# Patient Record
Sex: MALE | Race: BLACK OR AFRICAN AMERICAN | Employment: OTHER | URBAN - METROPOLITAN AREA
[De-identification: names, ages, dates, MRNs, and addresses within clinical notes are randomized per-mention and may not be internally consistent; named-entity substitution may affect disease eponyms.]

---

## 2017-01-03 ENCOUNTER — TELEPHONE (OUTPATIENT)
Dept: ENDOCRINOLOGY | Age: 72
End: 2017-01-03

## 2017-01-03 NOTE — TELEPHONE ENCOUNTER
----- Message from Sebastián Lockwood MD sent at 12/31/2016  8:28 AM EST -----  Please let him know I received a note from the pre-operative clinic about guidance for insulin dosing prior to his surgery on 1/10/17. Please confirm how much lantus he is currently taking at bedtime. For safety, I will have him take 2 units less the night before his surgery and not take any humalog the morning of his surgery unless his sugar is over 200. If over 200, he can take 2 units of humalog that morning.  -----      Pt notified of above note per Dr. Karoline Robles and voiced understanding of what was read to him. Pt currently taking 20 units of lantus so he will decrease it by 2 as noted above the night prior to surgery.

## 2017-01-10 ENCOUNTER — ANESTHESIA (OUTPATIENT)
Dept: SURGERY | Age: 72
End: 2017-01-10
Payer: MEDICARE

## 2017-01-10 ENCOUNTER — ANESTHESIA EVENT (OUTPATIENT)
Dept: SURGERY | Age: 72
End: 2017-01-10
Payer: MEDICARE

## 2017-01-10 PROCEDURE — 74011250636 HC RX REV CODE- 250/636

## 2017-01-10 PROCEDURE — 77030008684 HC TU ET CUF COVD -B: Performed by: ANESTHESIOLOGY

## 2017-01-10 PROCEDURE — 74011000250 HC RX REV CODE- 250

## 2017-01-10 PROCEDURE — A9270 NON-COVERED ITEM OR SERVICE: HCPCS

## 2017-01-10 PROCEDURE — 74011636637 HC RX REV CODE- 636/637

## 2017-01-10 PROCEDURE — 77030019908 HC STETH ESOPH SIMS -A: Performed by: ANESTHESIOLOGY

## 2017-01-10 PROCEDURE — 74011000258 HC RX REV CODE- 258: Performed by: UROLOGY

## 2017-01-10 PROCEDURE — 77030013079 HC BLNKT BAIR HGGR 3M -A: Performed by: ANESTHESIOLOGY

## 2017-01-10 PROCEDURE — 77030026438 HC STYL ET INTUB CARD -A: Performed by: ANESTHESIOLOGY

## 2017-01-10 PROCEDURE — 74011250636 HC RX REV CODE- 250/636: Performed by: UROLOGY

## 2017-01-10 RX ORDER — SODIUM CHLORIDE, SODIUM LACTATE, POTASSIUM CHLORIDE, CALCIUM CHLORIDE 600; 310; 30; 20 MG/100ML; MG/100ML; MG/100ML; MG/100ML
INJECTION, SOLUTION INTRAVENOUS
Status: DISCONTINUED | OUTPATIENT
Start: 2017-01-10 | End: 2017-01-10 | Stop reason: HOSPADM

## 2017-01-10 RX ORDER — ROCURONIUM BROMIDE 10 MG/ML
INJECTION, SOLUTION INTRAVENOUS AS NEEDED
Status: DISCONTINUED | OUTPATIENT
Start: 2017-01-10 | End: 2017-01-10 | Stop reason: HOSPADM

## 2017-01-10 RX ORDER — MIDAZOLAM HYDROCHLORIDE 1 MG/ML
INJECTION, SOLUTION INTRAMUSCULAR; INTRAVENOUS AS NEEDED
Status: DISCONTINUED | OUTPATIENT
Start: 2017-01-10 | End: 2017-01-10 | Stop reason: HOSPADM

## 2017-01-10 RX ORDER — FENTANYL CITRATE 50 UG/ML
INJECTION, SOLUTION INTRAMUSCULAR; INTRAVENOUS AS NEEDED
Status: DISCONTINUED | OUTPATIENT
Start: 2017-01-10 | End: 2017-01-10 | Stop reason: HOSPADM

## 2017-01-10 RX ORDER — SUCCINYLCHOLINE CHLORIDE 20 MG/ML
INJECTION INTRAMUSCULAR; INTRAVENOUS AS NEEDED
Status: DISCONTINUED | OUTPATIENT
Start: 2017-01-10 | End: 2017-01-10 | Stop reason: HOSPADM

## 2017-01-10 RX ORDER — PROPOFOL 10 MG/ML
INJECTION, EMULSION INTRAVENOUS AS NEEDED
Status: DISCONTINUED | OUTPATIENT
Start: 2017-01-10 | End: 2017-01-10 | Stop reason: HOSPADM

## 2017-01-10 RX ORDER — ONDANSETRON 2 MG/ML
INJECTION INTRAMUSCULAR; INTRAVENOUS AS NEEDED
Status: DISCONTINUED | OUTPATIENT
Start: 2017-01-10 | End: 2017-01-10 | Stop reason: HOSPADM

## 2017-01-10 RX ADMIN — ONDANSETRON 4 MG: 2 INJECTION INTRAMUSCULAR; INTRAVENOUS at 17:02

## 2017-01-10 RX ADMIN — SODIUM CHLORIDE, SODIUM LACTATE, POTASSIUM CHLORIDE, CALCIUM CHLORIDE: 600; 310; 30; 20 INJECTION, SOLUTION INTRAVENOUS at 15:40

## 2017-01-10 RX ADMIN — PROPOFOL 200 MG: 10 INJECTION, EMULSION INTRAVENOUS at 13:43

## 2017-01-10 RX ADMIN — SUCCINYLCHOLINE CHLORIDE 160 MG: 20 INJECTION INTRAMUSCULAR; INTRAVENOUS at 13:43

## 2017-01-10 RX ADMIN — FENTANYL CITRATE 100 MCG: 50 INJECTION, SOLUTION INTRAMUSCULAR; INTRAVENOUS at 13:43

## 2017-01-10 RX ADMIN — MIDAZOLAM HYDROCHLORIDE 2 MG: 1 INJECTION, SOLUTION INTRAMUSCULAR; INTRAVENOUS at 13:35

## 2017-01-10 RX ADMIN — SODIUM CHLORIDE, SODIUM LACTATE, POTASSIUM CHLORIDE, CALCIUM CHLORIDE: 600; 310; 30; 20 INJECTION, SOLUTION INTRAVENOUS at 16:42

## 2017-01-10 RX ADMIN — SODIUM CHLORIDE, SODIUM LACTATE, POTASSIUM CHLORIDE, CALCIUM CHLORIDE: 600; 310; 30; 20 INJECTION, SOLUTION INTRAVENOUS at 13:35

## 2017-01-10 RX ADMIN — ROCURONIUM BROMIDE 10 MG: 10 INJECTION, SOLUTION INTRAVENOUS at 13:43

## 2017-01-10 RX ADMIN — GENTAMICIN SULFATE 476 MG: 40 INJECTION, SOLUTION INTRAMUSCULAR; INTRAVENOUS at 13:50

## 2017-01-10 RX ADMIN — FENTANYL CITRATE 50 MCG: 50 INJECTION, SOLUTION INTRAMUSCULAR; INTRAVENOUS at 13:46

## 2017-01-10 NOTE — ANESTHESIA PREPROCEDURE EVALUATION
Anesthetic History   No history of anesthetic complications            Review of Systems / Medical History  Patient summary reviewed, nursing notes reviewed and pertinent labs reviewed    Pulmonary  Within defined limits      Sleep apnea           Neuro/Psych   Within defined limits           Cardiovascular  Within defined limits                     GI/Hepatic/Renal  Within defined limits         Liver disease     Endo/Other  Within defined limits  Diabetes: type 1    Arthritis     Other Findings              Physical Exam    Airway  Mallampati: II  TM Distance: > 6 cm  Neck ROM: normal range of motion   Mouth opening: Normal     Cardiovascular  Regular rate and rhythm,  S1 and S2 normal,  no murmur, click, rub, or gallop             Dental    Dentition: Full upper dentures     Pulmonary  Breath sounds clear to auscultation               Abdominal  GI exam deferred       Other Findings            Anesthetic Plan    ASA: 3  Anesthesia type: general          Induction: Intravenous  Anesthetic plan and risks discussed with: Patient

## 2017-01-11 NOTE — ANESTHESIA POSTPROCEDURE EVALUATION
Post-Anesthesia Evaluation and Assessment    Patient: Dave Dillon MRN: [id number]  SSN: [id number]    YOB: 1945  Age: 66 y.o. Sex: male       Cardiovascular Function/Vital Signs  Visit Vitals    /65    Pulse 91    Temp 37.1 °C (98.7 °F)    Resp 14    Ht 6' 2.5\" (1.892 m)    Wt 95.3 kg (210 lb)    SpO2 98%    BMI 26.6 kg/m2       Patient is status post general anesthesia for Procedure(s):  REMOVAL AND REPLACEMENT OF PENILE PROSTHESIS. Nausea/Vomiting: None    Postoperative hydration reviewed and adequate. Pain:  Pain Scale 1: Numeric (0 - 10) (01/10/17 2025)  Pain Intensity 1: 0 (01/10/17 2025)   Managed    Neurological Status:   Neuro (WDL): Within Defined Limits (01/10/17 1843)   At baseline    Mental Status and Level of Consciousness: Arousable    Pulmonary Status:   O2 Device: Room air (01/10/17 2025)   Adequate oxygenation and airway patent    Complications related to anesthesia: None    Post-anesthesia assessment completed.  No concerns    Signed By: Tian Bundy MD     January 10, 2017

## 2017-01-31 RX ORDER — INSULIN GLARGINE 100 [IU]/ML
INJECTION, SOLUTION SUBCUTANEOUS
Qty: 30 ADJUSTABLE DOSE PRE-FILLED PEN SYRINGE | Refills: 3 | Status: SHIPPED | OUTPATIENT
Start: 2017-01-31 | End: 2017-06-12 | Stop reason: SDUPTHER

## 2017-02-27 RX ORDER — INSULIN LISPRO 100 [IU]/ML
INJECTION, SOLUTION INTRAVENOUS; SUBCUTANEOUS
Qty: 30 ML | Refills: 3 | Status: SHIPPED | OUTPATIENT
Start: 2017-02-27 | End: 2017-09-01 | Stop reason: SDUPTHER

## 2017-03-28 ENCOUNTER — OFFICE VISIT (OUTPATIENT)
Dept: ENDOCRINOLOGY | Age: 72
End: 2017-03-28

## 2017-03-28 VITALS
BODY MASS INDEX: 25.89 KG/M2 | HEART RATE: 84 BPM | HEIGHT: 75 IN | DIASTOLIC BLOOD PRESSURE: 64 MMHG | WEIGHT: 208.2 LBS | SYSTOLIC BLOOD PRESSURE: 141 MMHG

## 2017-03-28 DIAGNOSIS — R03.0 ELEVATED BLOOD PRESSURE (NOT HYPERTENSION): ICD-10-CM

## 2017-03-28 DIAGNOSIS — E78.5 HYPERLIPIDEMIA LDL GOAL <100: ICD-10-CM

## 2017-03-28 NOTE — PROGRESS NOTES
Chief Complaint   Patient presents with    Diabetes     pcp and pharmacy confirmed    Other     eye exam is due     History of Present Illness: Nohemy Hardwick is a 67 y.o. male here for follow up of diabetes. Weight up 1 lbs since last visit in 10/16. Has been taking 19 units of lantus at bedtime since his revision of penile implant in 1/17. Still having trouble with breathing through his nose and just got a warm steam pot last week and has started using this and so far is helping. As a result, has not been using the CPAP. Fasting sugars are in the 140-200 range. Did have a low of 44 this morning after being more active in the yard yesterday but is not frequently having lows but can have some in the 50-60s before lunch. Still doing smoothie for breakfast.  Tends to eat some crackers at night for concern of his sugar going low overnight if his bedtime reading is under 150. Compliant with atorvastatin. Current Outpatient Prescriptions   Medication Sig    insulin lispro (HUMALOG KWIKPEN) 100 unit/mL kwikpen INJECT 1 UNIT FOR 8 GRAMS OF CARBOHYRATES AND 1 UNIT FOR 30 POINTS OVER 150 FOR CORRECTION, MAXIMUM 30 UNITS PER DAY    insulin glargine (LANTUS SOLOSTAR) 100 unit/mL (3 mL) pen Inject 19 units at bedtime    b complex vitamins tablet Take 1 Tab by mouth daily.  atorvastatin (LIPITOR) 10 mg tablet TAKE 1 TABLET DAILY    therapeutic multivitamin (THERA) tablet Take 1 Tab by mouth daily.  Cholecalciferol, Vitamin D3, (VITAMIN D) 2,000 unit Cap Take 1 Tab by mouth daily.  ONETOUCH ULTRA TEST strip TEST UP TO 5 TIMES DAILY   (INSULIN FLUCTUATING SUGARS). DX: E10.65     No current facility-administered medications for this visit.       Allergies   Allergen Reactions    Lisinopril Cough    Novocain [Procaine] Palpitations    Tamsulosin Other (comments)     Higher blood sugars     Review of Systems:  - Eyes: no blurry vision or double vision  - Cardiovascular: no chest pain  - Respiratory: no shortness of breath  - Musculoskeletal: no myalgias  - Neurological: no numbness/tingling in extremities    Physical Examination:  Blood pressure 141/64, pulse 84, height 6' 2.5\" (1.892 m), weight 208 lb 3.2 oz (94.4 kg). - General: pleasant, no distress, good eye contact   - Neck: no carotid bruits  - Cardiovascular: regular, normal rate, nl s1 and s2, no m/r/g,   - Respiratory: clear bilaterally  - Integumentary: no edema,   - Psychiatric: normal mood and affect    Data Reviewed:   Component      Latest Ref Rng & Units 3/27/2017 3/27/2017 3/27/2017           8:35 AM  8:35 AM  8:35 AM   Glucose      65 - 99 mg/dL 183 (H)     BUN      8 - 27 mg/dL 13     Creatinine      0.76 - 1.27 mg/dL 0.90     GFR est non-AA      >59 mL/min/1.73 84     GFR est AA      >59 mL/min/1.73 98     BUN/Creatinine ratio      10 - 22 14     Sodium      134 - 144 mmol/L 142     Potassium      3.5 - 5.2 mmol/L 4.4     Chloride      96 - 106 mmol/L 103     CO2      18 - 29 mmol/L 26     Calcium      8.6 - 10.2 mg/dL 9.0     Creatinine, urine      Not Estab. mg/dL  79.9    Microalbumin, urine      Not Estab. ug/mL  3.7    Microalbumin/Creat. Ratio      0.0 - 30.0 mg/g creat  4.6    Hemoglobin A1c, (calculated)      4.8 - 5.6 %   7.9 (H)   Estimated average glucose      mg/dL   180       Assessment/Plan:     1. Type I diabetes mellitus, uncontrolled: his most recent Hgb A1c was 7.9% in 3/17 down from 8.5% in 10/16 stable from 5/16 down from 9.5% in 12/15 up from 7.8% in 8/15 down from 7.9% in 4/15 up from 7.7% in 12/14 down from 8.3% in 7/14 down from 9.3% in 3/14 up from 8.5% in 11/13 up from 7.9% in 8/13 down from 8.4% 3/13 up from 7.4% in 12/12. I don't know how accurate this test will be because of his sickle cell trait so have been drawing both a Hgb A1c and a fructosamine.   His last fructosamine was 371 in 12/14 up from 366 in 7/14 down form 428 in 3/14 up from 400 in 11/13 up from 338 in 8/13 down from 373 in 3/13 up from 345 in 12/12 and 330 in 6/12. His A1c is still above goal < 7.5% but hopefully avoiding snacking at bedtime will help. - cont Lantus 19 units at bedtime  - cont humalog 1:8 for carbs and 1:30 > 150 for correction  - check bs up to 5 times daily due to fluctuating sugars and risk of hypoglycemia  - foot exam done 5/16  - optho UTD 7/15  - microalbumin nl 3/17  - check A1c and bmp prior to next visit      2. Other and unspecified hyperlipidemia: Given DM, Goal LDL < 100, non-HDL < 130, and TG < 150. LDL 79 3/12, 78 12/12 and 84 3/13 and 75 in 11/13 and 70 in 7/14 and 63 in 4/15. Up to 99 in 12/15 and 100 in 5/16 so went up to a while tab daily and LDL down to 74 in 10/16  - cont lipitor 10 mg daily  - check lipids prior to next visit      3. Elevated blood pressure (not hypertension): his BP was above goal < 140/90 in 12/14 and prior to that visit was at goal so monitor home readings and they are at goal off any meds. Up today but didn't repeat manually. - monitor home readings off meds for now        Patient Instructions   1) Your Hemoglobin A1c (3 month test of blood sugar) was 7.9% down from 8.5% and my goal is to get this under 7.5% so you are not far from goal and I think if we get your morning sugars more consistently under 130, we'll get your A1c to goal.    2) Check your bedtime readings and don't eat anything with sugar. Take your 19 units of lantus and then check your sugar in the morning and as long as it's staying between , this should be a safe dose of lantus and you won't need to eat before bed to avoid lows. If you are dropping 90, then cut back to 18 units of lantus. 3) If you are more active during the day, plan on taking 2 units less humalog for the meal after activity to avoid lows overnight. 4) Your urine protein and kidney function are normal.        Follow-up Disposition:  Return in about 5 months (around 8/28/2017).     Copy sent to:  Dr. Jessica Rangelina Hack Sebastien

## 2017-03-28 NOTE — MR AVS SNAPSHOT
Visit Information Date & Time Provider Department Dept. Phone Encounter #  
 3/28/2017  2:10 PM Swathi Larios, [address] [place] [address] Diabetes and Endocrinology [phone] [id number] Follow-up Instructions Return in about 5 months (around 8/28/2017). Upcoming Health Maintenance Date Due FOBT Q 1 YEAR AGE 50-75 2/8/1994 ZOSTER VACCINE AGE 60> 2/8/2004 MEDICARE YEARLY EXAM 2/8/2009 Pneumococcal 65+ High/Highest Risk (2 of 2 - PPSV23) 1/12/2015 EYE EXAM RETINAL OR DILATED Q1 7/15/2016 INFLUENZA AGE 9 TO ADULT 8/1/2016 FOOT EXAM Q1 5/23/2017 GLAUCOMA SCREENING Q2Y 7/15/2017 HEMOGLOBIN A1C Q6M 9/27/2017 LIPID PANEL Q1 10/18/2017 MICROALBUMIN Q1 3/27/2018 DTaP/Tdap/Td series (2 - Td) 2/13/2024 Allergies as of 3/28/2017  Review Complete On: 3/28/2017 By: Swathi Larios MD  
  
 Severity Noted Reaction Type Reactions Lisinopril  02/24/2015    Cough Novocain [Procaine]  11/12/2010    Palpitations Tamsulosin  08/01/2013    Other (comments) Higher blood sugars Current Immunizations  Reviewed on 7/3/2012 Name Date Pneumococcal Vaccine (Unspecified Type) 1/12/2010 Tdap 2/13/2014  3:40 PM  
  
 Not reviewed this visit You Were Diagnosed With   
  
 Codes Comments Uncontrolled type 1 diabetes mellitus with diabetic polyneuropathy ([facility]    -  Primary ICD-10-CM: E10.42, E10.65 ICD-9-CM: 250.63, 357.2 Hyperlipidemia LDL goal <100     ICD-10-CM: E78.5 ICD-9-CM: 272.4 Elevated blood pressure (not hypertension)     ICD-10-CM: R03.0 ICD-9-CM: 796.2 Vitals BP Pulse Height(growth percentile) Weight(growth percentile) BMI Smoking Status 141/64 (BP 1 Location: Left arm, BP Patient Position: Sitting) 84 6' 2.5\" (1.892 m) 208 lb 3.2 oz (94.4 kg) 26.37 kg/m2 Former Smoker Vitals History BMI and BSA Data Body Mass Index Body Surface Area  
 26.37 kg/m 2 2.23 m 2 Preferred Pharmacy Pharmacy Name Phone [facility], [address] [phone] Your Updated Medication List  
  
   
This list is accurate as of: 3/28/17  3:14 PM.  Always use your most recent med list.  
  
  
  
  
 atorvastatin 10 mg tablet Commonly known as:  LIPITOR  
TAKE 1 TABLET DAILY  
  
 b complex vitamins tablet Take 1 Tab by mouth daily. insulin glargine 100 unit/mL (3 mL) pen Commonly known as:  LANTUS SOLOSTAR Inject 19 units at bedtime  
  
 insulin lispro 100 unit/mL kwikpen Commonly known as:  HumaLOG KwikPen INJECT 1 UNIT FOR 8 GRAMS OF CARBOHYRATES AND 1 UNIT FOR 30 POINTS OVER 150 FOR CORRECTION, MAXIMUM 30 UNITS PER DAY  
  
 ONETOUCH ULTRA TEST strip Generic drug:  glucose blood VI test strips TEST UP TO 5 TIMES DAILY   (INSULIN FLUCTUATING SUGARS). DX: E10.65 THERA tablet Generic drug:  therapeutic multivitamin Take 1 Tab by mouth daily. VITAMIN D 2,000 unit Cap capsule Generic drug:  Cholecalciferol (Vitamin D3) Take 1 Tab by mouth daily. We Performed the Following HEMOGLOBIN A1C WITH EAG [89115 CPT(R)] LIPID PANEL [95027 CPT(R)] METABOLIC PANEL, BASIC [38780 CPT(R)] Follow-up Instructions Return in about 5 months (around 8/28/2017). Patient Instructions 1) Your Hemoglobin A1c (3 month test of blood sugar) was 7.9% down from 8.5% and my goal is to get this under 7.5% so you are not far from goal and I think if we get your morning sugars more consistently under 130, we'll get your A1c to goal. 
 
2) Check your bedtime readings and don't eat anything with sugar. Take your 19 units of lantus and then check your sugar in the morning and as long as it's staying between , this should be a safe dose of lantus and you won't need to eat before bed to avoid lows. If you are dropping 90, then cut back to 18 units of lantus. 3) If you are more active during the day, plan on taking 2 units less humalog for the meal after activity to avoid lows overnight. 4) Your urine protein and kidney function are normal. 
 
 
 
  
Introducing [employer]! Tisha Rodney introduces MyPermissions patient portal. Now you can access parts of your medical record, email your doctor's office, and request medication refills online. 1. In your internet browser, go to [email or web address] 2. Click on the First Time User? Click Here link in the Sign In box. You will see the New Member Sign Up page. 3. Enter your MyPermissions Access Code exactly as it appears below. You will not need to use this code after youve completed the sign-up process. If you do not sign up before the expiration date, you must request a new code. · MyPermissions Access Code: [email or web address] Expires: 6/26/2017  3:14 PM 
 
4. Enter the last four digits of your Social Security Number (xxxx) and Date of Birth (mm/dd/yyyy) as indicated and click Submit. You will be taken to the next sign-up page. 5. Create a MyPermissions ID. This will be your MyPermissions login ID and cannot be changed, so think of one that is secure and easy to remember. 6. Create a MyPermissions password. You can change your password at any time. 7. Enter your Password Reset Question and Answer. This can be used at a later time if you forget your password. 8. Enter your e-mail address. You will receive e-mail notification when new information is available in [address] 9. Click Sign Up. You can now view and download portions of your medical record. 10. Click the Download Summary menu link to download a portable copy of your medical information. If you have questions, please visit the Frequently Asked Questions section of the MyPermissions website. Remember, MyPermissions is NOT to be used for urgent needs. For medical emergencies, dial 911. Now available from your iPhone and Android! Please provide this summary of care documentation to your next provider. Your primary care clinician is listed as [address]. If you have any questions after today's visit, please call [phone].

## 2017-03-28 NOTE — PATIENT INSTRUCTIONS
1) Your Hemoglobin A1c (3 month test of blood sugar) was 7.9% down from 8.5% and my goal is to get this under 7.5% so you are not far from goal and I think if we get your morning sugars more consistently under 130, we'll get your A1c to goal.    2) Check your bedtime readings and don't eat anything with sugar. Take your 19 units of lantus and then check your sugar in the morning and as long as it's staying between , this should be a safe dose of lantus and you won't need to eat before bed to avoid lows. If you are dropping 90, then cut back to 18 units of lantus. 3) If you are more active during the day, plan on taking 2 units less humalog for the meal after activity to avoid lows overnight.     4) Your urine protein and kidney function are normal.

## 2017-05-08 RX ORDER — ATORVASTATIN CALCIUM 10 MG/1
TABLET, FILM COATED ORAL
Qty: 90 TAB | Refills: 3 | Status: SHIPPED | OUTPATIENT
Start: 2017-05-08 | End: 2018-08-06 | Stop reason: SDUPTHER

## 2017-06-08 ENCOUNTER — TELEPHONE (OUTPATIENT)
Dept: ENDOCRINOLOGY | Age: 72
End: 2017-06-08

## 2017-06-08 NOTE — TELEPHONE ENCOUNTER
Patient is calling with concerns regarding his \"fluctuating sugar readings, in the range of 45 -203. Patient contact- cell- [phone] or [phone]. Thank you.   Leda Wyatt next appt: 09/2017

## 2017-06-12 ENCOUNTER — TELEPHONE (OUTPATIENT)
Dept: ENDOCRINOLOGY | Age: 72
End: 2017-06-12

## 2017-06-12 ENCOUNTER — HOSPITAL ENCOUNTER (EMERGENCY)
Age: 72
Discharge: HOME | End: 2017-06-12
Attending: EMERGENCY MEDICINE
Payer: MEDICARE

## 2017-06-12 VITALS
OXYGEN SATURATION: 100 % | DIASTOLIC BLOOD PRESSURE: 66 MMHG | HEART RATE: 94 BPM | BODY MASS INDEX: 25.61 KG/M2 | RESPIRATION RATE: 18 BRPM | SYSTOLIC BLOOD PRESSURE: 146 MMHG | WEIGHT: 202.16 LBS | TEMPERATURE: 97.7 F

## 2017-06-12 DIAGNOSIS — E16.2 HYPOGLYCEMIA: Primary | ICD-10-CM

## 2017-06-12 LAB
ALBUMIN SERPL BCP-MCNC: 3.7 G/DL (ref 3.5–5)
ALBUMIN/GLOB SERPL: 0.9 {RATIO} (ref 1.1–2.2)
ALP SERPL-CCNC: 77 U/L (ref 45–117)
ALT SERPL-CCNC: 44 U/L (ref 12–78)
ANION GAP BLD CALC-SCNC: 1 MMOL/L (ref 5–15)
APPEARANCE UR: CLEAR
AST SERPL W P-5'-P-CCNC: 39 U/L (ref 15–37)
BACTERIA URNS QL MICRO: NEGATIVE /HPF
BASOPHILS # BLD AUTO: 0 K/UL (ref 0–0.1)
BASOPHILS # BLD: 0 % (ref 0–1)
BILIRUB SERPL-MCNC: 0.3 MG/DL (ref 0.2–1)
BILIRUB UR QL: NEGATIVE
BUN SERPL-MCNC: 14 MG/DL (ref 6–20)
BUN/CREAT SERPL: 12 (ref 12–20)
CALCIUM SERPL-MCNC: 8.7 MG/DL (ref 8.5–10.1)
CHLORIDE SERPL-SCNC: 105 MMOL/L (ref 97–108)
CO2 SERPL-SCNC: 32 MMOL/L (ref 21–32)
COLOR UR: ABNORMAL
CREAT SERPL-MCNC: 1.13 MG/DL (ref 0.7–1.3)
EOSINOPHIL # BLD: 0 K/UL (ref 0–0.4)
EOSINOPHIL NFR BLD: 2 % (ref 0–7)
EPITH CASTS URNS QL MICRO: ABNORMAL /LPF
ERYTHROCYTE [DISTWIDTH] IN BLOOD BY AUTOMATED COUNT: 14 % (ref 11.5–14.5)
GLOBULIN SER CALC-MCNC: 4.1 G/DL (ref 2–4)
GLUCOSE BLD STRIP.AUTO-MCNC: 276 MG/DL (ref 65–100)
GLUCOSE SERPL-MCNC: 275 MG/DL (ref 65–100)
GLUCOSE UR STRIP.AUTO-MCNC: 500 MG/DL
HCT VFR BLD AUTO: 37.4 % (ref 36.6–50.3)
HGB BLD-MCNC: 12.6 G/DL (ref 12.1–17)
HGB UR QL STRIP: NEGATIVE
HYALINE CASTS URNS QL MICRO: ABNORMAL /LPF (ref 0–5)
KETONES UR QL STRIP.AUTO: NEGATIVE MG/DL
LEUKOCYTE ESTERASE UR QL STRIP.AUTO: NEGATIVE
LYMPHOCYTES # BLD AUTO: 37 % (ref 12–49)
LYMPHOCYTES # BLD: 0.7 K/UL (ref 0.8–3.5)
MCH RBC QN AUTO: 27.2 PG (ref 26–34)
MCHC RBC AUTO-ENTMCNC: 33.7 G/DL (ref 30–36.5)
MCV RBC AUTO: 80.8 FL (ref 80–99)
MONOCYTES # BLD: 0.2 K/UL (ref 0–1)
MONOCYTES NFR BLD AUTO: 11 % (ref 5–13)
NEUTS SEG # BLD: 1.1 K/UL (ref 1.8–8)
NEUTS SEG NFR BLD AUTO: 50 % (ref 32–75)
NITRITE UR QL STRIP.AUTO: NEGATIVE
PH UR STRIP: 5.5 [PH] (ref 5–8)
PLATELET # BLD AUTO: 201 K/UL (ref 150–400)
POTASSIUM SERPL-SCNC: 4.6 MMOL/L (ref 3.5–5.1)
PROT SERPL-MCNC: 7.8 G/DL (ref 6.4–8.2)
PROT UR STRIP-MCNC: NEGATIVE MG/DL
RBC # BLD AUTO: 4.63 M/UL (ref 4.1–5.7)
RBC #/AREA URNS HPF: ABNORMAL /HPF (ref 0–5)
RBC MORPH BLD: ABNORMAL
SERVICE CMNT-IMP: ABNORMAL
SODIUM SERPL-SCNC: 138 MMOL/L (ref 136–145)
SP GR UR REFRACTOMETRY: 1.02 (ref 1–1.03)
UA: UC IF INDICATED,UAUC: ABNORMAL
UROBILINOGEN UR QL STRIP.AUTO: 0.2 EU/DL (ref 0.2–1)
WBC # BLD AUTO: 2 K/UL (ref 4.1–11.1)
WBC URNS QL MICRO: ABNORMAL /HPF (ref 0–4)

## 2017-06-12 PROCEDURE — 99283 EMERGENCY DEPT VISIT LOW MDM: CPT

## 2017-06-12 PROCEDURE — 36415 COLL VENOUS BLD VENIPUNCTURE: CPT | Performed by: EMERGENCY MEDICINE

## 2017-06-12 PROCEDURE — 81001 URINALYSIS AUTO W/SCOPE: CPT | Performed by: EMERGENCY MEDICINE

## 2017-06-12 PROCEDURE — 82962 GLUCOSE BLOOD TEST: CPT

## 2017-06-12 PROCEDURE — 85025 COMPLETE CBC W/AUTO DIFF WBC: CPT | Performed by: EMERGENCY MEDICINE

## 2017-06-12 PROCEDURE — 80053 COMPREHEN METABOLIC PANEL: CPT | Performed by: EMERGENCY MEDICINE

## 2017-06-12 RX ORDER — INSULIN GLARGINE 100 [IU]/ML
INJECTION, SOLUTION SUBCUTANEOUS
Qty: 30 ADJUSTABLE DOSE PRE-FILLED PEN SYRINGE | Refills: 3
Start: 2017-06-12 | End: 2019-04-23

## 2017-06-12 NOTE — DISCHARGE INSTRUCTIONS
Learning About Low Blood Sugar (Hypoglycemia) in Diabetes  What is low blood sugar (hypoglycemia)? Hypoglycemia means that your blood sugar is low and your body (especially your brain) is not getting enough fuel. If you have diabetes, your blood sugar can go too low if you take too much of some diabetes medicines. It can also go too low if you miss a meal. And it can happen if you exercise too hard without eating enough food. Some medicines used to treat other health problems can cause low blood sugar too. What are the symptoms? Symptoms of low blood sugar can start quickly. It may take just 10 to 15 minutes. If you have had diabetes for many years, you may not realize that your blood sugar is low until it drops very low. · If your blood sugar level drops below 70 (mild low blood sugar), you may feel tired, anxious, dizzy, weak, shaky, or sweaty. You may have a fast heartbeat or blurry vision. · If your blood sugar level continues to drop (usually below 40), your behavior may change. You may feel more irritable. You may find it hard to concentrate or talk. And you may feel unsteady when you stand or walk. You may become too weak or confused to eat something with sugar to raise your blood sugar level. · If your blood sugar level drops very low (usually below 20), you may pass out (lose consciousness). Or you may have a seizure or stroke. If you have symptoms of severe low blood sugar, you need to get medical care right away. If you had a low blood sugar level during the night, you may wake up tired or with a headache. Or you may sweat so much during the night that your pajamas or sheets are damp when you wake up. How is low blood sugar treated? You can treat low blood sugar by eating or drinking something that has 15 grams of carbohydrate. These should be quick-sugar foods.  Check your blood sugar level again 15 minutes after having a quick-sugar food to make sure your level is getting back to your target range. Here are examples of quick-sugar foods that have 15 grams of carbohydrate:  · 3 to 4 glucose tablets  · 1 tube of glucose gel  · Hard candy (such as 3 Jolly Ranchers or 5 to 7 Life Savers)  · 1 tablespoon honey  · 2 tablespoons of raisins  · ½ cup to ¾ cup (4 to 6 ounces) of fruit juice or regular (not diet) soda  · 1 tablespoon of sugar  · 1 cup of fat-free milk  If you have problems with severe low blood sugar, someone else may have to give you a shot of glucagon. This is a hormone that raises blood sugar levels quickly. How can you prevent low blood sugar? You can take steps to prevent low blood sugar. · Follow your treatment plan. Take your insulin or other diabetes medicine exactly as your doctor prescribed it. Talk with your doctor if you're having low blood sugar often. Your medicine may need to be adjusted if it's causing your low blood sugar. · Check your blood sugar levels often. This helps you find early changes before an emergency happens. · Keep a quick-sugar food with you in case your blood sugar level drops low. · Eat small meals more often so that you don't get too hungry between meals. Don't skip meals. · Balance extra exercise with eating more. Check your blood sugar and learn how it changes after exercise. If your blood sugar stays at a normal level, you may not need to eat after you exercise. · Limit how much alcohol you drink. Alcohol can make low blood sugar go even lower. Don't drink alcohol if you have problems recognizing the early signs of low blood sugar. · Keep a diary of your symptoms. This helps you learn when changes in your body may signal low blood sugar. And keep track of how often you have low blood sugar, including when you last ate and what you ate. This will help you learn what causes your blood sugar to drop. · Learn about diabetes and low blood sugar.  Support groups or a diabetes education center can help you understand how medicines, diet, and exercise affect your blood sugar levels. Since low blood sugar levels can quickly become an emergency, be sure to wear medical alert jewelry, such as a medical alert bracelet. This is to let people know you have diabetes so they can get help for you. You can buy this at most drugstores. And make sure your family, friends, and coworkers know the symptoms of low blood sugar. Teach them what to do to get your sugar level up. Follow-up care is a key part of your treatment and safety. Be sure to make and go to all appointments, and call your doctor if you are having problems. It's also a good idea to know your test results and keep a list of the medicines you take. Where can you learn more? Go to http://ernesto-vilma.info/. Enter O584 in the search box to learn more about \"Learning About Low Blood Sugar (Hypoglycemia) in Diabetes. \"  Current as of: May 23, 2016  Content Version: 11.2  © 0158-0848 [employer]. Care instructions adapted under license by [employer] (which disclaims liability or warranty for this information). If you have questions about a medical condition or this instruction, always ask your healthcare professional. [address] any warranty or liability for your use of this information.

## 2017-06-12 NOTE — CONSULTS
Endocrinology Consult    Called and spoke with Dr. Lupe Johnson and told her that I had received patient's blood sugar log that he dropped off on Thursday but was out of town that day and didn't get a chance to look at his log until this weekend and had planned on calling him tonight after clinic to try and help him determine what to do with his insulin doses. I advised her to treat him for the hypoglycemia and discharge him and let him know I will call him tonight after clinic to discuss his readings and she said she would do so. Please don't hesitate to call [phone] with further questions.     Martín Lawler MD

## 2017-06-12 NOTE — TELEPHONE ENCOUNTER
Kathe Campbell from [facility] emergency room is calling in a consult for Dr. Pérez Bright. Patient: Debbie Sherman  : 43  Reason: Low blood sugars  Referring: Dr. Johnna Wilburn  Phone: [phone]      Thanks.

## 2017-06-12 NOTE — TELEPHONE ENCOUNTER
Called and spoke with pt. I reviewed his blood sugar log that he dropped off and he has had widely fluctuating sugars from June 1st-8th with readings as low as 45 and as high as 335. There doesn't seem to be too much pattern to his highs or lows but did have a fasting sugar of 75 on 6/8 and 45 on 6/7 and again today at 50 despite cutting his lantus back from 19 units to 18 units. He states about a month ago he was treated for a UTI by Dr. Bradford Hernandez and since then his blood sugars have been more variable. He states his current weight is down to 194 lbs on his scale, down from 208 when he saw me last in 3/17. He said he has been eating packaged meals to allow him to accurately count his carbs and has been dosing humalog 1 unit for 8 grams of carbs. I told him he is likely more sensitive to insulin now that he has lost weight and advised him to decrease his lanuts to 16 units at bedtime starting tonight. He should keep his humalog with meals (1:8) and for correction (1:30 > 150) the same but if he has any more low sugars over the next 2 days, he should change his carb ratio to 1:10. I told him to call me with any further troubles with his sugars. he voiced understanding of this plan.
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
## 2017-07-24 ENCOUNTER — TELEPHONE (OUTPATIENT)
Dept: ENDOCRINOLOGY | Age: 72
End: 2017-07-24

## 2017-07-25 NOTE — TELEPHONE ENCOUNTER
----- Message from Annamaria Mobley sent at 7/21/2017  3:13 PM EDT -----  Regarding: need Physician Order  Mr. Sneha Byrd is scheduled for nutrition appointment with Selena Ba RD, CDE on 8/11/17 per patient's request.  When you have a moment would you please enter an Order into [facility]. Thank you.     Daniel Rodrigues

## 2017-07-31 ENCOUNTER — TELEPHONE (OUTPATIENT)
Dept: DIABETES SERVICES | Age: 72
End: 2017-07-31

## 2017-07-31 ENCOUNTER — TELEPHONE (OUTPATIENT)
Dept: ENDOCRINOLOGY | Age: 72
End: 2017-07-31

## 2017-08-31 LAB
BUN SERPL-MCNC: 16 MG/DL (ref 8–27)
BUN/CREAT SERPL: 16 (ref 10–24)
CALCIUM SERPL-MCNC: 9 MG/DL (ref 8.6–10.2)
CHLORIDE SERPL-SCNC: 103 MMOL/L (ref 96–106)
CHOLEST SERPL-MCNC: 131 MG/DL (ref 100–199)
CO2 SERPL-SCNC: 25 MMOL/L (ref 18–29)
CREAT SERPL-MCNC: 1.02 MG/DL (ref 0.76–1.27)
EST. AVERAGE GLUCOSE BLD GHB EST-MCNC: 192 MG/DL
GLUCOSE SERPL-MCNC: 112 MG/DL (ref 65–99)
HBA1C MFR BLD: 8.3 % (ref 4.8–5.6)
HDLC SERPL-MCNC: 50 MG/DL
INTERPRETATION, 910389: NORMAL
LDLC SERPL CALC-MCNC: 73 MG/DL (ref 0–99)
POTASSIUM SERPL-SCNC: 4.3 MMOL/L (ref 3.5–5.2)
SODIUM SERPL-SCNC: 143 MMOL/L (ref 134–144)
TRIGL SERPL-MCNC: 41 MG/DL (ref 0–149)
VLDLC SERPL CALC-MCNC: 8 MG/DL (ref 5–40)

## 2017-09-01 ENCOUNTER — OFFICE VISIT (OUTPATIENT)
Dept: ENDOCRINOLOGY | Age: 72
End: 2017-09-01

## 2017-09-01 VITALS
DIASTOLIC BLOOD PRESSURE: 65 MMHG | HEART RATE: 85 BPM | BODY MASS INDEX: 25.95 KG/M2 | SYSTOLIC BLOOD PRESSURE: 147 MMHG | HEIGHT: 74 IN | WEIGHT: 202.2 LBS

## 2017-09-01 DIAGNOSIS — E78.5 HYPERLIPIDEMIA LDL GOAL <100: ICD-10-CM

## 2017-09-01 DIAGNOSIS — R03.0 ELEVATED BLOOD PRESSURE READING WITHOUT DIAGNOSIS OF HYPERTENSION: ICD-10-CM

## 2017-09-01 RX ORDER — INSULIN LISPRO 100 [IU]/ML
INJECTION, SOLUTION INTRAVENOUS; SUBCUTANEOUS
Qty: 30 ML | Refills: 3
Start: 2017-09-01 | End: 2018-05-14 | Stop reason: SDUPTHER

## 2017-09-01 NOTE — PATIENT INSTRUCTIONS
1) I think the lantus 16 units looks good so stay on this dose. 2) If you eat after 9pm, dose your novolog 1 unit for 10 grams of carbs. 3) Only give a correction dose of novolog after 9pm if your sugar is over 180 and then take based on the scale below:  -181-230=1 unit  -231-280=2 units  -281-330=3 units  -331-380=4 units  - over 380=5 units    4) If you are more active during the day, plan on taking 2 units less humalog for the meal after activity to avoid lows overnight. 5) Start monitoring blood pressure about 2-3 times per week at alternating times either in the morning or evening after resting for 5 minutes and sitting upright in a chair with your arm at heart level. Please let me know if you are having readings over 140 on the top number or 90 on the bottom number. 6) I will make a referral to the diabetes treatment center who will contact you for an appointment for further education.

## 2017-09-01 NOTE — MR AVS SNAPSHOT
Visit Information Date & Time Provider Department Dept. Phone Encounter #  
 9/1/2017  2:30 PM Scooter Amin, [address] [facility] Diabetes and Endocr[facility]ology [phone] [id number] Follow-up Instructions Return in about 5 months (around 2/1/2018). Upcoming Health Maintenance Date Due FOBT Q 1 YEAR AGE 50-75 2/8/1994 ZOSTER VACCINE AGE 60> 12/8/2003 MEDICARE YEARLY EXAM 2/8/2009 Pneumococcal 65+ High/Highest Risk (2 of 2 - PPSV23) 1/12/2015 FOOT EXAM Q1 5/23/2017 INFLUENZA AGE 9 TO ADULT 8/1/2017 HEMOGLOBIN A1C Q6M 2/28/2018 MICROALBUMIN Q1 3/27/2018 EYE EXAM RETINAL OR DILATED Q1 6/5/2018 LIPID PANEL Q1 8/30/2018 GLAUCOMA SCREENING Q2Y 6/5/2019 DTaP/Tdap/Td series (2 - Td) 2/13/2024 Allergies as of 9/1/2017  Review Complete On: 9/1/2017 By: Scooter Amin MD  
  
 Severity Noted Reaction Type Reactions Lisinopril  02/24/2015    Cough Novocain [Procaine]  11/12/2010    Palpitations Tamsulosin  08/01/2013    Other (comments) Higher blood sugars Current Immunizations  Reviewed on 7/3/2012 Name Date Tdap 2/13/2014  3:40 PM  
 ZZZ-RETIRED (DO NOT USE) Pneumococcal Vaccine (Unspecified Type) 1/12/2010 Not reviewed this visit You Were Diagnosed With   
  
 Codes Comments Uncontrolled type 1 diabetes mellitus with diabetic polyneuropathy ([facility]    -  Primary ICD-10-CM: E10.42, E10.65 ICD-9-CM: 250.63, 357.2 Hyperlipidemia LDL goal <100     ICD-10-CM: E78.5 ICD-9-CM: 272.4 Elevated blood pressure reading without diagnosis of hypertension     ICD-10-CM: R03.0 ICD-9-CM: 796.2 Vitals BP Pulse Height(growth percentile) Weight(growth percentile) BMI Smoking Status 147/65 85 6' 2\" (1.88 m) 202 lb 3.2 oz (91.7 kg) 25.96 kg/m2 Former Smoker Vitals History BMI and BSA Data Body Mass Index Body Surface Area  
 25.96 kg/m 2 2.19 m 2 Preferred Pharmacy Pharmacy Name Phone [address] [place] [address] Halima Fortune [phone] Your Updated Medication List  
  
   
This list is accurate as of: 9/1/17  3:41 PM.  Always use your most recent med list.  
  
  
  
  
 atorvastatin 10 mg tablet Commonly known as:  LIPITOR  
TAKE 1 TABLET BY MOUTH DAILY  
  
 b complex vitamins tablet Take 1 Tab by mouth daily. insulin glargine 100 unit/mL (3 mL) Inpn Commonly known as:  LANTUS SOLOSTAR Inject 16 units at bedtime--Dose change 6/12/17--updated med list--did not send prescription to the pharmacy  
  
 insulin lispro 100 unit/mL kwikpen Commonly known as:  HumaLOG KwikPen INJECT 1 UNIT FOR 9 GRAMS OF CARBOHYRATES AND 1 UNIT FOR 30 POINTS OVER 150 FOR CORRECTION, MAXIMUM 30 UNITS PER DAY--Dose change 9/1/17--updated med list--did not send prescription to the pharmacy ONETOUCH ULTRA TEST strip Generic drug:  glucose blood VI test strips TEST UP TO 5 TIMES DAILY   (INSULIN FLUCTUATING SUGARS). DX: E10.65 THERA tablet Generic drug:  therapeutic multivitamin Take 1 Tab by mouth daily. VITAMIN D 2,000 unit Cap capsule Generic drug:  Cholecalciferol (Vitamin D3) Take 1 Tab by mouth daily. We Performed the Following HEMOGLOBIN A1C WITH EAG [53565 CPT(R)] METABOLIC PANEL, BASIC [04501 CPT(R)] REFERRAL TO DIABETIC EDUCATION [REF20 Custom] Comments:  
 Needs individual visit for carb counting and diet education Follow-up Instructions Return in about 5 months (around 2/1/2018). Referral Information Referral ID Referred By Referred To  
  
 [id number] [facility] DIABETIC TREATMENT   
   [address]    
   [address] [address] [address] , [address] Phone: [phone] Fax: [phone] Visits Status Start Date End Date 1 New Request 9/1/17 9/1/18  If your referral has a status of pending review or denied, additional information will be sent to support the outcome of this decision. Patient Instructions 1) I think the lantus 16 units looks good so stay on this dose. 2) If you eat after 9pm, dose your novolog 1 unit for 10 grams of carbs. 3) Only give a correction dose of novolog after 9pm if your sugar is over 180 and then take based on the scale below: 
-181-230=1 unit 
-231-280=2 units 
-281-330=3 units 
-331-380=4 units 
- over 380=5 units 4) If you are more active during the day, plan on taking 2 units less humalog for the meal after activity to avoid lows overnight. 5) Start monitoring blood pressure about 2-3 times per week at alternating times either in the morning or evening after resting for 5 minutes and sitting upright in a chair with your arm at heart level. Please let me know if you are having readings over 140 on the top number or 90 on the bottom number. 6) I will make a referral to the diabetes treatment center who will contact you for an appointment for further education. Introducing hospitals & HEALTH SERVICES! [facility] introduces [employer] patient portal. Now you can access parts of your medical record, email your doctor's office, and request medication refills online. 1. In your internet browser, go to [email or web address] 2. Click on the First Time User? Click Here link in the Sign In box. You will see the New Member Sign Up page. 3. Enter your [employer] Access Code exactly as it appears below. You will not need to use this code after youve completed the sign-up process. If you do not sign up before the expiration date, you must request a new code. · [employer] Access Code: [email or web address] Expires: 10/7/2017  2:09 PM 
 
4. Enter the last four digits of your Social Security Number (xxxx) and Date of Birth (mm/dd/yyyy) as indicated and click Submit. You will be taken to the next sign-up page. 5. Create a [employer] ID. This will be your [employer] login ID and cannot be changed, so think of one that is secure and easy to remember. 6. Create a [employer] password. You can change your password at any time. 7. Enter your Password Reset Question and Answer. This can be used at a later time if you forget your password. 8. Enter your e-mail address. You will receive e-mail notification when new information is available in [address] 9. Click Sign Up. You can now view and download portions of your medical record. 10. Click the Download Summary menu link to download a portable copy of your medical information. If you have questions, please visit the Frequently Asked Questions section of the [employer] website. Remember, [employer] is NOT to be used for urgent needs. For medical emergencies, dial 911. Now available from your iPhone and Android! Please provide this summary of care documentation to your next provider. Your primary care clinician is listed as [address]. If you have any questions after today's visit, please call [phone].

## 2017-09-01 NOTE — PROGRESS NOTES
Chief Complaint   Patient presents with    Diabetes     pcp and pharmacy confirmed    Diabetic Foot Exam     due     History of Present Illness: Jacqueline Barr is a 67 y.o. male here for follow up of diabetes. Weight down 6 lbs since last visit in 3/17 but this is actually up about 7 lbs from where it had been in 6/17 after he had been treated for UTI. Has been taking 16 units of lantus at bedtime and changed his humalog to 1:9 for food. Has many days where he can have good readings in the 100-180 range but other days where he can wake up with sugars in the 50-70 range and often this will happen after correcting for high sugars the night before. Also if he eats after 9pm he tends to have a low sugar the next day. Has not been taking less insulin for dinner when more active during the day and this could be leading to lows too. Willing to check some Home BP readings. Current Outpatient Prescriptions   Medication Sig    insulin lispro (HUMALOG KWIKPEN) 100 unit/mL kwikpen INJECT 1 UNIT FOR 9 GRAMS OF CARBOHYRATES AND 1 UNIT FOR 30 POINTS OVER 150 FOR CORRECTION, MAXIMUM 30 UNITS PER DAY--Dose change 9/1/17--updated med list--did not send prescription to the pharmacy    insulin glargine (LANTUS SOLOSTAR) 100 unit/mL (3 mL) inpn Inject 16 units at bedtime--Dose change 6/12/17--updated med list--did not send prescription to the pharmacy    atorvastatin (LIPITOR) 10 mg tablet TAKE 1 TABLET BY MOUTH DAILY    b complex vitamins tablet Take 1 Tab by mouth daily.  therapeutic multivitamin (THERA) tablet Take 1 Tab by mouth daily.  Cholecalciferol, Vitamin D3, (VITAMIN D) 2,000 unit Cap Take 1 Tab by mouth daily.  ONETOUCH ULTRA TEST strip TEST UP TO 5 TIMES DAILY   (INSULIN FLUCTUATING SUGARS). DX: E10.65     No current facility-administered medications for this visit.       Allergies   Allergen Reactions    Lisinopril Cough    Novocain [Procaine] Palpitations    Tamsulosin Other (comments) Higher blood sugars     Review of Systems:  - Eyes: no blurry vision or double vision  - Cardiovascular: no chest pain  - Respiratory: no shortness of breath  - Musculoskeletal: no myalgias  - Neurological: no numbness/tingling in extremities    Physical Examination:  Blood pressure 147/65, pulse 85, height 6' 2\" (1.88 m), weight 202 lb 3.2 oz (91.7 kg).   - General: pleasant, no distress, good eye contact   - Neck: no carotid bruits  - Cardiovascular: regular, normal rate, nl s1 and s2, no m/r/g,   - Respiratory: clear bilaterally  - Integumentary: no edema,   - Psychiatric: normal mood and affect         Diabetic foot exam performed by Shasha Easton MD     Measurement  Response Nurse Comment Physician Comment   Monofilament  R - reduced sensation with micro filament  L - reduced sensation with micro filament     Pulse DP R - 1+ (weak)  L - 1+ (weak)     Vibration R - can't check due to amputation  L - absent     Structural deformity R - Yes - transmetastarsal amputation  L - Yes - 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     Skin Integrity / Deformity R - Yes - callus  L - Yes - callus        Reviewed by:                 Data Reviewed: Component      Latest Ref Rng & Units 8/30/2017 8/30/2017 8/30/2017           8:10 AM  8:10 AM  8:10 AM   Glucose      65 - 99 mg/dL 112 (H)     BUN      8 - 27 mg/dL 16     Creatinine      0.76 - 1.27 mg/dL 1.02     GFR est non-AA      >59 mL/min/1.73 73     GFR est AA      >59 mL/min/1.73 84     BUN/Creatinine ratio      10 - 24 16     Sodium      134 - 144 mmol/L 143     Potassium      3.5 - 5.2 mmol/L 4.3     Chloride      96 - 106 mmol/L 103     CO2      18 - 29 mmol/L 25     Calcium      8.6 - 10.2 mg/dL 9.0     Cholesterol, total      100 - 199 mg/dL  131    Triglyceride      0 - 149 mg/dL  41    HDL Cholesterol      >39 mg/dL  50    VLDL, calculated      5 - 40 mg/dL  8    LDL, calculated      0 - 99 mg/dL  73    Hemoglobin A1c, (calculated)      4.8 - 5.6 %   8.3 (H)   Estimated average glucose      mg/dL   192       Assessment/Plan:     1. Type I diabetes mellitus, uncontrolled: his most recent Hgb A1c was 8.3% in 8/17 up from 7.9% in 3/17 down from 8.5% in 10/16 stable from 5/16 down from 9.5% in 12/15 up from 7.8% in 8/15 down from 7.9% in 4/15 up from 7.7% in 12/14 down from 8.3% in 7/14 down from 9.3% in 3/14 up from 8.5% in 11/13 up from 7.9% in 8/13 down from 8.4% 3/13 up from 7.4% in 12/12. I don't know how accurate this test will be because of his sickle cell trait so have been drawing both a Hgb A1c and a fructosamine. His last fructosamine was 371 in 12/14 up from 366 in 7/14 down form 428 in 3/14 up from 400 in 11/13 up from 338 in 8/13 down from 373 in 3/13 up from 345 in 12/12 and 330 in 6/12. His A1c is still above goal < 7.5% due to fluctuating sugars so agree that referral to DTC will be beneficial for help with carb counting and diet choices. Will be less aggressive with novolog for carbs and correction after 9pm as below. - cont Lantus 16 units at bedtime  - cont humalog 1:9 for carbs before 9pm and 1:10 after 9pm and 1:30 > 150 for correction during the day and 1:50 > 180 after 9pm  - check bs up to 5 times daily due to fluctuating sugars and risk of hypoglycemia  - foot exam done 9/17  - optho UTD 6/17  - microalbumin nl 3/17  - check A1c and bmp prior to next visit      2. Other and unspecified hyperlipidemia: Given DM, Goal LDL < 100, non-HDL < 130, and TG < 150. LDL 79 3/12, 78 12/12 and 84 3/13 and 75 in 11/13 and 70 in 7/14 and 63 in 4/15. Up to 99 in 12/15 and 100 in 5/16 so went up to a while tab daily and LDL down to 74 in 10/16 and 73 in 8/17.  - cont lipitor 10 mg daily  - check lipids in 8/18      3. Elevated blood pressure (not hypertension): his BP was above goal < 140/90 in 12/14 and prior to that visit was at goal so monitor home readings and they are at goal off any meds. Up today again.   - monitor home readings off meds for now        Patient Instructions   1) I think the lantus 16 units looks good so stay on this dose. 2) If you eat after 9pm, dose your novolog 1 unit for 10 grams of carbs. 3) Only give a correction dose of novolog after 9pm if your sugar is over 180 and then take based on the scale below:  -181-230=1 unit  -231-280=2 units  -281-330=3 units  -331-380=4 units  - over 380=5 units    4) If you are more active during the day, plan on taking 2 units less humalog for the meal after activity to avoid lows overnight. 5) Start monitoring blood pressure about 2-3 times per week at alternating times either in the morning or evening after resting for 5 minutes and sitting upright in a chair with your arm at heart level. Please let me know if you are having readings over 140 on the top number or 90 on the bottom number. 6) I will make a referral to the diabetes treatment center who will contact you for an appointment for further education. Follow-up Disposition:  Return in about 5 months (around 2/1/2018).       Copy sent to:  Dr. Courtney Rowe

## 2017-09-12 ENCOUNTER — HOSPITAL ENCOUNTER (OUTPATIENT)
Dept: DIABETES SERVICES | Age: 72
Discharge: HOME | End: 2017-09-12
Payer: MEDICARE

## 2017-09-12 PROCEDURE — G0108 DIAB MANAGE TRN  PER INDIV: HCPCS

## 2017-09-14 NOTE — DIABETES MGMT
DTC Progress Note    Recommendations/ Comments: Pt agrees to bring 2 days of detailed food diaries to better help review pt's skills related to evening meals and breakfast versus dining out when he has nutrition facts information to rely upon for carb counts. Follow-up visit planned for 10/11/17. Chart reviewed on [facility]. Patient is a 67 y.o. male with Type 1 and is seen individually for meal planning. Pt shared that he is no longer using insulin pump. He does dose his Humalog insulin:  1 unit:9 g carb, 1 unit:30 mg/dl >150 mg/dl for mealtime and correction along with Lantus 16 units daily at 11 pm.  He shared his BG logs which are detailed with his BG corrections and his carb estimates (along w/ mealtime insulin doses). Pt continues to have low BG values - <70 mg/dl with few to no s/s until <55 mg/dl. He shared that his Lantus and carb ratio have been recently adjusted. Pt also shared that he will use 30g carb (juice) to correct hypoglycemia. Pt has been adding correctional dosing for high BG post prandial w/in 2-3 hours of dosing premeal insulin increasing risk for stacking. He is also dosing insulin based on BG values that may be <2 hours old. Pt was encouraged to watch the trend and to see if he can relate either of these actions/interventions to his hypoglycemia. Pt and I discussed his carb counting calculations and dining out. Pt is dining out almost daily - he does use the internet to estimate his intakes of carbohydrates for ([employer], [employer], [employer] and [employer]). We determined that he does need to revisit using measuring cups to better estimate his po intakes of solids. He has not completed the calculation on all of his breakfast options. Reviewed carb counting basics and provided reference for carb choice estimates. We will discussed dietary fiber when he returns.     A1c:   Lab Results   Component Value Date/Time    Hemoglobin A1c 8.3 08/30/2017 08:10 AM    Hemoglobin A1c 7.9 03/27/2017 08:35 AM     Lab Results   Component Value Date/Time    Creatinine 1.02 08/30/2017 08:10 AM     Thank you  Janusz Kruse RD CDE

## 2017-10-11 ENCOUNTER — HOSPITAL ENCOUNTER (OUTPATIENT)
Dept: DIABETES SERVICES | Age: 72
Discharge: HOME | End: 2017-10-11
Payer: MEDICARE

## 2017-10-11 PROCEDURE — G0108 DIAB MANAGE TRN  PER INDIV: HCPCS

## 2017-10-12 NOTE — DIABETES MGMT
DTC Progress Note    Recommendations/ Comments:   Pt requested more f/u December into new year when he can better focus on his DM. Chart reviewed on [facility]. Patient is a 67 y.o. male with known Type 1 DM taking Lantus 16 units daily, Humalog 1 unit:9 g carbohydrate and 1 unit:30 mg/dl >150 mg/dl. at home. Pt is seen for f/u meal planning. He returns with food diaries and his carb counts - very accurate. We reviewed his calculations related to his insulin dosing. We discussed options for how he can adjust his insulin for anticipated activity or previous hypo treatment. Pt asked questions related to his early am fasting BG which are typically >180 mg/dl. Discussed the physiology that could be occurring early am.  How is basal insulin by injections will not be able to address if he is experiencing a strong sade phenomenon or very restless sleep. We also discussed the stress associated effects on his BG. He is aware that it increases his BG values but was not aware that it could impact as much as he is seeing. We discussed options of: 1) retesting BG prior to his breakfast as this is ~ 1hour after he awakes and values may vary grately and may need different dosing with his correction factor. 2) he is considering doing a small duration of exercise to help with his BG values prior to his breakfast - we discussed when to test relative to exercise and then how to adjust his calculations.     A1c:   Lab Results   Component Value Date/Time    Hemoglobin A1c 8.3 08/30/2017 08:10 AM    Hemoglobin A1c 7.9 03/27/2017 08:35 AM     Lab Results   Component Value Date/Time    Creatinine 1.02 08/30/2017 08:10 AM     Thank you  Celestino Reynoso RD, CDE

## 2018-01-29 LAB
BUN SERPL-MCNC: 15 MG/DL (ref 8–27)
BUN/CREAT SERPL: 14 (ref 10–24)
CALCIUM SERPL-MCNC: 9.5 MG/DL (ref 8.6–10.2)
CHLORIDE SERPL-SCNC: 102 MMOL/L (ref 96–106)
CO2 SERPL-SCNC: 26 MMOL/L (ref 18–29)
CREAT SERPL-MCNC: 1.04 MG/DL (ref 0.76–1.27)
EST. AVERAGE GLUCOSE BLD GHB EST-MCNC: 192 MG/DL
GFR SERPLBLD CREATININE-BSD FMLA CKD-EPI: 71 ML/MIN/1.73
GFR SERPLBLD CREATININE-BSD FMLA CKD-EPI: 82 ML/MIN/1.73
GLUCOSE SERPL-MCNC: 180 MG/DL (ref 65–99)
HBA1C MFR BLD: 8.3 % (ref 4.8–5.6)
POTASSIUM SERPL-SCNC: 4.8 MMOL/L (ref 3.5–5.2)
SODIUM SERPL-SCNC: 143 MMOL/L (ref 134–144)

## 2018-02-02 ENCOUNTER — OFFICE VISIT (OUTPATIENT)
Dept: ENDOCRINOLOGY | Age: 73
End: 2018-02-02

## 2018-02-02 VITALS
BODY MASS INDEX: 25.69 KG/M2 | HEART RATE: 84 BPM | SYSTOLIC BLOOD PRESSURE: 144 MMHG | DIASTOLIC BLOOD PRESSURE: 71 MMHG | HEIGHT: 74 IN | WEIGHT: 200.2 LBS

## 2018-02-02 DIAGNOSIS — E78.5 HYPERLIPIDEMIA LDL GOAL <100: ICD-10-CM

## 2018-02-02 DIAGNOSIS — R03.0 ELEVATED BLOOD PRESSURE READING WITHOUT DIAGNOSIS OF HYPERTENSION: ICD-10-CM

## 2018-02-02 NOTE — PROGRESS NOTES
Chief Complaint   Patient presents with    Diabetes     pcp and pharmacy confirmed     History of Present Illness: Berhane Briggs is a 67 y.o. male here for follow up of diabetes. Weight down 2 lbs since last visit in 9/17. Met with Jeff Saxena from [facility] on 2 occasions and has started counting carbs more tightly. Has had a couple times where he has questioned whether he actually took his insulin before he ate. Still taking lantus 16 units at bedtime and 1:9 for carbs for humalog. Didn't bring in any readings or meter for review. Fasting sugar was 137 this morning and was in the 140s yesterday but has seen a few in the 180-190s. Does have some spikes after eating up to the 200-300 when he is overdoing it on carbs and thinks he may be eating closer to  grams at times. Didn't check any home BP readings. Current Outpatient Prescriptions   Medication Sig    insulin lispro (HUMALOG KWIKPEN) 100 unit/mL kwikpen INJECT 1 UNIT FOR 9 GRAMS OF CARBOHYRATES AND 1 UNIT FOR 30 POINTS OVER 150 FOR CORRECTION, MAXIMUM 30 UNITS PER DAY--Dose change 9/1/17--updated med list--did not send prescription to the pharmacy    insulin glargine (LANTUS SOLOSTAR) 100 unit/mL (3 mL) inpn Inject 16 units at bedtime--Dose change 6/12/17--updated med list--did not send prescription to the pharmacy    atorvastatin (LIPITOR) 10 mg tablet TAKE 1 TABLET BY MOUTH DAILY    b complex vitamins tablet Take 1 Tab by mouth daily.  therapeutic multivitamin (THERA) tablet Take 1 Tab by mouth daily.  Cholecalciferol, Vitamin D3, (VITAMIN D) 2,000 unit Cap Take 1 Tab by mouth daily.  ONETOUCH ULTRA TEST strip TEST UP TO 5 TIMES DAILY   (INSULIN FLUCTUATING SUGARS). DX: E10.65     No current facility-administered medications for this visit.       Allergies   Allergen Reactions    Lisinopril Cough    Novocain [Procaine] Palpitations    Tamsulosin Other (comments)     Higher blood sugars     Review of Systems:  - Eyes: no blurry vision or double vision  - Cardiovascular: no chest pain  - Respiratory: no shortness of breath  - Musculoskeletal: no myalgias  - Neurological: no numbness/tingling in extremities    Physical Examination:  Blood pressure 144/71, pulse 84, height 6' 2\" (1.88 m), weight 200 lb 3.2 oz (90.8 kg). - General: pleasant, no distress, good eye contact   - Neck: no carotid bruits  - Cardiovascular: regular, normal rate, nl s1 and s2, no m/r/g,   - Respiratory: clear bilaterally  - Integumentary: no edema,   - Psychiatric: normal mood and affect    Data Reviewed:   -  Component      Latest Ref Rng & Units 1/26/2018 1/26/2018           1:02 PM  1:02 PM   Glucose      65 - 99 mg/dL 180 (H)    BUN      8 - 27 mg/dL 15    Creatinine      0.76 - 1.27 mg/dL 1.04    GFR est non-AA      >59 mL/min/1.73 71    GFR est AA      >59 mL/min/1.73 82    BUN/Creatinine ratio      10 - 24 14    Sodium      134 - 144 mmol/L 143    Potassium      3.5 - 5.2 mmol/L 4.8    Chloride      96 - 106 mmol/L 102    CO2      18 - 29 mmol/L 26    Calcium      8.6 - 10.2 mg/dL 9.5    Hemoglobin A1c, (calculated)      4.8 - 5.6 %  8.3 (H)   Estimated average glucose      mg/dL  192       Assessment/Plan:     1. Type I diabetes mellitus, uncontrolled: his most recent Hgb A1c was 8.3% in 1/18 stable from 8/17 up from 7.9% in 3/17 down from 8.5% in 10/16 stable from 5/16 down from 9.5% in 12/15 up from 7.8% in 8/15 down from 7.9% in 4/15 up from 7.7% in 12/14 down from 8.3% in 7/14 down from 9.3% in 3/14 up from 8.5% in 11/13 up from 7.9% in 8/13 down from 8.4% 3/13 up from 7.4% in 12/12. I don't know how accurate this test will be because of his sickle cell trait so have been drawing both a Hgb A1c and a fructosamine. His last fructosamine was 371 in 12/14 up from 366 in 7/14 down form 428 in 3/14 up from 400 in 11/13 up from 338 in 8/13 down from 373 in 3/13 up from 345 in 12/12 and 330 in 6/12.  His A1c is still above goal < 7.5% due to fluctuating sugars so will work on carb intake and may try to change his carb ratio if he's having spikes after eating despite cutting back on carbs. - cont Lantus 16 units at bedtime  - cont humalog 1:9 for carbs before 9pm and 1:10 after 9pm and 1:50 > 150 for correction during the day and 1:50 > 180 after 9pm  - check bs up to 5 times daily due to fluctuating sugars and risk of hypoglycemia  - foot exam done 9/17  - optho UTD 6/17  - microalbumin nl 3/17  - check A1c and cmp and urine prior to next visit      2. Other and unspecified hyperlipidemia: Given DM, Goal LDL < 100, non-HDL < 130, and TG < 150. LDL 79 3/12, 78 12/12 and 84 3/13 and 75 in 11/13 and 70 in 7/14 and 63 in 4/15. Up to 99 in 12/15 and 100 in 5/16 so went up to a while tab daily and LDL down to 74 in 10/16 and 73 in 8/17.  - cont lipitor 10 mg daily  - check lipids prior to next visit      3. Elevated blood pressure (not hypertension): his BP was above goal < 140/90 in 12/14 and prior to that visit was at goal so monitor home readings and they are at goal off any meds. Up in 8/17 and in 1/18 so will monitor more readings. - monitor home readings off meds for now        Patient Instructions   1) Collect readings over the next 7-10 days and either mail them, drop them off or fax to [phone] so I can give you some feedback on your insulin doses. Write some blood pressure readings on the same sheet. 2) Try to eat closer to 45 grams of carbs per meal or less which works out to about 5 units of humalog based on a 1:9 carb ratio.   If you are staying to 45 grams and still having spikes in your sugars, then 1:9 may not be the right ratio and you can try 1:8 or 1:7 (the lower the number the more insulin for food)    3) When your sugars are high during the day, follow this scale for humalog:  -151-200=1 unit  -201-250=2 units  -251-300=3 units  -301-350=4 units  - over 350=5 units    4) When your sugars are high after 9pm following this scale for humalog:   -181-230=1 unit  -231-280=2 units  -281-330=3 units  -331-380=4 units  - over 380=5 units    5) Start monitoring blood pressure about 2-3 times per week at alternating times either in the morning or evening after resting for 5 minutes and sitting upright in a chair with your arm at heart level. Please let me know if you are having readings over 140 on the top number or 90 on the bottom number. Follow-up Disposition:  Return in about 5 months (around 7/2/2018).     Copy sent to:  Dr. Judie Mehta

## 2018-02-02 NOTE — MR AVS SNAPSHOT
[address] [facility] [address] [address] [phone] Patient: Crescencio Atkins MRN: [id number] QDD:[id number] Visit Information Date & Time Provider Department Dept. Phone Encounter #  
 2/2/2018  2:50 PM Skinny Paulino, [address] [place] [address] Diabetes and Endocrinology [phone] [id number] Follow-up Instructions Return in about 5 months (around 7/2/2018). Upcoming Health Maintenance Date Due FOBT Q 1 YEAR AGE 50-75 2/8/1994 ZOSTER VACCINE AGE 60> 12/8/2003 MEDICARE YEARLY EXAM 2/8/2009 Pneumococcal 65+ High/Highest Risk (2 of 2 - PPSV23) 1/12/2015 Influenza Age 5 to Adult 8/1/2017 MICROALBUMIN Q1 3/27/2018 EYE EXAM RETINAL OR DILATED Q1 6/5/2018 HEMOGLOBIN A1C Q6M 7/26/2018 LIPID PANEL Q1 8/30/2018 FOOT EXAM Q1 9/1/2018 GLAUCOMA SCREENING Q2Y 6/5/2019 DTaP/Tdap/Td series (2 - Td) 2/13/2024 Allergies as of 2/2/2018  Review Complete On: 2/2/2018 By: Skinny Paulino MD  
  
 Severity Noted Reaction Type Reactions Lisinopril  02/24/2015    Cough Novocain [Procaine]  11/12/2010    Palpitations Tamsulosin  08/01/2013    Other (comments) Higher blood sugars Current Immunizations  Reviewed on 7/3/2012 Name Date Tdap 2/13/2014  3:40 PM  
 ZZZ-RETIRED (DO NOT USE) Pneumococcal Vaccine (Unspecified Type) 1/12/2010 Not reviewed this visit You Were Diagnosed With   
  
 Codes Comments Uncontrolled type 1 diabetes mellitus with diabetic polyneuropathy ([facility] [address]    -  Primary ICD-10-CM: E10.42, E10.65 ICD-9-CM: 250.63, 357.2 Hyperlipidemia LDL goal <100     ICD-10-CM: E78.5 ICD-9-CM: 272.4 Elevated blood pressure reading without diagnosis of hypertension     ICD-10-CM: R03.0 ICD-9-CM: 796.2 Vitals BP Pulse Height(growth percentile) Weight(growth percentile) BMI Smoking Status 144/71 84 6' 2\" (1.88 m) 200 lb 3.2 oz (90.8 kg) 25.7 kg/m2 Former Smoker Vitals History BMI and BSA Data Body Mass Index Body Surface Area 25.7 kg/m 2 2.18 m 2 Preferred Pharmacy Pharmacy Name Phone [facility], [place] [phone] Your Updated Medication List  
  
   
This list is accurate as of: 2/2/18  3:27 PM.  Always use your most recent med list.  
  
  
  
  
 atorvastatin 10 mg tablet Commonly known as:  LIPITOR  
TAKE 1 TABLET BY MOUTH DAILY  
  
 b complex vitamins tablet Take 1 Tab by mouth daily. insulin glargine 100 unit/mL (3 mL) Inpn Commonly known as:  LANTUS SOLOSTAR Inject 16 units at bedtime--Dose change 6/12/17--updated med list--did not send prescription to the pharmacy  
  
 insulin lispro 100 unit/mL kwikpen Commonly known as:  HumaLOG KwikPen INJECT 1 UNIT FOR 9 GRAMS OF CARBOHYRATES AND 1 UNIT FOR 30 POINTS OVER 150 FOR CORRECTION, MAXIMUM 30 UNITS PER DAY--Dose change 9/1/17--updated med list--did not send prescription to the pharmacy ONETOUCH ULTRA TEST strip Generic drug:  glucose blood VI test strips TEST UP TO 5 TIMES DAILY   (INSULIN FLUCTUATING SUGARS). DX: E10.65 THERA tablet Generic drug:  therapeutic multivitamin Take 1 Tab by mouth daily. VITAMIN D 2,000 unit Cap capsule Generic drug:  Cholecalciferol (Vitamin D3) Take 1 Tab by mouth daily. We Performed the Following HEMOGLOBIN A1C WITH EAG [84888 CPT(R)] LIPID PANEL [94762 CPT(R)] METABOLIC PANEL, COMPREHENSIVE [75544 CPT(R)] MICROALBUMIN, UR, RAND W/ MICROALBUMIN/CREA RATIO [id number] CPT(R)] Follow-up Instructions Return in about 5 months (around 7/2/2018). Patient Instructions 1) Collect readings over the next 7-10 days and either mail them, drop them off or fax to [phone] so I can give you some feedback on your insulin doses. Write some blood pressure readings on the same sheet. 2) Try to eat closer to 45 grams of carbs per meal or less which works out to about 5 units of humalog based on a 1:9 carb ratio. If you are staying to 45 grams and still having spikes in your sugars, then 1:9 may not be the right ratio and you can try 1:8 or 1:7 (the lower the number the more insulin for food) 3) When your sugars are high during the day, follow this scale for humalog: 
-151-200=1 unit 
-201-250=2 units 
-251-300=3 units 
-301-350=4 units 
- over 350=5 units 4) When your sugars are high after 9pm following this scale for humalog:  
-181-230=1 unit 
-231-280=2 units 
-281-330=3 units 
-331-380=4 units 
- over 380=5 units 5) Start monitoring blood pressure about 2-3 times per week at alternating times either in the morning or evening after resting for 5 minutes and sitting upright in a chair with your arm at heart level. Please let me know if you are having readings over 140 on the top number or 90 on the bottom number. Introducing [facility]! [employer] introduces [employer] patient portal. Now you can access parts of your medical record, email your doctor's office, and request medication refills online. 1. In your internet browser, go to [email or web address] 2. Click on the First Time User? Click Here link in the Sign In box. You will see the New Member Sign Up page. 3. Enter your [employer] Access Code exactly as it appears below. You will not need to use this code after youve completed the sign-up process. If you do not sign up before the expiration date, you must request a new code. · [employer] Access Code: [email or web address] Expires: 5/3/2018  3:17 PM 
 
4. Enter the last four digits of your Social Security Number (xxxx) and Date of Birth (mm/dd/yyyy) as indicated and click Submit. You will be taken to the next sign-up page. 5. Create a [employer] ID. This will be your [employer] login ID and cannot be changed, so think of one that is secure and easy to remember. 6. Create a [employer] password. You can change your password at any time. 7. Enter your Password Reset Question and Answer. This can be used at a later time if you forget your password. 8. Enter your e-mail address. You will receive e-mail notification when new information is available in [address] 9. Click Sign Up. You can now view and download portions of your medical record. 10. Click the Download Summary menu link to download a portable copy of your medical information. If you have questions, please visit the Frequently Asked Questions section of the [employer] website. Remember, [employer] is NOT to be used for urgent needs. For medical emergencies, dial 911. Now available from your iPhone and Android! Please provide this summary of care documentation to your next provider. Your primary care clinician is listed as [address]. If you have any questions after today's visit, please call [phone].

## 2018-02-02 NOTE — PATIENT INSTRUCTIONS
1) Collect readings over the next 7-10 days and either mail them, drop them off or fax to [phone] so I can give you some feedback on your insulin doses. Write some blood pressure readings on the same sheet. 2) Try to eat closer to 45 grams of carbs per meal or less which works out to about 5 units of humalog based on a 1:9 carb ratio. If you are staying to 45 grams and still having spikes in your sugars, then 1:9 may not be the right ratio and you can try 1:8 or 1:7 (the lower the number the more insulin for food)    3) When your sugars are high during the day, follow this scale for humalog:  -151-200=1 unit  -201-250=2 units  -251-300=3 units  -301-350=4 units  - over 350=5 units    4) When your sugars are high after 9pm following this scale for humalog:   -181-230=1 unit  -231-280=2 units  -281-330=3 units  -331-380=4 units  - over 380=5 units    5) Start monitoring blood pressure about 2-3 times per week at alternating times either in the morning or evening after resting for 5 minutes and sitting upright in a chair with your arm at heart level. Please let me know if you are having readings over 140 on the top number or 90 on the bottom number.

## 2018-05-14 RX ORDER — INSULIN LISPRO 100 [IU]/ML
INJECTION, SOLUTION INTRAVENOUS; SUBCUTANEOUS
Qty: 30 ML | Refills: 3 | Status: SHIPPED | OUTPATIENT
Start: 2018-05-14 | End: 2018-07-10 | Stop reason: SDUPTHER

## 2018-06-07 ENCOUNTER — TELEPHONE (OUTPATIENT)
Dept: ENDOCRINOLOGY | Age: 73
End: 2018-06-07

## 2018-06-08 NOTE — TELEPHONE ENCOUNTER
Please let him know that I received his paperwork for diabetic shoes but I have not done a foot exam on him since Sept 2017 and per Medicare requirements, this must have been done within 6 months of the order, therefore, I will do an exam at his visit on 7/10/18 and will complete the paperwork at that time.

## 2018-07-04 LAB
ALBUMIN SERPL-MCNC: 4 G/DL (ref 3.5–4.8)
ALBUMIN/CREAT UR: <4 MG/G CREAT (ref 0–30)
ALBUMIN/GLOB SERPL: 1.4 {RATIO} (ref 1.2–2.2)
ALP SERPL-CCNC: 67 IU/L (ref 39–117)
ALT SERPL-CCNC: 34 IU/L (ref 0–44)
AST SERPL-CCNC: 33 IU/L (ref 0–40)
BILIRUB SERPL-MCNC: 0.4 MG/DL (ref 0–1.2)
BUN SERPL-MCNC: 14 MG/DL (ref 8–27)
BUN/CREAT SERPL: 13 (ref 10–24)
CALCIUM SERPL-MCNC: 9.2 MG/DL (ref 8.6–10.2)
CHLORIDE SERPL-SCNC: 102 MMOL/L (ref 96–106)
CHOLEST SERPL-MCNC: 128 MG/DL (ref 100–199)
CO2 SERPL-SCNC: 26 MMOL/L (ref 20–29)
CREAT SERPL-MCNC: 1.07 MG/DL (ref 0.76–1.27)
CREAT UR-MCNC: 75.1 MG/DL
EST. AVERAGE GLUCOSE BLD GHB EST-MCNC: 186 MG/DL
GLOBULIN SER CALC-MCNC: 2.9 G/DL (ref 1.5–4.5)
GLUCOSE SERPL-MCNC: 190 MG/DL (ref 65–99)
HBA1C MFR BLD: 8.1 % (ref 4.8–5.6)
HDLC SERPL-MCNC: 47 MG/DL
INTERPRETATION, 910389: NORMAL
LDLC SERPL CALC-MCNC: 69 MG/DL (ref 0–99)
Lab: NORMAL
MICROALBUMIN UR-MCNC: <3 UG/ML
POTASSIUM SERPL-SCNC: 4.6 MMOL/L (ref 3.5–5.2)
PROT SERPL-MCNC: 6.9 G/DL (ref 6–8.5)
SODIUM SERPL-SCNC: 139 MMOL/L (ref 134–144)
TRIGL SERPL-MCNC: 59 MG/DL (ref 0–149)
VLDLC SERPL CALC-MCNC: 12 MG/DL (ref 5–40)

## 2018-07-10 ENCOUNTER — OFFICE VISIT (OUTPATIENT)
Dept: ENDOCRINOLOGY | Age: 73
End: 2018-07-10

## 2018-07-10 VITALS
BODY MASS INDEX: 24.79 KG/M2 | HEIGHT: 74 IN | DIASTOLIC BLOOD PRESSURE: 74 MMHG | WEIGHT: 193.2 LBS | SYSTOLIC BLOOD PRESSURE: 134 MMHG | HEART RATE: 75 BPM

## 2018-07-10 DIAGNOSIS — R03.0 ELEVATED BLOOD PRESSURE READING WITHOUT DIAGNOSIS OF HYPERTENSION: ICD-10-CM

## 2018-07-10 DIAGNOSIS — E78.5 HYPERLIPIDEMIA LDL GOAL <100: ICD-10-CM

## 2018-07-10 RX ORDER — INSULIN LISPRO 100 [IU]/ML
INJECTION, SOLUTION INTRAVENOUS; SUBCUTANEOUS
Qty: 30 ML | Refills: 3
Start: 2018-07-10 | End: 2019-08-08 | Stop reason: SDUPTHER

## 2018-07-10 NOTE — MR AVS SNAPSHOT
[address] [facility] [address] [facility] [address] [address] [phone] Patient: Dave Dillon MRN: [id number] CEY:[id number] Visit Information Date & Time Provider Department Dept. Phone Encounter #  
 7/10/2018  3:30 PM Laura Leung, [address] [facility] [phone] [id number] Follow-up Instructions Return in about 5 months (around 12/10/2018). Your Appointments 7/17/2018  3:20 PM  
New Patient with Citlali Quintero MD  
[address] [facility] ([address] [facility]) Appt Note: NP, refd by Dr. Stephan Olivarez, fatigue, witnessed apnea [address] [facility] [address] [address] [place] [address]., [address] [address] Upcoming Health Maintenance Date Due FOBT Q 1 YEAR AGE 50-75 2/8/1994 ZOSTER VACCINE AGE 60> 12/8/2003 Pneumococcal 65+ High/Highest Risk (2 of 2 - PPSV23) 1/12/2015 MEDICARE YEARLY EXAM 3/14/2018 EYE EXAM RETINAL OR DILATED Q1 6/5/2018 Influenza Age 5 to Adult 8/1/2018 FOOT EXAM Q1 9/1/2018 HEMOGLOBIN A1C Q6M 1/3/2019 GLAUCOMA SCREENING Q2Y 6/5/2019 MICROALBUMIN Q1 7/3/2019 LIPID PANEL Q1 7/3/2019 DTaP/Tdap/Td series (2 - Td) 2/13/2024 Allergies as of 7/10/2018  Review Complete On: 7/10/2018 By: Laura Leung MD  
  
 Severity Noted Reaction Type Reactions Lisinopril  02/24/2015    Cough Novocain [Procaine]  11/12/2010    Palpitations Tamsulosin  08/01/2013    Other (comments) Higher blood sugars Current Immunizations  Reviewed on 7/3/2012 Name Date Tdap 2/13/2014  3:40 PM  
 ZZZ-RETIRED (DO NOT USE) Pneumococcal Vaccine (Unspecified Type) 1/12/2010 Not reviewed this visit You Were Diagnosed With   
  
 Codes Comments  Uncontrolled type 1 diabetes mellitus with diabetic polyneuropathy ([facility] [address]    -  Primary ICD-10-CM: E10.42, E10.65 
 ICD-9-CM: 250.63, 357.2 Hyperlipidemia LDL goal <100     ICD-10-CM: E78.5 ICD-9-CM: 272.4 Elevated blood pressure reading without diagnosis of hypertension     ICD-10-CM: R03.0 ICD-9-CM: 796.2 Vitals BP Pulse Height(growth percentile) Weight(growth percentile) BMI Smoking Status 134/74 75 6' 2\" (1.88 m) 193 lb 3.2 oz (87.6 kg) 24.81 kg/m2 Former Smoker Vitals History BMI and BSA Data Body Mass Index Body Surface Area  
 24.81 kg/m 2 2.14 m 2 Preferred Pharmacy Pharmacy Name Phone [address] [facility] [phone] Your Updated Medication List  
  
   
This list is accurate as of 7/10/18  4:31 PM.  Always use your most recent med list.  
  
  
  
  
 atorvastatin 10 mg tablet Commonly known as:  LIPITOR  
TAKE 1 TABLET BY MOUTH DAILY  
  
 insulin glargine 100 unit/mL (3 mL) Inpn Commonly known as:  LANTUS SOLOSTAR U-100 INSULIN Inject 16 units at bedtime--Dose change 6/12/17--updated med list--did not send prescription to the pharmacy  
  
 insulin lispro 100 unit/mL kwikpen Commonly known as:  HumaLOG KwikPen Insulin INJECT 1 UNIT FOR 8 GRAMS OF CARB FOR BREAKFAST AND 9 GRAMS FOR LUNCH AND DINNER 1 UNIT FOR 30 POINTS OVER 150 FOR CORRECTION, MAXIMUM 30 UNITS PER DAY--Dose change 7/10/18--updated med list--did not send prescription to the pharmacy ONETOUCH ULTRA TEST strip Generic drug:  glucose blood VI test strips TEST UP TO 5 TIMES DAILY   (INSULIN FLUCTUATING SUGARS). DX: E10.65 OTHER Over the counter testosterone raising supplement THERA tablet Generic drug:  therapeutic multivitamin Take 1 Tab by mouth daily. VITAMIN D 2,000 unit Cap capsule Generic drug:  Cholecalciferol (Vitamin D3) Take 1 Tab by mouth daily. Follow-up Instructions Return in about 5 months (around 12/10/2018). Patient Instructions 1) Your A1c has decreased from 8.3% to 8.1%. My goal is to get his under 8% and as close to 7.5% or less. 2) Try dosing your humalog at 1:8 for breakfast instead of 1:9 to see if this helps with spikes by lunch. If still having spikes over 150 by lunch, try 1:7 for breakfast.   
 
3) Your liver and kidney and cholesterol and urine look good and blood pressure is at goal. 
 
4) We won't draw labs prior to next visit. You will just get your A1c in the office. 5) We will fill out the forms for diabetic shoes and fax away this week and let you know when it's done. Introducing [facility]! [employer] introduces [employer] patient portal. Now you can access parts of your medical record, email your doctor's office, and request medication refills online. 1. In your internet browser, go to [email or web address] 2. Click on the First Time User? Click Here link in the Sign In box. You will see the New Member Sign Up page. 3. Enter your [employer] Access Code exactly as it appears below. You will not need to use this code after youve completed the sign-up process. If you do not sign up before the expiration date, you must request a new code. · [employer] Access Code: [email or web address] Expires: 10/8/2018  4:12 PM 
 
4. Enter the last four digits of your Social Security Number (xxxx) and Date of Birth (mm/dd/yyyy) as indicated and click Submit. You will be taken to the next sign-up page. 5. Create a [employer] ID. This will be your [employer] login ID and cannot be changed, so think of one that is secure and easy to remember. 6. Create a [employer] password. You can change your password at any time. 7. Enter your Password Reset Question and Answer. This can be used at a later time if you forget your password. 8. Enter your e-mail address. You will receive e-mail notification when new information is available in [address] 9. Click Sign Up.  You can now view and download portions of your medical record. 10. Click the Download Summary menu link to download a portable copy of your medical information. If you have questions, please visit the Frequently Asked Questions section of the [employer] website. Remember, [employer] is NOT to be used for urgent needs. For medical emergencies, dial 911. Now available from your iPhone and Android! Please provide this summary of care documentation to your next provider. Your primary care clinician is listed as [address]. If you have any questions after today's visit, please call [phone].

## 2018-07-10 NOTE — PROGRESS NOTES
Chief Complaint   Patient presents with    Diabetes     pcp and pharmacy cofirmed    Other     eye exam is due     History of Present Illness: Andrade Ortiz is a 75 y.o. male here for follow up of diabetes. Weight down 7 lbs since last visit in 2/18. Has been checking 4-5 times per day and readings are often spiking between breakfast and dinner over 200 despite counting carbs correctly. He is running frequently in the 100-180 range but still having spikes over 200 throughout the day. No frequent lows. Compliant with lipitor. Has had more fatigue and started taking an OTC testosterone supplement that has helped somewhat after having received treatment for prostate cancer in 2016. Checked home BP readings and they are under 140/90. I am treating this patient under a comprehensive plan of care for his diabetes and this patient needs diabetic shoes and inserts because of his diabetes with peripheral neuropathy and callus formation and history of amputation. With diabetic footwear, the patient's prognosis is good. Current Outpatient Prescriptions   Medication Sig    OTHER Over the counter testosterone raising supplement    insulin lispro (HUMALOG KWIKPEN INSULIN) 100 unit/mL kwikpen INJECT 1 UNIT FOR 9 GRAMS OF CARBOHYRATES AND 1 UNIT FOR 30 POINTS OVER 150 FOR CORRECTION, MAXIMUM 30 UNITS PER DAY    insulin glargine (LANTUS SOLOSTAR) 100 unit/mL (3 mL) inpn Inject 16 units at bedtime--Dose change 6/12/17--updated med list--did not send prescription to the pharmacy    atorvastatin (LIPITOR) 10 mg tablet TAKE 1 TABLET BY MOUTH DAILY    therapeutic multivitamin (THERA) tablet Take 1 Tab by mouth daily.  Cholecalciferol, Vitamin D3, (VITAMIN D) 2,000 unit Cap Take 1 Tab by mouth daily.  ONETOUCH ULTRA TEST strip TEST UP TO 5 TIMES DAILY   (INSULIN FLUCTUATING SUGARS). DX: E10.65     No current facility-administered medications for this visit.       Allergies   Allergen Reactions    Lisinopril Cough    Novocain [Procaine] Palpitations    Tamsulosin Other (comments)     Higher blood sugars     Review of Systems:  - Eyes: no blurry vision or double vision  - Cardiovascular: no chest pain  - Respiratory: no shortness of breath  - Musculoskeletal: no myalgias  - Neurological: (+) numbness/tingling in extremities    Physical Examination:  Blood pressure 134/74, pulse 75, height 6' 2\" (1.88 m), weight 193 lb 3.2 oz (87.6 kg). - General: pleasant, no distress, good eye contact   - Neck: no carotid bruits  - Cardiovascular: regular, normal rate, nl s1 and s2, no m/r/g,   - Respiratory: clear bilaterally  - Integumentary: no edema,   - Psychiatric: normal mood and affect    Diabetic foot exam:     Left Foot:   Visual Exam: amputation- of 2nd toe, and callus formation   Pulse DP: 2+ (normal)   Filament test: absent sensation    Vibratory sensation: absent      Right Foot:   Visual Exam: amputation- transmetarsal and callus formation   Pulse DP: 2+ (normal)   Filament test: reduced sensation    Vibratory sensation: absent        Data Reviewed:   Component      Latest Ref Rng & Units 7/3/2018 7/3/2018 7/3/2018 7/3/2018           9:57 AM  9:57 AM  9:57 AM  9:57 AM   Glucose      65 - 99 mg/dL  190 (H)     BUN      8 - 27 mg/dL  14     Creatinine      0.76 - 1.27 mg/dL  1.07     GFR est non-AA      >59 mL/min/1.73  68     GFR est AA      >59 mL/min/1.73  79     BUN/Creatinine ratio      10 - 24  13     Sodium      134 - 144 mmol/L  139     Potassium      3.5 - 5.2 mmol/L  4.6     Chloride      96 - 106 mmol/L  102     CO2      20 - 29 mmol/L  26     Calcium      8.6 - 10.2 mg/dL  9.2     Protein, total      6.0 - 8.5 g/dL  6.9     Albumin      3.5 - 4.8 g/dL  4.0     GLOBULIN, TOTAL      1.5 - 4.5 g/dL  2.9     A-G Ratio      1.2 - 2.2  1.4     Bilirubin, total      0.0 - 1.2 mg/dL  0.4     Alk.  phosphatase      39 - 117 IU/L  67     AST      0 - 40 IU/L  33     ALT (SGPT)      0 - 44 IU/L  34     Cholesterol, total      100 - 199 mg/dL 128      Triglyceride      0 - 149 mg/dL 59      HDL Cholesterol      >39 mg/dL 47      VLDL, calculated      5 - 40 mg/dL 12      LDL, calculated      0 - 99 mg/dL 69      Creatinine, urine      Not Estab. mg/dL   75.1    Microalbumin, urine      Not Estab. ug/mL   <3.0    Microalbumin/Creat. Ratio      0.0 - 30.0 mg/g creat   <4.0    Hemoglobin A1c, (calculated)      4.8 - 5.6 %    8.1 (H)   Estimated average glucose      mg/dL    186       Assessment/Plan:     1. Type I diabetes mellitus, uncontrolled: his most recent Hgb A1c was 8.1% in 7/18 down from 8.3% in 1/18 stable from 8/17 up from 7.9% in 3/17 down from 8.5% in 10/16 stable from 5/16 down from 9.5% in 12/15 up from 7.8% in 8/15 down from 7.9% in 4/15 up from 7.7% in 12/14 down from 8.3% in 7/14 down from 9.3% in 3/14 up from 8.5% in 11/13 up from 7.9% in 8/13 down from 8.4% 3/13 up from 7.4% in 12/12. I don't know how accurate this test will be because of his sickle cell trait so have been drawing both a Hgb A1c and a fructosamine. His last fructosamine was 371 in 12/14 up from 366 in 7/14 down form 428 in 3/14 up from 400 in 11/13 up from 338 in 8/13 down from 373 in 3/13 up from 345 in 12/12 and 330 in 6/12. His A1c is still above goal < 7.5% due to fluctuating sugars so will try taking 1:8 for breakfast to see if this helps. - cont Lantus 16 units at bedtime  - take humalog 1:8 for carbs for breakfast and cont 1:9 for lunch and dinner before 9pm and 1:10 after 9pm and 1:50 > 150 for correction during the day and 1:50 > 180 after 9pm  - check bs up to 5 times daily due to fluctuating sugars and risk of hypoglycemia  - foot exam done 7/18  - optho UTD 6/17  - microalbumin nl 7/18  - check A1c POC at next visit      2. Other and unspecified hyperlipidemia: Given DM, Goal LDL < 100, non-HDL < 130, and TG < 150. LDL 79 3/12, 78 12/12 and 84 3/13 and 75 in 11/13 and 70 in 7/14 and 63 in 4/15.   Up to 99 in 12/15 and 100 in 5/16 so went up to a while tab daily and LDL down to 74 in 10/16 and 73 in 8/17 and 69 in 7/18  - cont lipitor 10 mg daily  - check lipids prior to next visit      3. Elevated blood pressure (not hypertension): his BP was above goal < 140/90 in 12/14 and prior to that visit was at goal so monitor home readings and they are at goal off any meds. Up in 8/17 and in 1/18 but home readings are at goal.  - monitor home readings off meds for now        Patient Instructions   1) Your A1c has decreased from 8.3% to 8.1%. My goal is to get his under 8% and as close to 7.5% or less. 2) Try dosing your humalog at 1:8 for breakfast instead of 1:9 to see if this helps with spikes by lunch. If still having spikes over 150 by lunch, try 1:7 for breakfast.      3) Your liver and kidney and cholesterol and urine look good and blood pressure is at goal.    4) We won't draw labs prior to next visit. You will just get your A1c in the office. 5) We will fill out the forms for diabetic shoes and fax away this week and let you know when it's done. Follow-up Disposition:  Return in about 5 months (around 12/10/2018).     Copy sent to:  Dr. Martina Quiroga

## 2018-07-10 NOTE — PATIENT INSTRUCTIONS
1) Your A1c has decreased from 8.3% to 8.1%. My goal is to get his under 8% and as close to 7.5% or less. 2) Try dosing your humalog at 1:8 for breakfast instead of 1:9 to see if this helps with spikes by lunch. If still having spikes over 150 by lunch, try 1:7 for breakfast.      3) Your liver and kidney and cholesterol and urine look good and blood pressure is at goal.    4) We won't draw labs prior to next visit. You will just get your A1c in the office. 5) We will fill out the forms for diabetic shoes and fax away this week and let you know when it's done.

## 2018-07-12 ENCOUNTER — TELEPHONE (OUTPATIENT)
Dept: ENDOCRINOLOGY | Age: 73
End: 2018-07-12

## 2018-07-12 NOTE — TELEPHONE ENCOUNTER
Please fax office notes, forms and prescription to  and confirm receipt and let him know this was done.

## 2018-07-13 NOTE — TELEPHONE ENCOUNTER
Faxed notes, prescription, and forms to [facility]. Confirmed receipt with Adina luther. Spoke to patient to let him know this had been done.

## 2018-07-17 ENCOUNTER — OFFICE VISIT (OUTPATIENT)
Dept: SLEEP MEDICINE | Age: 73
End: 2018-07-17

## 2018-07-17 VITALS
OXYGEN SATURATION: 98 % | BODY MASS INDEX: 24.9 KG/M2 | DIASTOLIC BLOOD PRESSURE: 73 MMHG | WEIGHT: 194 LBS | HEART RATE: 80 BPM | HEIGHT: 74 IN | SYSTOLIC BLOOD PRESSURE: 136 MMHG

## 2018-07-17 DIAGNOSIS — G47.33 OSA (OBSTRUCTIVE SLEEP APNEA): Primary | ICD-10-CM

## 2018-07-17 NOTE — PROGRESS NOTES
Patients PAP machine was unable to be downloaded.  The following information was able to be collected:

## 2018-07-20 NOTE — PATIENT INSTRUCTIONS
Sleep Apnea: Care Instructions  Your Care Instructions    Sleep apnea means that you frequently stop breathing for 10 seconds or longer during sleep. It can be mild to severe, based on the number of times an hour that you stop breathing or have slowed breathing. Blocked or narrowed airways in your nose, mouth, or throat can cause sleep apnea. Your airway can become blocked when your throat muscles and tongue relax during sleep. You can treat sleep apnea at home by making lifestyle changes. You also can use a CPAP breathing machine that keeps tissues in the throat from blocking your airway. Or your doctor may suggest that you use a breathing device while you sleep. It helps keep your airway open. This could be a device that you put in your mouth. Other examples include strips or disks that you use on your nose. In some cases, surgery may be needed to remove enlarged tissues in the throat. Follow-up care is a key part of your treatment and safety. Be sure to make and go to all appointments, and call your doctor if you are having problems. It's also a good idea to know your test results and keep a list of the medicines you take. How can you care for yourself at home? · Lose weight, if needed. It may reduce the number of times you stop breathing or have slowed breathing. · Sleep on your side. It may stop mild apnea. If you tend to roll onto your back, sew a pocket in the back of your pajama top. Put a tennis ball into the pocket, and stitch the pocket shut. This will help keep you from sleeping on your back. · Avoid alcohol and medicines such as sleeping pills and sedatives before bed. · Do not smoke. Smoking can make sleep apnea worse. If you need help quitting, talk to your doctor about stop-smoking programs and medicines. These can increase your chances of quitting for good. · Prop up the head of your bed 4 to 6 inches by putting bricks under the legs of the bed.   · Treat breathing problems, such as a stuffy nose, caused by a cold or allergies. · Try a continuous positive airway pressure (CPAP) breathing machine if your doctor recommends it. The machine keeps your airway open when you sleep. · If CPAP does not work for you, ask your doctor if you can try other breathing machines. A bilevel positive airway pressure machine uses one type of air pressure for breathing in and another type for breathing out. Another device raises or lowers air pressure as needed while you breathe. · Talk to your doctor if:  ¨ Your nose feels dry or bleeds when you use one of these machines. You may need to increase moisture in the air. A humidifier may help. ¨ Your nose is runny or stuffy from using a breathing machine. Decongestants or a corticosteroid nasal spray may help. ¨ You are sleepy during the day and it gets in the way of the normal things you do. Do not drive when you are drowsy. When should you call for help? Watch closely for changes in your health, and be sure to contact your doctor if:    · You still have sleep apnea even though you have made lifestyle changes.     · You are thinking of trying a device such as CPAP.     · You are having problems using a CPAP or similar machine. Where can you learn more? Go to http://ernesto-vilma.info/. Enter L637 in the search box to learn more about \"Sleep Apnea: Care Instructions. \"  Current as of: December 6, 2017  Content Version: 11.7  © 7191-5001 [employer]. Care instructions adapted under license by [employer] (which disclaims liability or warranty for this information). If you have questions about a medical condition or this instruction, always ask your healthcare professional. [place] [address] any warranty or liability for your use of this information.

## 2018-07-20 NOTE — PROGRESS NOTES
[address] [facility]. [place], [address]  Tel.  [phone]  Fax. 100 [facility] 60  [place], [address] [facility]  Tel.  [phone]  Fax. [phone] [address]  Tel.  [phone]  Fax. [phone]       Chief Complaint       Chief Complaint   Patient presents with    Sleep Problem     NP, refd by Dr. Lore Mcconnell, fatigue, witnessed apnea       HPI      Sophia Harley is a 75y.o. year old male referred by Dr. Lore Mcconnell for evaluation of a sleep disorder  . He describes a past evaluation of sleep disorder [address] greater than 10 years ago at which time he was diagnosed with sleep apnea. He references breathing events where he did not snore. He was started on BiPAP which he stopped after 3 years. He did not bring with him a BiPAP unit set at 13/6 cm which is not downloadable and unable to provide any data. The patient retires at 11 pm and awakens at 3 am.  He returns to sleep an hour or so later; awakens at 5: 30 and then returns to sleep until 830-9 AM.  He is somewhat tired on awakening. The patient notes that he is fatigued during the day. He will fall asleep if he is seated and inactive as well as in the late afternoon where he will often nap for an hour. He is still fatigued after awakening from the nap. The patient has undergone diagnostic testing for the current problems. Copies of the study performed over 10 years ago are not available.     Sandy Sleepiness Score: 5       Allergies   Allergen Reactions    Lisinopril Cough    Novocain [Procaine] Palpitations    Tamsulosin Other (comments)     Higher blood sugars       Current Outpatient Prescriptions   Medication Sig Dispense Refill    OTHER Over the counter testosterone raising supplement      insulin lispro (HUMALOG KWIKPEN INSULIN) 100 unit/mL kwikpen INJECT 1 UNIT FOR 8 GRAMS OF CARB FOR BREAKFAST AND 9 GRAMS FOR LUNCH AND DINNER 1 UNIT FOR 30 POINTS OVER 150 FOR CORRECTION, MAXIMUM 30 UNITS PER DAY--Dose change 7/10/18--updated med list--did not send prescription to the pharmacy 30 mL 3    insulin glargine (LANTUS SOLOSTAR) 100 unit/mL (3 mL) inpn Inject 16 units at bedtime--Dose change 6/12/17--updated med list--did not send prescription to the pharmacy 30 Adjustable Dose Pre-filled Pen Syringe 3    atorvastatin (LIPITOR) 10 mg tablet TAKE 1 TABLET BY MOUTH DAILY 90 Tab 3    ONETOUCH ULTRA TEST strip TEST UP TO 5 TIMES DAILY   (INSULIN FLUCTUATING SUGARS). DX: E10.65 500 Strip 3    therapeutic multivitamin (THERA) tablet Take 1 Tab by mouth daily.  Cholecalciferol, Vitamin D3, (VITAMIN D) 2,000 unit Cap Take 1 Tab by mouth daily. He  has a past medical history of Arthritis; Diabetes ([facility] [address] Hepatitis; Leukopenia; Other and unspecified hyperlipidemia; Prostate cancer ([facility] [address] (Fall of 2015); Sickle cell trait ([facility] [address] (7/3/2012); Type I (juvenile type) diabetes mellitus without mention of complication, uncontrolled (since 1979); and Unspecified sleep apnea. He  has a past surgical history that includes hx urological (1991); hx orthopaedic; hx orthopaedic; hx orthopaedic (Jan 2013); hx orthopaedic (Left, 2014); hx heent; and hx heent. He family history includes Cancer in his brother, father, and sister; Diabetes in his sister; Hypertension in his mother; Lung Disease in his brother; Other in his mother. He  reports that he quit smoking about 12 years ago. He has a 35.00 pack-year smoking history. He has never used smokeless tobacco. He reports that he drinks alcohol. He reports that he does not use illicit drugs. Review of Systems:  Review of Systems   Constitutional: Positive for weight loss. HENT: Negative for hearing loss and tinnitus. Eyes: Negative for blurred vision and double vision. Respiratory: Negative for cough and shortness of breath. Gastrointestinal: Negative for heartburn.    Musculoskeletal: Positive for neck pain. Skin: Negative for itching and rash. Neurological: Positive for tingling. Psychiatric/Behavioral: Positive for depression. Objective:     Visit Vitals    /73    Pulse 80    Ht 6' 2\" (1.88 m)    Wt 194 lb (88 kg)    SpO2 98%    BMI 24.91 kg/m2     Body mass index is 24.91 kg/(m^2). General:   Conversant, cooperative   Eyes:  Pupils equal and reactive, no nystagmus   Oropharynx:   Mallampati score II, tongue large, tongue , uvula prominent   Tonsils:      Neck:   No carotid bruits; Neck circ. in \"inches\": 16.25   Chest/Lungs:  Clear on auscultation    CVS:  Normal rate, regular rhythm, amputation right toe   Skin:  Warm to touch; no obvious rashes   Neuro:  Speech fluent, face symmetrical, tongue movement normal, bilateral interosseous wasting, absent deep tendon reflexes   Psych:  Normal affect,  normal countenance        Assessment:       ICD-10-CM ICD-9-CM    1. [place] (obstructive sleep apnea) G47.33 327.23 SPLIT CPAP/PSG     History of sleep apnea with limited PAP usage. Patient infers at the study demonstrated events where he had apnea without snoring consistent with central apnea. He will be reevaluated with a sleep study. Plan:     Orders Placed This Encounter    SPLIT CPAP/PSG     Standing Status:   Future     Standing Expiration Date:   1/20/2019     Order Specific Question:   Reason for Exam     Answer:   Past history of sleep apnea without follow-up for some time. Has a non-downloadable unit. * Patient has a history and examination consistent with the diagnosis of sleep apnea. * He was provided information on sleep apnea including corresponding risk factors and the importance of proper treatment. * Treatment options if indicated were reviewed today. Instructions:  o Do not engage in activities requiring a normal degree of alertness if fatigue is present.   o The patient understands that untreated or undertreated sleep apnea could impair judgement and the ability to function normally during the day.  o Call or return if symptoms worsen or persist.          Pan Nix MD, [facility]  Electronically signed 07/20/18

## 2018-10-08 ENCOUNTER — HOSPITAL ENCOUNTER (OUTPATIENT)
Dept: SLEEP MEDICINE | Age: 73
Discharge: HOME | End: 2018-10-08
Payer: MEDICARE

## 2018-10-08 VITALS
OXYGEN SATURATION: 98 % | HEIGHT: 74 IN | WEIGHT: 197.7 LBS | DIASTOLIC BLOOD PRESSURE: 65 MMHG | SYSTOLIC BLOOD PRESSURE: 158 MMHG | TEMPERATURE: 98.6 F | BODY MASS INDEX: 25.37 KG/M2 | HEART RATE: 88 BPM

## 2018-10-08 DIAGNOSIS — G47.33 OSA (OBSTRUCTIVE SLEEP APNEA): ICD-10-CM

## 2018-10-08 PROCEDURE — 95811 POLYSOM 6/>YRS CPAP 4/> PARM: CPT | Performed by: SPECIALIST

## 2018-11-01 ENCOUNTER — TELEPHONE (OUTPATIENT)
Dept: SLEEP MEDICINE | Age: 73
End: 2018-11-01

## 2018-11-01 DIAGNOSIS — G47.31 CENTRAL SLEEP APNEA: Primary | ICD-10-CM

## 2018-11-01 NOTE — TELEPHONE ENCOUNTER
Patient called into the office to review result of his sleep study from 10/08/2018. Please call patient back at [phone].

## 2018-11-05 ENCOUNTER — DOCUMENTATION ONLY (OUTPATIENT)
Dept: SLEEP MEDICINE | Age: 73
End: 2018-11-05

## 2018-11-05 NOTE — TELEPHONE ENCOUNTER
Titration study demonstrated AHI 64.2 per hour with minimal SaO2 87%. Of 106 respiratory events, 82 apnea and 24 hypopnea. All apnea were central.  During titration portion , CPAP and BIPAP employed. Events continues with BIPAP. ASV started with EPAP 15 cm and IPAP 19-20 cm. Reommendation: ASV EPAP 15 cm, PS 4-5 cm    Chief Technologist: please review polysomnogram with patient. Order for ASV generated. Please schedule first compliance appointment. Would obtain remote compliance review after 1 month since ASV compliance appointment requires 60 days of usage before appointment can be scheduled.

## 2018-11-06 ENCOUNTER — APPOINTMENT (OUTPATIENT)
Dept: CT IMAGING | Age: 73
End: 2018-11-06
Attending: EMERGENCY MEDICINE
Payer: MEDICARE

## 2018-11-06 ENCOUNTER — HOSPITAL ENCOUNTER (EMERGENCY)
Age: 73
Discharge: HOME | End: 2018-11-07
Attending: EMERGENCY MEDICINE
Payer: MEDICARE

## 2018-11-06 DIAGNOSIS — G44.009 CLUSTER HEADACHE, NOT INTRACTABLE, UNSPECIFIED CHRONICITY PATTERN: Primary | ICD-10-CM

## 2018-11-06 LAB
ALBUMIN SERPL-MCNC: 4.1 G/DL (ref 3.5–5)
ALBUMIN/GLOB SERPL: 1 {RATIO} (ref 1.1–2.2)
ALP SERPL-CCNC: 74 U/L (ref 45–117)
ALT SERPL-CCNC: 39 U/L (ref 12–78)
ANION GAP SERPL CALC-SCNC: 5 MMOL/L (ref 5–15)
AST SERPL-CCNC: 27 U/L (ref 15–37)
BASOPHILS # BLD: 0 K/UL (ref 0–0.1)
BASOPHILS NFR BLD: 0 % (ref 0–1)
BILIRUB SERPL-MCNC: 0.3 MG/DL (ref 0.2–1)
BUN SERPL-MCNC: 20 MG/DL (ref 6–20)
BUN/CREAT SERPL: 19 (ref 12–20)
CALCIUM SERPL-MCNC: 9.2 MG/DL (ref 8.5–10.1)
CHLORIDE SERPL-SCNC: 105 MMOL/L (ref 97–108)
CO2 SERPL-SCNC: 31 MMOL/L (ref 21–32)
CREAT SERPL-MCNC: 1.05 MG/DL (ref 0.7–1.3)
DIFFERENTIAL METHOD BLD: ABNORMAL
EOSINOPHIL # BLD: 0 K/UL (ref 0–0.4)
EOSINOPHIL NFR BLD: 1 % (ref 0–7)
ERYTHROCYTE [DISTWIDTH] IN BLOOD BY AUTOMATED COUNT: 13.6 % (ref 11.5–14.5)
GLOBULIN SER CALC-MCNC: 4.3 G/DL (ref 2–4)
GLUCOSE BLD STRIP.AUTO-MCNC: 124 MG/DL (ref 65–100)
GLUCOSE SERPL-MCNC: 128 MG/DL (ref 65–100)
HCT VFR BLD AUTO: 38.5 % (ref 36.6–50.3)
HGB BLD-MCNC: 12.4 G/DL (ref 12.1–17)
IMM GRANULOCYTES # BLD: 0 K/UL (ref 0–0.04)
IMM GRANULOCYTES NFR BLD AUTO: 0 % (ref 0–0.5)
LYMPHOCYTES # BLD: 0.7 K/UL (ref 0.8–3.5)
LYMPHOCYTES NFR BLD: 17 % (ref 12–49)
MCH RBC QN AUTO: 27.1 PG (ref 26–34)
MCHC RBC AUTO-ENTMCNC: 32.2 G/DL (ref 30–36.5)
MCV RBC AUTO: 84.2 FL (ref 80–99)
MONOCYTES # BLD: 0.3 K/UL (ref 0–1)
MONOCYTES NFR BLD: 7 % (ref 5–13)
NEUTS SEG # BLD: 3 K/UL (ref 1.8–8)
NEUTS SEG NFR BLD: 75 % (ref 32–75)
NRBC # BLD: 0 K/UL (ref 0–0.01)
NRBC BLD-RTO: 0 PER 100 WBC
PLATELET # BLD AUTO: 236 K/UL (ref 150–400)
PMV BLD AUTO: 9.7 FL (ref 8.9–12.9)
POTASSIUM SERPL-SCNC: 4.3 MMOL/L (ref 3.5–5.1)
PROT SERPL-MCNC: 8.4 G/DL (ref 6.4–8.2)
RBC # BLD AUTO: 4.57 M/UL (ref 4.1–5.7)
RBC MORPH BLD: ABNORMAL
SERVICE CMNT-IMP: ABNORMAL
SODIUM SERPL-SCNC: 141 MMOL/L (ref 136–145)
WBC # BLD AUTO: 4 K/UL (ref 4.1–11.1)

## 2018-11-06 PROCEDURE — 96376 TX/PRO/DX INJ SAME DRUG ADON: CPT

## 2018-11-06 PROCEDURE — 70450 CT HEAD/BRAIN W/O DYE: CPT

## 2018-11-06 PROCEDURE — 96375 TX/PRO/DX INJ NEW DRUG ADDON: CPT

## 2018-11-06 PROCEDURE — 85025 COMPLETE CBC W/AUTO DIFF WBC: CPT | Performed by: EMERGENCY MEDICINE

## 2018-11-06 PROCEDURE — 82962 GLUCOSE BLOOD TEST: CPT

## 2018-11-06 PROCEDURE — 99284 EMERGENCY DEPT VISIT MOD MDM: CPT

## 2018-11-06 PROCEDURE — 36415 COLL VENOUS BLD VENIPUNCTURE: CPT | Performed by: EMERGENCY MEDICINE

## 2018-11-06 PROCEDURE — 80053 COMPREHEN METABOLIC PANEL: CPT | Performed by: EMERGENCY MEDICINE

## 2018-11-06 PROCEDURE — 96374 THER/PROPH/DIAG INJ IV PUSH: CPT

## 2018-11-06 PROCEDURE — 74011250636 HC RX REV CODE- 250/636: Performed by: EMERGENCY MEDICINE

## 2018-11-06 RX ORDER — ONDANSETRON 2 MG/ML
INJECTION INTRAMUSCULAR; INTRAVENOUS
Status: DISCONTINUED
Start: 2018-11-06 | End: 2018-11-07 | Stop reason: HOSPADM

## 2018-11-06 RX ORDER — KETOROLAC TROMETHAMINE 30 MG/ML
15 INJECTION, SOLUTION INTRAMUSCULAR; INTRAVENOUS
Status: COMPLETED | OUTPATIENT
Start: 2018-11-06 | End: 2018-11-07

## 2018-11-06 RX ORDER — DEXAMETHASONE SODIUM PHOSPHATE 4 MG/ML
10 INJECTION, SOLUTION INTRA-ARTICULAR; INTRALESIONAL; INTRAMUSCULAR; INTRAVENOUS; SOFT TISSUE
Status: COMPLETED | OUTPATIENT
Start: 2018-11-06 | End: 2018-11-06

## 2018-11-06 RX ORDER — ONDANSETRON 2 MG/ML
4 INJECTION INTRAMUSCULAR; INTRAVENOUS
Status: COMPLETED | OUTPATIENT
Start: 2018-11-06 | End: 2018-11-06

## 2018-11-06 RX ORDER — BUTALBITAL, ACETAMINOPHEN AND CAFFEINE 50; 325; 40 MG/1; MG/1; MG/1
2 TABLET ORAL
Status: COMPLETED | OUTPATIENT
Start: 2018-11-06 | End: 2018-11-07

## 2018-11-06 RX ORDER — GUAIFENESIN 100 MG/5ML
81 LIQUID (ML) ORAL DAILY
COMMUNITY

## 2018-11-06 RX ADMIN — ONDANSETRON 4 MG: 2 INJECTION INTRAMUSCULAR; INTRAVENOUS at 21:35

## 2018-11-06 RX ADMIN — ONDANSETRON 4 MG: 2 INJECTION INTRAMUSCULAR; INTRAVENOUS at 22:36

## 2018-11-06 RX ADMIN — DEXAMETHASONE SODIUM PHOSPHATE 10 MG: 4 INJECTION, SOLUTION INTRAMUSCULAR; INTRAVENOUS at 23:13

## 2018-11-07 VITALS
BODY MASS INDEX: 25.18 KG/M2 | SYSTOLIC BLOOD PRESSURE: 151 MMHG | HEIGHT: 74 IN | OXYGEN SATURATION: 100 % | TEMPERATURE: 98 F | DIASTOLIC BLOOD PRESSURE: 78 MMHG | WEIGHT: 196.21 LBS | RESPIRATION RATE: 17 BRPM | HEART RATE: 85 BPM

## 2018-11-07 PROCEDURE — 96375 TX/PRO/DX INJ NEW DRUG ADDON: CPT

## 2018-11-07 PROCEDURE — 74011250636 HC RX REV CODE- 250/636: Performed by: EMERGENCY MEDICINE

## 2018-11-07 PROCEDURE — 74011250637 HC RX REV CODE- 250/637: Performed by: EMERGENCY MEDICINE

## 2018-11-07 RX ORDER — ONDANSETRON 4 MG/1
4 TABLET, ORALLY DISINTEGRATING ORAL
Qty: 16 TAB | Refills: 0 | Status: SHIPPED | OUTPATIENT
Start: 2018-11-07 | End: 2018-12-11

## 2018-11-07 RX ORDER — BUTALBITAL, ACETAMINOPHEN AND CAFFEINE 300; 40; 50 MG/1; MG/1; MG/1
1 CAPSULE ORAL
Qty: 20 CAP | Refills: 0 | Status: SHIPPED | OUTPATIENT
Start: 2018-11-07 | End: 2018-12-11

## 2018-11-07 RX ADMIN — KETOROLAC TROMETHAMINE 15 MG: 30 INJECTION, SOLUTION INTRAMUSCULAR at 00:17

## 2018-11-07 RX ADMIN — BUTALBITAL, ACETAMINOPHEN AND CAFFEINE 2 TABLET: 50; 325; 40 TABLET ORAL at 00:17
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
## 2018-11-07 NOTE — DISCHARGE INSTRUCTIONS
Cluster Headache: Care Instructions  Your Care Instructions  Cluster headaches are very painful. They happen on one side of the head and often start at night. They can last for 30 minutes to several hours. They usually occur in groups, or clusters, over weeks or months. You may have a stuffy nose and watery eyes during the headaches. The cause of cluster headaches is not known. Medicine may help prevent cluster headaches. You also can try to avoid things that trigger your headaches. Follow-up care is a key part of your treatment and safety. Be sure to make and go to all appointments, and call your doctor if you are having problems. It's also a good idea to know your test results and keep a list of the medicines you take. How can you care for yourself at home? · Watch for new symptoms with a headache. These include fever, weakness or numbness, vision changes, or confusion. They may be signs of a more serious problem. · Be safe with medicines. Take your medicines exactly as prescribed. Call your doctor if you think you are having a problem with your medicine. You will get more details on the specific medicines your doctor prescribes. · If your doctor recommends it, take an over-the-counter pain medicine, such as acetaminophen (Tylenol), ibuprofen (Advil, Motrin), or naproxen (Aleve). Read and follow all instructions on the label. · Do not take two or more pain medicines at the same time unless the doctor told you to. Many pain medicines have acetaminophen, which is Tylenol. Too much acetaminophen (Tylenol) can be harmful. · Carry medicine with you to quickly treat a headache. · Put ice or a cold pack on the area for 10 to 20 minutes at a time. Put a thin cloth between the ice and your skin. · If your doctor prescribed at-home oxygen therapy to stop a cluster headache, follow the directions for using it. To prevent cluster headaches  · Keep a headache diary.  Avoiding triggers may help you prevent headaches. Write down when a headache begins, how long it lasts, and what might have triggered it. This could include stress, alcohol, or certain foods. · Exercise daily to lower stress. · Limit caffeine by not drinking too much coffee, tea, or soda. But do not quit caffeine suddenly. This can also give you headaches. · Do not smoke or allow others to smoke around you. If you need help quitting, talk to your doctor about stop-smoking programs and medicines. These can increase your chances of quitting for good. · Tell your doctor if your headaches get worse and medicines do not help. You may need to try a different medicine. When should you call for help? Call 911 anytime you think you may need emergency care. For example, call if:    · You have symptoms of a stroke. These may include:  ? Sudden numbness, tingling, weakness, or loss of movement in your face, arm, or leg, especially on only one side of your body. ? Sudden vision changes. ? Sudden trouble speaking. ? Sudden confusion or trouble understanding simple statements. ? Sudden problems with walking or balance. ? A sudden, severe headache that is different from past headaches.    Call your doctor now or seek immediate medical care if:    · You have a fever with a stiff neck or a severe headache.     · You are sensitive to light or feel very sleepy or confused.     · You have new nausea and vomiting and you cannot keep down food or liquids.    Watch closely for changes in your health, and be sure to contact your doctor if:    · You have a headache that does not get better within 1 or 2 days.     · Your headaches get worse or happen more often. Where can you learn more? Go to http://ernesto-vilma.info/. Enter T844 in the search box to learn more about \"Cluster Headache: Care Instructions. \"  Current as of: June 4, 2018  Content Version: 11.8  © 9172-3466 [employer].  Care instructions adapted under license by [employer] (which disclaims liability or warranty for this information). If you have questions about a medical condition or this instruction, always ask your healthcare professional. [address] any warranty or liability for your use of this information.

## 2018-11-07 NOTE — ED NOTES
Patient presents with complaints of pain in the head that is diffuse in the head and feels like it is behind his left eye. Patient states that the pain started around 31 75 62 and has been getting progressively worse.

## 2018-11-07 NOTE — ED TRIAGE NOTES
Pt ambulatory to triage at this time. Pt states that he ate a \"[employer]\" around 4:30pm today and since then he has had pain above his right eye and in his right eye. He has also vomited twice. Pt states that he took his blood sugar around 7pm today and it was \"47\" so he ate some crackers but did not check it after.

## 2018-11-07 NOTE — ED NOTES
Pt reports a decrease in pain after being medicated. A/Ox4. PIV removed at this time. Pt getting dressed at this time.

## 2018-11-09 ENCOUNTER — DOCUMENTATION ONLY (OUTPATIENT)
Dept: SLEEP MEDICINE | Age: 73
End: 2018-11-09

## 2018-12-11 ENCOUNTER — OFFICE VISIT (OUTPATIENT)
Dept: ENDOCRINOLOGY | Age: 73
End: 2018-12-11

## 2018-12-11 VITALS
DIASTOLIC BLOOD PRESSURE: 69 MMHG | WEIGHT: 191.2 LBS | SYSTOLIC BLOOD PRESSURE: 134 MMHG | HEART RATE: 105 BPM | BODY MASS INDEX: 24.54 KG/M2 | HEIGHT: 74 IN

## 2018-12-11 DIAGNOSIS — E10.65 UNCONTROLLED TYPE 1 DIABETES MELLITUS WITH HYPERGLYCEMIA (HCC): Primary | ICD-10-CM

## 2018-12-11 DIAGNOSIS — R03.0 ELEVATED BLOOD PRESSURE READING WITHOUT DIAGNOSIS OF HYPERTENSION: ICD-10-CM

## 2018-12-11 DIAGNOSIS — E78.5 HYPERLIPIDEMIA LDL GOAL <100: ICD-10-CM

## 2018-12-11 LAB — HBA1C MFR BLD HPLC: 8 %

## 2018-12-11 NOTE — PATIENT INSTRUCTIONS
1) Your A1c is down to 8%. Keep up the good work. 2) Plan on writing down all the times you check so I can review at the next visit. 3) Keep your insulin doses the same at 1:8 for breakfast and 1:9 for lunch and dinner. 4) If your sugar is over 180 at bedtime, plan on taking humalog 1 unit for 50 points over 180.    5) I will call you or send you a letter with your lab results.

## 2018-12-11 NOTE — PROGRESS NOTES
Chief Complaint   Patient presents with    Diabetes     pcp ad pharmacy confirmed    Labs     drawn  for lipid     History of Present Illness: Delia Rain is a 75 y.o. male here for follow up of diabetes. Weight down 2 lbs since last visit in 7/17. Hasn't been taking the OTC testosterone supplement as regularly and has had more hot flashes at night and also has noticed more problems with ejaculation so went to see Dr. Yefri Bryant and apparently his testosterone levels were normal but I told him to try going back on the supplement to see if this helps. Did have an ER visit last month for headache and pain behind the right eye and n/v and had a normal head CT and was discharged home and symptoms seemed to resolve on their own and never needed to take the zofran or fiorcet. Still taking lantus and humalog as below. Checking up to 5 times per day over the past 90 days but has only been recording 3 times per day. Majority of his readings are between 100-180 but still having some spikes over 200 and a few over 300. States he never got new shoes from [place] even though we faxed all the paperwork in July so I reprinted all the documents and gave them to him to take to [place] now. Current Outpatient Medications   Medication Sig    aspirin 81 mg chewable tablet Take 81 mg by mouth daily.     atorvastatin (LIPITOR) 10 mg tablet TAKE 1 TABLET BY MOUTH DAILY    insulin glargine (LANTUS SOLOSTAR U-100 INSULIN) 100 unit/mL (3 mL) inpn 16 units subcutaneously at bedtime    OTHER Over the counter testosterone raising supplement    insulin lispro (HUMALOG KWIKPEN INSULIN) 100 unit/mL kwikpen INJECT 1 UNIT FOR 8 GRAMS OF CARB FOR BREAKFAST AND 9 GRAMS FOR LUNCH AND DINNER 1 UNIT FOR 30 POINTS OVER 150 FOR CORRECTION, MAXIMUM 30 UNITS PER DAY--Dose change 7/10/18--updated med list--did not send prescription to the pharmacy    insulin glargine (LANTUS SOLOSTAR) 100 unit/mL (3 mL) inpn Inject 16 units at bedtime--Dose change 6/12/17--updated med list--did not send prescription to the pharmacy    ONETOUCH ULTRA TEST strip TEST UP TO 5 TIMES DAILY   (INSULIN FLUCTUATING SUGARS). DX: E10.65    therapeutic multivitamin (THERA) tablet Take 1 Tab by mouth daily.  Cholecalciferol, Vitamin D3, (VITAMIN D) 2,000 unit Cap Take 1 Tab by mouth daily. No current facility-administered medications for this visit. Allergies   Allergen Reactions    Lisinopril Cough    Novocain [Procaine] Palpitations    Tamsulosin Other (comments)     Higher blood sugars     Review of Systems:  - Eyes: no blurry vision or double vision  - Cardiovascular: no chest pain  - Respiratory: no shortness of breath  - Musculoskeletal: no myalgias  - Neurological: no numbness/tingling in extremities    Physical Examination:  Blood pressure 134/69, pulse (!) 105, height 6' 2\" (1.88 m), weight 191 lb 3.2 oz (86.7 kg). - General: pleasant, no distress, good eye contact   - Neck: no carotid bruits  - Cardiovascular: regular, normal rate, nl s1 and s2, no m/r/g,   - Respiratory: clear bilaterally  - Integumentary: no edema,   - Psychiatric: normal mood and affect    Data Reviewed:   Component      Latest Ref Rng & Units 12/11/2018           2:08 PM   Hemoglobin A1c (POC)      % 8.0     Component      Latest Ref Rng & Units 11/6/2018           9:12 PM   Sodium      136 - 145 mmol/L 141   Potassium      3.5 - 5.1 mmol/L 4.3   Chloride      97 - 108 mmol/L 105   CO2      21 - 32 mmol/L 31   Anion gap      5 - 15 mmol/L 5   Glucose      65 - 100 mg/dL 128 (H)   BUN      6 - 20 MG/DL 20   Creatinine      0.70 - 1.30 MG/DL 1.05   BUN/Creatinine ratio      12 - 20   19   GFR est AA      >60 ml/min/1.73m2 >60   GFR est non-AA      >60 ml/min/1.73m2 >60   Calcium      8.5 - 10.1 MG/DL 9.2   Bilirubin, total      0.2 - 1.0 MG/DL 0.3   ALT (SGPT)      12 - 78 U/L 39   AST      15 - 37 U/L 27   Alk.  phosphatase      45 - 117 U/L 74   Protein, total      6.4 - 8.2 g/dL 8.4 (H)   Albumin      3.5 - 5.0 g/dL 4.1   Globulin      2.0 - 4.0 g/dL 4.3 (H)   A-G Ratio      1.1 - 2.2   1.0 (L)       Assessment/Plan:     1. Type I diabetes mellitus, uncontrolled: his most recent Hgb A1c was 8% in 12/18 down from 8.1% in 7/18 down from 8.3% in 1/18 stable from 8/17 up from 7.9% in 3/17 down from 8.5% in 10/16 stable from 5/16 down from 9.5% in 12/15 up from 7.8% in 8/15 down from 7.9% in 4/15 up from 7.7% in 12/14 down from 8.3% in 7/14 down from 9.3% in 3/14 up from 8.5% in 11/13 up from 7.9% in 8/13 down from 8.4% 3/13 up from 7.4% in 12/12. I don't know how accurate this test will be because of his sickle cell trait so have been drawing both a Hgb A1c and a fructosamine. His last fructosamine was 371 in 12/14 up from 366 in 7/14 down form 428 in 3/14 up from 400 in 11/13 up from 338 in 8/13 down from 373 in 3/13 up from 345 in 12/12 and 330 in 6/12. His A1c is still above goal < 7.5% due to fluctuating sugars but won't make any changes. - cont Lantus 16 units at bedtime  - cont humalog 1:8 for carbs for breakfast and cont 1:9 for lunch and dinner before 9pm and 1:10 after 9pm and 1:50 > 150 for correction during the day and 1:50 > 180 after 9pm  - check bs up to 5 times daily due to fluctuating sugars and risk of hypoglycemia  - foot exam done 7/18  - optho UTD 6/17  - microalbumin nl 7/18  - check A1c and cmp and microalbumin prior to next visit        2. Other and unspecified hyperlipidemia: Given DM, Goal LDL < 100, non-HDL < 130, and TG < 150. LDL 79 3/12, 78 12/12 and 84 3/13 and 75 in 11/13 and 70 in 7/14 and 63 in 4/15. Up to 99 in 12/15 and 100 in 5/16 so went up to a while tab daily and LDL down to 74 in 10/16 and 73 in 8/17 and 69 in 7/18  - cont lipitor 10 mg daily  - check lipids prior to next visit  - check lipids and cmp today      3.  Elevated blood pressure (not hypertension): his BP was above goal < 140/90 in 12/14 and prior to that visit was at goal so monitor home readings and they are at goal off any meds. Up in 8/17 and in 1/18 but home readings are at goal.  - monitor home readings off meds for now        Patient Instructions   1) Your A1c is down to 8%. Keep up the good work. 2) Plan on writing down all the times you check so I can review at the next visit. 3) Keep your insulin doses the same at 1:8 for breakfast and 1:9 for lunch and dinner. 4) If your sugar is over 180 at bedtime, plan on taking humalog 1 unit for 50 points over 180.    5) I will call you or send you a letter with your lab results. Follow-up Disposition:  Return in about 5 months (around 5/11/2019). Copy sent to:  Dr. Milton Miller follow up: 12/12/18    Sent him the following message in a letter:    METABOLIC PANEL, BASIC   Result Value Ref Range    Glucose 272 (H) 65 - 99 mg/dL    BUN 16 8 - 27 mg/dL    Creatinine 0.97 0.76 - 1.27 mg/dL    GFR est non-AA 77 >59 mL/min/1.73    GFR est AA 89 >59 mL/min/1.73    BUN/Creatinine ratio 16 10 - 24    Sodium 139 134 - 144 mmol/L    Potassium 4.7 3.5 - 5.2 mmol/L    Chloride 102 96 - 106 mmol/L    CO2 26 20 - 29 mmol/L    Calcium 9.5 8.6 - 10.2 mg/dL    Narrative    Performed at:  [address]  [id number]  : Thaddeus Grove MD, Phone:  [phone]   LIPID PANEL   Result Value Ref Range    Cholesterol, total 135 100 - 199 mg/dL    Triglyceride 67 0 - 149 mg/dL    HDL Cholesterol 58 >39 mg/dL    VLDL, calculated 13 5 - 40 mg/dL    LDL, calculated 64 0 - 99 mg/dL    Narrative    Performed at:  [address]  [id number]  : Thaddeus Grove MD, Phone:  [phone]   CVD REPORT   Result Value Ref Range    INTERPRETATION Note       Comment:      Supplemental report is available.     Narrative    Performed at:  [address]  [address] [place] [address]  [id number]  Lab Director: Abhinav Vega MD, Phone:  [phone]       I wanted to update you on your recent lab results:    Hemoglobin A1c is a 3 month marker of your diabetes control. Goal is less than 7.5%. Continue to work on your diet and exercise and take all your medications as directed.  -------------------------------------------------------------------------------------------------------------------  Total Cholesterol is the total number of cholesterol particles in your blood. Goal is less than 200. Triglycerides are the short term fats in your blood. Goal is less than 150. HDL is the good cholesterol in your blood. Goal is more than 50 if you are a woman and 40 if you are a man. LDL is the bad cholesterol in your blood. Goal is less than 100 unless you have heart disease and then goal is under 70. Continue to follow a low cholesterol diet. Try to limit the amount of fried foods, fatty foods, butter, gravy, red meat, ice cream, cheese, and eggs in your diet, which are all high in cholesterol.  -------------------------------------------------------------------------------------------------------------------  BUN and creatinine are markers of kidney function.   Your values are normal.

## 2018-12-12 LAB
BUN SERPL-MCNC: 16 MG/DL (ref 8–27)
BUN/CREAT SERPL: 16 (ref 10–24)
CALCIUM SERPL-MCNC: 9.5 MG/DL (ref 8.6–10.2)
CHLORIDE SERPL-SCNC: 102 MMOL/L (ref 96–106)
CHOLEST SERPL-MCNC: 135 MG/DL (ref 100–199)
CO2 SERPL-SCNC: 26 MMOL/L (ref 20–29)
CREAT SERPL-MCNC: 0.97 MG/DL (ref 0.76–1.27)
GLUCOSE SERPL-MCNC: 272 MG/DL (ref 65–99)
HDLC SERPL-MCNC: 58 MG/DL
INTERPRETATION, 910389: NORMAL
LDLC SERPL CALC-MCNC: 64 MG/DL (ref 0–99)
POTASSIUM SERPL-SCNC: 4.7 MMOL/L (ref 3.5–5.2)
SODIUM SERPL-SCNC: 139 MMOL/L (ref 134–144)
TRIGL SERPL-MCNC: 67 MG/DL (ref 0–149)
VLDLC SERPL CALC-MCNC: 13 MG/DL (ref 5–40)

## 2018-12-28 ENCOUNTER — TELEPHONE (OUTPATIENT)
Dept: SLEEP MEDICINE | Age: 73
End: 2018-12-28

## 2018-12-28 DIAGNOSIS — G47.31 COMPLEX SLEEP APNEA SYNDROME: Primary | ICD-10-CM

## 2018-12-28 NOTE — TELEPHONE ENCOUNTER
Per Mu Yanes from [facility] patient Pap order needs to have an Ipap pressure setting. Please put an updated order with the setting In system.

## 2019-01-04 NOTE — TELEPHONE ENCOUNTER
[address] called to inform that she was not able to set the pressure 4-5. It could not let max pressure set to lower than 9. So, she requested new order for pressure setting 4-9.

## 2019-01-14 ENCOUNTER — TELEPHONE (OUTPATIENT)
Dept: SLEEP MEDICINE | Age: 74
End: 2019-01-14

## 2019-01-14 NOTE — TELEPHONE ENCOUNTER
Patient called in stating he is unable to use pap device due to current pressure.  Please preform download and contact patient at [phone]

## 2019-01-14 NOTE — TELEPHONE ENCOUNTER
Patient was called and data was reviewed. Patient will wash his mask and face to address any leaking.  Download added to patients chart for review by Helena Alfaro MD .

## 2019-02-12 ENCOUNTER — OFFICE VISIT (OUTPATIENT)
Dept: SLEEP MEDICINE | Age: 74
End: 2019-02-12

## 2019-02-12 VITALS
OXYGEN SATURATION: 99 % | BODY MASS INDEX: 25.67 KG/M2 | HEIGHT: 74 IN | WEIGHT: 200 LBS | SYSTOLIC BLOOD PRESSURE: 166 MMHG | DIASTOLIC BLOOD PRESSURE: 79 MMHG | HEART RATE: 85 BPM

## 2019-02-12 DIAGNOSIS — G47.33 OSA (OBSTRUCTIVE SLEEP APNEA): Primary | ICD-10-CM

## 2019-02-12 DIAGNOSIS — G47.31 CENTRAL SLEEP APNEA: ICD-10-CM

## 2019-02-21 RX ORDER — ATORVASTATIN CALCIUM 10 MG/1
TABLET, FILM COATED ORAL
Qty: 90 TAB | Refills: 3 | Status: SHIPPED | OUTPATIENT
Start: 2019-02-21 | End: 2019-02-26 | Stop reason: SDUPTHER

## 2019-02-26 RX ORDER — ATORVASTATIN CALCIUM 10 MG/1
10 TABLET, FILM COATED ORAL DAILY
Qty: 90 TAB | Refills: 3 | Status: SHIPPED | OUTPATIENT
Start: 2019-02-26 | End: 2020-03-03 | Stop reason: SDUPTHER

## 2019-04-03 ENCOUNTER — HOSPITAL ENCOUNTER (OUTPATIENT)
Dept: CT IMAGING | Age: 74
Discharge: HOME | End: 2019-04-03
Attending: OTOLARYNGOLOGY
Payer: MEDICARE

## 2019-04-03 DIAGNOSIS — J34.89 NASAL VALVE COLLAPSE: ICD-10-CM

## 2019-04-03 DIAGNOSIS — J32.9 CHRONIC SINUSITIS: ICD-10-CM

## 2019-04-03 DIAGNOSIS — R09.81 NASAL CONGESTION: ICD-10-CM

## 2019-04-03 DIAGNOSIS — J30.9 RHINITIS, ALLERGIC: ICD-10-CM

## 2019-04-03 DIAGNOSIS — Z98.890 HISTORY OF NASAL SEPTOPLASTY: ICD-10-CM

## 2019-04-03 DIAGNOSIS — J34.3 HYPERTROPHY, NASAL, TURBINATE: ICD-10-CM

## 2019-04-03 PROCEDURE — 70486 CT MAXILLOFACIAL W/O DYE: CPT

## 2019-04-09 NOTE — PATIENT INSTRUCTIONS

## 2019-04-23 ENCOUNTER — HOSPITAL ENCOUNTER (OUTPATIENT)
Dept: PREADMISSION TESTING | Age: 74
Discharge: HOME | End: 2019-04-23
Payer: MEDICARE

## 2019-04-23 VITALS
WEIGHT: 194.38 LBS | HEART RATE: 83 BPM | RESPIRATION RATE: 20 BRPM | TEMPERATURE: 98.1 F | BODY MASS INDEX: 24.95 KG/M2 | DIASTOLIC BLOOD PRESSURE: 74 MMHG | SYSTOLIC BLOOD PRESSURE: 167 MMHG | HEIGHT: 74 IN

## 2019-04-23 LAB
ANION GAP SERPL CALC-SCNC: 7 MMOL/L (ref 5–15)
ATRIAL RATE: 76 BPM
BASOPHILS # BLD: 0 K/UL (ref 0–0.1)
BASOPHILS NFR BLD: 0 % (ref 0–1)
BUN SERPL-MCNC: 18 MG/DL (ref 6–20)
BUN/CREAT SERPL: 16 (ref 12–20)
CALCIUM SERPL-MCNC: 8.7 MG/DL (ref 8.5–10.1)
CALCULATED P AXIS, ECG09: 70 DEGREES
CALCULATED R AXIS, ECG10: -59 DEGREES
CALCULATED T AXIS, ECG11: 57 DEGREES
CHLORIDE SERPL-SCNC: 105 MMOL/L (ref 97–108)
CO2 SERPL-SCNC: 25 MMOL/L (ref 21–32)
CREAT SERPL-MCNC: 1.12 MG/DL (ref 0.7–1.3)
DIAGNOSIS, 93000: NORMAL
DIFFERENTIAL METHOD BLD: ABNORMAL
EOSINOPHIL # BLD: 0.1 K/UL (ref 0–0.4)
EOSINOPHIL NFR BLD: 2 % (ref 0–7)
ERYTHROCYTE [DISTWIDTH] IN BLOOD BY AUTOMATED COUNT: 13.4 % (ref 11.5–14.5)
EST. AVERAGE GLUCOSE BLD GHB EST-MCNC: 197 MG/DL
GLUCOSE SERPL-MCNC: 267 MG/DL (ref 65–100)
HBA1C MFR BLD: 8.5 % (ref 4.2–6.3)
HCT VFR BLD AUTO: 36.5 % (ref 36.6–50.3)
HGB BLD-MCNC: 11.8 G/DL (ref 12.1–17)
IMM GRANULOCYTES # BLD AUTO: 0 K/UL (ref 0–0.04)
IMM GRANULOCYTES NFR BLD AUTO: 0 % (ref 0–0.5)
LYMPHOCYTES # BLD: 0.6 K/UL (ref 0.8–3.5)
LYMPHOCYTES NFR BLD: 15 % (ref 12–49)
MCH RBC QN AUTO: 28.1 PG (ref 26–34)
MCHC RBC AUTO-ENTMCNC: 32.3 G/DL (ref 30–36.5)
MCV RBC AUTO: 86.9 FL (ref 80–99)
MONOCYTES # BLD: 0.4 K/UL (ref 0–1)
MONOCYTES NFR BLD: 9 % (ref 5–13)
NEUTS SEG # BLD: 2.8 K/UL (ref 1.8–8)
NEUTS SEG NFR BLD: 74 % (ref 32–75)
NRBC # BLD: 0 K/UL (ref 0–0.01)
NRBC BLD-RTO: 0 PER 100 WBC
P-R INTERVAL, ECG05: 216 MS
PLATELET # BLD AUTO: 226 K/UL (ref 150–400)
PMV BLD AUTO: 9.9 FL (ref 8.9–12.9)
POTASSIUM SERPL-SCNC: 4.7 MMOL/L (ref 3.5–5.1)
Q-T INTERVAL, ECG07: 396 MS
QRS DURATION, ECG06: 90 MS
QTC CALCULATION (BEZET), ECG08: 445 MS
RBC # BLD AUTO: 4.2 M/UL (ref 4.1–5.7)
RBC MORPH BLD: ABNORMAL
SODIUM SERPL-SCNC: 137 MMOL/L (ref 136–145)
VENTRICULAR RATE, ECG03: 76 BPM
WBC # BLD AUTO: 3.9 K/UL (ref 4.1–11.1)

## 2019-04-23 PROCEDURE — 80048 BASIC METABOLIC PNL TOTAL CA: CPT

## 2019-04-23 PROCEDURE — 83036 HEMOGLOBIN GLYCOSYLATED A1C: CPT

## 2019-04-23 PROCEDURE — 93005 ELECTROCARDIOGRAM TRACING: CPT

## 2019-04-23 PROCEDURE — 85025 COMPLETE CBC W/AUTO DIFF WBC: CPT

## 2019-04-23 RX ORDER — MULTIVIT WITH MINERALS/HERBS
1 TABLET ORAL DAILY
COMMUNITY
End: 2019-10-18

## 2019-04-24 ENCOUNTER — TELEPHONE (OUTPATIENT)
Dept: ENDOCRINOLOGY | Age: 74
End: 2019-04-24

## 2019-04-24 NOTE — TELEPHONE ENCOUNTER
----- Message from Abram Aguirre sent at 4/24/2019  3:13 PM EDT -----  Regarding: Dr Dereck Espinosa  Pt request a call back from the practice in regards to clarification on the amount of Insulin he needs to take on the day of surgery 05/02. Best contact number is [phone].

## 2019-04-24 NOTE — TELEPHONE ENCOUNTER
Have him take his normal dose of 16 units the night before surgery. The morning of surgery he should not take any humalog as he won't be eating but if his sugar is over 200 fasting, then he can just take 2 units of humalog to bring it down prior to surgery.

## 2019-04-24 NOTE — PERIOP NOTES
CALLED DR. RESTREPO'S OFFICE AND LEFT A VOICEMAIL FOR RAFAEL ABOUT ABNORMAL LABS (GLUCOSE 267, WBC 3.9, HGB 11.8, HCT 36.5, HGBA1C 8.5). ALL LABS AND EKG HAVE BEEN FAXED OVER TO OFFICE.

## 2019-05-09 LAB
ALBUMIN SERPL-MCNC: 4.1 G/DL (ref 3.5–4.8)
ALBUMIN/CREAT UR: <4.8 MG/G CREAT (ref 0–30)
ALBUMIN/GLOB SERPL: 1.4 {RATIO} (ref 1.2–2.2)
ALP SERPL-CCNC: 72 IU/L (ref 39–117)
ALT SERPL-CCNC: 31 IU/L (ref 0–44)
AST SERPL-CCNC: 39 IU/L (ref 0–40)
BILIRUB SERPL-MCNC: 0.4 MG/DL (ref 0–1.2)
BUN SERPL-MCNC: 16 MG/DL (ref 8–27)
BUN/CREAT SERPL: 16 (ref 10–24)
CALCIUM SERPL-MCNC: 9.5 MG/DL (ref 8.6–10.2)
CHLORIDE SERPL-SCNC: 104 MMOL/L (ref 96–106)
CHOLEST SERPL-MCNC: 163 MG/DL (ref 100–199)
CO2 SERPL-SCNC: 25 MMOL/L (ref 20–29)
CREAT SERPL-MCNC: 0.99 MG/DL (ref 0.76–1.27)
CREAT UR-MCNC: 62.9 MG/DL
EST. AVERAGE GLUCOSE BLD GHB EST-MCNC: 186 MG/DL
GLOBULIN SER CALC-MCNC: 2.9 G/DL (ref 1.5–4.5)
GLUCOSE SERPL-MCNC: 165 MG/DL (ref 65–99)
HBA1C MFR BLD: 8.1 % (ref 4.8–5.6)
HDLC SERPL-MCNC: 57 MG/DL
INTERPRETATION, 910389: NORMAL
LDLC SERPL CALC-MCNC: 96 MG/DL (ref 0–99)
Lab: NORMAL
MICROALBUMIN UR-MCNC: <3 UG/ML
POTASSIUM SERPL-SCNC: 4.5 MMOL/L (ref 3.5–5.2)
PROT SERPL-MCNC: 7 G/DL (ref 6–8.5)
SODIUM SERPL-SCNC: 141 MMOL/L (ref 134–144)
TRIGL SERPL-MCNC: 48 MG/DL (ref 0–149)
VLDLC SERPL CALC-MCNC: 10 MG/DL (ref 5–40)

## 2019-05-13 ENCOUNTER — OFFICE VISIT (OUTPATIENT)
Dept: ENDOCRINOLOGY | Age: 74
End: 2019-05-13

## 2019-05-13 VITALS
DIASTOLIC BLOOD PRESSURE: 78 MMHG | BODY MASS INDEX: 25.37 KG/M2 | HEIGHT: 74 IN | HEART RATE: 80 BPM | RESPIRATION RATE: 14 BRPM | WEIGHT: 197.7 LBS | OXYGEN SATURATION: 97 % | SYSTOLIC BLOOD PRESSURE: 147 MMHG

## 2019-05-13 DIAGNOSIS — E10.65 UNCONTROLLED TYPE 1 DIABETES MELLITUS WITH HYPERGLYCEMIA (HCC): Primary | ICD-10-CM

## 2019-05-13 DIAGNOSIS — R03.0 ELEVATED BLOOD PRESSURE READING WITHOUT DIAGNOSIS OF HYPERTENSION: ICD-10-CM

## 2019-05-13 DIAGNOSIS — E78.5 HYPERLIPIDEMIA LDL GOAL <100: ICD-10-CM

## 2019-05-13 NOTE — PROGRESS NOTES
Chief Complaint   Patient presents with    Diabetes     PCP and Pharmacy Verified. History of Present Illness: Ene Bean is a 75 y.o. male here for follow up of diabetes. Weight up 6 lbs since last visit in 12/18. He was due to have surgery with Dr. Alexander on 5/2/19 but cancelled this due to concern over his sugars fluctuating as he has had some low values overnight sometimes down to the 50-70s. He has not been following my correction scale at bedtime as directed and appears to be over-calculating out how much humalog to take at night and this may be contributing to some of his lows. Has been checking 5 times daily over the past 90 days and does have readings over 200-300 at times and other times 100-200 and some under 90 and it's hard to find a pattern to his values. Compliant with rest of meds below. Did get his diabetic shoes from [place] [id number] but is needing to have them adjusted. Current Outpatient Medications   Medication Sig    b complex vitamins (B COMPLEX 1) tablet Take 1 Tab by mouth daily.  atorvastatin (LIPITOR) 10 mg tablet Take 1 Tab by mouth daily.  aspirin 81 mg chewable tablet Take 81 mg by mouth daily.  insulin glargine (LANTUS SOLOSTAR U-100 INSULIN) 100 unit/mL (3 mL) inpn 16 units subcutaneously at bedtime    insulin lispro (HUMALOG KWIKPEN INSULIN) 100 unit/mL kwikpen INJECT 1 UNIT FOR 8 GRAMS OF CARB FOR BREAKFAST AND 9 GRAMS FOR LUNCH AND DINNER 1 UNIT FOR 30 POINTS OVER 150 FOR CORRECTION, MAXIMUM 30 UNITS PER DAY--Dose change 7/10/18--updated med list--did not send prescription to the pharmacy    ONETOUCH ULTRA TEST strip TEST UP TO 5 TIMES DAILY   (INSULIN FLUCTUATING SUGARS). DX: E10.65    therapeutic multivitamin (THERA) tablet Take 1 Tab by mouth daily.  Cholecalciferol, Vitamin D3, (VITAMIN D) 2,000 unit Cap Take 1 Tab by mouth daily. No current facility-administered medications for this visit.       Allergies   Allergen Reactions    Lisinopril Cough    Novocain [Procaine] Palpitations    Tamsulosin Other (comments)     Higher blood sugars     Review of Systems:  - Eyes: no blurry vision or double vision  - Cardiovascular: no chest pain  - Respiratory: no shortness of breath  - Musculoskeletal: no myalgias  - Neurological: some numbness/tingling in extremities    Physical Examination:  Blood pressure 147/78, pulse 80, resp. rate 14, height 6' 2\" (1.88 m), weight 197 lb 11.2 oz (89.7 kg), SpO2 97 %. - General: pleasant, no distress, good eye contact   - Neck: no carotid bruits  - Cardiovascular: regular, normal rate, nl s1 and s2, 2/6 systolic murmur   - Respiratory: clear bilaterally  - Integumentary: no edema,   - Psychiatric: normal mood and affect    Data Reviewed:   Component      Latest Ref Rng & Units 5/8/2019 5/8/2019 5/8/2019 5/8/2019           7:52 AM  7:52 AM  7:52 AM  7:52 AM   Glucose      65 - 99 mg/dL  165 (H)     BUN      8 - 27 mg/dL  16     Creatinine      0.76 - 1.27 mg/dL  0.99     GFR est non-AA      >59 mL/min/1.73  74     GFR est AA      >59 mL/min/1.73  86     BUN/Creatinine ratio      10 - 24  16     Sodium      134 - 144 mmol/L  141     Potassium      3.5 - 5.2 mmol/L  4.5     Chloride      96 - 106 mmol/L  104     CO2      20 - 29 mmol/L  25     Calcium      8.6 - 10.2 mg/dL  9.5     Protein, total      6.0 - 8.5 g/dL  7.0     Albumin      3.5 - 4.8 g/dL  4.1     GLOBULIN, TOTAL      1.5 - 4.5 g/dL  2.9     A-G Ratio      1.2 - 2.2  1.4     Bilirubin, total      0.0 - 1.2 mg/dL  0.4     Alk. phosphatase      39 - 117 IU/L  72     AST      0 - 40 IU/L  39     ALT (SGPT)      0 - 44 IU/L  31     Cholesterol, total      100 - 199 mg/dL 163      Triglyceride      0 - 149 mg/dL 48      HDL Cholesterol      >39 mg/dL 57      VLDL, calculated      5 - 40 mg/dL 10      LDL, calculated      0 - 99 mg/dL 96      Creatinine, urine      Not Estab. mg/dL   62.9    Microalbumin, urine      Not Estab. ug/mL   <3.0    Microalbumin/Creat. Ratio      0.0 - 30.0 mg/g creat   <4.8    Hemoglobin A1c, (calculated)      4.8 - 5.6 %    8.1 (H)   Estimated average glucose      mg/dL    186       Assessment/Plan:     1. Type I diabetes mellitus, uncontrolled: his most recent Hgb A1c was 8.1% in 5/19 up from 8% in 12/18 down from 8.1% in 7/18 down from 8.3% in 1/18 stable from 8/17 up from 7.9% in 3/17 down from 8.5% in 10/16 stable from 5/16 down from 9.5% in 12/15 up from 7.8% in 8/15 down from 7.9% in 4/15 up from 7.7% in 12/14 down from 8.3% in 7/14 down from 9.3% in 3/14 up from 8.5% in 11/13 up from 7.9% in 8/13 down from 8.4% 3/13 up from 7.4% in 12/12. I don't know how accurate this test will be because of his sickle cell trait so have been drawing both a Hgb A1c and a fructosamine. His last fructosamine was 371 in 12/14 up from 366 in 7/14 down form 428 in 3/14 up from 400 in 11/13 up from 338 in 8/13 down from 373 in 3/13 up from 345 in 12/12 and 330 in 6/12. His A1c is still above goal < 7.5% due to fluctuating sugars but won't make any changes as he has not been following my directions exactly so will collect more data over the next week to see where other changes are needed. - cont Lantus 16 units at bedtime  - cont humalog 1:8 for carbs for breakfast and cont 1:9 for lunch and dinner before 9pm and 1:10 after 9pm and 1:50 > 150 for correction during the day and 1:50 > 180 after 9pm  - check bs 5 times daily due to fluctuating sugars and risk of hypoglycemia  - foot exam done 7/18  - optho UTD 6/17  - microalbumin nl 5/18  - check A1c and bmp prior to next visit        2. Other and unspecified hyperlipidemia: Given DM, Goal LDL < 100, non-HDL < 130, and TG < 150. LDL 79 3/12, 78 12/12 and 84 3/13 and 75 in 11/13 and 70 in 7/14 and 63 in 4/15.   Up to 99 in 12/15 and 100 in 5/16 so went up to a while tab daily and LDL down to 74 in 10/16 and 73 in 8/17 and 69 in 7/18 and 64 in 12/18, up to 96 in 5/19  - cont lipitor 10 mg daily  - check lipids in 5/20      3. Elevated blood pressure (not hypertension): his BP was above goal < 140/90 in 12/14 and prior to that visit was at goal so monitor home readings and they are at goal off any meds. Up in 8/17 and in 1/18 but home readings are at goal.  Did not repeat today manually. - monitor home readings off meds for now        Patient Instructions   1) When your sugars are high during the day, follow this scale for humalog:  -151-200=1 unit  -201-250=2 units  -251-300=3 units  -301-350=4 units  - over 350=5 units     2) When your sugars are high after 9pm following this scale for humalog:   -181-230=1 unit  -231-280=2 units  -281-330=3 units  -331-380=4 units  - over 380=5 units    3) Stay on lantus 16 units at night and humalog 1:8 for breakfast and 1:9 for lunch and dinner and get me a copy of your readings over the next week. 4) Your cholesterol is normal and liver and kidney and urine are normal.            Follow-up and Dispositions    · Return in about 5 months (around 10/13/2019).            Copy sent to:  Dr. Bonny Beauchamp

## 2019-05-13 NOTE — PROGRESS NOTES
Beatriz Pinzon is a 75 y.o. male      Chief Complaint   Patient presents with    Diabetes     PCP and Pharmacy Verified. 1. Have you been to the ER, urgent care clinic since your last visit? Hospitalized since your last visit? No    2. Have you seen or consulted any other health care providers outside of the [address] [place] [address] since your last visit? Include any pap smears or colon screening.  No

## 2019-05-13 NOTE — PATIENT INSTRUCTIONS
1) When your sugars are high during the day, follow this scale for humalog:  -151-200=1 unit  -201-250=2 units  -251-300=3 units  -301-350=4 units  - over 350=5 units     2) When your sugars are high after 9pm following this scale for humalog:   -181-230=1 unit  -231-280=2 units  -281-330=3 units  -331-380=4 units  - over 380=5 units    3) Stay on lantus 16 units at night and humalog 1:8 for breakfast and 1:9 for lunch and dinner and get me a copy of your readings over the next week.     4) Your cholesterol is normal and liver and kidney and urine are normal.

## 2019-05-23 ENCOUNTER — TELEPHONE (OUTPATIENT)
Dept: ENDOCRINOLOGY | Age: 74
End: 2019-05-23

## 2019-05-23 RX ORDER — INSULIN GLARGINE 100 [IU]/ML
INJECTION, SOLUTION SUBCUTANEOUS
Qty: 15 ADJUSTABLE DOSE PRE-FILLED PEN SYRINGE | Refills: 0
Start: 2019-05-23 | End: 2019-05-24 | Stop reason: SDUPTHER

## 2019-05-23 NOTE — TELEPHONE ENCOUNTER
I called and left a voicemail on both his home and cell to let him know I received his blood sugar readings and wanted to discuss the trends and asked him to call the office back tomorrow. Verónica,  Please let him know that his lantus appears to be a little too much as he is having low sugars in the morning. Have him decrease to 15 units at night and if still dropping under 90 2 or more times a week, then further decrease to 14 units at night.

## 2019-05-24 RX ORDER — INSULIN GLARGINE 100 [IU]/ML
INJECTION, SOLUTION SUBCUTANEOUS
Qty: 15 ML | Refills: 3 | Status: SHIPPED | OUTPATIENT
Start: 2019-05-24 | End: 2019-10-18

## 2019-05-24 NOTE — TELEPHONE ENCOUNTER
Pt was made aware of his new insulin dose and what to do if his fasting insulins is 90 or below more than 2 times within the week.  Pt stated that he will drop by next week to bring in bs readinds

## 2019-05-24 NOTE — TELEPHONE ENCOUNTER
Patient called back to say that he is going to bring back another reading on next week(Tuesday). He also would like to talk to the doctor.  Thanks

## 2019-05-24 NOTE — TELEPHONE ENCOUNTER
I too have tried to reach pt by his home phone number, mobil, emergency contact, and  but was unsuccessful. I did leave a detail message on his home phone and requested for him to return my call today.

## 2019-06-18 ENCOUNTER — TELEPHONE (OUTPATIENT)
Dept: ENDOCRINOLOGY | Age: 74
End: 2019-06-18

## 2019-06-18 NOTE — TELEPHONE ENCOUNTER
Called and spoke with pt about his blood sugar readings that he has dropped off. He is having less overnight lows on lantus 15 units so advised him to keep this dose the same. Does have some occ morning lows that are unpredictable but may be due to dosing the lantus sometimes in his arms vs his stomach so told him to just use the stomach for now. He does have rebound high after correcting for lows so advised him to not use more than 15 grams of sugar to correct for lows. He is thinking about cancelling his surgery with Dr. Rachael Lopez and not rescheduling until his sugars are more stable which is reasonable. He may also consider doing a cleanse which is fine too. I told him to contact me with any other questions or concerns. he voiced understanding of this plan.

## 2019-08-08 RX ORDER — INSULIN LISPRO 100 [IU]/ML
INJECTION, SOLUTION INTRAVENOUS; SUBCUTANEOUS
Qty: 30 ML | Refills: 3 | Status: SHIPPED | OUTPATIENT
Start: 2019-08-08 | End: 2019-10-18

## 2019-10-07 ENCOUNTER — TELEPHONE (OUTPATIENT)
Dept: ENDOCRINOLOGY | Age: 74
End: 2019-10-07

## 2019-10-07 NOTE — TELEPHONE ENCOUNTER
Patient wanted to know if he should have a \"Fructosamine\" test added to his lab order? He can be reached at:  [phone].

## 2019-10-11 LAB
BUN SERPL-MCNC: 13 MG/DL (ref 8–27)
BUN/CREAT SERPL: 14 (ref 10–24)
CALCIUM SERPL-MCNC: 9.3 MG/DL (ref 8.6–10.2)
CHLORIDE SERPL-SCNC: 103 MMOL/L (ref 96–106)
CO2 SERPL-SCNC: 26 MMOL/L (ref 20–29)
CREAT SERPL-MCNC: 0.93 MG/DL (ref 0.76–1.27)
EST. AVERAGE GLUCOSE BLD GHB EST-MCNC: 194 MG/DL
GLUCOSE SERPL-MCNC: 112 MG/DL (ref 65–99)
HBA1C MFR BLD: 8.4 % (ref 4.8–5.6)
POTASSIUM SERPL-SCNC: 4.3 MMOL/L (ref 3.5–5.2)
SODIUM SERPL-SCNC: 141 MMOL/L (ref 134–144)

## 2019-10-18 ENCOUNTER — OFFICE VISIT (OUTPATIENT)
Dept: ENDOCRINOLOGY | Age: 74
End: 2019-10-18

## 2019-10-18 VITALS
BODY MASS INDEX: 24.02 KG/M2 | OXYGEN SATURATION: 98 % | SYSTOLIC BLOOD PRESSURE: 108 MMHG | RESPIRATION RATE: 16 BRPM | HEART RATE: 87 BPM | WEIGHT: 187.2 LBS | DIASTOLIC BLOOD PRESSURE: 60 MMHG | HEIGHT: 74 IN

## 2019-10-18 DIAGNOSIS — R03.0 ELEVATED BLOOD PRESSURE READING WITHOUT DIAGNOSIS OF HYPERTENSION: ICD-10-CM

## 2019-10-18 DIAGNOSIS — E78.5 HYPERLIPIDEMIA LDL GOAL <100: ICD-10-CM

## 2019-10-18 DIAGNOSIS — E10.65 UNCONTROLLED TYPE 1 DIABETES MELLITUS WITH HYPERGLYCEMIA (HCC): Primary | ICD-10-CM

## 2019-10-18 RX ORDER — INSULIN GLARGINE 100 [IU]/ML
INJECTION, SOLUTION SUBCUTANEOUS
Qty: 15 ML | Refills: 3
Start: 2019-10-18 | End: 2020-03-03 | Stop reason: SDUPTHER

## 2019-10-18 RX ORDER — INSULIN LISPRO 100 [IU]/ML
INJECTION, SOLUTION INTRAVENOUS; SUBCUTANEOUS
Qty: 30 ML | Refills: 3
Start: 2019-10-18 | End: 2020-03-03 | Stop reason: SDUPTHER

## 2019-10-18 RX ORDER — TESTOSTERONE CYPIONATE 200 MG/ML
200 INJECTION INTRAMUSCULAR
COMMUNITY
End: 2021-03-22

## 2019-10-18 RX ORDER — MULTIVIT WITH MINERALS/HERBS
1 TABLET ORAL DAILY
COMMUNITY
End: 2022-08-03

## 2019-10-18 NOTE — PROGRESS NOTES
Chief Complaint   Patient presents with    Diabetes     History of Present Illness: Latosha Perkins is a 75 y.o. male here for follow up of diabetes. Weight down 10 lbs since last visit in 5/19. Has been getting T injections every 3 weeks from Dr. Yessica Malloy over the past 2.5 months. Was having hot flashes and some low libido that have both improved on treatment. He was concerned about his wt loss but I told him it's likely because he is having higher sugars since last visit and this is leading to break down of fat and muscle. Checking 5 times per day and readings are in the 140-180s at times at bedtime and frequently is rising over 200 and sometimes over 300 by the next morning despite not eating anything with carbs at bedtime. Following the humalog scale as below. No frequent lows at this time. Current Outpatient Medications   Medication Sig    testosterone cypionate (DEPOTESTOTERONE CYPIONATE) 200 mg/mL injection 200 mg by IntraMUSCular route every twenty-one (21) days.  insulin glargine (LANTUS SOLOSTAR U-100 INSULIN) 100 unit/mL (3 mL) inpn INJECT 15 UNITS SUBCUTANEOUSLY AT BEDTIME    insulin lispro (HUMALOG) 100 unit/mL kwikpen Inject 1:8 for carbs for breakfast and cont 1:9 for lunch and dinner before 9pm and 1:10 after 9pm and 1:50 > 150 for correction during the day and 1:50 > 180 after 9pm--Dose change 10/18/19--updated med list--did not send prescription to the pharmacy    b complex vitamins (B COMPLEX 1) tablet Take 1 Tab by mouth daily.--NOT TAKING    atorvastatin (LIPITOR) 10 mg tablet Take 1 Tab by mouth daily.  aspirin 81 mg chewable tablet Take 81 mg by mouth daily.  ONETOUCH ULTRA TEST strip TEST UP TO 5 TIMES DAILY   (INSULIN FLUCTUATING SUGARS). DX: E10.65    therapeutic multivitamin (THERA) tablet Take 1 Tab by mouth daily.  Cholecalciferol, Vitamin D3, (VITAMIN D) 2,000 unit Cap Take 1 Tab by mouth daily.        No current facility-administered medications for this visit. Allergies   Allergen Reactions    Lisinopril Cough    Novocain [Procaine] Palpitations    Tamsulosin Other (comments)     Higher blood sugars     Review of Systems:  - Eyes: no blurry vision or double vision  - Cardiovascular: no chest pain  - Respiratory: no shortness of breath  - Musculoskeletal: no myalgias  - Neurological: some numbness/tingling in extremities    Physical Examination:  Blood pressure 108/60, pulse 87, resp. rate 16, height 6' 2\" (1.88 m), weight 187 lb 3.2 oz (84.9 kg), SpO2 98 %. - General: pleasant, no distress, good eye contact   - Neck: no carotid bruits  - Cardiovascular: regular, normal rate, nl s1 and s2, no m/r/g,   - Respiratory: clear bilaterally  - Integumentary: no edema,   - Psychiatric: normal mood and affect    Diabetic foot exam:     Left Foot:   Visual Exam: callous - mild with amputation of 2nd toe   Pulse DP: 2+ (normal)   Filament test: reduced sensation    Vibratory sensation: absent      Right Foot:   Visual Exam: amputation- transmetarsal with mild callus   Pulse DP: 2+ (normal)   Filament test: reduced sensation    Vibratory sensation: absent        Data Reviewed:   Component      Latest Ref Rng & Units 10/10/2019 10/10/2019          11:04 AM 11:04 AM   Glucose      65 - 99 mg/dL  112 (H)   BUN      8 - 27 mg/dL  13   Creatinine      0.76 - 1.27 mg/dL  0.93   GFR est non-AA      >59 mL/min/1.73  80   GFR est AA      >59 mL/min/1.73  93   BUN/Creatinine ratio      10 - 24  14   Sodium      134 - 144 mmol/L  141   Potassium      3.5 - 5.2 mmol/L  4.3   Chloride      96 - 106 mmol/L  103   CO2      20 - 29 mmol/L  26   Calcium      8.6 - 10.2 mg/dL  9.3   Hemoglobin A1c, (calculated)      4.8 - 5.6 % 8.4 (H)    Estimated average glucose      mg/dL 194        Assessment/Plan:     1.  Type I diabetes mellitus, uncontrolled: his most recent Hgb A1c was 8.4% in 10/19 up from 8.1% in 5/19 up from 8% in 12/18 down from 8.1% in 7/18 down from 8.3% in 1/18 stable from 8/17 up from 7.9% in 3/17 down from 8.5% in 10/16 stable from 5/16 down from 9.5% in 12/15 up from 7.8% in 8/15 down from 7.9% in 4/15 up from 7.7% in 12/14 down from 8.3% in 7/14 down from 9.3% in 3/14 up from 8.5% in 11/13 up from 7.9% in 8/13 down from 8.4% 3/13 up from 7.4% in 12/12. I don't know how accurate this test will be because of his sickle cell trait so have been drawing both a Hgb A1c and a fructosamine. His last fructosamine was 371 in 12/14 up from 366 in 7/14 down form 428 in 3/14 up from 400 in 11/13 up from 338 in 8/13 down from 373 in 3/13 up from 345 in 12/12 and 330 in 6/12. His A1c is still above goal < 7.5% due to fluctuating sugars and is rising overnight so will increase his lantus as below  - increase Lantus to 16 units at bedtime  - cont humalog 1:8 for carbs for breakfast and cont 1:9 for lunch and dinner before 9pm and 1:10 after 9pm and 1:50 > 150 for correction during the day and 1:50 > 180 after 9pm  - check bs 5 times daily due to fluctuating sugars and risk of hypoglycemia  - foot exam done 10/19  - optho UTD 6/17  - microalbumin nl 5/19  - check A1c and cmp and microalbumin prior to next visit        2. Other and unspecified hyperlipidemia: Given DM, Goal LDL < 100, non-HDL < 130, and TG < 150. LDL 79 3/12, 78 12/12 and 84 3/13 and 75 in 11/13 and 70 in 7/14 and 63 in 4/15. Up to 99 in 12/15 and 100 in 5/16 so went up to a while tab daily and LDL down to 74 in 10/16 and 73 in 8/17 and 69 in 7/18 and 64 in 12/18, up to 96 in 5/19  - cont lipitor 10 mg daily  - check lipids prior to next visit        3. Elevated blood pressure (not hypertension): his BP was above goal < 140/90 in 12/14 and prior to that visit was at goal so monitor home readings and they are at goal off any meds.   Up in 8/17 and in 1/18 but home readings are at goal and is at goal today  - monitor home readings off meds for now        Patient Instructions   1) I would go back on the b-complex vitamin to help with any nerve symptoms. 2) Go up on the lantus to 16 units at night for the next 4 days. If your fasting sugar is staying between , then stay on this dose. If still over 140 after 4 days, then try 17 units. Keep the humalog the same. I think the higher sugars are leading to weight loss and a little more insulin should keep your weight from dropping. 3) When your sugars are high during the day, follow this scale for humalog:  -151-200=1 unit  -201-250=2 units  -251-300=3 units  -301-350=4 units  - over 350=5 units     4) When your sugars are high after 9pm following this scale for humalog:   -181-230=1 unit  -231-280=2 units  -281-330=3 units  -331-380=4 units  - over 380=5 units    5) Your A1c was 8.4% up from 8.1% so we want to get this back down under 8%. Follow-up and Dispositions    · Return in about 5 months (around 3/18/2020).                Copy sent to:  Dr. Ryne Vital

## 2019-10-18 NOTE — PROGRESS NOTES
Lab Results   Component Value Date/Time    Hemoglobin A1c 8.4 (H) 10/10/2019 11:04 AM    Hemoglobin A1c 8.1 (H) 05/08/2019 07:52 AM    Hemoglobin A1c 8.5 (H) 04/23/2019 11:09 AM

## 2019-10-18 NOTE — PATIENT INSTRUCTIONS
1) I would go back on the b-complex vitamin to help with any nerve symptoms. 2) Go up on the lantus to 16 units at night for the next 4 days. If your fasting sugar is staying between , then stay on this dose. If still over 140 after 4 days, then try 17 units. Keep the humalog the same. I think the higher sugars are leading to weight loss and a little more insulin should keep your weight from dropping. 3) When your sugars are high during the day, follow this scale for humalog:  -151-200=1 unit  -201-250=2 units  -251-300=3 units  -301-350=4 units  - over 350=5 units     4) When your sugars are high after 9pm following this scale for humalog:   -181-230=1 unit  -231-280=2 units  -281-330=3 units  -331-380=4 units  - over 380=5 units    5) Your A1c was 8.4% up from 8.1% so we want to get this back down under 8%.

## 2019-12-30 ENCOUNTER — APPOINTMENT (OUTPATIENT)
Dept: CT IMAGING | Age: 74
End: 2019-12-30
Attending: EMERGENCY MEDICINE
Payer: MEDICARE

## 2019-12-30 ENCOUNTER — HOSPITAL ENCOUNTER (EMERGENCY)
Age: 74
Discharge: HOME | End: 2019-12-30
Attending: EMERGENCY MEDICINE
Payer: MEDICARE

## 2019-12-30 VITALS
OXYGEN SATURATION: 97 % | HEART RATE: 100 BPM | TEMPERATURE: 98.8 F | DIASTOLIC BLOOD PRESSURE: 89 MMHG | WEIGHT: 191.58 LBS | RESPIRATION RATE: 16 BRPM | BODY MASS INDEX: 24.59 KG/M2 | SYSTOLIC BLOOD PRESSURE: 200 MMHG | HEIGHT: 74 IN

## 2019-12-30 DIAGNOSIS — G44.209 ACUTE NON INTRACTABLE TENSION-TYPE HEADACHE: Primary | ICD-10-CM

## 2019-12-30 DIAGNOSIS — R11.2 NAUSEA AND VOMITING IN ADULT: ICD-10-CM

## 2019-12-30 LAB
ALBUMIN SERPL-MCNC: 3.7 G/DL (ref 3.5–5)
ALBUMIN/GLOB SERPL: 0.9 {RATIO} (ref 1.1–2.2)
ALP SERPL-CCNC: 80 U/L (ref 45–117)
ALT SERPL-CCNC: 36 U/L (ref 12–78)
ANION GAP SERPL CALC-SCNC: 7 MMOL/L (ref 5–15)
APPEARANCE UR: CLEAR
AST SERPL-CCNC: 28 U/L (ref 15–37)
BACTERIA URNS QL MICRO: NEGATIVE /HPF
BASOPHILS # BLD: 0 K/UL (ref 0–0.1)
BASOPHILS NFR BLD: 0 % (ref 0–1)
BILIRUB SERPL-MCNC: 0.4 MG/DL (ref 0.2–1)
BILIRUB UR QL: NEGATIVE
BUN SERPL-MCNC: 20 MG/DL (ref 6–20)
BUN/CREAT SERPL: 16 (ref 12–20)
CALCIUM SERPL-MCNC: 8.8 MG/DL (ref 8.5–10.1)
CHLORIDE SERPL-SCNC: 106 MMOL/L (ref 97–108)
CO2 SERPL-SCNC: 26 MMOL/L (ref 21–32)
COLOR UR: ABNORMAL
CREAT SERPL-MCNC: 1.23 MG/DL (ref 0.7–1.3)
DIFFERENTIAL METHOD BLD: ABNORMAL
EOSINOPHIL # BLD: 0.1 K/UL (ref 0–0.4)
EOSINOPHIL NFR BLD: 1 % (ref 0–7)
EPITH CASTS URNS QL MICRO: ABNORMAL /LPF
ERYTHROCYTE [DISTWIDTH] IN BLOOD BY AUTOMATED COUNT: 14.1 % (ref 11.5–14.5)
GLOBULIN SER CALC-MCNC: 4.2 G/DL (ref 2–4)
GLUCOSE SERPL-MCNC: 299 MG/DL (ref 65–100)
GLUCOSE UR STRIP.AUTO-MCNC: >1000 MG/DL
HCT VFR BLD AUTO: 45 % (ref 36.6–50.3)
HGB BLD-MCNC: 14.7 G/DL (ref 12.1–17)
HGB UR QL STRIP: NEGATIVE
HYALINE CASTS URNS QL MICRO: ABNORMAL /LPF (ref 0–5)
IMM GRANULOCYTES # BLD AUTO: 0 K/UL (ref 0–0.04)
IMM GRANULOCYTES NFR BLD AUTO: 0 % (ref 0–0.5)
KETONES UR QL STRIP.AUTO: NEGATIVE MG/DL
LEUKOCYTE ESTERASE UR QL STRIP.AUTO: NEGATIVE
LYMPHOCYTES # BLD: 0.7 K/UL (ref 0.8–3.5)
LYMPHOCYTES NFR BLD: 13 % (ref 12–49)
MCH RBC QN AUTO: 27.6 PG (ref 26–34)
MCHC RBC AUTO-ENTMCNC: 32.7 G/DL (ref 30–36.5)
MCV RBC AUTO: 84.4 FL (ref 80–99)
MONOCYTES # BLD: 0.3 K/UL (ref 0–1)
MONOCYTES NFR BLD: 6 % (ref 5–13)
NEUTS SEG # BLD: 3.9 K/UL (ref 1.8–8)
NEUTS SEG NFR BLD: 80 % (ref 32–75)
NITRITE UR QL STRIP.AUTO: NEGATIVE
NRBC # BLD: 0 K/UL (ref 0–0.01)
NRBC BLD-RTO: 0 PER 100 WBC
PH UR STRIP: 6.5 [PH] (ref 5–8)
PLATELET # BLD AUTO: 193 K/UL (ref 150–400)
PMV BLD AUTO: 9.9 FL (ref 8.9–12.9)
POTASSIUM SERPL-SCNC: 4.1 MMOL/L (ref 3.5–5.1)
PROT SERPL-MCNC: 7.9 G/DL (ref 6.4–8.2)
PROT UR STRIP-MCNC: NEGATIVE MG/DL
RBC # BLD AUTO: 5.33 M/UL (ref 4.1–5.7)
RBC #/AREA URNS HPF: ABNORMAL /HPF (ref 0–5)
RBC MORPH BLD: ABNORMAL
SODIUM SERPL-SCNC: 139 MMOL/L (ref 136–145)
SP GR UR REFRACTOMETRY: 1.02 (ref 1–1.03)
UA: UC IF INDICATED,UAUC: ABNORMAL
UROBILINOGEN UR QL STRIP.AUTO: 1 EU/DL (ref 0.2–1)
WBC # BLD AUTO: 5 K/UL (ref 4.1–11.1)
WBC URNS QL MICRO: ABNORMAL /HPF (ref 0–4)

## 2019-12-30 PROCEDURE — 96375 TX/PRO/DX INJ NEW DRUG ADDON: CPT

## 2019-12-30 PROCEDURE — 85025 COMPLETE CBC W/AUTO DIFF WBC: CPT

## 2019-12-30 PROCEDURE — 80053 COMPREHEN METABOLIC PANEL: CPT

## 2019-12-30 PROCEDURE — 99283 EMERGENCY DEPT VISIT LOW MDM: CPT

## 2019-12-30 PROCEDURE — 74011250636 HC RX REV CODE- 250/636: Performed by: EMERGENCY MEDICINE

## 2019-12-30 PROCEDURE — 96374 THER/PROPH/DIAG INJ IV PUSH: CPT

## 2019-12-30 PROCEDURE — 36415 COLL VENOUS BLD VENIPUNCTURE: CPT

## 2019-12-30 PROCEDURE — 70450 CT HEAD/BRAIN W/O DYE: CPT

## 2019-12-30 PROCEDURE — 81001 URINALYSIS AUTO W/SCOPE: CPT

## 2019-12-30 PROCEDURE — 96376 TX/PRO/DX INJ SAME DRUG ADON: CPT

## 2019-12-30 RX ORDER — MORPHINE SULFATE 2 MG/ML
2 INJECTION, SOLUTION INTRAMUSCULAR; INTRAVENOUS
Status: COMPLETED | OUTPATIENT
Start: 2019-12-30 | End: 2019-12-30

## 2019-12-30 RX ORDER — ONDANSETRON 2 MG/ML
4 INJECTION INTRAMUSCULAR; INTRAVENOUS
Status: COMPLETED | OUTPATIENT
Start: 2019-12-30 | End: 2019-12-30

## 2019-12-30 RX ORDER — FENTANYL CITRATE 50 UG/ML
25 INJECTION, SOLUTION INTRAMUSCULAR; INTRAVENOUS
Status: COMPLETED | OUTPATIENT
Start: 2019-12-30 | End: 2019-12-30

## 2019-12-30 RX ORDER — ONDANSETRON 4 MG/1
4 TABLET, ORALLY DISINTEGRATING ORAL
Qty: 10 TAB | Refills: 0 | Status: SHIPPED | OUTPATIENT
Start: 2019-12-30 | End: 2020-03-10

## 2019-12-30 RX ADMIN — MORPHINE SULFATE 2 MG: 2 INJECTION, SOLUTION INTRAMUSCULAR; INTRAVENOUS at 09:57

## 2019-12-30 RX ADMIN — ONDANSETRON 4 MG: 2 INJECTION INTRAMUSCULAR; INTRAVENOUS at 08:18

## 2019-12-30 RX ADMIN — FENTANYL CITRATE 25 MCG: 50 INJECTION, SOLUTION INTRAMUSCULAR; INTRAVENOUS at 08:18

## 2019-12-30 RX ADMIN — ONDANSETRON 4 MG: 2 INJECTION INTRAMUSCULAR; INTRAVENOUS at 09:57
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
## 2019-12-30 NOTE — DISCHARGE INSTRUCTIONS
Patient Education        Tension Headache: Care Instructions  Your Care Instructions  Most headaches are tension headaches. These headaches tend to happen again, especially if you are under stress. A tension headache may cause pain or a feeling of pressure all over your head. You probably can't pinpoint the center of the pain. If you keep getting tension headaches, the best thing you can do to limit them is to find out what is causing them and then make changes in those areas. Follow-up care is a key part of your treatment and safety. Be sure to make and go to all appointments, and call your doctor if you are having problems. It's also a good idea to know your test results and keep a list of the medicines you take. How can you care for yourself at home? · Rest in a quiet, dark room with a cool cloth on your forehead until your headache is gone. Close your eyes, and try to relax or go to sleep. Don't watch TV or read. Avoid using the computer. · Use a warm, moist towel or a heating pad set on low to relax tight shoulder and neck muscles. · Have someone gently massage your neck and shoulders. · Take pain medicines exactly as directed. ? If the doctor gave you a prescription medicine for pain, take it as prescribed. ? If you are not taking a prescription pain medicine, ask your doctor if you can take an over-the-counter medicine. · Be careful not to take pain medicine more often than the instructions allow, because you may get worse or more frequent headaches when the medicine wears off. · If you get another tension headache, stop what you are doing and sit quietly for a moment. Close your eyes and breathe slowly. Try to relax your head and neck muscles. · Do not ignore new symptoms that occur with a headache, such as fever, weakness or numbness, vision changes, or confusion. These may be signs of a more serious problem.   To help prevent headaches  · Keep a headache diary so you can figure out what triggers your headaches. Avoiding triggers may help you prevent headaches. Record when each headache began, how long it lasted, and what the pain was like (throbbing, aching, stabbing, or dull). List anything that may have triggered the headache, such as being physically or emotionally stressed or being anxious or depressed. Other possible triggers are hunger, anger, fatigue, poor posture, and muscle strain. · Find healthy ways to deal with stress. Headaches are most common during or right after stressful times. Take time to relax before and after you do something that has caused a headache in the past.  · Exercise daily to relieve stress. Relaxation exercises may help reduce tension. · Get plenty of sleep. · Eat regularly and well. Long periods without food can trigger a headache. · Treat yourself to a massage. Some people find that massages are very helpful in relieving tension. · Try to keep your muscles relaxed by keeping good posture. Check your jaw, face, neck, and shoulder muscles for tension, and try to relax them. When sitting at a desk, change positions often, and stretch for 30 seconds each hour. · Reduce eyestrain from computers by blinking frequently and looking away from the computer screen every so often. Make sure you have proper eyewear and that your monitor is set up properly, about an arm's length away. When should you call for help? Call 911 anytime you think you may need emergency care. For example, call if:    · You have signs of a stroke. These may include:  ? Sudden numbness, paralysis, or weakness in your face, arm, or leg, especially on only one side of your body. ? Sudden vision changes. ? Sudden trouble speaking. ? Sudden confusion or trouble understanding simple statements. ? Sudden problems with walking or balance.   ? A sudden, severe headache that is different from past headaches.    Call your doctor now or seek immediate medical care if:    · You have new or worse nausea and vomiting.     · You have a new or higher fever.     · Your headache gets much worse.    Watch closely for changes in your health, and be sure to contact your doctor if:    · You are not getting better after 2 days (48 hours). Where can you learn more? Go to http://ernesto-vilma.info/. Enter 84 17 85 in the search box to learn more about \"Tension Headache: Care Instructions. \"  Current as of: March 28, 2019  Content Version: 12.2  © 2804-1358 [employer]. Care instructions adapted under license by [employer] (which disclaims liability or warranty for this information). If you have questions about a medical condition or this instruction, always ask your healthcare professional. [place] [address] any warranty or liability for your use of this information. Patient Education        Nausea and Vomiting: Care Instructions  Your Care Instructions    When you are nauseated, you may feel weak and sweaty and notice a lot of saliva in your mouth. Nausea often leads to vomiting. Most of the time you do not need to worry about nausea and vomiting, but they can be signs of other illnesses. Two common causes of nausea and vomiting are stomach flu and food poisoning. Nausea and vomiting from viral stomach flu will usually start to improve within 24 hours. Nausea and vomiting from food poisoning may last from 12 to 48 hours. The doctor has checked you carefully, but problems can develop later. If you notice any problems or new symptoms, get medical treatment right away. Follow-up care is a key part of your treatment and safety. Be sure to make and go to all appointments, and call your doctor if you are having problems. It's also a good idea to know your test results and keep a list of the medicines you take. How can you care for yourself at home? · To prevent dehydration, drink plenty of fluids, enough so that your urine is light yellow or clear like water. Choose water and other caffeine-free clear liquids until you feel better. If you have kidney, heart, or liver disease and have to limit fluids, talk with your doctor before you increase the amount of fluids you drink. · Rest in bed until you feel better. · When you are able to eat, try clear soups, mild foods, and liquids until all symptoms are gone for 12 to 48 hours. Other good choices include dry toast, crackers, cooked cereal, and gelatin dessert, such as Jell-O. When should you call for help? Call 911 anytime you think you may need emergency care. For example, call if:    · You passed out (lost consciousness).    Call your doctor now or seek immediate medical care if:    · You have symptoms of dehydration, such as:  ? Dry eyes and a dry mouth. ? Passing only a little dark urine. ? Feeling thirstier than usual.     · You have new or worsening belly pain.     · You have a new or higher fever.     · You vomit blood or what looks like coffee grounds.    Watch closely for changes in your health, and be sure to contact your doctor if:    · You have ongoing nausea and vomiting.     · Your vomiting is getting worse.     · Your vomiting lasts longer than 2 days.     · You are not getting better as expected. Where can you learn more? Go to http://ernesto-vilma.info/. Enter 25 164523 in the search box to learn more about \"Nausea and Vomiting: Care Instructions. \"  Current as of: June 26, 2019  Content Version: 12.2  © 1328-4020 [employer]. Care instructions adapted under license by [employer] (which disclaims liability or warranty for this information). If you have questions about a medical condition or this instruction, always ask your healthcare professional. [place] [address] any warranty or liability for your use of this information.

## 2020-01-06 ENCOUNTER — TELEPHONE (OUTPATIENT)
Dept: NEUROLOGY | Age: 75
End: 2020-01-06

## 2020-01-06 NOTE — TELEPHONE ENCOUNTER
----- Message from Mitzi Coronel sent at 1/6/2020 10:42 AM EST -----  Regarding: Dr. Shakir Gross  Patient return call    Caller's first and last name and relationship (if not the patient):      Best contact number(s):[phone](please leave message, if no answer)      Whose call is being returned: from office      Details to clarify the request: Pt returned call from the office from Friday for an ER f/up appt.       Mitzi Coronel

## 2020-01-07 ENCOUNTER — TELEPHONE (OUTPATIENT)
Dept: NEUROLOGY | Age: 75
End: 2020-01-07

## 2020-01-07 NOTE — TELEPHONE ENCOUNTER
----- Message from Jamin Kwan sent at 1/7/2020  1:17 PM EST -----  Regarding: Dr. Ada Sandhu first and last name: ((NEW PATIENT))      Reason for call: NEW PATIENT APPT       Callback required yes/no and why: yes to be scheduled       Best contact number(s): [phone]      Details to clarify the request: NEW PATIENT was seen by the [facility] ER for possible migraine (headaches) on Dec. 30 2019 and was advise to contact the office to see Dr. Chantale Epstein. Pt spoke with someone that was going to contact the Er to determine the cause on why he was referred to the office and get back with him . NEW PATIENT has not heard back and this would be his 3rd attempt. Pt would like a call back to be scheduled in.      Jamin Kwan

## 2020-01-07 NOTE — TELEPHONE ENCOUNTER
NP appt scheduled for 3/23/2020 at 0900 with Dr. Johanna Ring. Pt aware to arrive 30 mins early and given instructions as to where we are located.

## 2020-02-10 ENCOUNTER — HOSPITAL ENCOUNTER (EMERGENCY)
Age: 75
Discharge: HOME | End: 2020-02-11
Attending: EMERGENCY MEDICINE
Payer: MEDICARE

## 2020-02-10 VITALS
DIASTOLIC BLOOD PRESSURE: 83 MMHG | HEIGHT: 74 IN | HEART RATE: 86 BPM | WEIGHT: 199.52 LBS | OXYGEN SATURATION: 99 % | RESPIRATION RATE: 18 BRPM | SYSTOLIC BLOOD PRESSURE: 189 MMHG | BODY MASS INDEX: 25.61 KG/M2 | TEMPERATURE: 97.4 F

## 2020-02-10 DIAGNOSIS — G44.019 EPISODIC CLUSTER HEADACHE, NOT INTRACTABLE: Primary | ICD-10-CM

## 2020-02-10 PROCEDURE — 99283 EMERGENCY DEPT VISIT LOW MDM: CPT

## 2020-02-11 ENCOUNTER — APPOINTMENT (OUTPATIENT)
Dept: CT IMAGING | Age: 75
End: 2020-02-11
Attending: EMERGENCY MEDICINE
Payer: MEDICARE

## 2020-02-11 PROCEDURE — 74011250637 HC RX REV CODE- 250/637: Performed by: EMERGENCY MEDICINE

## 2020-02-11 PROCEDURE — 70450 CT HEAD/BRAIN W/O DYE: CPT

## 2020-02-11 RX ORDER — BUTALBITAL, ACETAMINOPHEN AND CAFFEINE 300; 40; 50 MG/1; MG/1; MG/1
1 CAPSULE ORAL
Qty: 6 CAP | Refills: 0 | Status: SHIPPED | OUTPATIENT
Start: 2020-02-11 | End: 2020-05-03

## 2020-02-11 RX ORDER — BUTALBITAL, ACETAMINOPHEN AND CAFFEINE 50; 325; 40 MG/1; MG/1; MG/1
1 TABLET ORAL
Status: COMPLETED | OUTPATIENT
Start: 2020-02-11 | End: 2020-02-11

## 2020-02-11 RX ADMIN — BUTALBITAL, ACETAMINOPHEN AND CAFFEINE 1 TABLET: 50; 325; 40 TABLET ORAL at 01:54

## 2020-02-11 NOTE — DISCHARGE INSTRUCTIONS
Patient Education        Cluster Headache: Care Instructions  Your Care Instructions  Cluster headaches are very painful. They happen on one side of the head and often start at night. They can last for 30 minutes to several hours. They usually occur in groups, or clusters, over weeks or months. You may have a stuffy nose and watery eyes during the headaches. The cause of cluster headaches is not known. Medicine may help prevent cluster headaches. You also can try to avoid things that trigger your headaches. Follow-up care is a key part of your treatment and safety. Be sure to make and go to all appointments, and call your doctor if you are having problems. It's also a good idea to know your test results and keep a list of the medicines you take. How can you care for yourself at home? · Watch for new symptoms with a headache. These include fever, weakness or numbness, vision changes, or confusion. They may be signs of a more serious problem. · Be safe with medicines. Take your medicines exactly as prescribed. Call your doctor if you think you are having a problem with your medicine. You will get more details on the specific medicines your doctor prescribes. · If your doctor recommends it, take an over-the-counter pain medicine, such as acetaminophen (Tylenol), ibuprofen (Advil, Motrin), or naproxen (Aleve). Read and follow all instructions on the label. · Do not take two or more pain medicines at the same time unless the doctor told you to. Many pain medicines have acetaminophen, which is Tylenol. Too much acetaminophen (Tylenol) can be harmful. · Carry medicine with you to quickly treat a headache. · Put ice or a cold pack on the area for 10 to 20 minutes at a time. Put a thin cloth between the ice and your skin. · If your doctor prescribed at-home oxygen therapy to stop a cluster headache, follow the directions for using it. To prevent cluster headaches  · Keep a headache diary.  Avoiding triggers may help you prevent headaches. Write down when a headache begins, how long it lasts, and what might have triggered it. This could include stress, alcohol, or certain foods. · Exercise daily to lower stress. · Limit caffeine by not drinking too much coffee, tea, or soda. But do not quit caffeine suddenly. This can also give you headaches. · Do not smoke or allow others to smoke around you. If you need help quitting, talk to your doctor about stop-smoking programs and medicines. These can increase your chances of quitting for good. · Tell your doctor if your headaches get worse and medicines do not help. You may need to try a different medicine. When should you call for help? Call 911 anytime you think you may need emergency care. For example, call if:    · You have symptoms of a stroke. These may include:  ? Sudden numbness, tingling, weakness, or loss of movement in your face, arm, or leg, especially on only one side of your body. ? Sudden vision changes. ? Sudden trouble speaking. ? Sudden confusion or trouble understanding simple statements. ? Sudden problems with walking or balance. ? A sudden, severe headache that is different from past headaches.    Call your doctor now or seek immediate medical care if:    · You have a fever with a stiff neck or a severe headache.     · You are sensitive to light or feel very sleepy or confused.     · You have new nausea and vomiting and you cannot keep down food or liquids.    Watch closely for changes in your health, and be sure to contact your doctor if:    · You have a headache that does not get better within 1 or 2 days.     · Your headaches get worse or happen more often. Where can you learn more? Go to http://ernesto-vilma.info/. Enter N793 in the search box to learn more about \"Cluster Headache: Care Instructions. \"  Current as of: March 28, 2019  Content Version: 12.2  © 7213-1334 [employer].  Care instructions adapted under license by [address] (which disclaims liability or warranty for this information). If you have questions about a medical condition or this instruction, always ask your healthcare professional. [address] any warranty or liability for your use of this information.

## 2020-02-11 NOTE — ED NOTES
Assumed care of pt. Awaiting evaluation by ED MD. Call mazariegos inr each. Will continue to monitor.
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
## 2020-03-03 RX ORDER — INSULIN GLARGINE 100 [IU]/ML
INJECTION, SOLUTION SUBCUTANEOUS
Qty: 15 ML | Refills: 3 | Status: SHIPPED | OUTPATIENT
Start: 2020-03-03 | End: 2020-03-10

## 2020-03-03 RX ORDER — INSULIN LISPRO 100 [IU]/ML
INJECTION, SOLUTION INTRAVENOUS; SUBCUTANEOUS
Qty: 30 ML | Refills: 3 | Status: SHIPPED | OUTPATIENT
Start: 2020-03-03 | End: 2021-01-18 | Stop reason: SDUPTHER

## 2020-03-03 RX ORDER — ATORVASTATIN CALCIUM 10 MG/1
10 TABLET, FILM COATED ORAL DAILY
Qty: 90 TAB | Refills: 3 | Status: SHIPPED | OUTPATIENT
Start: 2020-03-03 | End: 2021-01-23 | Stop reason: SDUPTHER

## 2020-03-04 LAB
ALBUMIN SERPL-MCNC: 4.3 G/DL (ref 3.7–4.7)
ALBUMIN/CREAT UR: 11 MG/G CREAT (ref 0–29)
ALBUMIN/GLOB SERPL: 1.5 {RATIO} (ref 1.2–2.2)
ALP SERPL-CCNC: 59 IU/L (ref 39–117)
ALT SERPL-CCNC: 23 IU/L (ref 0–44)
AST SERPL-CCNC: 33 IU/L (ref 0–40)
BILIRUB SERPL-MCNC: 0.4 MG/DL (ref 0–1.2)
BUN SERPL-MCNC: 10 MG/DL (ref 8–27)
BUN/CREAT SERPL: 10 (ref 10–24)
CALCIUM SERPL-MCNC: 9 MG/DL (ref 8.6–10.2)
CHLORIDE SERPL-SCNC: 103 MMOL/L (ref 96–106)
CHOLEST SERPL-MCNC: 125 MG/DL (ref 100–199)
CO2 SERPL-SCNC: 24 MMOL/L (ref 20–29)
CREAT SERPL-MCNC: 1.02 MG/DL (ref 0.76–1.27)
CREAT UR-MCNC: 77.9 MG/DL
EST. AVERAGE GLUCOSE BLD GHB EST-MCNC: 180 MG/DL
GLOBULIN SER CALC-MCNC: 2.8 G/DL (ref 1.5–4.5)
GLUCOSE SERPL-MCNC: 53 MG/DL (ref 65–99)
HBA1C MFR BLD: 7.9 % (ref 4.8–5.6)
HDLC SERPL-MCNC: 45 MG/DL
INTERPRETATION, 910389: NORMAL
LDLC SERPL CALC-MCNC: 71 MG/DL (ref 0–99)
Lab: NORMAL
MICROALBUMIN UR-MCNC: 8.6 UG/ML
POTASSIUM SERPL-SCNC: 3.8 MMOL/L (ref 3.5–5.2)
PROT SERPL-MCNC: 7.1 G/DL (ref 6–8.5)
SODIUM SERPL-SCNC: 143 MMOL/L (ref 134–144)
TRIGL SERPL-MCNC: 45 MG/DL (ref 0–149)
VLDLC SERPL CALC-MCNC: 9 MG/DL (ref 5–40)

## 2020-03-10 ENCOUNTER — OFFICE VISIT (OUTPATIENT)
Dept: ENDOCRINOLOGY | Age: 75
End: 2020-03-10

## 2020-03-10 VITALS
HEIGHT: 74 IN | DIASTOLIC BLOOD PRESSURE: 72 MMHG | BODY MASS INDEX: 24.54 KG/M2 | WEIGHT: 191.2 LBS | SYSTOLIC BLOOD PRESSURE: 130 MMHG | HEART RATE: 97 BPM

## 2020-03-10 DIAGNOSIS — E78.5 HYPERLIPIDEMIA LDL GOAL <100: ICD-10-CM

## 2020-03-10 DIAGNOSIS — E10.65 UNCONTROLLED TYPE 1 DIABETES MELLITUS WITH HYPERGLYCEMIA (HCC): Primary | ICD-10-CM

## 2020-03-10 DIAGNOSIS — R03.0 ELEVATED BLOOD PRESSURE READING WITHOUT DIAGNOSIS OF HYPERTENSION: ICD-10-CM

## 2020-03-10 RX ORDER — INSULIN GLARGINE 100 [IU]/ML
INJECTION, SOLUTION SUBCUTANEOUS
Qty: 15 ML | Refills: 3
Start: 2020-03-10 | End: 2021-02-08

## 2020-03-10 NOTE — PATIENT INSTRUCTIONS
1) On Wed the prep day with drinking clear liquids, plan on taking your normal doses of humalog based on carb counting knowing that the amount of carbs is less in liquid than in food (and err on the side of rounding down on your dose). That night to be safe, just take 15 units of lantus instead of 17 units. 2) If your sugar is over 200 in the morning, you can take 2 units of humalog to correct but don't take any humalog if under 200.    3) Your A1c is 7.9% which is at goal of 7.5-8% and the best it's been in 3 years. Keep up the good work. 4) Your liver and kidney and urine are normal and blood pressure looks great.

## 2020-03-10 NOTE — PROGRESS NOTES
Chief Complaint   Patient presents with    Diabetes     pcp and pharmacy confirmed     History of Present Illness: Portia Sesay is a 67 y.o. male here for follow up of diabetes. Weight up 4 lbs since last visit in 10/19. Had an ER visit in 12/19 and again in 2/20 for headache and eye pain and will be seeing a neurologist, Dr. Kenneth Jacob, later this month. His dose frequency of testosterone changed from every 3 weeks to every 2 weeks within the past month. Increased the lantus to 17 units at bedtime after last visit and has stayed on this dose. On this dose is having only about once every 7-10 days with a reading under 80. Has been better at ensuring the dose is delivered by waiting at least 5-10 secs before withdrawing the needle. Fasting sugars are more frequently in the  range. During the day is staying 100-180 and does have some in the 200s and rarely in the 300s but less frequently than last time. Checking 5 times daily over the past 90 days. He has had more numbness/tingling in his fingers and arms and sometimes this occurs with low sugars but other times not and advised him to ask Dr. Kenneth Jacob about this in case he is developing cervical spine disease. Having colonoscopy next week and gave him a plan for insulin below. Current Outpatient Medications   Medication Sig    insulin glargine (LANTUS SOLOSTAR U-100 INSULIN) 100 unit/mL (3 mL) inpn INJECT 17 UNITS SUBCUTANEOUSLY AT BEDTIME--Dose change 3/10/20--updated med list--did not send prescription to the pharmacy    atorvastatin (LIPITOR) 10 mg tablet Take 1 Tab by mouth daily.  insulin lispro (HUMALOG) 100 unit/mL kwikpen Inject 1:8 for carbs for breakfast and 1:9 for lunch and dinner before 9pm and 1:10 after 9pm and 1:50 > 150 for correction during the day and 1:50 > 180 after 9pm.  MAX 35/D    butalbital-acetaminophen-caff (FIORICET) -40 mg per capsule Take 1 Cap by mouth every four (4) hours as needed for Headache.     testosterone cypionate (DEPOTESTOTERONE CYPIONATE) 200 mg/mL injection 200 mg by IntraMUSCular route every fourteen (14) days.  b complex vitamins (B COMPLEX 1) tablet Take 1 Tab by mouth daily.  aspirin 81 mg chewable tablet Take 81 mg by mouth daily.  ONETOUCH ULTRA TEST strip TEST UP TO 5 TIMES DAILY   (INSULIN FLUCTUATING SUGARS). DX: E10.65    therapeutic multivitamin (THERA) tablet Take 1 Tab by mouth daily.  Cholecalciferol, Vitamin D3, (VITAMIN D) 2,000 unit Cap Take 1 Tab by mouth daily. No current facility-administered medications for this visit. Allergies   Allergen Reactions    Lisinopril Cough    Novocain [Procaine] Palpitations    Tamsulosin Other (comments)     Higher blood sugars     Review of Systems:  - Eyes: no blurry vision or double vision  - Cardiovascular: no chest pain  - Respiratory: no shortness of breath  - Musculoskeletal: no myalgias  - Neurological: (+) numbness/tingling in extremities    Physical Examination:  Blood pressure 130/72, pulse 97, height 6' 2\" (1.88 m), weight 191 lb 3.2 oz (86.7 kg).   - General: pleasant, no distress, good eye contact   - Neck: no carotid bruits  - Cardiovascular: regular, normal rate, nl s1 and s2, no m/r/g,   - Respiratory: clear bilaterally  - Integumentary: no edema,   - Psychiatric: normal mood and affect    Data Reviewed:   Component      Latest Ref Rng & Units 3/3/2020 3/3/2020 3/3/2020 3/3/2020           8:30 AM  8:30 AM  8:30 AM  8:30 AM   Glucose      65 - 99 mg/dL  53 (L)     BUN      8 - 27 mg/dL  10     Creatinine      0.76 - 1.27 mg/dL  1.02     GFR est non-AA      >59 mL/min/1.73  71     GFR est AA      >59 mL/min/1.73  82     BUN/Creatinine ratio      10 - 24  10     Sodium      134 - 144 mmol/L  143     Potassium      3.5 - 5.2 mmol/L  3.8     Chloride      96 - 106 mmol/L  103     CO2      20 - 29 mmol/L  24     Calcium      8.6 - 10.2 mg/dL  9.0     Protein, total      6.0 - 8.5 g/dL  7.1     Albumin      3.7 - 4.7 g/dL  4.3     GLOBULIN, TOTAL      1.5 - 4.5 g/dL  2.8     A-G Ratio      1.2 - 2.2  1.5     Bilirubin, total      0.0 - 1.2 mg/dL  0.4     Alk. phosphatase      39 - 117 IU/L  59     AST      0 - 40 IU/L  33     ALT (SGPT)      0 - 44 IU/L  23     Cholesterol, total      100 - 199 mg/dL 125      Triglyceride      0 - 149 mg/dL 45      HDL Cholesterol      >39 mg/dL 45      VLDL, calculated      5 - 40 mg/dL 9      LDL, calculated      0 - 99 mg/dL 71      Creatinine, urine      Not Estab. mg/dL   77.9    Microalbumin, urine      Not Estab. ug/mL   8.6    Microalbumin/Creat. Ratio      0 - 29 mg/g creat   11    Hemoglobin A1c, (calculated)      4.8 - 5.6 %    7.9 (H)   Estimated average glucose      mg/dL    180     Assessment/Plan:     1. Type I diabetes mellitus, uncontrolled: his most recent Hgb A1c was 7.9% in 3/20 down from 8.4% in 10/19 up from 8.1% in 5/19 up from 8% in 12/18 down from 8.1% in 7/18 down from 8.3% in 1/18 stable from 8/17 up from 7.9% in 3/17 down from 8.5% in 10/16 stable from 5/16 down from 9.5% in 12/15 up from 7.8% in 8/15 down from 7.9% in 4/15 up from 7.7% in 12/14 down from 8.3% in 7/14 down from 9.3% in 3/14 up from 8.5% in 11/13 up from 7.9% in 8/13 down from 8.4% 3/13 up from 7.4% in 12/12. I don't know how accurate this test will be because of his sickle cell trait so have been drawing both a Hgb A1c and a fructosamine. His last fructosamine was 371 in 12/14 up from 366 in 7/14 down form 428 in 3/14 up from 400 in 11/13 up from 338 in 8/13 down from 373 in 3/13 up from 345 in 12/12 and 330 in 6/12. His A1c is at goal < 7.5-8% so won't make any changes.   - cont Lantus 17 units at bedtime  - cont humalog 1:8 for carbs for breakfast and cont 1:9 for lunch and dinner before 9pm and 1:10 after 9pm and 1:50 > 150 for correction during the day and 1:50 > 180 after 9pm  - check bs 5 times daily due to fluctuating sugars and risk of hypoglycemia  - foot exam done 10/19  - optho UTD 6/17  - microalbumin nl 3/20  - check A1c and bmp prior to next visit        2. Other and unspecified hyperlipidemia: Given DM, Goal LDL < 100, non-HDL < 130, and TG < 150. LDL 79 3/12, 78 12/12 and 84 3/13 and 75 in 11/13 and 70 in 7/14 and 63 in 4/15. Up to 99 in 12/15 and 100 in 5/16 so went up to a while tab daily and LDL down to 74 in 10/16 and 73 in 8/17 and 69 in 7/18 and 64 in 12/18, up to 96 in 5/19, down to 71 in 3/20  - cont lipitor 10 mg daily  - check lipids in 3/21        3. Elevated blood pressure (not hypertension): his BP was above goal < 140/90 in 12/14 and prior to that visit was at goal so monitor home readings and they are at goal off any meds. Up in 8/17 and in 1/18 but home readings are at goal and is at goal today  - monitor home readings off meds for now        Patient Instructions   1) On Wed the prep day with drinking clear liquids, plan on taking your normal doses of humalog based on carb counting knowing that the amount of carbs is less in liquid than in food (and err on the side of rounding down on your dose). That night to be safe, just take 15 units of lantus instead of 17 units. 2) If your sugar is over 200 in the morning, you can take 2 units of humalog to correct but don't take any humalog if under 200.    3) Your A1c is 7.9% which is at goal of 7.5-8% and the best it's been in 3 years. Keep up the good work. 4) Your liver and kidney and urine are normal and blood pressure looks great. Follow-up and Dispositions    · Return in about 6 months (around 9/10/2020).                Copy sent to:  Dr. Saqib Ballard

## 2020-03-24 ENCOUNTER — TELEPHONE (OUTPATIENT)
Dept: NEUROLOGY | Age: 75
End: 2020-03-24

## 2020-03-24 NOTE — TELEPHONE ENCOUNTER
Please review the chart. The patient is scheduled for a new patient appointment on Thursday. He had a cough about a week ago and treated with antibiotics.  Only tested negative

## 2020-03-27 NOTE — TELEPHONE ENCOUNTER
I called the patient and told him that we are not able to see him at this time.  He is not able to do the virtual calls currently

## 2020-05-03 ENCOUNTER — HOSPITAL ENCOUNTER (EMERGENCY)
Age: 75
Discharge: HOME | End: 2020-05-03
Attending: EMERGENCY MEDICINE
Payer: MEDICARE

## 2020-05-03 ENCOUNTER — APPOINTMENT (OUTPATIENT)
Dept: GENERAL RADIOLOGY | Age: 75
End: 2020-05-03
Attending: EMERGENCY MEDICINE
Payer: MEDICARE

## 2020-05-03 VITALS
BODY MASS INDEX: 24.45 KG/M2 | OXYGEN SATURATION: 99 % | WEIGHT: 190.48 LBS | TEMPERATURE: 98.4 F | SYSTOLIC BLOOD PRESSURE: 166 MMHG | RESPIRATION RATE: 13 BRPM | HEIGHT: 74 IN | HEART RATE: 82 BPM | DIASTOLIC BLOOD PRESSURE: 73 MMHG

## 2020-05-03 DIAGNOSIS — L97.519 TYPE 2 DIABETES MELLITUS WITH RIGHT DIABETIC FOOT ULCER (HCC): Primary | ICD-10-CM

## 2020-05-03 DIAGNOSIS — E11.621 TYPE 2 DIABETES MELLITUS WITH RIGHT DIABETIC FOOT ULCER (HCC): Primary | ICD-10-CM

## 2020-05-03 LAB
ALBUMIN SERPL-MCNC: 3.8 G/DL (ref 3.5–5)
ALBUMIN/GLOB SERPL: 1 {RATIO} (ref 1.1–2.2)
ALP SERPL-CCNC: 70 U/L (ref 45–117)
ALT SERPL-CCNC: 37 U/L (ref 12–78)
ANION GAP SERPL CALC-SCNC: 3 MMOL/L (ref 5–15)
AST SERPL-CCNC: 37 U/L (ref 15–37)
BASOPHILS # BLD: 0 K/UL (ref 0–0.1)
BASOPHILS NFR BLD: 1 % (ref 0–1)
BILIRUB SERPL-MCNC: 0.9 MG/DL (ref 0.2–1)
BUN SERPL-MCNC: 15 MG/DL (ref 6–20)
BUN/CREAT SERPL: 15 (ref 12–20)
CALCIUM SERPL-MCNC: 9 MG/DL (ref 8.5–10.1)
CHLORIDE SERPL-SCNC: 106 MMOL/L (ref 97–108)
CO2 SERPL-SCNC: 32 MMOL/L (ref 21–32)
COMMENT, HOLDF: NORMAL
CREAT SERPL-MCNC: 1.01 MG/DL (ref 0.7–1.3)
CRP SERPL-MCNC: <0.29 MG/DL (ref 0–0.6)
DIFFERENTIAL METHOD BLD: ABNORMAL
EOSINOPHIL # BLD: 0.1 K/UL (ref 0–0.4)
EOSINOPHIL NFR BLD: 3 % (ref 0–7)
ERYTHROCYTE [DISTWIDTH] IN BLOOD BY AUTOMATED COUNT: 13.8 % (ref 11.5–14.5)
GLOBULIN SER CALC-MCNC: 4 G/DL (ref 2–4)
GLUCOSE SERPL-MCNC: 102 MG/DL (ref 65–100)
HCT VFR BLD AUTO: 43.9 % (ref 36.6–50.3)
HGB BLD-MCNC: 14.3 G/DL (ref 12.1–17)
IMM GRANULOCYTES # BLD AUTO: 0 K/UL (ref 0–0.04)
IMM GRANULOCYTES NFR BLD AUTO: 0 % (ref 0–0.5)
LACTATE SERPL-SCNC: 0.6 MMOL/L (ref 0.4–2)
LYMPHOCYTES # BLD: 0.7 K/UL (ref 0.8–3.5)
LYMPHOCYTES NFR BLD: 31 % (ref 12–49)
MCH RBC QN AUTO: 27.8 PG (ref 26–34)
MCHC RBC AUTO-ENTMCNC: 32.6 G/DL (ref 30–36.5)
MCV RBC AUTO: 85.2 FL (ref 80–99)
MONOCYTES # BLD: 0.4 K/UL (ref 0–1)
MONOCYTES NFR BLD: 17 % (ref 5–13)
NEUTS SEG # BLD: 0.9 K/UL (ref 1.8–8)
NEUTS SEG NFR BLD: 48 % (ref 32–75)
NRBC # BLD: 0 K/UL (ref 0–0.01)
NRBC BLD-RTO: 0 PER 100 WBC
PLATELET # BLD AUTO: 189 K/UL (ref 150–400)
PMV BLD AUTO: 9.9 FL (ref 8.9–12.9)
POTASSIUM SERPL-SCNC: 4.8 MMOL/L (ref 3.5–5.1)
PROT SERPL-MCNC: 7.8 G/DL (ref 6.4–8.2)
RBC # BLD AUTO: 5.15 M/UL (ref 4.1–5.7)
RBC MORPH BLD: ABNORMAL
SAMPLES BEING HELD,HOLD: NORMAL
SODIUM SERPL-SCNC: 141 MMOL/L (ref 136–145)
WBC # BLD AUTO: 2.1 K/UL (ref 4.1–11.1)

## 2020-05-03 PROCEDURE — 85025 COMPLETE CBC W/AUTO DIFF WBC: CPT

## 2020-05-03 PROCEDURE — 80053 COMPREHEN METABOLIC PANEL: CPT

## 2020-05-03 PROCEDURE — 99283 EMERGENCY DEPT VISIT LOW MDM: CPT

## 2020-05-03 PROCEDURE — 86140 C-REACTIVE PROTEIN: CPT

## 2020-05-03 PROCEDURE — 73620 X-RAY EXAM OF FOOT: CPT

## 2020-05-03 PROCEDURE — 36415 COLL VENOUS BLD VENIPUNCTURE: CPT

## 2020-05-03 PROCEDURE — 83605 ASSAY OF LACTIC ACID: CPT

## 2020-05-03 PROCEDURE — 74011250637 HC RX REV CODE- 250/637: Performed by: EMERGENCY MEDICINE

## 2020-05-03 RX ORDER — DOXYCYCLINE HYCLATE 100 MG
100 TABLET ORAL
Status: COMPLETED | OUTPATIENT
Start: 2020-05-03 | End: 2020-05-03

## 2020-05-03 RX ORDER — DOXYCYCLINE HYCLATE 100 MG
100 TABLET ORAL 2 TIMES DAILY
Qty: 28 TAB | Refills: 0 | Status: SHIPPED | OUTPATIENT
Start: 2020-05-03 | End: 2020-05-17

## 2020-05-03 RX ORDER — ASCORBIC ACID 250 MG
TABLET ORAL DAILY
COMMUNITY
End: 2022-03-28

## 2020-05-03 RX ADMIN — DOXYCYCLINE HYCLATE 100 MG: 100 TABLET, COATED ORAL at 17:29

## 2020-05-03 NOTE — ED NOTES
Pt given discharge instructions. Saline lock removed. Discharged ambulatory with steady gait, declines wheelchair. No acute distress at time of discharge.
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
## 2020-05-03 NOTE — DISCHARGE INSTRUCTIONS
Patient Education        Diabetes Foot Health: Care Instructions  Your Care Instructions    When you have diabetes, your feet need extra care and attention. Diabetes can damage the nerve endings and blood vessels in your feet, making you less likely to notice when your feet are injured. Diabetes also limits your body's ability to fight infection and get blood to areas that need it. If you get a minor foot injury, it could become an ulcer or a serious infection. With good foot care, you can prevent most of these problems. Caring for your feet can be quick and easy. Most of the care can be done when you are bathing or getting ready for bed. Follow-up care is a key part of your treatment and safety. Be sure to make and go to all appointments, and call your doctor if you are having problems. It's also a good idea to know your test results and keep a list of the medicines you take. How can you care for yourself at home? · Keep your blood sugar close to normal by watching what and how much you eat, monitoring blood sugar, taking medicines if prescribed, and getting regular exercise. · Do not smoke. Smoking affects blood flow and can make foot problems worse. If you need help quitting, talk to your doctor about stop-smoking programs and medicines. These can increase your chances of quitting for good. · Eat a diet that is low in fats. High fat intake can cause fat to build up in your blood vessels and decrease blood flow. · Inspect your feet daily for blisters, cuts, cracks, or sores. If you cannot see well, use a mirror or have someone help you. · Take care of your feet:  ? Wash your feet every day. Use warm (not hot) water. Check the water temperature with your wrists or other part of your body, not your feet. ? Dry your feet well. Pat them dry. Do not rub the skin on your feet too hard. Dry well between your toes.  If the skin on your feet stays moist, bacteria or a fungus can grow, which can lead to infection. ? Keep your skin soft. Use moisturizing skin cream to keep the skin on your feet soft and prevent calluses and cracks. But do not put the cream between your toes, and stop using any cream that causes a rash. ? Clean underneath your toenails carefully. Do not use a sharp object to clean underneath your toenails. Use the blunt end of a nail file or other rounded tool. ? Trim and file your toenails straight across to prevent ingrown toenails. Use a nail clipper, not scissors. Use an emery board to smooth the edges. · Change socks daily. Socks without seams are best, because seams often rub the feet. You can find socks for people with diabetes from specialty catalogs. · Look inside your shoes every day for things like gravel or torn linings, which could cause blisters or sores. · Buy shoes that fit well:  ? Look for shoes that have plenty of space around the toes. This helps prevent bunions and blisters. ? Try on shoes while wearing the kind of socks you will usually wear with the shoes. ? Avoid plastic shoes. They may rub your feet and cause blisters. Good shoes should be made of materials that are flexible and breathable, such as leather or cloth. ? Break in new shoes slowly by wearing them for no more than an hour a day for several days. Take extra time to check your feet for red areas, blisters, or other problems after you wear new shoes. · Do not go barefoot. Do not wear sandals, and do not wear shoes with very thin soles. Thin soles are easy to puncture. They also do not protect your feet from hot pavement or cold weather. · Have your doctor check your feet during each visit. If you have a foot problem, see your doctor. Do not try to treat an early foot problem at home. Home remedies or treatments that you can buy without a prescription (such as corn removers) can be harmful. · Always get early treatment for foot problems.  A minor irritation can lead to a major problem if not properly cared for early. When should you call for help? Call your doctor now or seek immediate medical care if:    · You have a foot sore, an ulcer or break in the skin that is not healing after 4 days, bleeding corns or calluses, or an ingrown toenail.     · You have blue or black areas, which can mean bruising or blood flow problems.     · You have peeling skin or tiny blisters between your toes or cracking or oozing of the skin.     · You have a fever for more than 24 hours and a foot sore.     · You have new numbness or tingling in your feet that does not go away after you move your feet or change positions.     · You have unexplained or unusual swelling of the foot or ankle.    Watch closely for changes in your health, and be sure to contact your doctor if:    · You cannot do proper foot care. Where can you learn more? Go to http://ernesto-vilma.info/  Enter A739 in the search box to learn more about \"Diabetes Foot Health: Care Instructions. \"  Current as of: December 19, 2019Content Version: 12.4  © 8265-8401 [employer]. Care instructions adapted under license by [employer] (which disclaims liability or warranty for this information). If you have questions about a medical condition or this instruction, always ask your healthcare professional. [address] any warranty or liability for your use of this information. Patient Education        Diabetic Foot Ulcer: Care Instructions  Your Care Instructions  Diabetes can damage the nerve endings and blood vessels in your feet. That means you are less likely to notice when your feet are injured. A small skin problem like a callus, blister, or cracked skin can turn into a larger sore, called a foot ulcer. Foot ulcers form most often on the pad (ball) of the foot or the bottom of the big toe. You can also get them on the top and bottom of each toe. Foot ulcers can get infected.  If the infection is severe, then tissue in the foot can die. This is called gangrene. In that case, one or more of the toes, part or all of the foot, and sometimes part of the leg may have to be removed (amputated). Your doctor may have removed the dead tissue and cleaned the ulcer. Your foot wound may be wrapped in a protective bandage. It is very important to keep your weight off your injured foot. After a foot ulcer has formed, it will not heal as long as you keep putting weight on the area. Always get early treatment for foot problems. A minor irritation can lead to a major problem if it's not taken care of soon. Follow-up care is a key part of your treatment and safety. Be sure to make and go to all appointments, and call your doctor if you are having problems. It's also a good idea to know your test results and keep a list of the medicines you take. How can you care for yourself at home? · Follow your doctor's instructions about keeping pressure off the foot ulcer. You may need to use crutches or a wheelchair. Or you may wear a cast or a walking boot. · Follow your doctor's instructions on how to clean the ulcer and change the bandage. · If your doctor prescribed antibiotics, take them as directed. Do not stop taking them just because you feel better. You need to take the full course of antibiotics. To prevent foot ulcers  · Keep your blood sugar close to normal by watching what and how much you eat. Track your blood sugar, take medicines if prescribed, and get regular exercise. · Do not smoke. Smoking affects blood flow and can make foot problems worse. If you need help quitting, talk to your doctor about stop-smoking programs and medicines. These can increase your chances of quitting for good. · Do not go barefoot. Protect your feet by wearing shoes that fit well. Choose shoes that are made of materials that are flexible and breathable, such as leather or cloth. · Inspect your feet daily for blisters, cuts, cracks, or sores. If you can't see well, use a mirror or have someone help you. · Have your doctor check your feet during each visit. If you have a foot problem, see your doctor. Do not try to treat your foot problem on your own. Home remedies or treatments that you can buy without a prescription (such as corn removers) can be harmful. When should you call for help? Call your doctor now or seek immediate medical care if:    · You have symptoms of infection, such as:  ? Increased pain, swelling, warmth, or redness. ? Red streaks leading from the area. ? Pus draining from the area. ? A fever.    Watch closely for changes in your health, and be sure to contact your doctor if:    · You have a new problem with your feet, such as:  ? A new sore or ulcer. ? A break in the skin that is not healing after several days. ? Bleeding corns or calluses. ? An ingrown toenail.     · You do not get better as expected. Where can you learn more? Go to http://ernesto-vilma.info/  Enter T131 in the search box to learn more about \"Diabetic Foot Ulcer: Care Instructions. \"  Current as of: December 19, 2019Content Version: 12.4  © 1220-0300 [employer]. Care instructions adapted under license by [employer] (which disclaims liability or warranty for this information). If you have questions about a medical condition or this instruction, always ask your healthcare professional. [place] [address] any warranty or liability for your use of this information.

## 2020-07-06 ENCOUNTER — OFFICE VISIT (OUTPATIENT)
Dept: NEUROLOGY | Age: 75
End: 2020-07-06

## 2020-07-06 VITALS
OXYGEN SATURATION: 98 % | HEIGHT: 74 IN | RESPIRATION RATE: 16 BRPM | BODY MASS INDEX: 24.51 KG/M2 | HEART RATE: 91 BPM | DIASTOLIC BLOOD PRESSURE: 70 MMHG | SYSTOLIC BLOOD PRESSURE: 158 MMHG | WEIGHT: 191 LBS

## 2020-07-06 DIAGNOSIS — G56.22 ULNAR NEUROPATHY AT ELBOW OF LEFT UPPER EXTREMITY: ICD-10-CM

## 2020-07-06 DIAGNOSIS — M47.22 CERVICAL RADICULOPATHY DUE TO DEGENERATIVE JOINT DISEASE OF SPINE: Primary | ICD-10-CM

## 2020-07-06 DIAGNOSIS — I65.23 BILATERAL CAROTID ARTERY STENOSIS: ICD-10-CM

## 2020-07-06 DIAGNOSIS — E53.8 B12 DEFICIENCY: ICD-10-CM

## 2020-07-06 DIAGNOSIS — G25.0 BENIGN ESSENTIAL TREMOR SYNDROME: ICD-10-CM

## 2020-07-06 DIAGNOSIS — G56.01 CARPAL TUNNEL SYNDROME OF RIGHT WRIST: ICD-10-CM

## 2020-07-06 DIAGNOSIS — I67.89 CEREBRAL MICROVASCULAR DISEASE: ICD-10-CM

## 2020-07-06 DIAGNOSIS — G56.21 ULNAR NEUROPATHY AT ELBOW, RIGHT: ICD-10-CM

## 2020-07-06 DIAGNOSIS — G44.209 TENSION VASCULAR HEADACHE: ICD-10-CM

## 2020-07-06 DIAGNOSIS — G60.8 IDIOPATHIC SMALL AND LARGE FIBER SENSORY NEUROPATHY: ICD-10-CM

## 2020-07-06 DIAGNOSIS — E03.4 HYPOTHYROIDISM DUE TO ACQUIRED ATROPHY OF THYROID: ICD-10-CM

## 2020-07-06 DIAGNOSIS — G44.019 EPISODIC CLUSTER HEADACHE, NOT INTRACTABLE: ICD-10-CM

## 2020-07-06 DIAGNOSIS — E11.42 DIABETIC PERIPHERAL NEUROPATHY ASSOCIATED WITH TYPE 2 DIABETES MELLITUS (HCC): ICD-10-CM

## 2020-07-06 DIAGNOSIS — E55.9 VITAMIN D DEFICIENCY: ICD-10-CM

## 2020-07-06 RX ORDER — BUTALBITAL, ACETAMINOPHEN AND CAFFEINE 50; 325; 40 MG/1; MG/1; MG/1
2 TABLET ORAL
Qty: 50 TAB | Refills: 5 | Status: SHIPPED | OUTPATIENT
Start: 2020-07-06 | End: 2022-03-28
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
## 2020-07-06 NOTE — PATIENT INSTRUCTIONS
A Healthy Lifestyle: Care Instructions Your Care Instructions A healthy lifestyle can help you feel good, stay at a healthy weight, and have plenty of energy for both work and play. A healthy lifestyle is something you can share with your whole family. A healthy lifestyle also can lower your risk for serious health problems, such as high blood pressure, heart disease, and diabetes. You can follow a few steps listed below to improve your health and the health of your family. Follow-up care is a key part of your treatment and safety. Be sure to make and go to all appointments, and call your doctor if you are having problems. It's also a good idea to know your test results and keep a list of the medicines you take. How can you care for yourself at home? · Do not eat too much sugar, fat, or fast foods. You can still have dessert and treats now and then. The goal is moderation. · Start small to improve your eating habits. Pay attention to portion sizes, drink less juice and soda pop, and eat more fruits and vegetables. ? Eat a healthy amount of food. A 3-ounce serving of meat, for example, is about the size of a deck of cards. Fill the rest of your plate with vegetables and whole grains. ? Limit the amount of soda and sports drinks you have every day. Drink more water when you are thirsty. ? Eat at least 5 servings of fruits and vegetables every day. It may seem like a lot, but it is not hard to reach this goal. A serving or helping is 1 piece of fruit, 1 cup of vegetables, or 2 cups of leafy, raw vegetables. Have an apple or some carrot sticks as an afternoon snack instead of a candy bar. Try to have fruits and/or vegetables at every meal. 
· Make exercise part of your daily routine. You may want to start with simple activities, such as walking, bicycling, or slow swimming. Try to be active 30 to 60 minutes every day.  You do not need to do all 30 to 60 minutes all at once. For example, you can exercise 3 times a day for 10 or 20 minutes. Moderate exercise is safe for most people, but it is always a good idea to talk to your doctor before starting an exercise program. 
· Keep moving. Luz Maria Listen the lawn, work in the garden, or [employer]. Take the stairs instead of the elevator at work. · If you smoke, quit. People who smoke have an increased risk for heart attack, stroke, cancer, and other lung illnesses. Quitting is hard, but there are ways to boost your chance of quitting tobacco for good. ? Use nicotine gum, patches, or lozenges. ? Ask your doctor about stop-smoking programs and medicines. ? Keep trying. In addition to reducing your risk of diseases in the future, you will notice some benefits soon after you stop using tobacco. If you have shortness of breath or asthma symptoms, they will likely get better within a few weeks after you quit. · Limit how much alcohol you drink. Moderate amounts of alcohol (up to 2 drinks a day for men, 1 drink a day for women) are okay. But drinking too much can lead to liver problems, high blood pressure, and other health problems. Family health If you have a family, there are many things you can do together to improve your health. · Eat meals together as a family as often as possible. · Eat healthy foods. This includes fruits, vegetables, lean meats and dairy, and whole grains. · Include your family in your fitness plan. Most people think of activities such as jogging or tennis as the way to fitness, but there are many ways you and your family can be more active. Anything that makes you breathe hard and gets your heart pumping is exercise. Here are some tips: 
? Walk to do errands or to take your child to school or the bus. 
? Go for a family bike ride after dinner instead of watching TV. Where can you learn more? Go to http://ernesto-vilma.info/ Enter D285 in the search box to learn more about \"A Healthy Lifestyle: Care Instructions. \" Current as of: January 31, 2020               Content Version: 12.5 © 2317-3050 [employer]. Care instructions adapted under license by [employer] (which disclaims liability or warranty for this information). If you have questions about a medical condition or this instruction, always ask your healthcare professional. [address] any warranty or liability for your use of this information. Office Policies 
 
o Phone calls/patient messages: Please allow up to 24 hours for someone in the office to contact you about your call or message. Be mindful your provider may be out of the office or your message may require further review. We encourage you to use [employer] for your messages as this is a faster, more efficient way to communicate with our office 
 
o Medication Refills: 
Prescription medications require up to 48 business hours to process. We encourage you to use [employer] for your refills. For controlled medications: Please allow up to 72 business hours to process. Certain medications may require you to  a written prescription at our office. NO narcotic/controlled medications will be prescribed after 4pm Monday through Friday or on weekends 
 
o Form/Paperwork Completion: We ask that you allow 7-14 business days. You may also download your forms to [employer] to have your doctor print off.
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
## 2020-07-13 ENCOUNTER — HOSPITAL ENCOUNTER (OUTPATIENT)
Dept: GENERAL RADIOLOGY | Age: 75
Discharge: HOME | End: 2020-07-13
Payer: MEDICARE

## 2020-07-13 DIAGNOSIS — G56.01 CARPAL TUNNEL SYNDROME OF RIGHT WRIST: ICD-10-CM

## 2020-07-13 DIAGNOSIS — G56.21 ULNAR NEUROPATHY AT ELBOW, RIGHT: ICD-10-CM

## 2020-07-13 DIAGNOSIS — G60.8 IDIOPATHIC SMALL AND LARGE FIBER SENSORY NEUROPATHY: ICD-10-CM

## 2020-07-13 DIAGNOSIS — E03.4 HYPOTHYROIDISM DUE TO ACQUIRED ATROPHY OF THYROID: ICD-10-CM

## 2020-07-13 DIAGNOSIS — E55.9 VITAMIN D DEFICIENCY: ICD-10-CM

## 2020-07-13 DIAGNOSIS — I65.23 BILATERAL CAROTID ARTERY STENOSIS: ICD-10-CM

## 2020-07-13 DIAGNOSIS — G44.209 TENSION VASCULAR HEADACHE: ICD-10-CM

## 2020-07-13 DIAGNOSIS — I67.89 CEREBRAL MICROVASCULAR DISEASE: ICD-10-CM

## 2020-07-13 DIAGNOSIS — G56.22 ULNAR NEUROPATHY AT ELBOW OF LEFT UPPER EXTREMITY: ICD-10-CM

## 2020-07-13 DIAGNOSIS — E53.8 B12 DEFICIENCY: ICD-10-CM

## 2020-07-13 DIAGNOSIS — E11.42 DIABETIC PERIPHERAL NEUROPATHY ASSOCIATED WITH TYPE 2 DIABETES MELLITUS (HCC): ICD-10-CM

## 2020-07-13 DIAGNOSIS — M47.22 CERVICAL RADICULOPATHY DUE TO DEGENERATIVE JOINT DISEASE OF SPINE: ICD-10-CM

## 2020-07-13 DIAGNOSIS — G25.0 BENIGN ESSENTIAL TREMOR SYNDROME: ICD-10-CM

## 2020-07-13 PROCEDURE — 72052 X-RAY EXAM NECK SPINE 6/>VWS: CPT

## 2020-07-14 LAB
CREATININE, EXTERNAL: 1.08
HBA1C MFR BLD HPLC: 7.6 %
LDL-C, EXTERNAL: 80

## 2020-07-15 ENCOUNTER — DOCUMENTATION ONLY (OUTPATIENT)
Dept: NEUROLOGY | Age: 75
End: 2020-07-15

## 2020-07-17 LAB
25(OH)D3+25(OH)D2 SERPL-MCNC: 28.7 NG/ML (ref 30–100)
ANTI NEURONAL CELL AB METHOD: NORMAL
CENTROMERE B AB SER-ACNC: <0.2 AI (ref 0–0.9)
CHROMATIN AB SERPL-ACNC: <0.2 AI (ref 0–0.9)
CK SERPL-CCNC: 587 U/L (ref 41–331)
DSDNA AB SER-ACNC: <1 IU/ML (ref 0–9)
ENA JO1 AB SER-ACNC: <0.2 AI (ref 0–0.9)
ENA RNP AB SER-ACNC: 0.2 AI (ref 0–0.9)
ENA SCL70 AB SER-ACNC: <0.2 AI (ref 0–0.9)
ENA SM AB SER-ACNC: <0.2 AI (ref 0–0.9)
ENA SM+RNP AB SER-ACNC: <0.2 AI (ref 0–0.9)
ENA SS-A AB SER-ACNC: 4.5 AI (ref 0–0.9)
ENA SS-B AB SER-ACNC: >8 AI (ref 0–0.9)
ERYTHROCYTE [SEDIMENTATION RATE] IN BLOOD BY WESTERGREN METHOD: 25 MM/HR (ref 0–30)
FOLATE SERPL-MCNC: 16.2 NG/ML
GLIADIN PEPTIDE IGA SER-ACNC: 5 UNITS (ref 0–19)
GLIADIN PEPTIDE IGG SER-ACNC: 2 UNITS (ref 0–19)
GM1 GANGL IGG TITR SER IA: 48 % (ref 0–30)
IGA SERPL-MCNC: 355 MG/DL (ref 61–437)
IGG SERPL-MCNC: 1459 MG/DL (ref 603–1613)
IGM SERPL-MCNC: 54 MG/DL (ref 15–143)
INTERPRETATION, 811987: NORMAL
MAG IGM AUTO-AB INTERPRETATION: NORMAL
MAG IGM SER-ACNC: <900 BTU (ref 0–999)
NEURONAL AB SER IA-ACNC: 15 UNITS (ref 0–54)
PROT PATTERN SERPL IFE-IMP: NORMAL
RIBOSOMAL P AB SER-ACNC: <0.2 AI (ref 0–0.9)
SEE BELOW:, 164879: ABNORMAL
T3 SERPL-MCNC: 98 NG/DL (ref 71–180)
T4 SERPL-MCNC: 6.9 UG/DL (ref 4.5–12)
TSH SERPL DL<=0.005 MIU/L-ACNC: 2.33 UIU/ML (ref 0.45–4.5)
VIT B12 SERPL-MCNC: 242 PG/ML (ref 232–1245)

## 2020-07-20 ENCOUNTER — HOSPITAL ENCOUNTER (OUTPATIENT)
Dept: MRI IMAGING | Age: 75
Discharge: HOME | End: 2020-07-20
Attending: PSYCHIATRY & NEUROLOGY
Payer: MEDICARE

## 2020-07-20 ENCOUNTER — DOCUMENTATION ONLY (OUTPATIENT)
Dept: NEUROLOGY | Age: 75
End: 2020-07-20

## 2020-07-20 DIAGNOSIS — G60.8 IDIOPATHIC SMALL AND LARGE FIBER SENSORY NEUROPATHY: ICD-10-CM

## 2020-07-20 DIAGNOSIS — M47.22 CERVICAL RADICULOPATHY DUE TO DEGENERATIVE JOINT DISEASE OF SPINE: ICD-10-CM

## 2020-07-20 DIAGNOSIS — G25.0 BENIGN ESSENTIAL TREMOR SYNDROME: ICD-10-CM

## 2020-07-20 DIAGNOSIS — G44.209 TENSION VASCULAR HEADACHE: ICD-10-CM

## 2020-07-20 DIAGNOSIS — G56.21 ULNAR NEUROPATHY AT ELBOW, RIGHT: ICD-10-CM

## 2020-07-20 DIAGNOSIS — I67.89 CEREBRAL MICROVASCULAR DISEASE: ICD-10-CM

## 2020-07-20 DIAGNOSIS — G56.01 CARPAL TUNNEL SYNDROME OF RIGHT WRIST: ICD-10-CM

## 2020-07-20 DIAGNOSIS — E03.4 HYPOTHYROIDISM DUE TO ACQUIRED ATROPHY OF THYROID: ICD-10-CM

## 2020-07-20 DIAGNOSIS — I65.23 BILATERAL CAROTID ARTERY STENOSIS: ICD-10-CM

## 2020-07-20 DIAGNOSIS — E53.8 B12 DEFICIENCY: ICD-10-CM

## 2020-07-20 DIAGNOSIS — E55.9 VITAMIN D DEFICIENCY: ICD-10-CM

## 2020-07-20 DIAGNOSIS — E11.42 DIABETIC PERIPHERAL NEUROPATHY ASSOCIATED WITH TYPE 2 DIABETES MELLITUS (HCC): ICD-10-CM

## 2020-07-20 DIAGNOSIS — G56.22 ULNAR NEUROPATHY AT ELBOW OF LEFT UPPER EXTREMITY: ICD-10-CM

## 2020-07-20 PROCEDURE — A9575 INJ GADOTERATE MEGLUMI 0.1ML: HCPCS | Performed by: PSYCHIATRY & NEUROLOGY

## 2020-07-20 PROCEDURE — 74011250636 HC RX REV CODE- 250/636: Performed by: PSYCHIATRY & NEUROLOGY

## 2020-07-20 PROCEDURE — 70544 MR ANGIOGRAPHY HEAD W/O DYE: CPT

## 2020-07-20 PROCEDURE — 70553 MRI BRAIN STEM W/O & W/DYE: CPT

## 2020-07-20 RX ORDER — GADOTERATE MEGLUMINE 376.9 MG/ML
18 INJECTION INTRAVENOUS
Status: COMPLETED | OUTPATIENT
Start: 2020-07-20 | End: 2020-07-20

## 2020-07-20 RX ADMIN — GADOTERATE MEGLUMINE 18 ML: 376.9 INJECTION INTRAVENOUS at 14:39

## 2020-07-20 NOTE — PROGRESS NOTES
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 He also does have a mild elevation of his CPK we will repeat that. Advised the patient to call back if any questions.

## 2020-07-21 ENCOUNTER — TELEPHONE (OUTPATIENT)
Dept: NEUROLOGY | Age: 75
End: 2020-07-21

## 2020-07-21 NOTE — TELEPHONE ENCOUNTER
Henry Mack from [facility] radiology called to see if Dr. Nayak Keo had received the MRI and MRA

## 2020-07-22 ENCOUNTER — OFFICE VISIT (OUTPATIENT)
Dept: NEUROLOGY | Age: 75
End: 2020-07-22

## 2020-07-22 DIAGNOSIS — G44.209 TENSION VASCULAR HEADACHE: ICD-10-CM

## 2020-07-22 DIAGNOSIS — G56.21 ULNAR NEUROPATHY AT ELBOW, RIGHT: ICD-10-CM

## 2020-07-22 DIAGNOSIS — E11.42 DIABETIC PERIPHERAL NEUROPATHY ASSOCIATED WITH TYPE 2 DIABETES MELLITUS (HCC): ICD-10-CM

## 2020-07-22 DIAGNOSIS — M47.22 CERVICAL RADICULOPATHY DUE TO DEGENERATIVE JOINT DISEASE OF SPINE: Primary | ICD-10-CM

## 2020-07-22 DIAGNOSIS — I67.89 CEREBRAL MICROVASCULAR DISEASE: ICD-10-CM

## 2020-07-22 DIAGNOSIS — I67.1 CEREBRAL ANEURYSM WITHOUT RUPTURE: ICD-10-CM

## 2020-07-22 DIAGNOSIS — R76.8 POSITIVE ANA (ANTINUCLEAR ANTIBODY): ICD-10-CM

## 2020-07-22 DIAGNOSIS — G56.22 ULNAR NEUROPATHY AT ELBOW OF LEFT UPPER EXTREMITY: ICD-10-CM

## 2020-07-22 DIAGNOSIS — G60.8 IDIOPATHIC SMALL AND LARGE FIBER SENSORY NEUROPATHY: ICD-10-CM

## 2020-07-22 DIAGNOSIS — G25.0 BENIGN ESSENTIAL TREMOR SYNDROME: ICD-10-CM

## 2020-07-22 DIAGNOSIS — G56.01 CARPAL TUNNEL SYNDROME OF RIGHT WRIST: ICD-10-CM

## 2020-07-22 DIAGNOSIS — I65.23 BILATERAL CAROTID ARTERY STENOSIS: ICD-10-CM

## 2020-07-22 DIAGNOSIS — E53.8 B12 DEFICIENCY: ICD-10-CM

## 2020-07-22 NOTE — PROCEDURES
ELECTRODIAGNOSTIC REPORT      Test Date:  2020    Patient: Colleen Arroyo : 1945 Physician: Pradip Sheppard M.D. Sex: Male  < Ref Phys: Pradip Sheppard M.D. Technician: Maria Isabel Florian    Patient History: Patient is a 67-year-old male with a history of numbness in his feet and hands, some complaints of gait unsteadiness and weakness, and bad headache, and a 30-year history of type 1 diabetes. Patient for EMG study because of weakness and numbness in his arms and legs to rule out neuropathy, rule out entrapment neuropathy, rule out cervical or lumbar radiculopathy. Neuro Exam: Patient has hypoactive reflexes throughout with absent ankle jerks bilaterally. Patient has prominent atrophy and weakness of his ulnar innervated hand muscles bilaterally and of his intrinsic foot musculature bilaterally. The rest of his muscle bulk and strength appears to be relatively normal.  His sensory examination shows decreased sensation in both hands to palm level in both feet to about midcalf level. His cranial nerves and mental status are normal.  He walks with a slightly unsteady gait on Romberg and tandem, but finger-nose-finger examination shows no dysmetria. No Babinski or clonus present. 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 This was evidenced by the lack of sensory conductions in the feet, and multiple slow nerve conductions in the arms and leg, and chronic polyphasic motor potentials seen in the distally innervated muscles.   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 3.)  Possible bilateral median nerve entrapment at the wrist, versus changes from the neuropathy in both hands and median innervated nerves as manifest by the prolonged distal motor latencies of the median nerves, chronic axonal potentials seen, and unobtainable sensory latencies. There was no clear evidence of motor radiculopathy on the basis of this exam.  Clinical correlation recommended, and correlation with metabolic studies and imaging studies and follow-up EMG studies in 6 to 12 months time may be of value for the patient if clinically indicated. EMG & NCV Findings:  Evaluation of the left Fibular motor nerve showed no response (Ankle), no response (B Fib), and no response (Poplt). The left Fibular TA motor nerve showed normal distal onset latency (3.2 ms), reduced amplitude (2.8 mV), and normal conduction velocity (Poplit-Fib Head, 40 m/s). The left median motor and the right median motor nerves showed prolonged distal onset latency (L6.6, R5.9 ms), normal amplitude (L9.8, R5.2 mV), and decreased conduction velocity (Elbow-Wrist, L46, R46 m/s). The left tibial motor nerve showed no response (Ankle) and no response (Knee). The left ulnar motor and the right ulnar motor nerves showed prolonged distal onset latency (L5.2, R5.1 ms), reduced amplitude (L0.4, R0.6 mV), decreased conduction velocity (Wrist-Abd Dig Minimi, L15, R16 m/s), decreased conduction velocity (B Elbow-Wrist, L40, R42 m/s), and decreased conduction velocity (A Elbow-B Elbow, L34, R36 m/s). The left median sensory, the right median sensory, the left ulnar sensory, and the right ulnar sensory nerves showed no response (Wrist). The left radial sensory and the right radial sensory nerves showed normal distal peak latency (L2.7, R2.6 ms) and normal amplitude (L5.4, R9.7 µV).   The left Sup Fibular sensory nerve showed no response (Lower leg).  The left sural sensory nerve showed no response (Calf). All left vs. right side differences were within normal limits.                 __________________  Bubba Grewal M.D.        Nerve Conduction Studies  Anti Sensory Summary Table     Stim Site NR Onset (ms) Peak (ms) O-P Amp (µV) Norm Peak (ms) Norm O-P Amp Site1 Site2 Dist (cm) Norm Jaun (m/s)   Left Median Anti Sensory (2nd Digit)  33.2°C   Wrist NR    <4 >11 Wrist 2nd Digit 14.0    Right Median Anti Sensory (2nd Digit)  33.2°C   Wrist NR    <4 >11 Wrist 2nd Digit 14.0    Left Radial Anti Sensory (Base 1st Digit)  33.2°C   Wrist    2.1 2.7 5.4 <2.8 7 Wrist Base 1st Digit 10.0    Right Radial Anti Sensory (Base 1st Digit)  33.2°C   Wrist    2.0 2.6 9.7 <2.8 7 Wrist Base 1st Digit 10.0    Left Sup Fibular Anti Sensory (Lat ankle)  33.2°C   Lower leg NR    <4.4 >5.0 Lower leg Lat ankle 10.0 >32   Left Sural Anti Sensory (Lat Mall)  33.2°C   Calf NR    <4.5 >4.0 Calf Lat Mall 14.0    Left Ulnar Anti Sensory (5th Digit)  33.2°C   Wrist NR    <4.0 >10 Wrist 5th Digit 14.0    Right Ulnar Anti Sensory (5th Digit)  33.2°C   Wrist NR    <4.0 >10 Wrist 5th Digit 14.0      Motor Summary Table     Stim Site NR Onset (ms) Norm Onset (ms) O-P Amp (mV) Norm O-P Amp P-T Amp (mV) Site1 Site2 Dist (cm) Jaun (m/s)   Left Fibular Motor (Ext Dig Brev)  33.2°C   Ankle NR  <6.5  >1.1  Ankle Ext Dig Brev 8.0    B Fib NR      B Fib Ankle 0.0    Poplt NR      Poplt B Fib 10.0    Left Fibular TA Motor (Tib Ant)  33.2°C   Fib Head    3.2 <4.5 2.8 >3.0  Fib Head Tib Ant 10.0 31   Poplit    5.7  2.8   Poplit Fib Head 10.0 40   Left Median Motor (Abd Poll Brev)  33.2°C   Wrist    6.6 <4.5 9.8 >4.1  Wrist Abd Poll Brev 8.0 12   Elbow    13.4  8.7   Elbow Wrist 31.0 46   Right Median Motor (Abd Poll Brev)  33.2°C   Wrist    5.9 <4.5 5.2 >4.1  Wrist Abd Poll Brev 8.0 14   Elbow    13.3  4.6   Elbow Wrist 34.0 46   Left Tibial Motor (Abd Ballesteros Brev)  33.2°C   Ankle NR  <6.1  >1.1  Ankle Abd Ballesteros Brev 8.0    Knee NR      Knee Ankle 0.0    Left Ulnar Motor (Abd Dig Minimi)  33.2°C   Wrist    5.2 <3.1 0.4 >7.0  Wrist Abd Dig Minimi 8.0 15   B Elbow    11.7  0.3   B Elbow Wrist 26.0 40   A Elbow    14.6  0.3   A Elbow B Elbow 10.0 34   Right Ulnar Motor (Abd Dig Minimi)  33.2°C   Wrist    5.1 <3.1 0.6 >7.0  Wrist Abd Dig Minimi 8.0 16   B Elbow    11.3  0.6   B Elbow Wrist 26.0 42   A Elbow    14.1  0.5   A Elbow B Elbow 10.0 36     EMG     Side Muscle Nerve Root Ins Act Fibs Psw Recrt Duration Amp Poly Comment   Left Biceps Musculocut C5-6 Nml Nml Nml Nml Nml Nml Nml    Left BrachioRad Radial C5-6 Nml Nml Nml Nml Nml Nml Nml    Right BrachioRad Radial C5-6 Nml Nml Nml Nml Nml Nml Nml    Right Biceps Musculocut C5-6 Nml Nml Nml Nml Nml Nml Nml    Left Deltoid Axillary C5-6 Nml Nml Nml Nml Nml Nml Nml    Right Deltoid Axillary C5-6 Nml Nml Nml Nml Nml Nml Nml    Left Triceps Radial C6-7-8 Nml Nml Nml Nml Nml Nml Nml    Right Triceps Radial C6-7-8 Nml Nml Nml Nml Nml Nml Nml    Right Lower Cerv Parasp Rami C7,T1 Nml Nml Nml Nml Nml Nml Nml    Left Lower Cerv Parasp Rami C7,T1 Nml Nml Nml Nml Nml Nml Nml    Left FlexPolLong Median (Ant Int) C7-8 Nml Nml Nml Nml Nml Nml Nml    Right FlexPolLong Median (Ant Int) C7-8 Nml Nml Nml Nml Nml Nml Nml    Left FlexCarpiUln Ulnar C8,T1 Nml Nml Nml Nml Nml Nml Nml    Right FlexCarpiUln Ulnar C8,T1 Nml Nml Nml Nml Nml Nml Nml    Left ABD Dig Min Ulnar C8-T1 Incr 1+ 1+ Reduced Incr Incr 4+    Left Abd Poll Brev Median C8-T1 Nml Nml Nml Reduced Incr Incr 2+    Left 1stDorInt Ulnar C8-T1 Incr 1+ 1+ Reduced Incr Incr 3+    Right 1stDorInt Ulnar C8-T1 Incr 1+ 1+ Reduced Incr Incr 3+    Right ABD Dig Min Ulnar C8-T1 Incr 2+ 2+ Reduced Incr Incr 3+    Right Abd Poll Brev Median C8-T1 Nml Nml Nml Reduced Incr Incr 1+    Left VastusLat Femoral L2-4 Nml Nml Nml Nml Nml Nml Nml    Left AntTibialis Dp Br Peron L4-5 Incr 1+ 1+ Nml Incr Incr 1+    Left Ext Dig Brev Dp Br Peron L5, S1 Nml Nml Nml     NO UNITS SEEN   Left Lower Lumb Parasp Rami L5,S1 Nml Nml Nml Nml Nml Nml Nml    Left AbdHallucis MedPlantar S1-2 Nml Nml Nml     NO UNITS SEEN   Left MedGastroc Tibial S1-2 Incr 1+ 1+ Nml Incr Incr 1+          Waveforms:

## 2020-07-22 NOTE — PROGRESS NOTES
Patient's EMG study did show moderate severe distal demyelinating and axonal length dependent polyneuropathy consistent with his history of diabetes type 1 for over 30 years. Cannot completely rule out other toxic metabolic or acquired neuropathies. Patient study also showed questionable bilateral carpal tunnel syndromes versus severe neuropathic changes seen in the median nerves in the hands bilaterally. Patient study also showed apparent bilateral compressive neuropathies at the elbow with that are moderately severe to severe, versus mononeuritis multiplex involvement of the ulnar nerves. Patient on his MRA of the brain and a questionable 1 mm aneurysm of the left internal carotid artery so a CTA was ordered to more clearly define that and see if it was an artifact. His kidney function is normal.  We repeated his anti-GM 1 antibody and that was borderline positive, and his ANGEL was positive for Sjogren's syndrome so he was sent to rheumatology, and his CPK was elevated so we sent him for repeat CPK also.   He will return for follow-up as scheduled check my chart for results of his test.

## 2020-07-22 NOTE — LETTER
7/22/20 Patient: Angie Sommers YOB: 1945 Date of Visit: 7/22/2020 [address] [address] VIA In Basket Dear Jai Main MD, Thank you for referring Mr. Angie Sommers to [address] [place] [address] for evaluation. My notes for this consultation are attached. Patient's EMG study did show moderate severe distal demyelinating and axonal length dependent polyneuropathy consistent with his history of diabetes type 1 for over 30 years. Cannot completely rule out other toxic metabolic or acquired neuropathies. Patient study also showed questionable bilateral carpal tunnel syndromes versus severe neuropathic changes seen in the median nerves in the hands bilaterally. Patient study also showed apparent bilateral compressive neuropathies at the elbow with that are moderately severe to severe, versus mononeuritis multiplex involvement of the ulnar nerves. Patient on his MRA of the brain and a questionable 1 mm aneurysm of the left internal carotid artery so a CTA was ordered to more clearly define that and see if it was an artifact. His kidney function is normal. 
We repeated his anti-GM 1 antibody and that was borderline positive, and his ANGEL was positive for Sjogren's syndrome so he was sent to rheumatology, and his CPK was elevated so we sent him for repeat CPK also. He will return for follow-up as scheduled check my chart for results of his test. 
 
 
If you have questions, please do not hesitate to call me. I look forward to following your patient along with you. Sincerely, Holly Murdock MD

## 2020-07-23 ENCOUNTER — DOCUMENTATION ONLY (OUTPATIENT)
Dept: NEUROLOGY | Age: 75
End: 2020-07-23

## 2020-07-23 DIAGNOSIS — G60.8 IDIOPATHIC SMALL AND LARGE FIBER SENSORY NEUROPATHY: Primary | ICD-10-CM

## 2020-07-23 NOTE — PROGRESS NOTES
I called the patient, no answer, left message on his machine we are going to mail him a lab slip to repeat his CPK next week because the EMG may have elevated the CPK, he may need a muscle biopsy if all else fails  I told him to call back if any question

## 2020-07-30 LAB
CENTROMERE B AB SER-ACNC: <0.2 AI (ref 0–0.9)
CHROMATIN AB SERPL-ACNC: 0.2 AI (ref 0–0.9)
CK SERPL-CCNC: 753 U/L (ref 41–331)
DSDNA AB SER-ACNC: <1 IU/ML (ref 0–9)
ENA JO1 AB SER-ACNC: <0.2 AI (ref 0–0.9)
ENA RNP AB SER-ACNC: 0.2 AI (ref 0–0.9)
ENA SCL70 AB SER-ACNC: <0.2 AI (ref 0–0.9)
ENA SM AB SER-ACNC: <0.2 AI (ref 0–0.9)
ENA SM+RNP AB SER-ACNC: <0.2 AI (ref 0–0.9)
ENA SS-A AB SER-ACNC: 5.3 AI (ref 0–0.9)
ENA SS-B AB SER-ACNC: >8 AI (ref 0–0.9)
GM1 GANGL IGG TITR SER IA: 38 % (ref 0–30)
RIBOSOMAL P AB SER-ACNC: <0.2 AI (ref 0–0.9)
SEE BELOW:, 164879: ABNORMAL

## 2020-07-31 DIAGNOSIS — R74.8 ABNORMAL CPK: Primary | ICD-10-CM

## 2020-07-31 NOTE — PROGRESS NOTES
I called the patient, we will mail him a lab slip for CPK repeat, I will do it in a week or 2 when he recovers from his foot surgery because his last CPK was drawn after an EMG which may have artificially elevated    Scott Stalls can you mail him the lab slip

## 2020-08-05 ENCOUNTER — TELEPHONE (OUTPATIENT)
Dept: NEUROLOGY | Age: 75
End: 2020-08-05

## 2020-08-25 LAB — CK SERPL-CCNC: 373 U/L (ref 41–331)

## 2020-09-01 ENCOUNTER — HOSPITAL ENCOUNTER (OUTPATIENT)
Dept: PHYSICAL THERAPY | Age: 75
Discharge: HOME | End: 2020-09-01
Payer: MEDICARE

## 2020-09-01 PROCEDURE — 97110 THERAPEUTIC EXERCISES: CPT | Performed by: PHYSICAL THERAPIST

## 2020-09-01 PROCEDURE — 97162 PT EVAL MOD COMPLEX 30 MIN: CPT | Performed by: PHYSICAL THERAPIST

## 2020-09-01 NOTE — PROGRESS NOTES
[address] [address] (MOB IV), [address] [facility]  Sharif Colon  Phone: [phone] Fax: [phone]     Discharge Summary/Plan of Care/Statement of Necessity for Physical Therapy Services  2-15    Patient name: Tarsha Zuleta  : 1945  Provider#: [id number]  Referral source: Ehsan Manuel MD      Medical/Treatment Diagnosis: Other abnormalities of gait and mobility [R26.89]     Prior Hospitalization: see medical history     Comorbidities: diabetes, visual impairment, hepatitis, hx of right toes amputated  Prior Level of Function: 20 min of exercise seldom or never  Medications: Verified on Patient Summary List  Start of Care: 20      Onset Date: chronic   The Plan of Care and following information is based on the information from the initial evaluation. Assessment/ key information: The patient presents to this clinic confused as to why he was referred to therapy for gait and balance. He does not report any issues with walking or have a history of falls. He does have his right toes amputated, and a month ago had surgery to remove a callus, but that is healing well and has a follow up with his physician tomorrow for this. Even with a walking shoe (normally wears sneakers with a \"toe filler\", his gait is fairly normalized. He had some difficulty with a single leg balance, but other measures were within functional limits. He was given home exercises to improve and maintain his mobility and hip strength. His script did mention hand therapy for ulnar and median neuropathy, and it would be best if he see a hand specialist/therapist to address this issue if he so desires (he reports tremors on the left side along with numbness/tingling in the hands along the ulnar nerve, and has bilateral trigger fingers with a history of finger surgery).  He will seek out a hand therapist and will perform his HEP for his gait/balance/lower extremity strength independently, so this plan of care will also serve as a Discharge Summary. Evaluation Complexity History MEDIUM  Complexity : 1-2 comorbidities / personal factors will impact the outcome/ POC ; Examination MEDIUM Complexity : 3 Standardized tests and measures addressing body structure, function, activity limitation and / or participation in recreation  ;Presentation MEDIUM Complexity : Evolving with changing characteristics  ; Clinical Decision Making Other outcome measures balance  MEDIUM  Overall Complexity Rating: MEDIUM    Problem List: decrease ROM, decrease strength, impaired gait/ balance, decrease ADL/ functional abilitiies, decrease activity tolerance, decrease flexibility/ joint mobility and decrease transfer abilities   Treatment Plan may include any combination of the following: Therapeutic exercise, Therapeutic activities, Neuromuscular re-education, Physical agent/modality, Gait/balance training, Manual therapy, Patient education, Self Care training, Functional mobility training, Home safety training and Stair training  Patient / Family readiness to learn indicated by: asking questions, trying to perform skills and interest  Persons(s) to be included in education: patient (P)  Barriers to Learning/Limitations: None  Patient Goal (s): to address hand issues  Patient Self Reported Health Status: good  Rehabilitation Potential: good    Short Term Goals: To be accomplished in 2 treatments:                         1.) The patient will be given an HEP to maintain and improve lower extremity ROM and strength- MET  Long Term Goals: n/a    Frequency / Duration: Patient to be seen for Eval ONLY and be discharged to his HEP today.       Patient/ Caregiver education and instruction: self care, activity modification and exercises    [x]  Plan of care has been reviewed with PTA        Certification Period: 9/1/20-12/1/20  Monica Marquez, PT 9/1/2020     ________________________________________________________________________    I certify that the above Therapy Services are being furnished while the patient is under my care. I agree with the treatment plan and certify that this therapy is necessary.     [de-identified] Signature:____________________  Date:____________Time: _________

## 2020-09-01 NOTE — PROGRESS NOTES
PT INITIAL EVALUATION NOTE - [facility] 2-15    Patient Name: Ramona Calhoun  Date:2020  : 1945  [x]  Patient  Verified  Payor: Chase Vega / Plan: [place] MED[place] PART [place] & B / Product Type: Medicare /    In time: 2:45pm  Out time:3:45pm  Total Treatment Time (min): 60  Total Timed Codes (min): 25  1:1 Treatment Time ( [address] only): 25   Visit #: 1     Treatment Area: Other abnormalities of gait and mobility [R26.89]    SUBJECTIVE  Pain Level (0-10 scale): 0  Any medication changes, allergies to medications, adverse drug reactions, diagnosis change, or new procedure performed?: [] No    [x] Yes (see summary sheet for update)  Subjective:    He doesn't have any toes on the right foot, and he recently had surgery about a month ago to shave down the lateral foot and callus. It's healing well. He normally wears a shoe with a toe filler. He saw Dr. Judd Raymond initially for cluster headaches and was trying to see him for that. He doesn't know why he's here for gait and balance. He does have tremors in the hands, left worse than right. He had finger therapy about 6 years.     OBJECTIVE    Posture:  Scapular protraction bilaterally  Other Observations:  Right walking boot; no toes on RLE  Gait and Functional Mobility:  WFL  Palpation: nt         LOWER QUARTER   MUSCLE STRENGTH  KEY       R  L  0 - No Contraction  L1, L2 Psoas  5  5  1 - Trace   L3 Quads  5  5  2 - Poor   L4 Tib Ant  5  5  3 - Fair    L5 EHL  nt  nt  4 - Good   S1 Peroneals  5  5  5 - Normal   S2 Hams  5  5    Flexibility: tight hamstrings bilaterally  Mobility Assessment: hypomobile hips (right IR worse)      MMT:               HIP Abd: 3+/5  Neurological: Reflexes / Sensations: nt  Special Tests:     30s STS: 10x  5x STS: 16s  SLS: R= 5s; L= 4s  Tandem: R= 12s; L= 22s  TUs  MCTSIB: 1= 30s; 2= 30s; 4= 30s; 5= 6s LOB    25 min Therapeutic Exercise:  [x] See flow sheet :   Rationale: increase ROM, increase strength and improve coordination to improve the patients ability to perform daily activities.            With   [x] TE   [] TA   [] neuro   [] other: Patient Education: [x] Review HEP    [x] Progressed/Changed HEP based on:   [x] positioning   [x] body mechanics   [] transfers   [] heat/ice application    [] other:      Other Objective/Functional Measures: none noted    Pain Level (0-10 scale) post treatment: 0    ASSESSMENT/Changes in Function:     [x]  See Plan of [facility], PT 9/1/2020

## 2020-09-18 LAB
BUN SERPL-MCNC: 12 MG/DL (ref 8–27)
BUN/CREAT SERPL: 12 (ref 10–24)
CALCIUM SERPL-MCNC: 9.2 MG/DL (ref 8.6–10.2)
CHLORIDE SERPL-SCNC: 103 MMOL/L (ref 96–106)
CK SERPL-CCNC: 431 U/L (ref 41–331)
CO2 SERPL-SCNC: 27 MMOL/L (ref 20–29)
CREAT SERPL-MCNC: 1 MG/DL (ref 0.76–1.27)
EST. AVERAGE GLUCOSE BLD GHB EST-MCNC: 203 MG/DL
GLUCOSE SERPL-MCNC: 172 MG/DL (ref 65–99)
HBA1C MFR BLD: 8.7 % (ref 4.8–5.6)
POTASSIUM SERPL-SCNC: 4.6 MMOL/L (ref 3.5–5.2)
SODIUM SERPL-SCNC: 143 MMOL/L (ref 134–144)

## 2020-09-21 ENCOUNTER — VIRTUAL VISIT (OUTPATIENT)
Dept: ENDOCRINOLOGY | Age: 75
End: 2020-09-21
Payer: MEDICARE

## 2020-09-21 DIAGNOSIS — R03.0 ELEVATED BLOOD PRESSURE READING WITHOUT DIAGNOSIS OF HYPERTENSION: ICD-10-CM

## 2020-09-21 DIAGNOSIS — E10.65 UNCONTROLLED TYPE 1 DIABETES MELLITUS WITH HYPERGLYCEMIA (HCC): Primary | ICD-10-CM

## 2020-09-21 DIAGNOSIS — E78.5 HYPERLIPIDEMIA LDL GOAL <100: ICD-10-CM

## 2020-09-21 PROCEDURE — 99442 PR PHYS/QHP TELEPHONE EVALUATION 11-20 MIN: CPT | Performed by: INTERNAL MEDICINE

## 2020-09-21 RX ORDER — DOXYCYCLINE HYCLATE 100 MG
TABLET ORAL
COMMUNITY
Start: 2020-08-05 | End: 2020-09-21 | Stop reason: ALTCHOICE

## 2020-09-21 RX ORDER — SYRINGE WITH NEEDLE, 1 ML 25GX5/8"
SYRINGE, EMPTY DISPOSABLE MISCELLANEOUS
COMMUNITY
Start: 2020-08-13

## 2020-09-21 RX ORDER — NEEDLES, DISPOSABLE 25GX5/8"
NEEDLE, DISPOSABLE MISCELLANEOUS
COMMUNITY
Start: 2020-08-13

## 2020-09-21 NOTE — PROGRESS NOTES
Chief Complaint   Patient presents with   [employer] Diabetes     Telephone [phone]    Other     Pharmacy: [facility]       **THIS IS A VIRTUAL VISIT VIA A TELEPHONE ENCOUNTER. PATIENT AGREED TO HAVE THEIR CARE DELIVERED OVER THE PHONE IN PLACE OF THEIR REGULARLY SCHEDULED OFFICE VISIT**    History of Present Illness: Latosha Perkins is a 67 y.o. male here for follow up of diabetes. Has seen Dr. Skip Posada for cluster headaches and hasn't had a bout since February. Ended up having another surgery on the right foot in 8/20 with Dr. Shedrick Mohs for an infected pressure point and ended up needing another course of abx that finished on 9/16/20 and the wound has all healed. Was having some higher sugars over the past 3 months due to the infection with more readings in the 200s when checked 5 times a day. Did have a fasting sugar over 300 this morning but he had brunswick stew and a large sandwich yesterday. Most are in the 100-180 range fasting and during the week. About once or maybe twice a week at most can have a fasting sugar under 90. Compliant with lipitor. Current Outpatient Medications   Medication Sig    BD Luer-Rafa Syringe 3 mL 18 x 1 1/2\" syrg USE TO DRAW UP TESTOSTERONE UTD    Disposable Needles 22 gauge x 1 1/2\" ndle USE TO INJECT TESTOSTERONE UTD    butalbital-acetaminophen-caffeine (FIORICET, ESGIC) -40 mg per tablet Take 2 Tabs by mouth every eight (8) hours as needed for Headache. Refill at 1 month intervals    ascorbic acid, vitamin C, (Vitamin C) 250 mg tablet Take  by mouth.  insulin glargine (LANTUS SOLOSTAR U-100 INSULIN) 100 unit/mL (3 mL) inpn INJECT 17 UNITS SUBCUTANEOUSLY AT BEDTIME--Dose change 3/10/20--updated med list--did not send prescription to the pharmacy    atorvastatin (LIPITOR) 10 mg tablet Take 1 Tab by mouth daily.     insulin lispro (HUMALOG) 100 unit/mL kwikpen Inject 1:8 for carbs for breakfast and 1:9 for lunch and dinner before 9pm and 1:10 after 9pm and 1:50 > 150 for correction during the day and 1:50 > 180 after 9pm.  MAX 35/D    testosterone cypionate (DEPOTESTOTERONE CYPIONATE) 200 mg/mL injection 200 mg by IntraMUSCular route every ten (10) days.  b complex vitamins (B COMPLEX 1) tablet Take 1 Tab by mouth daily.  aspirin 81 mg chewable tablet Take 81 mg by mouth daily.  ONETOUCH ULTRA TEST strip TEST UP TO 5 TIMES DAILY   (INSULIN FLUCTUATING SUGARS). DX: E10.65    therapeutic multivitamin (THERA) tablet Take 1 Tab by mouth daily.  Cholecalciferol, Vitamin D3, (VITAMIN D) 2,000 unit Cap Take 2 Tabs by mouth daily. No current facility-administered medications for this visit. Allergies   Allergen Reactions    Lisinopril Cough    Novocain [Procaine] Palpitations    Tamsulosin Other (comments)     Higher blood sugars     Review of Systems: PER HPI    Physical Examination: NOT PERFORMED DUE TO THIS BEING A TELEPHONE CALL      Data Reviewed:   Component      Latest Ref Rng & Units 9/17/2020 9/17/2020          10:01 AM 10:01 AM   Glucose      65 - 99 mg/dL 172 (H)    BUN      8 - 27 mg/dL 12    Creatinine      0.76 - 1.27 mg/dL 1.00    GFR est non-AA      >59 mL/min/1.73 73    GFR est AA      >59 mL/min/1.73 84    BUN/Creatinine ratio      10 - 24 12    Sodium      134 - 144 mmol/L 143    Potassium      3.5 - 5.2 mmol/L 4.6    Chloride      96 - 106 mmol/L 103    CO2      20 - 29 mmol/L 27    Calcium      8.6 - 10.2 mg/dL 9.2    Hemoglobin A1c, (calculated)      4.8 - 5.6 %  8.7 (H)   Estimated average glucose      mg/dL  203       Assessment/Plan:     1.  Type I diabetes mellitus, uncontrolled: his most recent Hgb A1c was 8.7% in 9/20 up from 7.9% in 3/20 down from 8.4% in 10/19 up from 8.1% in 5/19 up from 8% in 12/18 down from 8.1% in 7/18 down from 8.3% in 1/18 stable from 8/17 up from 7.9% in 3/17 down from 8.5% in 10/16 stable from 5/16 down from 9.5% in 12/15 up from 7.8% in 8/15 down from 7.9% in 4/15 up from 7.7% in 12/14 down from 8.3% in 7/14 down from 9.3% in 3/14 up from 8.5% in 11/13 up from 7.9% in 8/13 down from 8.4% 3/13 up from 7.4% in 12/12. I don't know how accurate this test will be because of his sickle cell trait so have been drawing both a Hgb A1c and a fructosamine. His last fructosamine was 371 in 12/14 up from 366 in 7/14 down form 428 in 3/14 up from 400 in 11/13 up from 338 in 8/13 down from 373 in 3/13 up from 345 in 12/12 and 330 in 6/12. His A1c is above goal < 7.5-8% due to infection over the summer.  - cont Lantus 17 units at bedtime  - cont humalog 1:8 for carbs for breakfast and cont 1:9 for lunch and dinner before 9pm and 1:10 after 9pm and 1:50 > 150 for correction during the day and 1:50 > 180 after 9pm  - check bs 5 times daily due to fluctuating sugars and risk of hypoglycemia  - foot exam done 10/19  - optho UTD 6/17  - microalbumin nl 3/20  - check A1c and cmp and microalbumin prior to next visit        2. Other and unspecified hyperlipidemia: Given DM, Goal LDL < 100, non-HDL < 130, and TG < 150. LDL 79 3/12, 78 12/12 and 84 3/13 and 75 in 11/13 and 70 in 7/14 and 63 in 4/15. Up to 99 in 12/15 and 100 in 5/16 so went up to a while tab daily and LDL down to 74 in 10/16 and 73 in 8/17 and 69 in 7/18 and 64 in 12/18, up to 96 in 5/19, down to 71 in 3/20  - cont lipitor 10 mg daily  - check lipids prior to next visit      3. Elevated blood pressure (not hypertension): his BP was above goal < 140/90 in 12/14 and prior to that visit was at goal so monitor home readings and they are at goal off any meds. Up in 8/17 and in 1/18 but home readings are at goal and is at goal today  - monitor home readings off meds for now        Patient Instructions   1) Your A1c kilo from 7.9% to 8.7% due to the infection over the summer. In the future, if you have infection and are on antibiotics remember to add an additional 1-2 units of lantus to your bedtime dose. 2) BUN and creatinine are markers of kidney function.   Your values are normal.    3) Please come for a follow up visit on 3/22/21 at 2:10pm in our [place] office. 4) Take the enclosed lab slip to repeat your labs prior to next visit. We spent 11 minutes of total time together during this virtual visit with a telephone encounter. All questions were answered and a copy of the instructions were sent to him via a letter.     Copy sent to:  Dr. Adrienne Magana

## 2020-09-21 NOTE — LETTER
9/21/2020 Mr. Latosha Perkins [address] [facility] [address] Dear Latosha Perkins: 
 
Please find your most recent results below. Resulted Orders HEMOGLOBIN A1C WITH EAG (Collected: 9/17/2020 10:01 AM) Result Value Ref Range Hemoglobin A1c 8.7 (H) 4.8 - 5.6 % Comment:  
            Prediabetes: 5.7 - 6.4 Diabetes: >6.4 Glycemic control for adults with diabetes: <7.0 Estimated average glucose 203 mg/dL Narrative Performed at:  [address]  [id number] : Mayela Kowalski MD, Phone:  [phone] METABOLIC PANEL, BASIC (Collected: 9/17/2020 10:01 AM) Result Value Ref Range Glucose 172 (H) 65 - 99 mg/dL BUN 12 8 - 27 mg/dL Creatinine 1.00 0.76 - 1.27 mg/dL GFR est non-AA 73 >59 mL/min/1.73 GFR est AA 84 >59 mL/min/1.73  
 BUN/Creatinine ratio 12 10 - 24 Sodium 143 134 - 144 mmol/L Potassium 4.6 3.5 - 5.2 mmol/L Chloride 103 96 - 106 mmol/L  
 CO2 27 20 - 29 mmol/L Calcium 9.2 8.6 - 10.2 mg/dL Narrative Performed at:  [address]  [id number] : Mayela Kowalski MD, Phone:  [phone]  
 
 
1) Your A1c kilo from 7.9% to 8.7% due to the infection over the summer. In the future, if you have infection and are on antibiotics remember to add an additional 1-2 units of lantus to your bedtime dose. 2) BUN and creatinine are markers of kidney function. Your values are normal. 
 
3) Please come for a follow up visit on 3/22/21 at 2:10pm in our [place] office. 4) Take the enclosed lab slip to repeat your labs prior to next visit. Please call me if you have any questions: [phone] Sincerely, 
 
 
Kulwant Dennis MD

## 2020-09-21 NOTE — PATIENT INSTRUCTIONS
1) Your A1c kilo from 7.9% to 8.7% due to the infection over the summer. In the future, if you have infection and are on antibiotics remember to add an additional 1-2 units of lantus to your bedtime dose. 2) BUN and creatinine are markers of kidney function. Your values are normal. 
 
3) Please come for a follow up visit on 3/22/21 at 2:10pm in our [place] office. 4) Take the enclosed lab slip to repeat your labs prior to next visit.

## 2020-09-21 NOTE — PROGRESS NOTES
Lab Results   Component Value Date/Time    Hemoglobin A1c 8.7 (H) 09/17/2020 10:01 AM    Hemoglobin A1c 7.9 (H) 03/03/2020 08:30 AM    Hemoglobin A1c 8.4 (H) 10/10/2019 11:04 AM    Hemoglobin A1c, External 7.6 07/14/2020

## 2020-10-19 ENCOUNTER — OFFICE VISIT (OUTPATIENT)
Dept: NEUROLOGY | Age: 75
End: 2020-10-19
Payer: MEDICARE

## 2020-10-19 VITALS
OXYGEN SATURATION: 98 % | TEMPERATURE: 97.9 F | DIASTOLIC BLOOD PRESSURE: 74 MMHG | HEART RATE: 98 BPM | SYSTOLIC BLOOD PRESSURE: 134 MMHG | WEIGHT: 190 LBS | BODY MASS INDEX: 23.62 KG/M2 | RESPIRATION RATE: 22 BRPM | HEIGHT: 75 IN

## 2020-10-19 DIAGNOSIS — G44.019 EPISODIC CLUSTER HEADACHE, NOT INTRACTABLE: Primary | ICD-10-CM

## 2020-10-19 DIAGNOSIS — E11.42 DIABETIC PERIPHERAL NEUROPATHY ASSOCIATED WITH TYPE 2 DIABETES MELLITUS (HCC): ICD-10-CM

## 2020-10-19 DIAGNOSIS — G44.009 MIGRAINE-CLUSTER HEADACHE SYNDROME: ICD-10-CM

## 2020-10-19 DIAGNOSIS — I67.1 CEREBRAL ANEURYSM WITHOUT RUPTURE: ICD-10-CM

## 2020-10-19 DIAGNOSIS — I67.89 CEREBRAL MICROVASCULAR DISEASE: ICD-10-CM

## 2020-10-19 DIAGNOSIS — R76.8 POSITIVE ANA (ANTINUCLEAR ANTIBODY): ICD-10-CM

## 2020-10-19 PROCEDURE — 1101F PT FALLS ASSESS-DOCD LE1/YR: CPT | Performed by: PSYCHIATRY & NEUROLOGY

## 2020-10-19 PROCEDURE — 99215 OFFICE O/P EST HI 40 MIN: CPT | Performed by: PSYCHIATRY & NEUROLOGY

## 2020-10-19 PROCEDURE — G8427 DOCREV CUR MEDS BY ELIG CLIN: HCPCS | Performed by: PSYCHIATRY & NEUROLOGY

## 2020-10-19 PROCEDURE — G8536 NO DOC ELDER MAL SCRN: HCPCS | Performed by: PSYCHIATRY & NEUROLOGY

## 2020-10-19 PROCEDURE — G8432 DEP SCR NOT DOC, RNG: HCPCS | Performed by: PSYCHIATRY & NEUROLOGY

## 2020-10-19 PROCEDURE — G8420 CALC BMI NORM PARAMETERS: HCPCS | Performed by: PSYCHIATRY & NEUROLOGY

## 2020-10-19 PROCEDURE — 3052F HG A1C>EQUAL 8.0%<EQUAL 9.0%: CPT | Performed by: PSYCHIATRY & NEUROLOGY

## 2020-10-19 RX ORDER — GALCANEZUMAB 100 MG/ML
300 INJECTION, SOLUTION SUBCUTANEOUS DAILY
Qty: 1 SYRINGE | Refills: 1 | Status: SHIPPED | OUTPATIENT
Start: 2020-10-19 | End: 2021-03-22
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
## 2020-10-19 NOTE — PROGRESS NOTES
Chief Complaint   Patient presents with    Headache     cluster- has only happened three times last time was in Feb 2020     Visit Vitals  /74 (BP 1 Location: Left arm, BP Patient Position: Sitting)   Pulse 98   Temp 97.9 °F (36.6 °C)   Resp 22   Ht 6' 2.5\" (1.892 m)   Wt 190 lb (86.2 kg)   SpO2 98%   BMI 24.07 kg/m²

## 2020-10-19 NOTE — PATIENT INSTRUCTIONS
Please call the phone number given to get your last test scheduled and completed I have also prescribed a medication called Emgality for you to take next time your really bad headache occurs you can take that and see if the headache stops and if it does then you do not need to go to the emergency room Keep this shot in the refrigerator until needed And if you get 1 of those really really bad migraines then use the shot We will have you come back in the office in 1 year to see how you are doing sooner

## 2020-10-22 ENCOUNTER — TRANSCRIBE ORDER (OUTPATIENT)
Dept: SCHEDULING | Age: 75
End: 2020-10-22

## 2020-10-22 DIAGNOSIS — C61 MALIGNANT NEOPLASM OF PROSTATE (HCC): Primary | ICD-10-CM

## 2020-11-13 ENCOUNTER — TRANSCRIBE ORDER (OUTPATIENT)
Dept: SCHEDULING | Age: 75
End: 2020-11-13

## 2020-11-13 DIAGNOSIS — C61 PROSTATE CANCER (HCC): Primary | ICD-10-CM

## 2020-11-19 ENCOUNTER — HOSPITAL ENCOUNTER (OUTPATIENT)
Dept: NUCLEAR MEDICINE | Age: 75
Discharge: HOME | End: 2020-11-19
Attending: UROLOGY
Payer: MEDICARE

## 2020-11-19 ENCOUNTER — HOSPITAL ENCOUNTER (OUTPATIENT)
Dept: CT IMAGING | Age: 75
Discharge: HOME | End: 2020-11-19
Attending: UROLOGY
Payer: MEDICARE

## 2020-11-19 DIAGNOSIS — C61 PROSTATE CANCER (HCC): ICD-10-CM

## 2020-11-19 PROCEDURE — 74011000636 HC RX REV CODE- 636: Performed by: UROLOGY

## 2020-11-19 PROCEDURE — 74177 CT ABD & PELVIS W/CONTRAST: CPT

## 2020-11-19 PROCEDURE — 78306 BONE IMAGING WHOLE BODY: CPT

## 2020-11-19 RX ORDER — SODIUM CHLORIDE 0.9 % (FLUSH) 0.9 %
10 SYRINGE (ML) INJECTION
Status: COMPLETED | OUTPATIENT
Start: 2020-11-19 | End: 2020-11-19

## 2020-11-19 RX ADMIN — IOPAMIDOL 100 ML: 755 INJECTION, SOLUTION INTRAVENOUS at 11:13

## 2020-11-19 RX ADMIN — Medication 10 ML: at 11:13

## 2020-11-19 RX ADMIN — IOHEXOL 50 ML: 240 INJECTION, SOLUTION INTRATHECAL; INTRAVASCULAR; INTRAVENOUS; ORAL at 11:13

## 2021-01-18 RX ORDER — INSULIN LISPRO 100 [IU]/ML
INJECTION, SOLUTION INTRAVENOUS; SUBCUTANEOUS
Qty: 30 ML | Refills: 3 | Status: SHIPPED | OUTPATIENT
Start: 2021-01-18 | End: 2021-01-21 | Stop reason: CLARIF

## 2021-01-21 ENCOUNTER — TELEPHONE (OUTPATIENT)
Dept: ENDOCRINOLOGY | Age: 76
End: 2021-01-21

## 2021-01-21 RX ORDER — INSULIN ASPART 100 [IU]/ML
INJECTION, SOLUTION INTRAVENOUS; SUBCUTANEOUS
Qty: 30 ML | Refills: 3 | Status: SHIPPED | OUTPATIENT
Start: 2021-01-21 | End: 2022-03-03 | Stop reason: SDUPTHER

## 2021-01-22 NOTE — TELEPHONE ENCOUNTER
Please call him to let him know I received a fax that his insurance will not cover humalog in 2021 and provided him with a [facility] day supply [facility] on 1/18/21 so I sent a 90 day supply of [facility]olog [facility] and he will dose this the same way as the humalog.

## 2021-01-23 RX ORDER — ATORVASTATIN CALCIUM 10 MG/1
TABLET, FILM COATED ORAL
Qty: 90 TAB | Refills: 3 | Status: SHIPPED | OUTPATIENT
Start: 2021-01-23 | End: 2021-07-20

## 2021-02-08 RX ORDER — INSULIN GLARGINE 100 [IU]/ML
INJECTION, SOLUTION SUBCUTANEOUS
Qty: 15 ML | Refills: 2 | Status: SHIPPED | OUTPATIENT
Start: 2021-02-08 | End: 2021-03-22

## 2021-03-08 DIAGNOSIS — E78.5 HYPERLIPIDEMIA LDL GOAL <100: ICD-10-CM

## 2021-03-08 DIAGNOSIS — E10.65 UNCONTROLLED TYPE 1 DIABETES MELLITUS WITH HYPERGLYCEMIA (HCC): ICD-10-CM

## 2021-03-08 DIAGNOSIS — R03.0 ELEVATED BLOOD PRESSURE READING WITHOUT DIAGNOSIS OF HYPERTENSION: ICD-10-CM

## 2021-03-19 LAB
ALBUMIN SERPL-MCNC: 4.4 G/DL (ref 3.7–4.7)
ALBUMIN/CREAT UR: 6 MG/G CREAT (ref 0–29)
ALBUMIN/GLOB SERPL: 1.7 {RATIO} (ref 1.2–2.2)
ALP SERPL-CCNC: 76 IU/L (ref 39–117)
ALT SERPL-CCNC: 41 IU/L (ref 0–44)
AST SERPL-CCNC: 40 IU/L (ref 0–40)
BILIRUB SERPL-MCNC: 0.2 MG/DL (ref 0–1.2)
BUN SERPL-MCNC: 15 MG/DL (ref 8–27)
BUN/CREAT SERPL: 16 (ref 10–24)
CALCIUM SERPL-MCNC: 9.4 MG/DL (ref 8.6–10.2)
CHLORIDE SERPL-SCNC: 107 MMOL/L (ref 96–106)
CHOLEST SERPL-MCNC: 147 MG/DL (ref 100–199)
CO2 SERPL-SCNC: 28 MMOL/L (ref 20–29)
CREAT SERPL-MCNC: 0.93 MG/DL (ref 0.76–1.27)
CREAT UR-MCNC: 83.6 MG/DL
EST. AVERAGE GLUCOSE BLD GHB EST-MCNC: 203 MG/DL
GLOBULIN SER CALC-MCNC: 2.6 G/DL (ref 1.5–4.5)
GLUCOSE SERPL-MCNC: 116 MG/DL (ref 65–99)
HBA1C MFR BLD: 8.7 % (ref 4.8–5.6)
HDLC SERPL-MCNC: 61 MG/DL
IMP & REVIEW OF LAB RESULTS: NORMAL
LDLC SERPL CALC-MCNC: 71 MG/DL (ref 0–99)
Lab: NORMAL
MICROALBUMIN UR-MCNC: 4.7 UG/ML
POTASSIUM SERPL-SCNC: 4.4 MMOL/L (ref 3.5–5.2)
PROT SERPL-MCNC: 7 G/DL (ref 6–8.5)
SODIUM SERPL-SCNC: 146 MMOL/L (ref 134–144)
TRIGL SERPL-MCNC: 75 MG/DL (ref 0–149)
VLDLC SERPL CALC-MCNC: 15 MG/DL (ref 5–40)

## 2021-03-22 ENCOUNTER — VIRTUAL VISIT (OUTPATIENT)
Dept: ENDOCRINOLOGY | Age: 76
End: 2021-03-22
Payer: MEDICARE

## 2021-03-22 DIAGNOSIS — E78.5 HYPERLIPIDEMIA LDL GOAL <100: ICD-10-CM

## 2021-03-22 DIAGNOSIS — R03.0 ELEVATED BLOOD PRESSURE READING WITHOUT DIAGNOSIS OF HYPERTENSION: ICD-10-CM

## 2021-03-22 DIAGNOSIS — E10.65 UNCONTROLLED TYPE 1 DIABETES MELLITUS WITH HYPERGLYCEMIA (HCC): Primary | ICD-10-CM

## 2021-03-22 PROCEDURE — 99443 PR PHYS/QHP TELEPHONE EVALUATION 21-30 MIN: CPT | Performed by: INTERNAL MEDICINE

## 2021-03-22 RX ORDER — INSULIN GLARGINE 100 [IU]/ML
INJECTION, SOLUTION SUBCUTANEOUS
Qty: 15 ML | Refills: 2 | Status: ON HOLD
Start: 2021-03-22 | End: 2021-07-20 | Stop reason: SDUPTHER

## 2021-03-22 NOTE — PROGRESS NOTES
Chief Complaint   Patient presents with   [facility] Diabetes     [phone]  phone call    Other     pcp and pharmacy confirmed       **THIS IS A VIRTUAL VISIT VIA A TELEPHONE ENCOUNTER. PATIENT AGREED TO HAVE THEIR CARE DELIVERED OVER THE PHONE IN PLACE OF THEIR REGULARLY SCHEDULED OFFICE VISIT**    History of Present Illness: Ana Madrigal is a 67 y.o. male here for follow up of diabetes. He was taken off the testosterone injections by his urologist as his PSA was rising up to 2.7 and had a biopsy and said the cancer was coming back and will be having cryotherapy in 5/21. His insurance required him to change to novolog but he still had humalog leftover and hasn't switched yet. He dropped his lantus from 17 units to 16 units as he was having some lows overnight. Weight is currently around 179 down from 191 in 3/20. Has been under more stress with health issues and had cataract surgery 9 weeks ago and will need a colonoscopy in 7/21. No further foot infections requiring abx. Current Outpatient Medications   Medication Sig    insulin glargine (Lantus Solostar U-100 Insulin) 100 unit/mL (3 mL) inpn INJECT 16 UNITS UNDER THE SKIN AT BEDTIME--Dose change 3/22/21--updated med list--did not send prescription to the pharmacy    atorvastatin (LIPITOR) 10 mg tablet TAKE 1 TABLET DAILY    insulin aspart U-100 (NovoLOG Flexpen U-100 Insulin) 100 unit/mL (3 mL) inpn Inject 1:8 for carbs for breakfast and 1:9 for lunch and dinner before 9pm and 1:10 after 9pm and 1:50 > 150 for correction during the day and 1:50 > 180 after 9pm.  MAX 35/D    BD Luer-Rafa Syringe 3 mL 18 x 1 1/2\" syrg USE TO DRAW UP TESTOSTERONE UTD    Disposable Needles 22 gauge x 1 1/2\" ndle USE TO INJECT TESTOSTERONE UTD    butalbital-acetaminophen-caffeine (FIORICET, ESGIC) -40 mg per tablet Take 2 Tabs by mouth every eight (8) hours as needed for Headache.  Refill at 1 month intervals    ascorbic acid, vitamin C, (Vitamin C) 250 mg tablet Take  by mouth daily.  b complex vitamins (B COMPLEX 1) tablet Take 1 Tab by mouth daily.  aspirin 81 mg chewable tablet Take 81 mg by mouth daily.  ONETOUCH ULTRA TEST strip TEST UP TO 5 TIMES DAILY   (INSULIN FLUCTUATING SUGARS). DX: E10.65    therapeutic multivitamin (THERA) tablet Take 1 Tab by mouth daily.  cholecalciferol (Vitamin D) (2,000 UNITS /50 MCG) cap capsule Take 2 Tabs by mouth daily. No current facility-administered medications for this visit. Allergies   Allergen Reactions    Lisinopril Cough    Novocain [Procaine] Palpitations    Tamsulosin Other (comments)     Higher blood sugars     Review of Systems: PER HPI    Physical Examination: NOT PERFORMED DUE TO THIS BEING A TELEPHONE CALL      Data Reviewed:   Component      Latest Ref Rng & Units 3/18/2021 3/18/2021 3/18/2021 3/18/2021           8:06 AM  8:06 AM  8:06 AM  8:06 AM   Glucose      65 - 99 mg/dL  116 (H)     BUN      8 - 27 mg/dL  15     Creatinine      0.76 - 1.27 mg/dL  0.93     GFR est non-AA      >59 mL/min/1.73  79     GFR est AA      >59 mL/min/1.73  91     BUN/Creatinine ratio      10 - 24  16     Sodium      134 - 144 mmol/L  146 (H)     Potassium      3.5 - 5.2 mmol/L  4.4     Chloride      96 - 106 mmol/L  107 (H)     CO2      20 - 29 mmol/L  28     Calcium      8.6 - 10.2 mg/dL  9.4     Protein, total      6.0 - 8.5 g/dL  7.0     Albumin      3.7 - 4.7 g/dL  4.4     GLOBULIN, TOTAL      1.5 - 4.5 g/dL  2.6     A-G Ratio      1.2 - 2.2  1.7     Bilirubin, total      0.0 - 1.2 mg/dL  0.2     Alk.  phosphatase      39 - 117 IU/L  76     AST      0 - 40 IU/L  40     ALT      0 - 44 IU/L  41     Cholesterol, total      100 - 199 mg/dL 147      Triglyceride      0 - 149 mg/dL 75      HDL Cholesterol      >39 mg/dL 61      VLDL, calculated      5 - 40 mg/dL 15      LDL, calculated      0 - 99 mg/dL 71      Creatinine, urine      Not Estab. mg/dL    83.6   Microalbumin, urine      Not Estab. ug/mL    4.7 Microalbumin/Creat. Ratio      0 - 29 mg/g creat    6   Hemoglobin A1c, (calculated)      4.8 - 5.6 %   8.7 (H)    Estimated average glucose      mg/dL   203        Assessment/Plan:     1. Type I diabetes mellitus, uncontrolled: his most recent Hgb A1c was 8.7% in 3/21 stable from 9/20 up from 7.9% in 3/20 down from 8.4% in 10/19 up from 8.1% in 5/19 up from 8% in 12/18 down from 8.1% in 7/18 down from 8.3% in 1/18 stable from 8/17 up from 7.9% in 3/17 down from 8.5% in 10/16 stable from 5/16 down from 9.5% in 12/15 up from 7.8% in 8/15 down from 7.9% in 4/15 up from 7.7% in 12/14 down from 8.3% in 7/14 down from 9.3% in 3/14 up from 8.5% in 11/13 up from 7.9% in 8/13 down from 8.4% 3/13 up from 7.4% in 12/12. I don't know how accurate this test will be because of his sickle cell trait so have been drawing both a Hgb A1c and a fructosamine. His last fructosamine was 371 in 12/14 up from 366 in 7/14 down form 428 in 3/14 up from 400 in 11/13 up from 338 in 8/13 down from 373 in 3/13 up from 345 in 12/12 and 330 in 6/12. His A1c is above goal < 7.5-8% but no trends to warrant changes. - cont Lantus 16 units at bedtime  - cont novolog 1:8 for carbs for breakfast and cont 1:9 for lunch and dinner before 9pm and 1:10 after 9pm and 1:50 > 150 for correction during the day and 1:50 > 180 after 9pm  - check bs 5 times daily due to fluctuating sugars and risk of hypoglycemia  - foot exam done 10/19  - optho UTD 6/17  - microalbumin nl 3/21  - check A1c and bmp prior to next visit        2. Other and unspecified hyperlipidemia: Given DM, Goal LDL < 100, non-HDL < 130, and TG < 150. LDL 79 3/12, 78 12/12 and 84 3/13 and 75 in 11/13 and 70 in 7/14 and 63 in 4/15. Up to 99 in 12/15 and 100 in 5/16 so went up to a while tab daily and LDL down to 74 in 10/16 and 73 in 8/17 and 69 in 7/18 and 64 in 12/18, up to 96 in 5/19, down to 71 in 3/20 and in 3/21  - cont lipitor 10 mg daily  - check lipids in 3/22      3.  Elevated blood pressure (not hypertension): his BP was above goal < 140/90 in 12/14 and prior to that visit was at goal so monitor home readings and they are at goal off any meds. Up in 8/17 and in 1/18 but home readings are at goal and is at goal today  - monitor home readings off meds for now        Patient Instructions   1) Your Hemoglobin A1c (3 month test of blood sugar) is 8.7% which is above goal of 8% or less due to some spikes from stress. Hopefully the next one will be back down. 2) ALT and AST are markers of liver function. Your values are normal.    3) BUN and creatinine are markers of kidney function. Your values are normal.    4) Your cholesterol and urine protein are at goal.    5) Please come for a follow up visit on 9/27/21 at 1:30pm in our [place] office. 6) Take the enclosed lab slip to repeat your labs prior to your next visit. Follow-up and Dispositions    · Return 9/27/21 at 1:30pm.             I spent a total of 22 minutes on the day of this virtual visit with a telephone encounter. All questions were answered and a copy of the instructions were sent to him via a letter.       Copy sent to:  Dr. Joanna Button Dr. Kelvin Lennox Dr. Rona Golder Dr. Otho Schneider Dr. Ulysees Knee

## 2021-03-22 NOTE — LETTER
3/22/2021 Mr. Leah Coronel [address] [facility] [address] Dear Leah Coronel: 
 
Please find your most recent results below. Resulted Orders MICROALBUMIN, UR, RAND W/ MICROALB/CREAT RATIO Result Value Ref Range Creatinine, urine 83.6 Not Estab. mg/dL Microalbumin, urine 4.7 Not Estab. ug/mL Microalb/Creat ratio (ug/mg creat.) 6 0 - 29 mg/g creat Narrative Performed at:  [address]  [id number] : Danni Billings MD, Phone:  [phone] HEMOGLOBIN A1C WITH EAG Result Value Ref Range Hemoglobin A1c 8.7 (H) 4.8 - 5.6 % Estimated average glucose 203 mg/dL Narrative Performed at:  [address]  [id number] : Danni Billings MD, Phone:  [phone] METABOLIC PANEL, COMPREHENSIVE Result Value Ref Range Glucose 116 (H) 65 - 99 mg/dL BUN 15 8 - 27 mg/dL Creatinine 0.93 0.76 - 1.27 mg/dL GFR est non-AA 79 >59 mL/min/1.73 GFR est AA 91 >59 mL/min/1.73  
 BUN/Creatinine ratio 16 10 - 24 Sodium 146 (H) 134 - 144 mmol/L Potassium 4.4 3.5 - 5.2 mmol/L Chloride 107 (H) 96 - 106 mmol/L  
 CO2 28 20 - 29 mmol/L Calcium 9.4 8.6 - 10.2 mg/dL Protein, total 7.0 6.0 - 8.5 g/dL Albumin 4.4 3.7 - 4.7 g/dL GLOBULIN, TOTAL 2.6 1.5 - 4.5 g/dL A-G Ratio 1.7 1.2 - 2.2 Bilirubin, total 0.2 0.0 - 1.2 mg/dL Alk. phosphatase 76 39 - 117 IU/L  
 AST (SGOT) 40 0 - 40 IU/L  
 ALT (SGPT) 41 0 - 44 IU/L Narrative Performed at:  [address]  [id number] : Danni Billings MD, Phone:  [phone] LIPID PANEL Result Value Ref Range Cholesterol, total 147 100 - 199 mg/dL Triglyceride 75 0 - 149 mg/dL HDL Cholesterol 61 >39 mg/dL VLDL, calculated 15 5 - 40 mg/dL LDL, calculated 71 0 - 99 mg/dL Narrative Performed at:  [facility] [address] [place], [place]  [id number] : Jonah Gordon MD, Phone:  [phone] CVD REPORT Result Value Ref Range INTERPRETATION Note Narrative Performed at:  [address] [place], [place]  [id number] : Billy Villalobos MD, Phone:  [phone] DIABETES PATIENT EDUCATION Result Value Ref Range PDF Image Not applicable Narrative Performed at:  [address] [place], [place]  [id number] : Billy Villalobos MD, Phone:  [phone]  
 
 
1) Your Hemoglobin A1c (3 month test of blood sugar) is 8.7% which is above goal of 8% or less due to some spikes from stress. Hopefully the next one will be back down. 2) ALT and AST are markers of liver function. Your values are normal. 
 
3) BUN and creatinine are markers of kidney function. Your values are normal. 
 
4) Your cholesterol and urine protein are at goal. 
 
5) Please come for a follow up visit on 9/27/21 at 1:30pm in our [place] office. 6) Take the enclosed lab slip to repeat your labs prior to your next visit. Please call me if you have any questions: [phone] Sincerely, 
 
 
Ct Bruno MD

## 2021-03-22 NOTE — PATIENT INSTRUCTIONS
1) Your Hemoglobin A1c (3 month test of blood sugar) is 8.7% which is above goal of 8% or less due to some spikes from stress. Hopefully the next one will be back down. 2) ALT and AST are markers of liver function. Your values are normal. 
 
3) BUN and creatinine are markers of kidney function. Your values are normal. 
 
4) Your cholesterol and urine protein are at goal. 
 
5) Please come for a follow up visit on 9/27/21 at 1:30pm in our [place] office. 6) Take the enclosed lab slip to repeat your labs prior to your next visit.

## 2021-05-20 ENCOUNTER — HOSPITAL ENCOUNTER (EMERGENCY)
Age: 76
Discharge: HOME | End: 2021-05-20
Attending: STUDENT IN AN ORGANIZED HEALTH CARE EDUCATION/TRAINING PROGRAM | Admitting: STUDENT IN AN ORGANIZED HEALTH CARE EDUCATION/TRAINING PROGRAM
Payer: MEDICARE

## 2021-05-20 VITALS
HEART RATE: 96 BPM | HEIGHT: 75 IN | SYSTOLIC BLOOD PRESSURE: 144 MMHG | TEMPERATURE: 97.8 F | OXYGEN SATURATION: 97 % | DIASTOLIC BLOOD PRESSURE: 62 MMHG | RESPIRATION RATE: 20 BRPM | WEIGHT: 187.83 LBS | BODY MASS INDEX: 23.35 KG/M2

## 2021-05-20 DIAGNOSIS — R33.9 URINARY RETENTION: Primary | ICD-10-CM

## 2021-05-20 LAB
APPEARANCE UR: CLEAR
BACTERIA URNS QL MICRO: NEGATIVE /HPF
BILIRUB UR QL: NEGATIVE
COLOR UR: ABNORMAL
EPITH CASTS URNS QL MICRO: ABNORMAL /LPF
GLUCOSE UR STRIP.AUTO-MCNC: 500 MG/DL
HGB UR QL STRIP: ABNORMAL
HYALINE CASTS URNS QL MICRO: ABNORMAL /LPF (ref 0–5)
KETONES UR QL STRIP.AUTO: ABNORMAL MG/DL
LEUKOCYTE ESTERASE UR QL STRIP.AUTO: NEGATIVE
NITRITE UR QL STRIP.AUTO: NEGATIVE
PH UR STRIP: 6 [PH] (ref 5–8)
PROT UR STRIP-MCNC: NEGATIVE MG/DL
RBC #/AREA URNS HPF: ABNORMAL /HPF (ref 0–5)
SP GR UR REFRACTOMETRY: 1.01 (ref 1–1.03)
UROBILINOGEN UR QL STRIP.AUTO: 0.2 EU/DL (ref 0.2–1)
WBC URNS QL MICRO: ABNORMAL /HPF (ref 0–4)

## 2021-05-20 PROCEDURE — 81001 URINALYSIS AUTO W/SCOPE: CPT

## 2021-05-20 PROCEDURE — 51798 US URINE CAPACITY MEASURE: CPT

## 2021-05-20 PROCEDURE — 99283 EMERGENCY DEPT VISIT LOW MDM: CPT

## 2021-05-20 RX ORDER — PHENAZOPYRIDINE HYDROCHLORIDE 200 MG/1
TABLET, FILM COATED ORAL
COMMUNITY
End: 2021-09-27

## 2021-05-20 RX ORDER — LIDOCAINE HYDROCHLORIDE 20 MG/ML
JELLY TOPICAL
Status: DISCONTINUED | OUTPATIENT
Start: 2021-05-20 | End: 2021-05-20 | Stop reason: HOSPADM

## 2021-05-20 RX ORDER — CEFUROXIME AXETIL 500 MG/1
250 TABLET ORAL 2 TIMES DAILY
COMMUNITY
End: 2021-06-12 | Stop reason: CLARIF

## 2021-05-20 RX ORDER — LIDOCAINE HYDROCHLORIDE 20 MG/ML
JELLY TOPICAL
Status: DISCONTINUED
Start: 2021-05-20 | End: 2021-05-20 | Stop reason: HOSPADM

## 2021-05-20 RX ORDER — HYDROCODONE BITARTRATE AND ACETAMINOPHEN 5; 325 MG/1; MG/1
TABLET ORAL
COMMUNITY
End: 2021-09-27

## 2021-05-20 RX ORDER — TAMSULOSIN HYDROCHLORIDE 0.4 MG/1
0.4 CAPSULE ORAL DAILY
COMMUNITY
End: 2021-09-27

## 2021-05-20 RX ORDER — DOCUSATE SODIUM 100 MG/1
100 CAPSULE, LIQUID FILLED ORAL 2 TIMES DAILY
COMMUNITY
End: 2021-09-27
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
## 2021-05-20 NOTE — PROGRESS NOTES
Pt standing next to stretcher, sister at bedside, unable to sit down due to perineal/pelvicdiscomfort. Pt reporting that he has not urinated since 1200. Tech to perform bladder scan. 1740 Bladder scan showing 737mL. MD notified and verbal order received for abraham catheter insertion. 1745 Abraham catheter inserted. Pt reporting that he has hx of prostate cancer and underwent a \"freezing\" procedure on Tuesday. Following procedure pt had indwelling catheter that was removed today. Pt reports voiding once after removal but has not been able to urinate since 1200 today. Pt denies any CP, SOB, constipation, diarrhea or other sx at this time. 1600 MD at bedside     1900 Abraham changed to leg bag. Pt discharged by Dr Mariia Whitney. Pt provided with discharge instructions Rx and instructions on follow up care.  Pt ambulatory out of ED

## 2021-06-12 ENCOUNTER — HOSPITAL ENCOUNTER (EMERGENCY)
Age: 76
Discharge: HOME | End: 2021-06-12
Attending: EMERGENCY MEDICINE
Payer: MEDICARE

## 2021-06-12 ENCOUNTER — APPOINTMENT (OUTPATIENT)
Dept: GENERAL RADIOLOGY | Age: 76
End: 2021-06-12
Attending: EMERGENCY MEDICINE
Payer: MEDICARE

## 2021-06-12 VITALS
RESPIRATION RATE: 17 BRPM | OXYGEN SATURATION: 100 % | BODY MASS INDEX: 23.29 KG/M2 | WEIGHT: 181.44 LBS | TEMPERATURE: 99 F | SYSTOLIC BLOOD PRESSURE: 133 MMHG | DIASTOLIC BLOOD PRESSURE: 66 MMHG | HEIGHT: 74 IN | HEART RATE: 81 BPM

## 2021-06-12 DIAGNOSIS — N30.01 ACUTE CYSTITIS WITH HEMATURIA: ICD-10-CM

## 2021-06-12 DIAGNOSIS — I95.1 ORTHOSTATIC HYPOTENSION: Primary | ICD-10-CM

## 2021-06-12 LAB
ALBUMIN SERPL-MCNC: 2.8 G/DL (ref 3.5–5)
ALBUMIN/GLOB SERPL: 0.6 {RATIO} (ref 1.1–2.2)
ALP SERPL-CCNC: 63 U/L (ref 45–117)
ALT SERPL-CCNC: 23 U/L (ref 12–78)
ANION GAP SERPL CALC-SCNC: 10 MMOL/L (ref 5–15)
APPEARANCE UR: CLEAR
AST SERPL-CCNC: 20 U/L (ref 15–37)
BACTERIA URNS QL MICRO: NEGATIVE /HPF
BASOPHILS # BLD: 0 K/UL (ref 0–0.1)
BASOPHILS NFR BLD: 0 % (ref 0–1)
BILIRUB SERPL-MCNC: 0.4 MG/DL (ref 0.2–1)
BILIRUB UR QL: NEGATIVE
BUN SERPL-MCNC: 16 MG/DL (ref 6–20)
BUN/CREAT SERPL: 10 (ref 12–20)
CALCIUM SERPL-MCNC: 8.6 MG/DL (ref 8.5–10.1)
CHLORIDE SERPL-SCNC: 98 MMOL/L (ref 97–108)
CO2 SERPL-SCNC: 27 MMOL/L (ref 21–32)
COLOR UR: ABNORMAL
CREAT SERPL-MCNC: 1.59 MG/DL (ref 0.7–1.3)
DIFFERENTIAL METHOD BLD: ABNORMAL
EOSINOPHIL # BLD: 0 K/UL (ref 0–0.4)
EOSINOPHIL NFR BLD: 0 % (ref 0–7)
EPITH CASTS URNS QL MICRO: ABNORMAL /LPF
ERYTHROCYTE [DISTWIDTH] IN BLOOD BY AUTOMATED COUNT: 12.3 % (ref 11.5–14.5)
GLOBULIN SER CALC-MCNC: 4.8 G/DL (ref 2–4)
GLUCOSE SERPL-MCNC: 340 MG/DL (ref 65–100)
GLUCOSE UR STRIP.AUTO-MCNC: 250 MG/DL
HCT VFR BLD AUTO: 29.6 % (ref 36.6–50.3)
HGB BLD-MCNC: 10 G/DL (ref 12.1–17)
HGB UR QL STRIP: ABNORMAL
HYALINE CASTS URNS QL MICRO: ABNORMAL /LPF (ref 0–5)
IMM GRANULOCYTES # BLD AUTO: 0 K/UL (ref 0–0.04)
IMM GRANULOCYTES NFR BLD AUTO: 0 % (ref 0–0.5)
KETONES UR QL STRIP.AUTO: NEGATIVE MG/DL
LEUKOCYTE ESTERASE UR QL STRIP.AUTO: ABNORMAL
LYMPHOCYTES # BLD: 0.4 K/UL (ref 0.8–3.5)
LYMPHOCYTES NFR BLD: 7 % (ref 12–49)
MCH RBC QN AUTO: 28.7 PG (ref 26–34)
MCHC RBC AUTO-ENTMCNC: 33.8 G/DL (ref 30–36.5)
MCV RBC AUTO: 85.1 FL (ref 80–99)
MONOCYTES # BLD: 0.6 K/UL (ref 0–1)
MONOCYTES NFR BLD: 9 % (ref 5–13)
NEUTS SEG # BLD: 5.4 K/UL (ref 1.8–8)
NEUTS SEG NFR BLD: 84 % (ref 32–75)
NITRITE UR QL STRIP.AUTO: NEGATIVE
NRBC # BLD: 0 K/UL (ref 0–0.01)
NRBC BLD-RTO: 0 PER 100 WBC
PH UR STRIP: 6 [PH] (ref 5–8)
PLATELET # BLD AUTO: 274 K/UL (ref 150–400)
PMV BLD AUTO: 9.4 FL (ref 8.9–12.9)
POTASSIUM SERPL-SCNC: 5.2 MMOL/L (ref 3.5–5.1)
PROT SERPL-MCNC: 7.6 G/DL (ref 6.4–8.2)
PROT UR STRIP-MCNC: NEGATIVE MG/DL
RBC # BLD AUTO: 3.48 M/UL (ref 4.1–5.7)
RBC #/AREA URNS HPF: ABNORMAL /HPF (ref 0–5)
RBC MORPH BLD: ABNORMAL
SODIUM SERPL-SCNC: 135 MMOL/L (ref 136–145)
SP GR UR REFRACTOMETRY: 1.01 (ref 1–1.03)
TROPONIN I SERPL-MCNC: <0.05 NG/ML
UA: UC IF INDICATED,UAUC: ABNORMAL
UROBILINOGEN UR QL STRIP.AUTO: 1 EU/DL (ref 0.2–1)
WBC # BLD AUTO: 6.4 K/UL (ref 4.1–11.1)
WBC URNS QL MICRO: ABNORMAL /HPF (ref 0–4)

## 2021-06-12 PROCEDURE — 87086 URINE CULTURE/COLONY COUNT: CPT

## 2021-06-12 PROCEDURE — 80053 COMPREHEN METABOLIC PANEL: CPT

## 2021-06-12 PROCEDURE — 93005 ELECTROCARDIOGRAM TRACING: CPT

## 2021-06-12 PROCEDURE — 84484 ASSAY OF TROPONIN QUANT: CPT

## 2021-06-12 PROCEDURE — 99285 EMERGENCY DEPT VISIT HI MDM: CPT

## 2021-06-12 PROCEDURE — 74011250636 HC RX REV CODE- 250/636: Performed by: EMERGENCY MEDICINE

## 2021-06-12 PROCEDURE — 71046 X-RAY EXAM CHEST 2 VIEWS: CPT

## 2021-06-12 PROCEDURE — 85025 COMPLETE CBC W/AUTO DIFF WBC: CPT

## 2021-06-12 PROCEDURE — 36415 COLL VENOUS BLD VENIPUNCTURE: CPT

## 2021-06-12 PROCEDURE — 81001 URINALYSIS AUTO W/SCOPE: CPT

## 2021-06-12 RX ORDER — CEPHALEXIN 500 MG/1
500 CAPSULE ORAL 3 TIMES DAILY
Qty: 15 CAPSULE | Refills: 0 | Status: SHIPPED | OUTPATIENT
Start: 2021-06-12 | End: 2021-07-20

## 2021-06-12 RX ADMIN — SODIUM CHLORIDE 1000 ML: 9 INJECTION, SOLUTION INTRAVENOUS at 15:05
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
## 2021-06-12 NOTE — ED NOTES
Pt received from triage with c/o fatigue off and on \"for a while\". Pt reports when he walks or exerts himself he has to stop because he \"feels like his legs are gonna give out\" and reports some sob with it. Pt also reports he recently had a procedure on his prostate and has been feeling weak and having problems urinating ever since. Pt is able to void but feels like he is unable to empty his bladder when he urinates. Pt is A&Ox4 and on Insighter[employer] monitor x3 at this time.

## 2021-06-12 NOTE — ED NOTES
Patient discharged by Dr. Teena Almanza. Patient provided with discharge instructions Rx and instructions on follow up care. Patient out of ED via wheelchair accompanied by tech.

## 2021-06-13 LAB
ATRIAL RATE: 85 BPM
BACTERIA SPEC CULT: NORMAL
CALCULATED P AXIS, ECG09: 70 DEGREES
CALCULATED R AXIS, ECG10: -47 DEGREES
CALCULATED T AXIS, ECG11: 66 DEGREES
DIAGNOSIS, 93000: NORMAL
P-R INTERVAL, ECG05: 178 MS
Q-T INTERVAL, ECG07: 358 MS
QRS DURATION, ECG06: 94 MS
QTC CALCULATION (BEZET), ECG08: 426 MS
SERVICE CMNT-IMP: NORMAL
VENTRICULAR RATE, ECG03: 85 BPM

## 2021-07-09 ENCOUNTER — APPOINTMENT (OUTPATIENT)
Dept: MRI IMAGING | Age: 76
DRG: 477 | End: 2021-07-09
Attending: GENERAL ACUTE CARE HOSPITAL
Payer: MEDICARE

## 2021-07-09 ENCOUNTER — APPOINTMENT (OUTPATIENT)
Dept: GENERAL RADIOLOGY | Age: 76
DRG: 477 | End: 2021-07-09
Attending: GENERAL ACUTE CARE HOSPITAL
Payer: MEDICARE

## 2021-07-09 ENCOUNTER — APPOINTMENT (OUTPATIENT)
Dept: GENERAL RADIOLOGY | Age: 76
DRG: 477 | End: 2021-07-09
Attending: EMERGENCY MEDICINE
Payer: MEDICARE

## 2021-07-09 ENCOUNTER — TRANSCRIBE ORDER (OUTPATIENT)
Dept: SCHEDULING | Age: 76
End: 2021-07-09

## 2021-07-09 ENCOUNTER — HOSPITAL ENCOUNTER (INPATIENT)
Age: 76
LOS: 11 days | Discharge: HOME HEALTH | DRG: 477 | End: 2021-07-20
Attending: EMERGENCY MEDICINE | Admitting: GENERAL ACUTE CARE HOSPITAL
Payer: MEDICARE

## 2021-07-09 DIAGNOSIS — L97.419 DIABETIC ULCER OF RIGHT MIDFOOT ASSOCIATED WITH TYPE 2 DIABETES MELLITUS, UNSPECIFIED ULCER STAGE (HCC): Primary | ICD-10-CM

## 2021-07-09 DIAGNOSIS — M86.171 OSTEOMYELITIS OF FOOT, RIGHT, ACUTE (HCC): Primary | ICD-10-CM

## 2021-07-09 DIAGNOSIS — R65.10 SIRS (SYSTEMIC INFLAMMATORY RESPONSE SYNDROME) (HCC): ICD-10-CM

## 2021-07-09 DIAGNOSIS — E11.621 DIABETIC ULCER OF RIGHT MIDFOOT ASSOCIATED WITH TYPE 2 DIABETES MELLITUS, UNSPECIFIED ULCER STAGE (HCC): Primary | ICD-10-CM

## 2021-07-09 PROBLEM — T14.8XXA WOUND INFECTION: Status: ACTIVE | Noted: 2021-07-09

## 2021-07-09 PROBLEM — L08.9 WOUND INFECTION: Status: ACTIVE | Noted: 2021-07-09

## 2021-07-09 LAB
ALBUMIN SERPL-MCNC: 3 G/DL (ref 3.5–5)
ALBUMIN/GLOB SERPL: 0.6 {RATIO} (ref 1.1–2.2)
ALP SERPL-CCNC: 77 U/L (ref 45–117)
ALT SERPL-CCNC: 26 U/L (ref 12–78)
ANION GAP SERPL CALC-SCNC: 2 MMOL/L (ref 5–15)
AST SERPL-CCNC: 27 U/L (ref 15–37)
BASOPHILS # BLD: 0 K/UL (ref 0–0.1)
BASOPHILS NFR BLD: 0 % (ref 0–1)
BILIRUB SERPL-MCNC: 0.5 MG/DL (ref 0.2–1)
BUN SERPL-MCNC: 11 MG/DL (ref 6–20)
BUN/CREAT SERPL: 11 (ref 12–20)
CALCIUM SERPL-MCNC: 8.7 MG/DL (ref 8.5–10.1)
CHLORIDE SERPL-SCNC: 105 MMOL/L (ref 97–108)
CO2 SERPL-SCNC: 28 MMOL/L (ref 21–32)
CREAT SERPL-MCNC: 0.98 MG/DL (ref 0.7–1.3)
DIFFERENTIAL METHOD BLD: ABNORMAL
EOSINOPHIL # BLD: 0 K/UL (ref 0–0.4)
EOSINOPHIL NFR BLD: 0 % (ref 0–7)
ERYTHROCYTE [DISTWIDTH] IN BLOOD BY AUTOMATED COUNT: 12.9 % (ref 11.5–14.5)
GLOBULIN SER CALC-MCNC: 5.1 G/DL (ref 2–4)
GLUCOSE BLD STRIP.AUTO-MCNC: 410 MG/DL (ref 65–117)
GLUCOSE BLD STRIP.AUTO-MCNC: 422 MG/DL (ref 65–117)
GLUCOSE SERPL-MCNC: 309 MG/DL (ref 65–100)
HCT VFR BLD AUTO: 33.3 % (ref 36.6–50.3)
HGB BLD-MCNC: 10.7 G/DL (ref 12.1–17)
IMM GRANULOCYTES # BLD AUTO: 0 K/UL (ref 0–0.04)
IMM GRANULOCYTES NFR BLD AUTO: 0 % (ref 0–0.5)
LACTATE BLD-SCNC: 0.95 MMOL/L (ref 0.4–2)
LYMPHOCYTES # BLD: 0.5 K/UL (ref 0.8–3.5)
LYMPHOCYTES NFR BLD: 7 % (ref 12–49)
MCH RBC QN AUTO: 27.6 PG (ref 26–34)
MCHC RBC AUTO-ENTMCNC: 32.1 G/DL (ref 30–36.5)
MCV RBC AUTO: 85.8 FL (ref 80–99)
MONOCYTES # BLD: 0.6 K/UL (ref 0–1)
MONOCYTES NFR BLD: 8 % (ref 5–13)
NEUTS SEG # BLD: 5.9 K/UL (ref 1.8–8)
NEUTS SEG NFR BLD: 85 % (ref 32–75)
NRBC # BLD: 0 K/UL (ref 0–0.01)
NRBC BLD-RTO: 0 PER 100 WBC
PLATELET # BLD AUTO: 293 K/UL (ref 150–400)
POTASSIUM SERPL-SCNC: 5.2 MMOL/L (ref 3.5–5.1)
PROT SERPL-MCNC: 8.1 G/DL (ref 6.4–8.2)
RBC # BLD AUTO: 3.88 M/UL (ref 4.1–5.7)
RBC MORPH BLD: ABNORMAL
SERVICE CMNT-IMP: ABNORMAL
SERVICE CMNT-IMP: ABNORMAL
SODIUM SERPL-SCNC: 135 MMOL/L (ref 136–145)
WBC # BLD AUTO: 7 K/UL (ref 4.1–11.1)

## 2021-07-09 PROCEDURE — 82962 GLUCOSE BLOOD TEST: CPT

## 2021-07-09 PROCEDURE — 36415 COLL VENOUS BLD VENIPUNCTURE: CPT

## 2021-07-09 PROCEDURE — 74011250637 HC RX REV CODE- 250/637: Performed by: GENERAL ACUTE CARE HOSPITAL

## 2021-07-09 PROCEDURE — 74011000258 HC RX REV CODE- 258: Performed by: GENERAL ACUTE CARE HOSPITAL

## 2021-07-09 PROCEDURE — 85025 COMPLETE CBC W/AUTO DIFF WBC: CPT

## 2021-07-09 PROCEDURE — 71046 X-RAY EXAM CHEST 2 VIEWS: CPT

## 2021-07-09 PROCEDURE — 83605 ASSAY OF LACTIC ACID: CPT

## 2021-07-09 PROCEDURE — 65270000029 HC RM PRIVATE

## 2021-07-09 PROCEDURE — 74011250636 HC RX REV CODE- 250/636: Performed by: EMERGENCY MEDICINE

## 2021-07-09 PROCEDURE — 73630 X-RAY EXAM OF FOOT: CPT

## 2021-07-09 PROCEDURE — 74011000258 HC RX REV CODE- 258: Performed by: EMERGENCY MEDICINE

## 2021-07-09 PROCEDURE — 74011636637 HC RX REV CODE- 636/637: Performed by: NURSE PRACTITIONER

## 2021-07-09 PROCEDURE — 74011250636 HC RX REV CODE- 250/636: Performed by: GENERAL ACUTE CARE HOSPITAL

## 2021-07-09 PROCEDURE — 96375 TX/PRO/DX INJ NEW DRUG ADDON: CPT

## 2021-07-09 PROCEDURE — 74011636637 HC RX REV CODE- 636/637: Performed by: GENERAL ACUTE CARE HOSPITAL

## 2021-07-09 PROCEDURE — 99284 EMERGENCY DEPT VISIT MOD MDM: CPT

## 2021-07-09 PROCEDURE — 73720 MRI LWR EXTREMITY W/O&W/DYE: CPT

## 2021-07-09 PROCEDURE — 74011636320 HC RX REV CODE- 636/320: Performed by: GENERAL ACUTE CARE HOSPITAL

## 2021-07-09 PROCEDURE — A9576 INJ PROHANCE MULTIPACK: HCPCS | Performed by: GENERAL ACUTE CARE HOSPITAL

## 2021-07-09 PROCEDURE — 80053 COMPREHEN METABOLIC PANEL: CPT

## 2021-07-09 PROCEDURE — 87040 BLOOD CULTURE FOR BACTERIA: CPT

## 2021-07-09 PROCEDURE — 96365 THER/PROPH/DIAG IV INF INIT: CPT

## 2021-07-09 RX ORDER — INSULIN LISPRO 100 [IU]/ML
INJECTION, SOLUTION INTRAVENOUS; SUBCUTANEOUS
Status: DISCONTINUED | OUTPATIENT
Start: 2021-07-09 | End: 2021-07-20 | Stop reason: HOSPADM

## 2021-07-09 RX ORDER — POLYETHYLENE GLYCOL 3350 17 G/17G
17 POWDER, FOR SOLUTION ORAL DAILY PRN
Status: DISCONTINUED | OUTPATIENT
Start: 2021-07-09 | End: 2021-07-20 | Stop reason: HOSPADM

## 2021-07-09 RX ORDER — MELATONIN
4000 DAILY
Status: DISCONTINUED | OUTPATIENT
Start: 2021-07-10 | End: 2021-07-20 | Stop reason: HOSPADM

## 2021-07-09 RX ORDER — GUAIFENESIN 100 MG/5ML
81 LIQUID (ML) ORAL DAILY
Status: DISCONTINUED | OUTPATIENT
Start: 2021-07-10 | End: 2021-07-20 | Stop reason: HOSPADM

## 2021-07-09 RX ORDER — SODIUM CHLORIDE 0.9 % (FLUSH) 0.9 %
5-10 SYRINGE (ML) INJECTION AS NEEDED
Status: DISCONTINUED | OUTPATIENT
Start: 2021-07-09 | End: 2021-07-17 | Stop reason: SDUPTHER

## 2021-07-09 RX ORDER — ACETAMINOPHEN 650 MG/1
650 SUPPOSITORY RECTAL
Status: DISCONTINUED | OUTPATIENT
Start: 2021-07-09 | End: 2021-07-20 | Stop reason: HOSPADM

## 2021-07-09 RX ORDER — ATORVASTATIN CALCIUM 20 MG/1
20 TABLET, FILM COATED ORAL DAILY
Status: DISCONTINUED | OUTPATIENT
Start: 2021-07-10 | End: 2021-07-20 | Stop reason: HOSPADM

## 2021-07-09 RX ORDER — ACETAMINOPHEN 325 MG/1
650 TABLET ORAL
Status: DISCONTINUED | OUTPATIENT
Start: 2021-07-09 | End: 2021-07-20 | Stop reason: HOSPADM

## 2021-07-09 RX ORDER — ONDANSETRON 2 MG/ML
4 INJECTION INTRAMUSCULAR; INTRAVENOUS
Status: DISCONTINUED | OUTPATIENT
Start: 2021-07-09 | End: 2021-07-10 | Stop reason: SDUPTHER

## 2021-07-09 RX ORDER — INSULIN LISPRO 100 [IU]/ML
7 INJECTION, SOLUTION INTRAVENOUS; SUBCUTANEOUS ONCE
Status: COMPLETED | OUTPATIENT
Start: 2021-07-10 | End: 2021-07-09

## 2021-07-09 RX ORDER — SODIUM CHLORIDE 0.9 % (FLUSH) 0.9 %
5-40 SYRINGE (ML) INJECTION EVERY 8 HOURS
Status: DISCONTINUED | OUTPATIENT
Start: 2021-07-09 | End: 2021-07-20 | Stop reason: HOSPADM

## 2021-07-09 RX ORDER — ONDANSETRON 4 MG/1
4 TABLET, ORALLY DISINTEGRATING ORAL
Status: DISCONTINUED | OUTPATIENT
Start: 2021-07-09 | End: 2021-07-20 | Stop reason: HOSPADM

## 2021-07-09 RX ORDER — INSULIN GLARGINE 100 [IU]/ML
16 INJECTION, SOLUTION SUBCUTANEOUS
Status: DISCONTINUED | OUTPATIENT
Start: 2021-07-09 | End: 2021-07-11

## 2021-07-09 RX ORDER — HYDROCODONE BITARTRATE AND ACETAMINOPHEN 5; 325 MG/1; MG/1
1 TABLET ORAL
Status: DISCONTINUED | OUTPATIENT
Start: 2021-07-09 | End: 2021-07-20 | Stop reason: HOSPADM

## 2021-07-09 RX ORDER — ONDANSETRON 2 MG/ML
4 INJECTION INTRAMUSCULAR; INTRAVENOUS
Status: DISCONTINUED | OUTPATIENT
Start: 2021-07-09 | End: 2021-07-20 | Stop reason: HOSPADM

## 2021-07-09 RX ORDER — THERA TABS 400 MCG
1 TAB ORAL DAILY
Status: DISCONTINUED | OUTPATIENT
Start: 2021-07-10 | End: 2021-07-20 | Stop reason: HOSPADM

## 2021-07-09 RX ORDER — ASCORBIC ACID 500 MG
250 TABLET ORAL DAILY
Status: DISCONTINUED | OUTPATIENT
Start: 2021-07-10 | End: 2021-07-20 | Stop reason: HOSPADM

## 2021-07-09 RX ORDER — MAGNESIUM SULFATE 100 %
4 CRYSTALS MISCELLANEOUS AS NEEDED
Status: DISCONTINUED | OUTPATIENT
Start: 2021-07-09 | End: 2021-07-20 | Stop reason: HOSPADM

## 2021-07-09 RX ORDER — DEXTROSE MONOHYDRATE AND SODIUM CHLORIDE 5; .9 G/100ML; G/100ML
100 INJECTION, SOLUTION INTRAVENOUS CONTINUOUS
Status: DISCONTINUED | OUTPATIENT
Start: 2021-07-09 | End: 2021-07-10

## 2021-07-09 RX ORDER — SODIUM CHLORIDE 0.9 % (FLUSH) 0.9 %
5-40 SYRINGE (ML) INJECTION AS NEEDED
Status: DISCONTINUED | OUTPATIENT
Start: 2021-07-09 | End: 2021-07-20 | Stop reason: HOSPADM

## 2021-07-09 RX ORDER — TAMSULOSIN HYDROCHLORIDE 0.4 MG/1
0.4 CAPSULE ORAL DAILY
Status: DISCONTINUED | OUTPATIENT
Start: 2021-07-10 | End: 2021-07-20 | Stop reason: HOSPADM

## 2021-07-09 RX ORDER — DEXTROSE 50 % IN WATER (D50W) INTRAVENOUS SYRINGE
12.5-25 AS NEEDED
Status: DISCONTINUED | OUTPATIENT
Start: 2021-07-09 | End: 2021-07-20 | Stop reason: HOSPADM

## 2021-07-09 RX ORDER — VANCOMYCIN 1.75 GRAM/500 ML IN 0.9 % SODIUM CHLORIDE INTRAVENOUS
1750 ONCE
Status: COMPLETED | OUTPATIENT
Start: 2021-07-09 | End: 2021-07-09

## 2021-07-09 RX ORDER — ACETAMINOPHEN 325 MG/1
650 TABLET ORAL
Status: DISCONTINUED | OUTPATIENT
Start: 2021-07-09 | End: 2021-07-10 | Stop reason: SDUPTHER

## 2021-07-09 RX ORDER — DOCUSATE SODIUM 100 MG/1
100 CAPSULE, LIQUID FILLED ORAL 2 TIMES DAILY
Status: DISCONTINUED | OUTPATIENT
Start: 2021-07-09 | End: 2021-07-20 | Stop reason: HOSPADM

## 2021-07-09 RX ORDER — ENOXAPARIN SODIUM 100 MG/ML
40 INJECTION SUBCUTANEOUS DAILY
Status: DISCONTINUED | OUTPATIENT
Start: 2021-07-10 | End: 2021-07-20 | Stop reason: HOSPADM

## 2021-07-09 RX ADMIN — ACETAMINOPHEN 650 MG: 325 TABLET ORAL at 23:43

## 2021-07-09 RX ADMIN — GADOTERIDOL 15 ML: 279.3 INJECTION, SOLUTION INTRAVENOUS at 22:29

## 2021-07-09 RX ADMIN — DOCUSATE SODIUM 100 MG: 100 CAPSULE, LIQUID FILLED ORAL at 21:09

## 2021-07-09 RX ADMIN — INSULIN LISPRO 7 UNITS: 100 INJECTION, SOLUTION INTRAVENOUS; SUBCUTANEOUS at 23:44

## 2021-07-09 RX ADMIN — VANCOMYCIN HYDROCHLORIDE 1750 MG: 10 INJECTION, POWDER, LYOPHILIZED, FOR SOLUTION INTRAVENOUS at 19:36

## 2021-07-09 RX ADMIN — Medication 10 ML: at 23:48

## 2021-07-09 RX ADMIN — INSULIN GLARGINE 16 UNITS: 100 INJECTION, SOLUTION SUBCUTANEOUS at 23:44

## 2021-07-09 RX ADMIN — PIPERACILLIN AND TAZOBACTAM 3.38 G: 3; .375 INJECTION, POWDER, LYOPHILIZED, FOR SOLUTION INTRAVENOUS at 18:58

## 2021-07-09 RX ADMIN — PIPERACILLIN AND TAZOBACTAM 3.38 G: 3; .375 INJECTION, POWDER, LYOPHILIZED, FOR SOLUTION INTRAVENOUS at 23:44

## 2021-07-09 RX ADMIN — DEXTROSE AND SODIUM CHLORIDE 75 ML/HR: 5; 900 INJECTION, SOLUTION INTRAVENOUS at 21:02

## 2021-07-09 NOTE — PROGRESS NOTES
Pharmacy Automatic Renal Dosing Protocol - Antimicrobials    Indication for Antimicrobials: SSTI    Current Regimen of Each Antimicrobial:  Vancomycin 1750 mg x 1 then 1000 mg Q12H (Start Date 21; Day # 1)    Previous Antimicrobial Therapy:    Vancomycin Goal Level: 10-15 mcg/ml    Vancomycin Levels  Date Dose & Interval Measured (mcg/mL) Steady State (mcg/mL)                       Date & time of next level:     Significant Cultures:     Radiology / Imaging results: (X-ray, CT scan or MRI):       Labs:  Recent Labs     21  1644   CREA 0.98   BUN 11   WBC 7.0     Temp (24hrs), Av °F (37.8 °C), Min:100 °F (37.8 °C), Max:100 °F (37.8 °C)      Paralysis, amputations, malnutrition:   Creatinine Clearance (mL/min) or Dialysis: 72    Impression/Plan:   Antibiotics as above  BMP daily     Pharmacy will follow daily and adjust medications as appropriate for renal function and/or serum levels. Thank you,  Homero العراقي, [address] [facility]      Recommended duration of therapy  [email or web address] [email or web address]    Renal Dosing  [email or web address]/Pharmacy/Clinical%20Companion/Renal%[email or web address]. pdf
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
## 2021-07-09 NOTE — ED TRIAGE NOTES
Pt c/o right foot pain and states he has had \"spot\" /sore x 5 months pt states he is followed by a wound; denied fever, shortness of breath, cp; pt states he has had some chills, foot is warm to touch, pt reports pain goes up his leg; +odor and drainage noted;

## 2021-07-10 LAB
ANION GAP SERPL CALC-SCNC: 4 MMOL/L (ref 5–15)
BASOPHILS # BLD: 0 K/UL (ref 0–0.1)
BASOPHILS NFR BLD: 0 % (ref 0–1)
BUN SERPL-MCNC: 12 MG/DL (ref 6–20)
BUN/CREAT SERPL: 13 (ref 12–20)
CALCIUM SERPL-MCNC: 8.7 MG/DL (ref 8.5–10.1)
CHLORIDE SERPL-SCNC: 106 MMOL/L (ref 97–108)
CO2 SERPL-SCNC: 27 MMOL/L (ref 21–32)
CREAT SERPL-MCNC: 0.95 MG/DL (ref 0.7–1.3)
DIFFERENTIAL METHOD BLD: ABNORMAL
EOSINOPHIL # BLD: 0.1 K/UL (ref 0–0.4)
EOSINOPHIL NFR BLD: 1 % (ref 0–7)
ERYTHROCYTE [DISTWIDTH] IN BLOOD BY AUTOMATED COUNT: 12.8 % (ref 11.5–14.5)
EST. AVERAGE GLUCOSE BLD GHB EST-MCNC: 200 MG/DL
GLUCOSE BLD STRIP.AUTO-MCNC: 155 MG/DL (ref 65–117)
GLUCOSE BLD STRIP.AUTO-MCNC: 194 MG/DL (ref 65–117)
GLUCOSE BLD STRIP.AUTO-MCNC: 202 MG/DL (ref 65–117)
GLUCOSE BLD STRIP.AUTO-MCNC: 295 MG/DL (ref 65–117)
GLUCOSE SERPL-MCNC: 271 MG/DL (ref 65–100)
HBA1C MFR BLD: 8.6 % (ref 4–5.6)
HCT VFR BLD AUTO: 28.6 % (ref 36.6–50.3)
HGB BLD-MCNC: 9.4 G/DL (ref 12.1–17)
IMM GRANULOCYTES # BLD AUTO: 0 K/UL (ref 0–0.04)
IMM GRANULOCYTES NFR BLD AUTO: 0 % (ref 0–0.5)
LYMPHOCYTES # BLD: 0.8 K/UL (ref 0.8–3.5)
LYMPHOCYTES NFR BLD: 15 % (ref 12–49)
MAGNESIUM SERPL-MCNC: 2.2 MG/DL (ref 1.6–2.4)
MCH RBC QN AUTO: 27.6 PG (ref 26–34)
MCHC RBC AUTO-ENTMCNC: 32.9 G/DL (ref 30–36.5)
MCV RBC AUTO: 84.1 FL (ref 80–99)
MONOCYTES # BLD: 0.7 K/UL (ref 0–1)
MONOCYTES NFR BLD: 13 % (ref 5–13)
NEUTS SEG # BLD: 3.7 K/UL (ref 1.8–8)
NEUTS SEG NFR BLD: 72 % (ref 32–75)
NRBC # BLD: 0 K/UL (ref 0–0.01)
NRBC BLD-RTO: 0 PER 100 WBC
PLATELET # BLD AUTO: 250 K/UL (ref 150–400)
PMV BLD AUTO: 8.8 FL (ref 8.9–12.9)
POTASSIUM SERPL-SCNC: 3.9 MMOL/L (ref 3.5–5.1)
RBC # BLD AUTO: 3.4 M/UL (ref 4.1–5.7)
SERVICE CMNT-IMP: ABNORMAL
SODIUM SERPL-SCNC: 137 MMOL/L (ref 136–145)
WBC # BLD AUTO: 5.2 K/UL (ref 4.1–11.1)

## 2021-07-10 PROCEDURE — 36415 COLL VENOUS BLD VENIPUNCTURE: CPT

## 2021-07-10 PROCEDURE — 85025 COMPLETE CBC W/AUTO DIFF WBC: CPT

## 2021-07-10 PROCEDURE — 83036 HEMOGLOBIN GLYCOSYLATED A1C: CPT

## 2021-07-10 PROCEDURE — 82962 GLUCOSE BLOOD TEST: CPT

## 2021-07-10 PROCEDURE — 80048 BASIC METABOLIC PNL TOTAL CA: CPT

## 2021-07-10 PROCEDURE — 74011250636 HC RX REV CODE- 250/636: Performed by: INTERNAL MEDICINE

## 2021-07-10 PROCEDURE — 74011636637 HC RX REV CODE- 636/637: Performed by: GENERAL ACUTE CARE HOSPITAL

## 2021-07-10 PROCEDURE — 74011000258 HC RX REV CODE- 258: Performed by: GENERAL ACUTE CARE HOSPITAL

## 2021-07-10 PROCEDURE — 74011250637 HC RX REV CODE- 250/637: Performed by: GENERAL ACUTE CARE HOSPITAL

## 2021-07-10 PROCEDURE — 65270000029 HC RM PRIVATE

## 2021-07-10 PROCEDURE — 74011250636 HC RX REV CODE- 250/636: Performed by: GENERAL ACUTE CARE HOSPITAL

## 2021-07-10 PROCEDURE — 83735 ASSAY OF MAGNESIUM: CPT

## 2021-07-10 RX ORDER — SODIUM CHLORIDE 9 MG/ML
75 INJECTION, SOLUTION INTRAVENOUS CONTINUOUS
Status: DISCONTINUED | OUTPATIENT
Start: 2021-07-10 | End: 2021-07-11

## 2021-07-10 RX ADMIN — INSULIN LISPRO 7 UNITS: 100 INJECTION, SOLUTION INTRAVENOUS; SUBCUTANEOUS at 08:03

## 2021-07-10 RX ADMIN — ATORVASTATIN CALCIUM 20 MG: 20 TABLET, FILM COATED ORAL at 08:13

## 2021-07-10 RX ADMIN — DOCUSATE SODIUM 100 MG: 100 CAPSULE, LIQUID FILLED ORAL at 17:34

## 2021-07-10 RX ADMIN — INSULIN LISPRO 4 UNITS: 100 INJECTION, SOLUTION INTRAVENOUS; SUBCUTANEOUS at 11:45

## 2021-07-10 RX ADMIN — ASPIRIN 81 MG: 81 TABLET, CHEWABLE ORAL at 08:14

## 2021-07-10 RX ADMIN — SODIUM CHLORIDE 75 ML/HR: 9 INJECTION, SOLUTION INTRAVENOUS at 12:25

## 2021-07-10 RX ADMIN — THERA TABS 1 TABLET: TAB at 08:15

## 2021-07-10 RX ADMIN — PIPERACILLIN AND TAZOBACTAM 3.38 G: 3; .375 INJECTION, POWDER, LYOPHILIZED, FOR SOLUTION INTRAVENOUS at 17:34

## 2021-07-10 RX ADMIN — INSULIN GLARGINE 16 UNITS: 100 INJECTION, SOLUTION SUBCUTANEOUS at 22:04

## 2021-07-10 RX ADMIN — INSULIN LISPRO 3 UNITS: 100 INJECTION, SOLUTION INTRAVENOUS; SUBCUTANEOUS at 17:36

## 2021-07-10 RX ADMIN — VANCOMYCIN HYDROCHLORIDE 1000 MG: 1 INJECTION, POWDER, LYOPHILIZED, FOR SOLUTION INTRAVENOUS at 20:37

## 2021-07-10 RX ADMIN — DOCUSATE SODIUM 100 MG: 100 CAPSULE, LIQUID FILLED ORAL at 08:14

## 2021-07-10 RX ADMIN — Medication 4000 UNITS: at 08:14

## 2021-07-10 RX ADMIN — PIPERACILLIN AND TAZOBACTAM 3.38 G: 3; .375 INJECTION, POWDER, LYOPHILIZED, FOR SOLUTION INTRAVENOUS at 10:10

## 2021-07-10 RX ADMIN — OXYCODONE HYDROCHLORIDE AND ACETAMINOPHEN 250 MG: 500 TABLET ORAL at 08:15

## 2021-07-10 RX ADMIN — Medication 10 ML: at 17:37

## 2021-07-10 RX ADMIN — Medication 10 ML: at 05:14

## 2021-07-10 RX ADMIN — NEPHROCAP 1 CAPSULE: 1 CAP ORAL at 08:14

## 2021-07-10 RX ADMIN — Medication 10 ML: at 22:05

## 2021-07-10 RX ADMIN — VANCOMYCIN HYDROCHLORIDE 1000 MG: 1 INJECTION, POWDER, LYOPHILIZED, FOR SOLUTION INTRAVENOUS at 08:16

## 2021-07-10 RX ADMIN — ENOXAPARIN SODIUM 40 MG: 40 INJECTION SUBCUTANEOUS at 08:15
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
## 2021-07-10 NOTE — ED NOTES
Report received from [place], [address] [place] [address]. They advised of the patient's chief complaint, current status, orders completed (to include IV access/medications/radiology testing), outstanding orders that still need to be completed, and the treatment plan. Questions asked and answered prior to assumption of care.

## 2021-07-10 NOTE — PROGRESS NOTES
Received notification from bedside RN about patient with regards to: , needs order for Lispro coverage    Intervention given: Lispro 7 units SQ x 1 dose ordered

## 2021-07-10 NOTE — PROGRESS NOTES
-Please complete MRI History and Safety Screening Form   - Patient cannot be scanned until this form is completed and reviewed in MRI to ensure patient is SAFE and eligible for MRI. - CALL MRI when this has been successfully completed at [phone].

## 2021-07-10 NOTE — PROGRESS NOTES
Pharmacy Automatic Renal Dosing Protocol - Antimicrobials    Indication for Antimicrobials: SSTI    Current Regimen of Each Antimicrobial:  Vancomycin 1750 mg x 1 then 1000 mg Q12H (Start Date 21; Day # 2/)    Previous Antimicrobial Therapy:    Vancomycin Goal Level: 10-15 mcg/ml    Vancomycin Levels  Date Dose & Interval Measured (mcg/mL) Steady State (mcg/mL)                       Date & time of next level:  @ 0800    Significant Cultures:   7/10: blood- NGTD - prelim    Radiology / Imaging results: (X-ray, CT scan or MRI):       Labs:  Recent Labs     07/10/21  0513 21  1644   CREA 0.95 0.98   BUN 12 11   WBC 5.2 7.0     Temp (24hrs), Av.4 °F (37.4 °C), Min:97.9 °F (36.6 °C), Max:100.6 °F (38.1 °C)      Paralysis, amputations, malnutrition:   Creatinine Clearance (mL/min) or Dialysis: 75.7 ml/in    Impression/Plan:   Antibiotics as above  Vancomycin trough  @ 0800  BMP daily     Pharmacy will follow daily and adjust medications as appropriate for renal function and/or serum levels. Thank you,  CARLOS EDUARDO Aburto      Recommended duration of therapy  [email or web address]/[place]/[facility]/Pharmacy/Clinical%20Companion/Duration%20of%20ABX%20therapy. docx    Renal Dosing  http://[facility]/[place]/[facility]/Pharmacy/Clinical%20Companion/Renal%[email or web address]. pdf

## 2021-07-11 LAB
ANION GAP SERPL CALC-SCNC: 5 MMOL/L (ref 5–15)
APPEARANCE UR: CLEAR
BACTERIA URNS QL MICRO: NEGATIVE /HPF
BILIRUB UR QL: NEGATIVE
BUN SERPL-MCNC: 8 MG/DL (ref 6–20)
BUN/CREAT SERPL: 11 (ref 12–20)
CALCIUM SERPL-MCNC: 8.7 MG/DL (ref 8.5–10.1)
CHLORIDE SERPL-SCNC: 108 MMOL/L (ref 97–108)
CO2 SERPL-SCNC: 26 MMOL/L (ref 21–32)
COLOR UR: ABNORMAL
CREAT SERPL-MCNC: 0.73 MG/DL (ref 0.7–1.3)
DATE LAST DOSE: NORMAL
EPITH CASTS URNS QL MICRO: ABNORMAL /LPF
ERYTHROCYTE [DISTWIDTH] IN BLOOD BY AUTOMATED COUNT: 12.7 % (ref 11.5–14.5)
GLUCOSE BLD STRIP.AUTO-MCNC: 156 MG/DL (ref 65–117)
GLUCOSE BLD STRIP.AUTO-MCNC: 165 MG/DL (ref 65–117)
GLUCOSE BLD STRIP.AUTO-MCNC: 199 MG/DL (ref 65–117)
GLUCOSE BLD STRIP.AUTO-MCNC: 99 MG/DL (ref 65–117)
GLUCOSE SERPL-MCNC: 109 MG/DL (ref 65–100)
GLUCOSE UR STRIP.AUTO-MCNC: NEGATIVE MG/DL
HCT VFR BLD AUTO: 29 % (ref 36.6–50.3)
HGB BLD-MCNC: 9.4 G/DL (ref 12.1–17)
HGB UR QL STRIP: NEGATIVE
HYALINE CASTS URNS QL MICRO: ABNORMAL /LPF (ref 0–5)
KETONES UR QL STRIP.AUTO: NEGATIVE MG/DL
LEUKOCYTE ESTERASE UR QL STRIP.AUTO: ABNORMAL
MCH RBC QN AUTO: 27.1 PG (ref 26–34)
MCHC RBC AUTO-ENTMCNC: 32.4 G/DL (ref 30–36.5)
MCV RBC AUTO: 83.6 FL (ref 80–99)
NITRITE UR QL STRIP.AUTO: NEGATIVE
NRBC # BLD: 0 K/UL (ref 0–0.01)
NRBC BLD-RTO: 0 PER 100 WBC
PH UR STRIP: 6 [PH] (ref 5–8)
PLATELET # BLD AUTO: 272 K/UL (ref 150–400)
PMV BLD AUTO: 9.4 FL (ref 8.9–12.9)
POTASSIUM SERPL-SCNC: 3.6 MMOL/L (ref 3.5–5.1)
PROT UR STRIP-MCNC: NEGATIVE MG/DL
RBC # BLD AUTO: 3.47 M/UL (ref 4.1–5.7)
RBC #/AREA URNS HPF: ABNORMAL /HPF (ref 0–5)
REPORTED DOSE,DOSE: NORMAL UNITS
REPORTED DOSE/TIME,TMG: NORMAL
SERVICE CMNT-IMP: ABNORMAL
SERVICE CMNT-IMP: NORMAL
SODIUM SERPL-SCNC: 139 MMOL/L (ref 136–145)
SP GR UR REFRACTOMETRY: 1.01 (ref 1–1.03)
UA: UC IF INDICATED,UAUC: ABNORMAL
UROBILINOGEN UR QL STRIP.AUTO: 0.2 EU/DL (ref 0.2–1)
VANCOMYCIN TROUGH SERPL-MCNC: 10 UG/ML (ref 5–10)
WBC # BLD AUTO: 4.4 K/UL (ref 4.1–11.1)
WBC URNS QL MICRO: ABNORMAL /HPF (ref 0–4)

## 2021-07-11 PROCEDURE — 36415 COLL VENOUS BLD VENIPUNCTURE: CPT

## 2021-07-11 PROCEDURE — 74011636637 HC RX REV CODE- 636/637: Performed by: INTERNAL MEDICINE

## 2021-07-11 PROCEDURE — 74011000258 HC RX REV CODE- 258: Performed by: GENERAL ACUTE CARE HOSPITAL

## 2021-07-11 PROCEDURE — 82962 GLUCOSE BLOOD TEST: CPT

## 2021-07-11 PROCEDURE — 74011250636 HC RX REV CODE- 250/636: Performed by: GENERAL ACUTE CARE HOSPITAL

## 2021-07-11 PROCEDURE — 65270000029 HC RM PRIVATE

## 2021-07-11 PROCEDURE — 80202 ASSAY OF VANCOMYCIN: CPT

## 2021-07-11 PROCEDURE — 74011250636 HC RX REV CODE- 250/636: Performed by: INTERNAL MEDICINE

## 2021-07-11 PROCEDURE — 74011250637 HC RX REV CODE- 250/637: Performed by: GENERAL ACUTE CARE HOSPITAL

## 2021-07-11 PROCEDURE — 81001 URINALYSIS AUTO W/SCOPE: CPT

## 2021-07-11 PROCEDURE — 74011636637 HC RX REV CODE- 636/637: Performed by: GENERAL ACUTE CARE HOSPITAL

## 2021-07-11 PROCEDURE — 85027 COMPLETE CBC AUTOMATED: CPT

## 2021-07-11 PROCEDURE — 80048 BASIC METABOLIC PNL TOTAL CA: CPT

## 2021-07-11 RX ORDER — VANCOMYCIN HYDROCHLORIDE
1250 EVERY 12 HOURS
Status: DISCONTINUED | OUTPATIENT
Start: 2021-07-11 | End: 2021-07-15

## 2021-07-11 RX ORDER — INSULIN GLARGINE 100 [IU]/ML
12 INJECTION, SOLUTION SUBCUTANEOUS
Status: DISCONTINUED | OUTPATIENT
Start: 2021-07-11 | End: 2021-07-16

## 2021-07-11 RX ADMIN — THERA TABS 1 TABLET: TAB at 08:45

## 2021-07-11 RX ADMIN — PIPERACILLIN AND TAZOBACTAM 3.38 G: 3; .375 INJECTION, POWDER, LYOPHILIZED, FOR SOLUTION INTRAVENOUS at 01:56

## 2021-07-11 RX ADMIN — Medication 4000 UNITS: at 08:45

## 2021-07-11 RX ADMIN — ASPIRIN 81 MG: 81 TABLET, CHEWABLE ORAL at 08:46

## 2021-07-11 RX ADMIN — INSULIN GLARGINE 12 UNITS: 100 INJECTION, SOLUTION SUBCUTANEOUS at 22:02

## 2021-07-11 RX ADMIN — INSULIN LISPRO 2 UNITS: 100 INJECTION, SOLUTION INTRAVENOUS; SUBCUTANEOUS at 16:45

## 2021-07-11 RX ADMIN — PIPERACILLIN AND TAZOBACTAM 3.38 G: 3; .375 INJECTION, POWDER, LYOPHILIZED, FOR SOLUTION INTRAVENOUS at 16:45

## 2021-07-11 RX ADMIN — OXYCODONE HYDROCHLORIDE AND ACETAMINOPHEN 250 MG: 500 TABLET ORAL at 08:46

## 2021-07-11 RX ADMIN — INSULIN LISPRO 2 UNITS: 100 INJECTION, SOLUTION INTRAVENOUS; SUBCUTANEOUS at 12:06

## 2021-07-11 RX ADMIN — ATORVASTATIN CALCIUM 20 MG: 20 TABLET, FILM COATED ORAL at 08:46

## 2021-07-11 RX ADMIN — DOCUSATE SODIUM 100 MG: 100 CAPSULE, LIQUID FILLED ORAL at 08:46

## 2021-07-11 RX ADMIN — VANCOMYCIN HYDROCHLORIDE 1000 MG: 1 INJECTION, POWDER, LYOPHILIZED, FOR SOLUTION INTRAVENOUS at 09:35

## 2021-07-11 RX ADMIN — VANCOMYCIN HYDROCHLORIDE 1250 MG: 10 INJECTION, POWDER, LYOPHILIZED, FOR SOLUTION INTRAVENOUS at 21:23

## 2021-07-11 RX ADMIN — PIPERACILLIN AND TAZOBACTAM 3.38 G: 3; .375 INJECTION, POWDER, LYOPHILIZED, FOR SOLUTION INTRAVENOUS at 08:45

## 2021-07-11 RX ADMIN — TAMSULOSIN HYDROCHLORIDE 0.4 MG: 0.4 CAPSULE ORAL at 08:46

## 2021-07-11 RX ADMIN — Medication 10 ML: at 05:08

## 2021-07-11 RX ADMIN — Medication 10 ML: at 14:45

## 2021-07-11 RX ADMIN — Medication 10 ML: at 21:23

## 2021-07-11 RX ADMIN — DOCUSATE SODIUM 100 MG: 100 CAPSULE, LIQUID FILLED ORAL at 17:10

## 2021-07-11 RX ADMIN — NEPHROCAP 1 CAPSULE: 1 CAP ORAL at 08:46

## 2021-07-11 RX ADMIN — ENOXAPARIN SODIUM 40 MG: 40 INJECTION SUBCUTANEOUS at 08:45

## 2021-07-11 NOTE — PROGRESS NOTES
Hospitalist Progress Note    NAME: Yahir Yu   :  1945   MRN:  [id number]       Assessment / Plan:  Chronic non healing diabetic wound of the R foot, worsening, failed outpt ABx   Sepsis 2/2 above  RLE pain  Cont iv abx as below   IVF  MRI postiive for OM. Podiatry consult pending  Monitor for diarrhea/C Diff  Obtain CXR, pt reported episode of SOB, also check ECHO   Obtain UA, completed 7 Days ABx course few weeks ago   F/u Cx     Hyperkalemia, Mild   Should improve w hydration and low K diet      IDDM  Resume lantus and add ISS  Check A1c     Prostate Ca  HLD  [place], not using CPAP  Resume home meds     Code Status: full   Surrogate Decision Maker: daughter, Carla Cleaning, [phone], [phone]  DVT Prophylaxis: lovenox  GI Prophylaxis: not indicated  Baseline: independent     Body mass index is 22.87 kg/m². Subjective:     Chief Complaint / Reason for Physician Visit  Report rt foot pain      Review of Systems:  Symptom Y/N Comments  Symptom Y/N Comments   Fever/Chills    Chest Pain     Poor Appetite    Edema     Cough    Abdominal Pain     Sputum    Joint Pain     SOB/DEL CASTILLO    Pruritis/Rash     Nausea/vomit    Tolerating PT/OT     Diarrhea    Tolerating Diet     Constipation    Other       Could NOT obtain due to:      Objective:     VITALS:   Last 24hrs VS reviewed since prior progress note. Most recent are:  Patient Vitals for the past 24 hrs:   Temp Pulse Resp BP SpO2   07/10/21 1950 98.6 °F (37 °C) 78 18 (!) 127/53 100 %   07/10/21 1517 98.2 °F (36.8 °C) 70 18 (!) 135/49 99 %   07/10/21 1206 97.9 °F (36.6 °C) 78 18 (!) 121/54 97 %   07/10/21 0819 98.1 °F (36.7 °C) 72 18 (!) 131/49 97 %   07/10/21 0345 99.7 °F (37.6 °C) 87 18 139/74 100 %       Intake/Output Summary (Last 24 hours) at 7/10/2021 2309  Last data filed at 7/10/2021 1211  Gross per 24 hour   Intake    Output 470 ml   Net -470 ml        PHYSICAL EXAM:  General:  Alert, cooperative, no acute distress    EENT:  EOMI.  Anicteric sclerae. MMM  Resp:  CTA bilaterally, no wheezing or rales.   No accessory muscle use  CV:  Regular  rhythm,  No edema  GI:  Soft, Non distended, Non tender.  +Bowel sounds  Neurologic:  Alert and oriented X 3, normal speech,   Psych:   Not anxious nor agitated  Skin:  Rt foot pain     Reviewed most current lab test results and cultures  YES  Reviewed most current radiology test results   YES  Review and summation of old records today    NO  Reviewed patient's current orders and MAR    YES  PMH/SH reviewed - no change compared to H&P          Current Facility-Administered Medications:     0.9% sodium chloride infusion, 75 mL/hr, IntraVENous, CONTINUOUS, Jose Antonio Coyne MD, Last Rate: 75 mL/hr at 07/10/21 1225, 75 mL/hr at 07/10/21 1225    [START ON 7/11/2021] Vancomycin Trough: Sunday, 7/11, prior to 0800 dose - RN please collect, thank you, , Other, ONCE, Xavier Coyne MD    sodium chloride (NS) flush 5-10 mL, 5-10 mL, IntraVENous, PRN, Al-Dabbagh, Elonda Moritz, MD    vancomycin (VANCOCIN) 1,000 mg in 0.9% sodium chloride 250 mL (VIAL-MATE), 1,000 mg, IntraVENous, Q12H, Mini Amos MD, Last Rate: 125 mL/hr at 07/10/21 2037, 1,000 mg at 07/10/21 2037    ascorbic acid (vitamin C) (VITAMIN C) tablet 250 mg, 250 mg, Oral, DAILY, Mini Amos MD, 250 mg at 07/10/21 0815    aspirin chewable tablet 81 mg, 81 mg, Oral, DAILY, Mini Amos MD, 81 mg at 07/10/21 0814    atorvastatin (LIPITOR) tablet 20 mg, 20 mg, Oral, DAILY, Mini Amos MD, 20 mg at 07/10/21 0813    B complex-vitaminC-folic acid (NEPHROCAP) cap, 1 Capsule, Oral, DAILY, Al-Dabbagh, Elonda Moritz, MD, 1 Capsule at 07/10/21 0814    cholecalciferol (VITAMIN D3) (1000 Units /25 mcg) tablet 4,000 Units, 4,000 Units, Oral, DAILY, Al-Dabbagh, Elonda Moritz, MD, 4,000 Units at 07/10/21 0814    docusate sodium (COLACE) capsule 100 mg, 100 mg, Oral, BID, Mini Amos MD, 100 mg at 07/10/21 1734    HYDROcodone-acetaminophen (NORCO) 5-325 mg per tablet 1 Tablet, 1 Tablet, Oral, Q4H PRN, Ed Amos MD    insulin glargine (LANTUS) injection 16 Units, 16 Units, SubCUTAneous, QHS, Ed Amos MD, 16 Units at 07/10/21 2204    tamsulosin (FLOMAX) capsule 0.4 mg, 0.4 mg, Oral, DAILY, Ed Amos MD    therapeutic multivitamin [facility]) tablet 1 Tablet, 1 Tablet, Oral, DAILY, Ed Amos MD, 1 Tablet at 07/10/21 0815    insulin lispro (HUMALOG) injection, , SubCUTAneous, AC&HS, Ed Amos MD, 3 Units at 07/10/21 1736    glucose chewable tablet 16 g, 4 Tablet, Oral, PRN, Ed Amos MD    dextrose (D50W) injection syrg 12.5-25 g, 12.5-25 g, IntraVENous, PRN, Ed Amos MD    glucagon (GLUCAGEN) injection 1 mg, 1 mg, IntraMUSCular, PRN, Ed Amos MD    sodium chloride (NS) flush 5-40 mL, 5-40 mL, IntraVENous, Q8H, Mini Amos MD, 10 mL at 07/10/21 2205    sodium chloride (NS) flush 5-40 mL, 5-40 mL, IntraVENous, PRN, Ed Amos MD    acetaminophen (TYLENOL) tablet 650 mg, 650 mg, Oral, Q6H PRN, 650 mg at 07/09/21 2343 **OR** acetaminophen (TYLENOL) suppository 650 mg, 650 mg, Rectal, Q6H PRN, Ed Amos MD    polyethylene glycol (MIRALAX) packet 17 g, 17 g, Oral, DAILY PRN, Ed Amos MD    ondansetron (ZOFRAN ODT) tablet 4 mg, 4 mg, Oral, Q8H PRN **OR** ondansetron (ZOFRAN) injection 4 mg, 4 mg, IntraVENous, Q6H PRN, Ed Amos MD    enoxaparin (LOVENOX) injection 40 mg, 40 mg, SubCUTAneous, DAILY, Mini Amos MD, 40 mg at 07/10/21 0815    piperacillin-tazobactam (ZOSYN) 3.375 g in 0.9% sodium chloride (MBP/ADV) 100 mL MBP, 3.375 g, IntraVENous, Q8H, Mini Amos MD, Last Rate: 25 mL/hr at 07/10/21 1734, 3.375 g at 07/10/21 1734  ________________________________________________________________________  Care Plan discussed with:    Comments   Patient y    Family      RN y Care Manager     Consultant                        Multidiciplinary team rounds were held today with , nursing, pharmacist and clinical coordinator. Patient's plan of care was discussed; medications were reviewed and discharge planning was addressed. ________________________________________________________________________  Total NON critical care TIME:  35   Minutes    Total CRITICAL CARE TIME Spent:   Minutes non procedure based      Comments   >50% of visit spent in counseling and coordination of care     ________________________________________________________________________  Criselda Chacon MD     Procedures: see electronic medical records for all procedures/Xrays and details which were not copied into this note but were reviewed prior to creation of Plan. LABS:  I reviewed today's most current labs and imaging studies.   Pertinent labs include:  Recent Labs     07/10/21  0513 07/09/21  1644   WBC 5.2 7.0   HGB 9.4* 10.7*   HCT 28.6* 33.3*    293     Recent Labs     07/10/21  0513 07/09/21  1644    135*   K 3.9 5.2*    105   CO2 27 28   * 309*   BUN 12 11   CREA 0.95 0.98   CA 8.7 8.7   MG 2.2  --    ALB  --  3.0*   TBILI  --  0.5   ALT  --  26       Signed: Criselda Chacon MD

## 2021-07-11 NOTE — PROGRESS NOTES
1100Called DR Pricila Jerome about Podiatric consult as DR Doreen Bean is off today and only back tomorrow, Dr Susie Aden asked to keep pt NPO Now pt last meal was breakfast at 8:45 am    1118 Spoke dr Pricila Jerome again he stated he spoke with Dr Yuridia Rivas and he is thininking he is already schedule for surgery with dr Dorene Bean so dr Pricila Jerome signing off    Bedside shift change report given to [address] [place] [address] (oncoming nurse) by Kristina OSORIO RN (offgoing nurse). Report included the following information SBAR, Kardex and MAR.

## 2021-07-11 NOTE — PROGRESS NOTES
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 from midnight for possible procedure in a.m. Hyperkalemia, Mild   -Potassium is normal     IDDM  -Blood sugars borderline low this a.m.  -Decrease Lantus to 12 units as he will be kept n.p.o. Continue insulin lispro sliding scale. -A1c is 8.6     Prostate Ca  HLD  [place], not using CPAP  -Continue home aspirin, Lipitor, Flomax     Code Status: full   Surrogate Decision Maker: daughter, Merry Garrett, [phone], [phone]    DVT Prophylaxis: lovenox  GI Prophylaxis: not indicated  Baseline: independent     Body mass index is 22.87 kg/m². Subjective:     Chief Complaint / Reason for Physician Visit  Right foot pain is controlled. No fever. No diarrhea. Review of Systems:  Symptom Y/N Comments  Symptom Y/N Comments   Fever/Chills    Chest Pain     Poor Appetite    Edema     Cough    Abdominal Pain     Sputum    Joint Pain     SOB/DEL CASTILLO    Pruritis/Rash     Nausea/vomit    Tolerating PT/OT     Diarrhea    Tolerating Diet     Constipation    Other       Could NOT obtain due to:      Objective:     VITALS:   Last 24hrs VS reviewed since prior progress note. Most recent are:  Patient Vitals for the past 24 hrs:   Temp Pulse Resp BP SpO2   21 0748 98.1 °F (36.7 °C) 80 18 135/61 98 %   07/10/21 1950 98.6 °F (37 °C) 78 18 (!) 127/53 100 %   07/10/21 1517 98.2 °F (36.8 °C) 70 18 (!) 135/49 99 %   07/10/21 1206 97.9 °F (36.6 °C) 78 18 (!) 121/54 97 %       Intake/Output Summary (Last 24 hours) at 2021 1018  Last data filed at 2021 0745  Gross per 24 hour   Intake    Output 1500 ml   Net -1500 ml        PHYSICAL EXAM:  General: Alert, cooperative, no acute distress    EENT:  EOMI. Anicteric sclerae. MMM  Resp:  CTA bilaterally, no wheezing or rales.   No accessory muscle use  CV:  Regular  rhythm,  No edema  GI:  Soft, Non distended, Non tender.  +Bowel sounds  Neurologic:  Alert and oriented X 3, normal speech,   Psych:   Not anxious nor agitated  Skin:  Right foot wound present    Reviewed most current lab test results and cultures  YES  Reviewed most current radiology test results   YES  Review and summation of old records today    NO  Reviewed patient's current orders and MAR    YES  PMH/SH reviewed - no change compared to H&P          Current Facility-Administered Medications:     0.9% sodium chloride infusion, 75 mL/hr, IntraVENous, CONTINUOUS, Jose Antonio Coyne MD, Last Rate: 75 mL/hr at 07/10/21 1225, 75 mL/hr at 07/10/21 1225    sodium chloride (NS) flush 5-10 mL, 5-10 mL, IntraVENous, PRN, Al-Dabbagh, Elonda Moritz, MD    vancomycin (VANCOCIN) 1,000 mg in 0.9% sodium chloride 250 mL (VIAL-MATE), 1,000 mg, IntraVENous, Q12H, Al-Dabbagh, Elonda Moritz, MD, Last Rate: 125 mL/hr at 07/11/21 0935, 1,000 mg at 07/11/21 0935    ascorbic acid (vitamin C) (VITAMIN C) tablet 250 mg, 250 mg, Oral, DAILY, Mini Amos MD, 250 mg at 07/11/21 0846    aspirin chewable tablet 81 mg, 81 mg, Oral, DAILY, Mini Amos MD, 81 mg at 07/11/21 0846    atorvastatin (LIPITOR) tablet 20 mg, 20 mg, Oral, DAILY, Al-Dabbagh, Elonda Moritz, MD, 20 mg at 07/11/21 0846    B complex-vitaminC-folic acid (NEPHROCAP) cap, 1 Capsule, Oral, DAILY, Al-Dabbagh, Elonda Moritz, MD, 1 Capsule at 07/11/21 0846    cholecalciferol (VITAMIN D3) (1000 Units /25 mcg) tablet 4,000 Units, 4,000 Units, Oral, DAILY, Al-Dabbagh, Elonda Moritz, MD, 4,000 Units at 07/11/21 0845    docusate sodium (COLACE) capsule 100 mg, 100 mg, Oral, BID, Mini Amos MD, 100 mg at 07/11/21 0846    HYDROcodone-acetaminophen (NORCO) 5-325 mg per tablet 1 Tablet, 1 Tablet, Oral, Q4H PRN, Al-Dabbagh, Elonda Moritz, MD    insulin glargine (LANTUS) injection 16 Units, 16 Units, SubCUTAneous, QHS, Kala Amos MD, 16 Units at 07/10/21 2204    tamsulosin (FLOMAX) capsule 0.4 mg, 0.4 mg, Oral, DAILY, Kala Amos MD, 0.4 mg at 07/11/21 0846    therapeutic multivitamin (THERAGRAN) tablet 1 Tablet, 1 Tablet, Oral, DAILY, Kala Amos MD, 1 Tablet at 07/11/21 0845    insulin lispro (HUMALOG) injection, , SubCUTAneous, AC&HS, Kala Amos MD, 3 Units at 07/10/21 1736    glucose chewable tablet 16 g, 4 Tablet, Oral, PRN, Kala Amos MD    dextrose (D50W) injection syrg 12.5-25 g, 12.5-25 g, IntraVENous, PRN, Kala Amos MD    glucagon (GLUCAGEN) injection 1 mg, 1 mg, IntraMUSCular, PRN, Kala Amos MD    sodium chloride (NS) flush 5-40 mL, 5-40 mL, IntraVENous, Q8H, Kala Amos MD, 10 mL at 07/11/21 0508    sodium chloride (NS) flush 5-40 mL, 5-40 mL, IntraVENous, PRN, Kala Amos MD    acetaminophen (TYLENOL) tablet 650 mg, 650 mg, Oral, Q6H PRN, 650 mg at 07/09/21 2343 **OR** acetaminophen (TYLENOL) suppository 650 mg, 650 mg, Rectal, Q6H PRN, Kala Amos MD    polyethylene glycol (MIRALAX) packet 17 g, 17 g, Oral, DAILY PRN, Kala Amos MD    ondansetron (ZOFRAN ODT) tablet 4 mg, 4 mg, Oral, Q8H PRN **OR** ondansetron (ZOFRAN) injection 4 mg, 4 mg, IntraVENous, Q6H PRN, Kala Amos MD    enoxaparin (LOVENOX) injection 40 mg, 40 mg, SubCUTAneous, DAILY, Mini Amos MD, 40 mg at 07/11/21 0845    piperacillin-tazobactam (ZOSYN) 3.375 g in 0.9% sodium chloride (MBP/ADV) 100 mL MBP, 3.375 g, IntraVENous, Q8H, Mini Amos MD, Last Rate: 25 mL/hr at 07/11/21 0845, 3.375 g at 07/11/21 0845  ________________________________________________________________________  Care Plan discussed with:    Comments   Patient y    Family      RN y    Care Manager     Consultant                        Multidiciplinary team rounds were held today with , nursing, pharmacist and clinical coordinator. Patient's plan of care was discussed; medications were reviewed and discharge planning was addressed. ________________________________________________________________________  Total NON critical care TIME:  35   Minutes    Total CRITICAL CARE TIME Spent:   Minutes non procedure based      Comments   >50% of visit spent in counseling and coordination of care     ________________________________________________________________________  Alex Waller MD     Procedures: see electronic medical records for all procedures/Xrays and details which were not copied into this note but were reviewed prior to creation of Plan. LABS:  I reviewed today's most current labs and imaging studies.   Pertinent labs include:  Recent Labs     07/11/21  0520 07/10/21  0513 07/09/21  1644   WBC 4.4 5.2 7.0   HGB 9.4* 9.4* 10.7*   HCT 29.0* 28.6* 33.3*    250 293     Recent Labs     07/11/21  0520 07/10/21  0513 07/09/21  1644    137 135*   K 3.6 3.9 5.2*    106 105   CO2 26 27 28   * 271* 309*   BUN 8 12 11   CREA 0.73 0.95 0.98   CA 8.7 8.7 8.7   MG  --  2.2  --    ALB  --   --  3.0*   TBILI  --   --  0.5   ALT  --   --  26       Signed: Alex Waller MD

## 2021-07-11 NOTE — PROGRESS NOTES
Pharmacy Automatic Renal Dosing Protocol - Antimicrobials    Indication for Antimicrobials: SSTI (OM)    Current Regimen of Each Antimicrobial:  Vancomycin 1750 mg x 1 then 1000 mg Q12H (Start Date 21; Day # 3)  Zosyn  3.375g IV q 8h (start: 7/10, day 2)    Previous Antimicrobial Therapy:    Vancomycin Goal Level: 15-20 mcg/ml    Vancomycin Levels  Date Dose & Interval Measured (mcg/mL) Steady State (mcg/mL)    @ 0929 1000mg q 12h 10.0 10.8                 Date & time of next level: ~    Significant Cultures:   : blood- NGTD - prelim    Radiology / Imaging results: (X-ray, CT scan or MRI):       Labs:  Recent Labs     21  0520 07/10/21  0513 21  1644   CREA 0.73 0.95 0.98   BUN 8 12 11   WBC 4.4 5.2 7.0     Temp (24hrs), Av.2 °F (36.8 °C), Min:97.9 °F (36.6 °C), Max:98.6 °F (37 °C)      Paralysis, amputations, malnutrition:   Creatinine Clearance (mL/min) or Dialysis: 98.5 ml/in    Impression/Plan:   Continue zosyn as above  Vancomycin trough resulted subtherapuetic for OM and diabetic wound, therefore increase dose to 1250mg q 12h for  / trough ~14mcg/mL  BMP daily     Pharmacy will follow daily and adjust medications as appropriate for renal function and/or serum levels. Thank you,  Sharyle Dixons, [address]      Recommended duration of therapy  [email or web address]/[place]/[facility]/Pharmacy/Clinical%20Companion/Duration%[email or web address]. docx    Renal Dosing  http://[facility]/[place]/[facility]/Pharmacy/Clinical%20Companion/Renal%[email or web address]. pdf

## 2021-07-11 NOTE — PROGRESS NOTES
End of Shift Note    Bedside shift change report given to Chapito Zapien RN (oncoming nurse) by Adrianne Sheldon RN (offgoing nurse).   Report included the following information SBAR, Kardex, Intake/Output and MAR

## 2021-07-12 LAB
ANION GAP SERPL CALC-SCNC: 7 MMOL/L (ref 5–15)
BUN SERPL-MCNC: 9 MG/DL (ref 6–20)
BUN/CREAT SERPL: 12 (ref 12–20)
CALCIUM SERPL-MCNC: 8.8 MG/DL (ref 8.5–10.1)
CHLORIDE SERPL-SCNC: 108 MMOL/L (ref 97–108)
CO2 SERPL-SCNC: 24 MMOL/L (ref 21–32)
CREAT SERPL-MCNC: 0.75 MG/DL (ref 0.7–1.3)
ERYTHROCYTE [DISTWIDTH] IN BLOOD BY AUTOMATED COUNT: 12.8 % (ref 11.5–14.5)
GLUCOSE BLD STRIP.AUTO-MCNC: 140 MG/DL (ref 65–117)
GLUCOSE BLD STRIP.AUTO-MCNC: 228 MG/DL (ref 65–117)
GLUCOSE BLD STRIP.AUTO-MCNC: 235 MG/DL (ref 65–117)
GLUCOSE BLD STRIP.AUTO-MCNC: 95 MG/DL (ref 65–117)
GLUCOSE SERPL-MCNC: 138 MG/DL (ref 65–100)
HCT VFR BLD AUTO: 28.4 % (ref 36.6–50.3)
HGB BLD-MCNC: 9.2 G/DL (ref 12.1–17)
MCH RBC QN AUTO: 27.2 PG (ref 26–34)
MCHC RBC AUTO-ENTMCNC: 32.4 G/DL (ref 30–36.5)
MCV RBC AUTO: 84 FL (ref 80–99)
NRBC # BLD: 0 K/UL (ref 0–0.01)
NRBC BLD-RTO: 0 PER 100 WBC
PLATELET # BLD AUTO: 269 K/UL (ref 150–400)
PMV BLD AUTO: 9.2 FL (ref 8.9–12.9)
POTASSIUM SERPL-SCNC: 3.9 MMOL/L (ref 3.5–5.1)
RBC # BLD AUTO: 3.38 M/UL (ref 4.1–5.7)
SERVICE CMNT-IMP: ABNORMAL
SERVICE CMNT-IMP: NORMAL
SODIUM SERPL-SCNC: 139 MMOL/L (ref 136–145)
WBC # BLD AUTO: 3.5 K/UL (ref 4.1–11.1)

## 2021-07-12 PROCEDURE — 80048 BASIC METABOLIC PNL TOTAL CA: CPT

## 2021-07-12 PROCEDURE — 36415 COLL VENOUS BLD VENIPUNCTURE: CPT

## 2021-07-12 PROCEDURE — 74011250636 HC RX REV CODE- 250/636: Performed by: GENERAL ACUTE CARE HOSPITAL

## 2021-07-12 PROCEDURE — 74011250637 HC RX REV CODE- 250/637: Performed by: GENERAL ACUTE CARE HOSPITAL

## 2021-07-12 PROCEDURE — 74011636637 HC RX REV CODE- 636/637: Performed by: INTERNAL MEDICINE

## 2021-07-12 PROCEDURE — 74011636637 HC RX REV CODE- 636/637: Performed by: GENERAL ACUTE CARE HOSPITAL

## 2021-07-12 PROCEDURE — 2709999900 HC NON-CHARGEABLE SUPPLY

## 2021-07-12 PROCEDURE — 74011250636 HC RX REV CODE- 250/636: Performed by: INTERNAL MEDICINE

## 2021-07-12 PROCEDURE — 85027 COMPLETE CBC AUTOMATED: CPT

## 2021-07-12 PROCEDURE — 65270000029 HC RM PRIVATE

## 2021-07-12 PROCEDURE — 82962 GLUCOSE BLOOD TEST: CPT

## 2021-07-12 PROCEDURE — 74011000258 HC RX REV CODE- 258: Performed by: GENERAL ACUTE CARE HOSPITAL

## 2021-07-12 RX ADMIN — VANCOMYCIN HYDROCHLORIDE 1250 MG: 10 INJECTION, POWDER, LYOPHILIZED, FOR SOLUTION INTRAVENOUS at 11:04

## 2021-07-12 RX ADMIN — PIPERACILLIN AND TAZOBACTAM 3.38 G: 3; .375 INJECTION, POWDER, LYOPHILIZED, FOR SOLUTION INTRAVENOUS at 18:17

## 2021-07-12 RX ADMIN — INSULIN LISPRO 2 UNITS: 100 INJECTION, SOLUTION INTRAVENOUS; SUBCUTANEOUS at 23:29

## 2021-07-12 RX ADMIN — Medication 10 ML: at 14:07

## 2021-07-12 RX ADMIN — ATORVASTATIN CALCIUM 20 MG: 20 TABLET, FILM COATED ORAL at 08:33

## 2021-07-12 RX ADMIN — PIPERACILLIN AND TAZOBACTAM 3.38 G: 3; .375 INJECTION, POWDER, LYOPHILIZED, FOR SOLUTION INTRAVENOUS at 00:19

## 2021-07-12 RX ADMIN — PIPERACILLIN AND TAZOBACTAM 3.38 G: 3; .375 INJECTION, POWDER, LYOPHILIZED, FOR SOLUTION INTRAVENOUS at 08:31

## 2021-07-12 RX ADMIN — INSULIN LISPRO 4 UNITS: 100 INJECTION, SOLUTION INTRAVENOUS; SUBCUTANEOUS at 18:20

## 2021-07-12 RX ADMIN — TAMSULOSIN HYDROCHLORIDE 0.4 MG: 0.4 CAPSULE ORAL at 08:34

## 2021-07-12 RX ADMIN — VANCOMYCIN HYDROCHLORIDE 1250 MG: 10 INJECTION, POWDER, LYOPHILIZED, FOR SOLUTION INTRAVENOUS at 23:28

## 2021-07-12 RX ADMIN — Medication 5 ML: at 22:00

## 2021-07-12 RX ADMIN — INSULIN GLARGINE 12 UNITS: 100 INJECTION, SOLUTION SUBCUTANEOUS at 23:33

## 2021-07-12 RX ADMIN — Medication 10 ML: at 06:51

## 2021-07-12 RX ADMIN — INSULIN LISPRO 3 UNITS: 100 INJECTION, SOLUTION INTRAVENOUS; SUBCUTANEOUS at 08:30

## 2021-07-12 NOTE — PROGRESS NOTES
End of Shift Note    Bedside shift change report given to Marcus Cochran  (oncoming nurse) by Robert Hawthorne (offgoing nurse). Report included the following information SBAR and Kardex    Shift worked:  [id number]     Shift summary and any significant changes:    Pt has been NPO since midnight in prep for surgery in the AM    Concerns for physician to address: none   Zone phone for oncoming shift:       Activity:  Activity Level: Up with Assistance  Number times ambulated in hallways past shift: 0  Number of times OOB to chair past shift: 0    Cardiac:   Cardiac Monitoring: No           Access:   Current line(s): PIV     Genitourinary:   Urinary status: voiding    Respiratory:   O2 Device: None (Room air)  Chronic home O2 use?: NO  Incentive spirometer at bedside: NO     GI:  Last Bowel Movement Date: 07/08/21  Current diet:  DIET NPO  Passing flatus: YES  Tolerating current diet: YES       Pain Management:   Patient states pain is manageable on current regimen: YES    Skin:  Ottoniel Score: 19  Interventions: PT/OT consult    Patient Safety:  Fall Score:  Total Score: 2  Interventions: pt to call before getting OOB       Length of Stay:  Expected LOS: - - -  Actual LOS: [address] [facility]

## 2021-07-12 NOTE — PROGRESS NOTES
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 Valentin Whitaker, he will see the patient today  -Keep n.p.o. from midnight for possible procedure in a.m. Hyperkalemia, Mild   -Potassium is normal     IDDM  -Blood sugars borderline low this a.m.  -Continue Lantus at 12 units as he will be kept n.p.o. Continue insulin lispro sliding scale. -A1c is 8.6     Prostate Ca  HLD  [place], not using CPAP  -Continue home aspirin, Lipitor, Flomax     Code Status: full   Surrogate Decision Maker: daughter, Juve Bush, [phone], [phone]    I spoke to patient's sister and updated on plan of care today. DVT Prophylaxis: lovenox  GI Prophylaxis: not indicated  Baseline: independent     Body mass index is 23.21 kg/m². Subjective:     Chief Complaint / Reason for Physician Visit  Right foot pain is controlled. No diarrhea. Review of Systems:  Symptom Y/N Comments  Symptom Y/N Comments   Fever/Chills    Chest Pain     Poor Appetite    Edema     Cough    Abdominal Pain     Sputum    Joint Pain     SOB/DEL CASTILLO    Pruritis/Rash     Nausea/vomit    Tolerating PT/OT     Diarrhea    Tolerating Diet     Constipation    Other       Could NOT obtain due to:      Objective:     VITALS:   Last 24hrs VS reviewed since prior progress note.  Most recent are:  Patient Vitals for the past 24 hrs:   Temp Pulse Resp BP SpO2   21 1117 98 °F (36.7 °C) 73 18 (!) 149/70 99 %   21 0716 98.2 °F (36.8 °C) 75 18 137/61 98 %   21 0302 98.7 °F (37.1 °C) 80 20 138/62 99 %   21 0017 98.4 °F (36.9 °C) 71 20 (!) 154/61 96 %   21 1952 98.7 °F (37.1 °C) 79 20 (!) 131/53 96 %       Intake/Output Summary (Last 24 hours) at 2021 1509  Last data filed at 7/12/2021 0303  Gross per 24 hour   Intake 300 ml   Output 700 ml   Net -400 ml        PHYSICAL EXAM:  General: Alert, cooperative, no acute distress    EENT:  EOMI. Anicteric sclerae. MMM  Resp:  CTA bilaterally, no wheezing or rales.   No accessory muscle use  CV:  Regular  rhythm,  No edema  GI:  Soft, Non distended, Non tender.  +Bowel sounds  Neurologic:  Alert and oriented X 3, normal speech,   Psych:   Not anxious nor agitated  Skin:  Right foot wound present    Reviewed most current lab test results and cultures  YES  Reviewed most current radiology test results   YES  Review and summation of old records today    NO  Reviewed patient's current orders and MAR    YES  PMH/SH reviewed - no change compared to H&P          Current Facility-Administered Medications:     insulin glargine (LANTUS) injection 12 Units, 12 Units, SubCUTAneous, QHS, Jose Antonio Coyne MD, 12 Units at 07/11/21 2202    vancomycin (VANCOCIN) 1250 mg in  ml infusion, 1,250 mg, IntraVENous, Q12H, Jose Antonio Coyne MD, Last Rate: 125 mL/hr at 07/12/21 1104, 1,250 mg at 07/12/21 1104    sodium chloride (NS) flush 5-10 mL, 5-10 mL, IntraVENous, PRN, Ludivina Amos MD    ascorbic acid (vitamin C) (VITAMIN C) tablet 250 mg, 250 mg, Oral, DAILY, Mini Amos MD, 250 mg at 07/11/21 0846    aspirin chewable tablet 81 mg, 81 mg, Oral, DAILY, Mini Amos MD, 81 mg at 07/11/21 0846    atorvastatin (LIPITOR) tablet 20 mg, 20 mg, Oral, DAILY, Ludivina Amos MD, 20 mg at 07/12/21 0833    B complex-vitaminC-folic acid (NEPHROCAP) cap, 1 Capsule, Oral, DAILY, Ludivina Amos MD, 1 Capsule at 07/11/21 0846    cholecalciferol (VITAMIN D3) (1000 Units /25 mcg) tablet 4,000 Units, 4,000 Units, Oral, DAILY, Ludivina Amos MD, 4,000 Units at 07/11/21 0845    docusate sodium (COLACE) capsule 100 mg, 100 mg, Oral, BID, Mini Amos MD, 100 mg at 07/11/21 1710    HYDROcodone-acetaminophen (Randine Ped) 5-325 mg per tablet 1 Tablet, 1 Tablet, Oral, Q4H PRN, Chung Amos MD    [facility]) capsule 0.4 mg, 0.4 mg, Oral, DAILY, Chung Amos MD, 0.4 mg at 07/12/21 0834    therapeutic multivitamin (THERAGRAN) tablet 1 Tablet, 1 Tablet, Oral, DAILY, Chung Amos MD, 1 Tablet at 07/11/21 0845    insulin lispro (HUMALOG) injection, , SubCUTAneous, AC&HS, Chung Amos MD, 3 Units at 07/12/21 0830    glucose chewable tablet 16 g, 4 Tablet, Oral, PRN, Chung Amos MD    dextrose (D50W) injection syrg 12.5-25 g, 12.5-25 g, IntraVENous, PRN, Chung Amos MD    glucagon (GLUCAGEN) injection 1 mg, 1 mg, IntraMUSCular, PRN, Chung Amos MD    sodium chloride (NS) flush 5-40 mL, 5-40 mL, IntraVENous, Q8H, Chung Amos MD, 10 mL at 07/12/21 0651    sodium chloride (NS) flush 5-40 mL, 5-40 mL, IntraVENous, PRN, Chung Amos MD    acetaminophen (TYLENOL) tablet 650 mg, 650 mg, Oral, Q6H PRN, 650 mg at 07/09/21 2343 **OR** acetaminophen (TYLENOL) suppository 650 mg, 650 mg, Rectal, Q6H PRN, Chung Amos MD    polyethylene glycol (MIRALAX) packet 17 g, 17 g, Oral, DAILY PRN, Chung Amos MD    ondansetron (ZOFRAN ODT) tablet 4 mg, 4 mg, Oral, Q8H PRN **OR** ondansetron (ZOFRAN) injection 4 mg, 4 mg, IntraVENous, Q6H PRN, Chung Amos MD    enoxaparin (LOVENOX) injection 40 mg, 40 mg, SubCUTAneous, DAILY, Mnii Amos MD, 40 mg at 07/11/21 0845    piperacillin-tazobactam (ZOSYN) 3.375 g in 0.9% sodium chloride (MBP/ADV) 100 mL MBP, 3.375 g, IntraVENous, Q8H, Mini Amos MD, Last Rate: 25 mL/hr at 07/12/21 0831, 3.375 g at 07/12/21 0831  ________________________________________________________________________  Care Plan discussed with:    Comments   Patient y    Family      RN y    Care Manager     Consultant                        Multidiciplinary team rounds were held today with , nursing, pharmacist and clinical coordinator. Patient's plan of care was discussed; medications were reviewed and discharge planning was addressed. ________________________________________________________________________  Total NON critical care TIME:  35   Minutes    Total CRITICAL CARE TIME Spent:   Minutes non procedure based      Comments   >50% of visit spent in counseling and coordination of care     ________________________________________________________________________  Sergio Caballero MD     Procedures: see electronic medical records for all procedures/Xrays and details which were not copied into this note but were reviewed prior to creation of Plan. LABS:  I reviewed today's most current labs and imaging studies. Pertinent labs include:  Recent Labs     07/12/21  0312 07/11/21  0520 07/10/21  0513   WBC 3.5* 4.4 5.2   HGB 9.2* 9.4* 9.4*   HCT 28.4* 29.0* 28.6*    272 250     Recent Labs     07/12/21  0312 07/11/21  0520 07/10/21  0513 07/09/21  1644 07/09/21  1644    139 137   < > 135*   K 3.9 3.6 3.9   < > 5.2*    108 106   < > 105   CO2 24 26 27   < > 28   * 109* 271*   < > 309*   BUN 9 8 12   < > 11   CREA 0.75 0.73 0.95   < > 0.98   CA 8.8 8.7 8.7   < > 8.7   MG  --   --  2.2  --   --    ALB  --   --   --   --  3.0*   TBILI  --   --   --   --  0.5   ALT  --   --   --   --  26    < > = values in this interval not displayed.        Signed: Sergio Caballero MD

## 2021-07-13 ENCOUNTER — ANESTHESIA EVENT (OUTPATIENT)
Dept: SURGERY | Age: 76
DRG: 477 | End: 2021-07-13
Payer: MEDICARE

## 2021-07-13 ENCOUNTER — ANESTHESIA (OUTPATIENT)
Dept: SURGERY | Age: 76
DRG: 477 | End: 2021-07-13
Payer: MEDICARE

## 2021-07-13 LAB
ANION GAP SERPL CALC-SCNC: 5 MMOL/L (ref 5–15)
BUN SERPL-MCNC: 8 MG/DL (ref 6–20)
BUN/CREAT SERPL: 10 (ref 12–20)
CALCIUM SERPL-MCNC: 8.8 MG/DL (ref 8.5–10.1)
CHLORIDE SERPL-SCNC: 106 MMOL/L (ref 97–108)
CO2 SERPL-SCNC: 27 MMOL/L (ref 21–32)
CREAT SERPL-MCNC: 0.84 MG/DL (ref 0.7–1.3)
DATE LAST DOSE: ABNORMAL
GLUCOSE BLD STRIP.AUTO-MCNC: 154 MG/DL (ref 65–117)
GLUCOSE BLD STRIP.AUTO-MCNC: 158 MG/DL (ref 65–117)
GLUCOSE BLD STRIP.AUTO-MCNC: 193 MG/DL (ref 65–117)
GLUCOSE BLD STRIP.AUTO-MCNC: 203 MG/DL (ref 65–117)
GLUCOSE BLD STRIP.AUTO-MCNC: 251 MG/DL (ref 65–117)
GLUCOSE SERPL-MCNC: 231 MG/DL (ref 65–100)
POTASSIUM SERPL-SCNC: 3.9 MMOL/L (ref 3.5–5.1)
REPORTED DOSE,DOSE: ABNORMAL UNITS
REPORTED DOSE/TIME,TMG: ABNORMAL
SERVICE CMNT-IMP: ABNORMAL
SODIUM SERPL-SCNC: 138 MMOL/L (ref 136–145)
VANCOMYCIN TROUGH SERPL-MCNC: 12.9 UG/ML (ref 5–10)

## 2021-07-13 PROCEDURE — 77030031139 HC SUT VCRL2 J&J -A: Performed by: PODIATRIST

## 2021-07-13 PROCEDURE — 80202 ASSAY OF VANCOMYCIN: CPT

## 2021-07-13 PROCEDURE — 77030040361 HC SLV COMPR DVT MDII -B: Performed by: PODIATRIST

## 2021-07-13 PROCEDURE — 82962 GLUCOSE BLOOD TEST: CPT

## 2021-07-13 PROCEDURE — 87186 SC STD MICRODIL/AGAR DIL: CPT

## 2021-07-13 PROCEDURE — 2709999900 HC NON-CHARGEABLE SUPPLY: Performed by: PODIATRIST

## 2021-07-13 PROCEDURE — 0QBN0ZX EXCISION OF RIGHT METATARSAL, OPEN APPROACH, DIAGNOSTIC: ICD-10-PCS | Performed by: PODIATRIST

## 2021-07-13 PROCEDURE — 74011250636 HC RX REV CODE- 250/636: Performed by: GENERAL ACUTE CARE HOSPITAL

## 2021-07-13 PROCEDURE — 88304 TISSUE EXAM BY PATHOLOGIST: CPT

## 2021-07-13 PROCEDURE — 76060000033 HC ANESTHESIA 1 TO 1.5 HR: Performed by: PODIATRIST

## 2021-07-13 PROCEDURE — 76010000149 HC OR TIME 1 TO 1.5 HR: Performed by: PODIATRIST

## 2021-07-13 PROCEDURE — 77030006788 HC BLD SAW OSC STRY -B: Performed by: PODIATRIST

## 2021-07-13 PROCEDURE — 76210000006 HC OR PH I REC 0.5 TO 1 HR: Performed by: PODIATRIST

## 2021-07-13 PROCEDURE — 36415 COLL VENOUS BLD VENIPUNCTURE: CPT

## 2021-07-13 PROCEDURE — 87077 CULTURE AEROBIC IDENTIFY: CPT

## 2021-07-13 PROCEDURE — 74011250637 HC RX REV CODE- 250/637: Performed by: PODIATRIST

## 2021-07-13 PROCEDURE — 74011000258 HC RX REV CODE- 258: Performed by: GENERAL ACUTE CARE HOSPITAL

## 2021-07-13 PROCEDURE — 74011000250 HC RX REV CODE- 250: Performed by: NURSE ANESTHETIST, CERTIFIED REGISTERED

## 2021-07-13 PROCEDURE — 77030018836 HC SOL IRR NACL ICUM -A: Performed by: PODIATRIST

## 2021-07-13 PROCEDURE — 87205 SMEAR GRAM STAIN: CPT

## 2021-07-13 PROCEDURE — 74011250636 HC RX REV CODE- 250/636: Performed by: PODIATRIST

## 2021-07-13 PROCEDURE — 80048 BASIC METABOLIC PNL TOTAL CA: CPT

## 2021-07-13 PROCEDURE — 74011636637 HC RX REV CODE- 636/637: Performed by: PODIATRIST

## 2021-07-13 PROCEDURE — 87075 CULTR BACTERIA EXCEPT BLOOD: CPT

## 2021-07-13 PROCEDURE — 74011250636 HC RX REV CODE- 250/636: Performed by: NURSE ANESTHETIST, CERTIFIED REGISTERED

## 2021-07-13 PROCEDURE — 74011000258 HC RX REV CODE- 258: Performed by: PODIATRIST

## 2021-07-13 PROCEDURE — 74011636637 HC RX REV CODE- 636/637: Performed by: GENERAL ACUTE CARE HOSPITAL

## 2021-07-13 PROCEDURE — 88311 DECALCIFY TISSUE: CPT

## 2021-07-13 PROCEDURE — 0QBN0ZZ EXCISION OF RIGHT METATARSAL, OPEN APPROACH: ICD-10-PCS | Performed by: PODIATRIST

## 2021-07-13 PROCEDURE — 65270000029 HC RM PRIVATE

## 2021-07-13 PROCEDURE — 88307 TISSUE EXAM BY PATHOLOGIST: CPT

## 2021-07-13 PROCEDURE — 77030000032 HC CUF TRNQT ZIMM -B: Performed by: PODIATRIST

## 2021-07-13 PROCEDURE — 77030002916 HC SUT ETHLN J&J -A: Performed by: PODIATRIST

## 2021-07-13 PROCEDURE — 74011000250 HC RX REV CODE- 250: Performed by: PODIATRIST

## 2021-07-13 RX ORDER — ONDANSETRON 2 MG/ML
INJECTION INTRAMUSCULAR; INTRAVENOUS AS NEEDED
Status: DISCONTINUED | OUTPATIENT
Start: 2021-07-13 | End: 2021-07-13 | Stop reason: HOSPADM

## 2021-07-13 RX ORDER — SODIUM CHLORIDE, SODIUM LACTATE, POTASSIUM CHLORIDE, CALCIUM CHLORIDE 600; 310; 30; 20 MG/100ML; MG/100ML; MG/100ML; MG/100ML
25 INJECTION, SOLUTION INTRAVENOUS CONTINUOUS
Status: DISCONTINUED | OUTPATIENT
Start: 2021-07-13 | End: 2021-07-13 | Stop reason: HOSPADM

## 2021-07-13 RX ORDER — SODIUM CHLORIDE 0.9 % (FLUSH) 0.9 %
5-40 SYRINGE (ML) INJECTION AS NEEDED
Status: DISCONTINUED | OUTPATIENT
Start: 2021-07-13 | End: 2021-07-17 | Stop reason: SDUPTHER

## 2021-07-13 RX ORDER — GLYCOPYRROLATE 0.2 MG/ML
INJECTION INTRAMUSCULAR; INTRAVENOUS AS NEEDED
Status: DISCONTINUED | OUTPATIENT
Start: 2021-07-13 | End: 2021-07-13 | Stop reason: HOSPADM

## 2021-07-13 RX ORDER — SODIUM CHLORIDE 0.9 % (FLUSH) 0.9 %
5-40 SYRINGE (ML) INJECTION AS NEEDED
Status: DISCONTINUED | OUTPATIENT
Start: 2021-07-13 | End: 2021-07-13 | Stop reason: HOSPADM

## 2021-07-13 RX ORDER — PHENYLEPHRINE HCL IN 0.9% NACL 0.4MG/10ML
SYRINGE (ML) INTRAVENOUS AS NEEDED
Status: DISCONTINUED | OUTPATIENT
Start: 2021-07-13 | End: 2021-07-13 | Stop reason: HOSPADM

## 2021-07-13 RX ORDER — FENTANYL CITRATE 50 UG/ML
25 INJECTION, SOLUTION INTRAMUSCULAR; INTRAVENOUS
Status: DISCONTINUED | OUTPATIENT
Start: 2021-07-13 | End: 2021-07-13 | Stop reason: HOSPADM

## 2021-07-13 RX ORDER — LIDOCAINE HYDROCHLORIDE 10 MG/ML
0.1 INJECTION, SOLUTION EPIDURAL; INFILTRATION; INTRACAUDAL; PERINEURAL AS NEEDED
Status: DISCONTINUED | OUTPATIENT
Start: 2021-07-13 | End: 2021-07-20 | Stop reason: HOSPADM

## 2021-07-13 RX ORDER — SODIUM CHLORIDE 0.9 % (FLUSH) 0.9 %
5-40 SYRINGE (ML) INJECTION EVERY 8 HOURS
Status: DISCONTINUED | OUTPATIENT
Start: 2021-07-13 | End: 2021-07-13 | Stop reason: HOSPADM

## 2021-07-13 RX ORDER — INSULIN LISPRO 100 [IU]/ML
INJECTION, SOLUTION INTRAVENOUS; SUBCUTANEOUS
Status: DISPENSED
Start: 2021-07-13 | End: 2021-07-13

## 2021-07-13 RX ORDER — PROPOFOL 10 MG/ML
INJECTION, EMULSION INTRAVENOUS
Status: DISCONTINUED | OUTPATIENT
Start: 2021-07-13 | End: 2021-07-13 | Stop reason: HOSPADM

## 2021-07-13 RX ORDER — SODIUM CHLORIDE, SODIUM LACTATE, POTASSIUM CHLORIDE, CALCIUM CHLORIDE 600; 310; 30; 20 MG/100ML; MG/100ML; MG/100ML; MG/100ML
INJECTION, SOLUTION INTRAVENOUS
Status: DISCONTINUED | OUTPATIENT
Start: 2021-07-13 | End: 2021-07-13 | Stop reason: HOSPADM

## 2021-07-13 RX ORDER — BUPIVACAINE HYDROCHLORIDE 5 MG/ML
INJECTION, SOLUTION EPIDURAL; INTRACAUDAL AS NEEDED
Status: DISCONTINUED | OUTPATIENT
Start: 2021-07-13 | End: 2021-07-13 | Stop reason: HOSPADM

## 2021-07-13 RX ORDER — DIPHENHYDRAMINE HYDROCHLORIDE 50 MG/ML
12.5 INJECTION, SOLUTION INTRAMUSCULAR; INTRAVENOUS AS NEEDED
Status: DISCONTINUED | OUTPATIENT
Start: 2021-07-13 | End: 2021-07-13 | Stop reason: HOSPADM

## 2021-07-13 RX ORDER — HYDROMORPHONE HYDROCHLORIDE 1 MG/ML
0.2 INJECTION, SOLUTION INTRAMUSCULAR; INTRAVENOUS; SUBCUTANEOUS
Status: DISCONTINUED | OUTPATIENT
Start: 2021-07-13 | End: 2021-07-13 | Stop reason: HOSPADM

## 2021-07-13 RX ORDER — SODIUM CHLORIDE 0.9 % (FLUSH) 0.9 %
5-40 SYRINGE (ML) INJECTION EVERY 8 HOURS
Status: DISCONTINUED | OUTPATIENT
Start: 2021-07-13 | End: 2021-07-17 | Stop reason: SDUPTHER

## 2021-07-13 RX ADMIN — Medication 40 MCG: at 08:21

## 2021-07-13 RX ADMIN — ONDANSETRON HYDROCHLORIDE 4 MG: 2 INJECTION, SOLUTION INTRAMUSCULAR; INTRAVENOUS at 07:41

## 2021-07-13 RX ADMIN — Medication 10 ML: at 13:07

## 2021-07-13 RX ADMIN — INSULIN LISPRO 4 UNITS: 100 INJECTION, SOLUTION INTRAVENOUS; SUBCUTANEOUS at 22:00

## 2021-07-13 RX ADMIN — Medication 40 MCG: at 08:04

## 2021-07-13 RX ADMIN — OXYCODONE HYDROCHLORIDE AND ACETAMINOPHEN 250 MG: 500 TABLET ORAL at 11:07

## 2021-07-13 RX ADMIN — GLYCOPYRROLATE 0.2 MG: 0.2 INJECTION, SOLUTION INTRAMUSCULAR; INTRAVENOUS at 07:41

## 2021-07-13 RX ADMIN — ASPIRIN 81 MG: 81 TABLET, CHEWABLE ORAL at 11:07

## 2021-07-13 RX ADMIN — INSULIN LISPRO 3 UNITS: 100 INJECTION, SOLUTION INTRAVENOUS; SUBCUTANEOUS at 17:05

## 2021-07-13 RX ADMIN — SODIUM CHLORIDE, POTASSIUM CHLORIDE, SODIUM LACTATE AND CALCIUM CHLORIDE: 600; 310; 30; 20 INJECTION, SOLUTION INTRAVENOUS at 07:26

## 2021-07-13 RX ADMIN — VANCOMYCIN HYDROCHLORIDE 1250 MG: 10 INJECTION, POWDER, LYOPHILIZED, FOR SOLUTION INTRAVENOUS at 23:19

## 2021-07-13 RX ADMIN — PROPOFOL 50 MCG/KG/MIN: 10 INJECTION, EMULSION INTRAVENOUS at 07:29

## 2021-07-13 RX ADMIN — Medication 40 MCG: at 08:12

## 2021-07-13 RX ADMIN — Medication 40 MCG: at 07:37

## 2021-07-13 RX ADMIN — Medication 1 AMPULE: at 21:53

## 2021-07-13 RX ADMIN — INSULIN LISPRO 3 UNITS: 100 INJECTION, SOLUTION INTRAVENOUS; SUBCUTANEOUS at 13:04

## 2021-07-13 RX ADMIN — ENOXAPARIN SODIUM 40 MG: 40 INJECTION SUBCUTANEOUS at 11:06

## 2021-07-13 RX ADMIN — Medication 40 MCG: at 08:17

## 2021-07-13 RX ADMIN — Medication 40 MCG: at 07:43

## 2021-07-13 RX ADMIN — DOCUSATE SODIUM 100 MG: 100 CAPSULE, LIQUID FILLED ORAL at 17:04

## 2021-07-13 RX ADMIN — Medication 1 AMPULE: at 11:08

## 2021-07-13 RX ADMIN — TAMSULOSIN HYDROCHLORIDE 0.4 MG: 0.4 CAPSULE ORAL at 11:07

## 2021-07-13 RX ADMIN — PIPERACILLIN AND TAZOBACTAM 3.38 G: 3; .375 INJECTION, POWDER, LYOPHILIZED, FOR SOLUTION INTRAVENOUS at 11:08

## 2021-07-13 RX ADMIN — PIPERACILLIN AND TAZOBACTAM 3.38 G: 3; .375 INJECTION, POWDER, LYOPHILIZED, FOR SOLUTION INTRAVENOUS at 02:40

## 2021-07-13 RX ADMIN — Medication 40 MCG: at 08:08

## 2021-07-13 RX ADMIN — Medication 40 MCG: at 07:34

## 2021-07-13 RX ADMIN — Medication 1000 UNITS: at 11:06

## 2021-07-13 RX ADMIN — PIPERACILLIN AND TAZOBACTAM 3.38 G: 3; .375 INJECTION, POWDER, LYOPHILIZED, FOR SOLUTION INTRAVENOUS at 17:37

## 2021-07-13 RX ADMIN — INSULIN GLARGINE 12 UNITS: 100 INJECTION, SOLUTION SUBCUTANEOUS at 21:53

## 2021-07-13 RX ADMIN — VANCOMYCIN HYDROCHLORIDE 1250 MG: 10 INJECTION, POWDER, LYOPHILIZED, FOR SOLUTION INTRAVENOUS at 11:58

## 2021-07-13 RX ADMIN — NEPHROCAP 1 CAPSULE: 1 CAP ORAL at 11:07

## 2021-07-13 RX ADMIN — Medication 40 MCG: at 08:00

## 2021-07-13 RX ADMIN — DOCUSATE SODIUM 100 MG: 100 CAPSULE, LIQUID FILLED ORAL at 11:08

## 2021-07-13 RX ADMIN — Medication 10 ML: at 21:52

## 2021-07-13 RX ADMIN — THERA TABS 1 TABLET: TAB at 11:07

## 2021-07-13 RX ADMIN — Medication 40 MCG: at 07:40

## 2021-07-13 RX ADMIN — Medication 10 ML: at 21:53

## 2021-07-13 RX ADMIN — ATORVASTATIN CALCIUM 20 MG: 20 TABLET, FILM COATED ORAL at 11:07

## 2021-07-13 RX ADMIN — INSULIN LISPRO 4 UNITS: 100 INJECTION, SOLUTION INTRAVENOUS; SUBCUTANEOUS at 07:22

## 2021-07-13 RX ADMIN — Medication 10 ML: at 13:06

## 2021-07-13 NOTE — PERIOP NOTES
Patient: Dianelys Diane MRN: [id number]  SSN: [id number]   YOB: 1945  Age: 67 y.o. Sex: male     Patient is status post Procedure(s):  DEBRIDEMENT OF BONE RIGHT FOURTH METATARSAL, BONE BIOPSY RIGHT THIRD METATARSAL, BONE BIOPSY RIGHT FIFTH METATARSAL.     Surgeon(s) and Role:     * Dave Hager DPM - Primary    Local/Dose/Irrigation:  26ml marcaine 0.5% plain                  Peripheral IV 07/10/21 Posterior;Right Wrist (Active)   Site Assessment Clean, dry, & intact 07/13/21 0030   Phlebitis Assessment 0 07/13/21 0030   Infiltration Assessment 0 07/13/21 0030   Dressing Status Clean, dry, & intact 07/13/21 0030   Dressing Type Tape;Transparent 07/13/21 0030   Hub Color/Line Status Blue;Capped;Flushed 07/13/21 0030                           Dressing/Packing:  Wound Anterior;Right-Dressing/Treatment: Foam (07/13/21 0030)  Incision 07/13/21 Foot Right-Dressing/Treatment: Non-adherent;Gauze dressing/dressing sponge (kerlix, niyah, coban) (07/13/21 0700)    Splint/Cast:  ]
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
## 2021-07-13 NOTE — ANESTHESIA POSTPROCEDURE EVALUATION
Procedure(s):  DEBRIDEMENT OF BONE RIGHT FOURTH METATARSAL, BONE BIOPSY RIGHT THIRD METATARSAL, BONE BIOPSY RIGHT FIFTH METATARSAL. regional, MAC    Anesthesia Post Evaluation        Patient location during evaluation: PACU  Note status: Adequate. Level of consciousness: responsive to verbal stimuli and sleepy but conscious  Pain management: satisfactory to patient  Airway patency: patent  Anesthetic complications: no  Cardiovascular status: acceptable  Respiratory status: acceptable  Hydration status: acceptable  Comments: +Post-Anesthesia Evaluation and Assessment    Patient: Jessica Rosas MRN: [id number]  SSN: [id number]   YOB: 1945  Age: 67 y.o. Sex: male      Cardiovascular Function/Vital Signs    /67   Pulse 79   Temp 36.6 °C (97.9 °F)   Resp 14   Ht 6' 2\" (1.88 m)   Wt 82 kg (180 lb 12.8 oz)   SpO2 98%   BMI 23.21 kg/m²     Patient is status post Procedure(s):  DEBRIDEMENT OF BONE RIGHT FOURTH METATARSAL, BONE BIOPSY RIGHT THIRD METATARSAL, BONE BIOPSY RIGHT FIFTH METATARSAL. Nausea/Vomiting: Controlled. Postoperative hydration reviewed and adequate. Pain:  Pain Scale 1: Numeric (0 - 10) (07/13/21 0900)  Pain Intensity 1: 0 (07/13/21 0900)   Managed. Neurological Status:   Neuro (WDL): Within Defined Limits (07/13/21 0841)   At baseline. Mental Status and Level of Consciousness: Arousable. Pulmonary Status:   O2 Device: None (Room air) (07/13/21 0900)   Adequate oxygenation and airway patent. Complications related to anesthesia: None    Post-anesthesia assessment completed. No concerns. Signed By: Heather Hamilton MD    7/13/2021  Post anesthesia nausea and vomiting:  controlled      INITIAL Post-op Vital signs:   Vitals Value Taken Time   /67 07/13/21 0900   Temp 36.6 °C (97.9 °F) 07/13/21 0841   Pulse 77 07/13/21 0904   Resp 11 07/13/21 0904   SpO2 97 % 07/13/21 0904   Vitals shown include unvalidated device data.

## 2021-07-13 NOTE — PROGRESS NOTES
OCCUPATIONAL THERAPY: OT order received and noted patient s/p surgery this am \"DEBRIDEMENT OF BONE RIGHT FOURTH METATARSAL, BONE BIOPSY RIGHT THIRD METATARSAL, BONE BIOPSY RIGHT FIFTH METATARSAL. \" with 2nd OT consult in with start time for 7/14/21 at 0000. OT to defer eval until tomorrow.   Ad Figueroa, OTR/L

## 2021-07-13 NOTE — PROGRESS NOTES
Hospitalist Progress Note    NAME: Diana Carrasco   :  1945   MRN:  [id number]       Assessment / Plan:  Chronic non healing diabetic wound of the R foot, worsening, failed outpt ABx   Sepsis   MRI positive for osteomyelitis. Continue empiric vancomycin and Zosyn  He is status post debridement now post op day 0. We will continue to follow patient perioperatively. Hyperkalemia,Mild. corrected   IDDM  Blood sugars borderline low this a.m. Continue Lantus at 12 units as he will be kept n.p.o. Continue insulin lispro sliding scale. Prostate Ca  HLD  [place], not using CPAP  Continue home aspirin, Lipitor, Flomax  Code Status: full   Surrogate Decision Maker: daughter, Victor Hugo Kilgore, [phone], [phone]  I spoke to patient's sister and updated on plan of care today. DVT Prophylaxis: lovenox  GI Prophylaxis: not indicated  Baseline: independent   Body mass index is 23.21 kg/m². Subjective:     Chief Complaint / Reason for Physician Visit  Patient was seen postop and dressing seen no leakage or bleeding seen. Patient said that his pain is controlled as well. Review of Systems:  Symptom Y/N Comments  Symptom Y/N Comments   Fever/Chills n   Chest Pain n    Poor Appetite    Edema     Cough    Abdominal Pain n    Sputum    Joint Pain     SOB/DEL CASTILLO n   Pruritis/Rash     Nausea/vomit    Tolerating PT/OT     Diarrhea    Tolerating Diet     Constipation    Other       Could NOT obtain due to:      Objective:     VITALS:   Last 24hrs VS reviewed since prior progress note.  Most recent are:  Patient Vitals for the past 24 hrs:   Temp Pulse Resp BP SpO2   21 0712 99 °F (37.2 °C) 73 17 (!) 174/82 95 %   21 0345 98 °F (36.7 °C) 72 18 (!) 162/74 97 %   21 0129    (!) 150/68    21 2326 97.9 °F (36.6 °C) 75 18 (!) 170/68 98 %   21 1949 98.4 °F (36.9 °C) 81 18 135/63 97 %   21 1635 98 °F (36.7 °C) 80 18 (!) 141/95 97 %   21 1117 98 °F (36.7 °C) 73 18 (!) 149/70 99 % Intake/Output Summary (Last 24 hours) at 7/13/2021 0834  Last data filed at 7/13/2021 0830  Gross per 24 hour   Intake 200 ml   Output 310 ml   Net -110 ml        PHYSICAL EXAM:  General: Alert, cooperative, no acute distress    EENT:  EOMI. Anicteric sclerae. MMM  Resp:  CTA bilaterally, no wheezing or rales. No accessory muscle use  CV:  Regular  rhythm,  No edema  GI:  Soft, Non distended, Non tender.  +Bowel sounds  Neurologic:  Alert and oriented X 3, normal speech,   Psych:   Not anxious nor agitated  Skin:  Right foot wound present  Right foot dressed very well postoperatively. Reviewed most current lab test results and cultures  YES  Reviewed most current radiology test results   YES  Review and summation of old records today    NO  Reviewed patient's current orders and MAR    YES  PMH/SH reviewed - no change compared to H&P          Current Facility-Administered Medications:     Vancomycin - please draw trough 7/13 prior to 1100 dose.   Thank you!, 1 Each, Other, ONCE, Kim Coyne MD    lactated Ringers infusion, 25 mL/hr, IntraVENous, CONTINUOUS, Shayy Dillon MD    sodium chloride (NS) flush 5-40 mL, 5-40 mL, IntraVENous, Q8H, Shayy Dillon MD    sodium chloride (NS) flush 5-40 mL, 5-40 mL, IntraVENous, PRN, Shayy Dillon MD    fentaNYL citrate (PF) injection 25 mcg, 25 mcg, IntraVENous, MultipleSantana Prescilla Shallow, MD    HYDROmorphone (DILAUDID) injection 0.2 mg, 0.2 mg, IntraVENous, MultipleSantana Prescilla Shallow, MD    diphenhydrAMINE (BENADRYL) injection 12.5 mg, 12.5 mg, IntraVENous, PRN, Shayy Dillon MD    meperidine (DEMEROL) injection 12.5 mg, 12.5 mg, IntraVENous, Q5MIN PRN, Chaka Garcia MD    insulin lispro (HUMALOG) 100 unit/mL injection, , , ,     bupivacaine (PF) (MARCAINE) 0.5 % (5 mg/mL) injection, , , PRN, Ish Hager DPM, 26 mL at 07/13/21 0743    insulin glargine (LANTUS) injection 12 Units, 12 Units, SubCUTAneous, QHS, [place], Prakash Minor MD, 12 Units at 07/12/21 2333    vancomycin (VANCOCIN) 1250 mg in  ml infusion, 1,250 mg, IntraVENous, Q12H, Jose Antonio Coyne MD, Last Rate: 125 mL/hr at 07/12/21 2328, 1,250 mg at 07/12/21 2328    sodium chloride (NS) flush 5-10 mL, 5-10 mL, IntraVENous, PRN, David Amos MD    ascorbic acid (vitamin C) (VITAMIN C) tablet 250 mg, 250 mg, Oral, DAILY, Mini Amos MD, 250 mg at 07/11/21 0846    aspirin chewable tablet 81 mg, 81 mg, Oral, DAILY, Mini Amos MD, 81 mg at 07/11/21 0846    atorvastatin (LIPITOR) tablet 20 mg, 20 mg, Oral, DAILY, David Amos MD, 20 mg at 07/12/21 0833    B complex-vitaminC-folic acid (NEPHROCAP) cap, 1 Capsule, Oral, DAILY, David Amos MD, 1 Capsule at 07/11/21 0846    cholecalciferol (VITAMIN D3) (1000 Units /25 mcg) tablet 4,000 Units, 4,000 Units, Oral, DAILY, David Amos MD, 4,000 Units at 07/11/21 0845    docusate sodium (COLACE) capsule 100 mg, 100 mg, Oral, BID, Mini Amos MD, 100 mg at 07/11/21 1710    HYDROcodone-acetaminophen (NORCO) 5-325 mg per tablet 1 Tablet, 1 Tablet, Oral, Q4H PRN, David Amos MD    tamsulosin (FLOMAX) capsule 0.4 mg, 0.4 mg, Oral, DAILY, Mini Amos MD, 0.4 mg at 07/12/21 0834    therapeutic multivitamin (THERAGRAN) tablet 1 Tablet, 1 Tablet, Oral, DAILY, David Amos MD, 1 Tablet at 07/11/21 0845    insulin lispro (HUMALOG) injection, , SubCUTAneous, AC&HS, David Amos MD, 4 Units at 07/13/21 0722    glucose chewable tablet 16 g, 4 Tablet, Oral, PRN, David Amos MD    dextrose (D50W) injection syrg 12.5-25 g, 12.5-25 g, IntraVENous, PRN, David Amos MD    glucagon (GLUCAGEN) injection 1 mg, 1 mg, IntraMUSCular, PRN, David Amos MD    sodium chloride (NS) flush 5-40 mL, 5-40 mL, IntraVENous, Q8H, Mini Amos MD, 5 mL at 07/12/21 2200    sodium chloride (NS) flush 5-40 mL, 5-40 mL, IntraVENous, PRN, Liza Amos MD    acetaminophen (TYLENOL) tablet 650 mg, 650 mg, Oral, Q6H PRN, 650 mg at 07/09/21 2343 **OR** acetaminophen (TYLENOL) suppository 650 mg, 650 mg, Rectal, Q6H PRN, Liza Amos MD    polyethylene glycol (MIRALAX) packet 17 g, 17 g, Oral, DAILY PRN, Liza Amos MD    ondansetron (ZOFRAN ODT) tablet 4 mg, 4 mg, Oral, Q8H PRN **OR** ondansetron (ZOFRAN) injection 4 mg, 4 mg, IntraVENous, Q6H PRN, Liza Amos MD    enoxaparin (LOVENOX) injection 40 mg, 40 mg, SubCUTAneous, DAILY, Mini Amos MD, 40 mg at 07/11/21 0845    piperacillin-tazobactam (ZOSYN) 3.375 g in 0.9% sodium chloride (MBP/ADV) 100 mL MBP, 3.375 g, IntraVENous, Q8H, Mini Amos MD, Last Rate: 25 mL/hr at 07/13/21 0240, 3.375 g at 07/13/21 0240    Facility-Administered Medications Ordered in Other Encounters:     lactated Ringers infusion, , IntraVENous, CONTINUOUS, Stef Abel CRNA, New Bag at 07/13/21 0726    propofoL (DIPRIVAN) 10 mg/mL injection, , IntraVENous, CONTINUOUS, Stef Abel, CRNA, Stopped at 07/13/21 6578    glycopyrrolate (ROBINUL) injection, , IntraVENous, PRN, Stef Abel, CRNA, 0.2 mg at 07/13/21 0741    ondansetron (ZOFRAN) injection, , IntraVENous, PRN, Stef Abel, CRNA, 4 mg at 07/13/21 0741    PHENYLephrine (NEOSYNEPHRINE) in NS syringe, , IntraVENous, PRN, Stef Abel, CRNA, 40 mcg at 07/13/21 7061  ________________________________________________________________________  Care Plan discussed with:    Comments   Patient y    Family      RN y    Care Manager     Consultant                        Multidiciplinary team rounds were held today with , nursing, pharmacist and clinical coordinator. Patient's plan of care was discussed; medications were reviewed and discharge planning was addressed.      ________________________________________________________________________  Total NON critical care TIME: 35   Minutes    Total CRITICAL CARE TIME Spent:   Minutes non procedure based      Comments   >50% of visit spent in counseling and coordination of care y    ________________________________________________________________________  Kendra Zuniga MD     Procedures: see electronic medical records for all procedures/Xrays and details which were not copied into this note but were reviewed prior to creation of Plan. LABS:  I reviewed today's most current labs and imaging studies. Pertinent labs include:  Recent Labs     07/12/21 0312 07/11/21  0520   WBC 3.5* 4.4   HGB 9.2* 9.4*   HCT 28.4* 29.0*    272     Recent Labs     07/13/21  0453 07/12/21 0312 07/11/21  0520    139 139   K 3.9 3.9 3.6    108 108   CO2 27 24 26   * 138* 109*   BUN 8 9 8   CREA 0.84 0.75 0.73   CA 8.8 8.8 8.7       Signed:  Kendra Zuniga MD

## 2021-07-13 NOTE — PROGRESS NOTES
Bedside and Verbal shift change report given to [facility] Jacquie (oncoming nurse) by Gregorio Benedict (offgoing nurse). Report included the following information SBAR, Kardex, Intake/Output, MAR and Recent Results.

## 2021-07-13 NOTE — PROGRESS NOTES
End of Shift Note    Bedside shift change report given to [place], RN (oncoming nurse) by Geraldo Doran (offgoing nurse). Report included the following information SBAR, Kardex, Intake/Output, MAR, Accordion, Recent Results and Med Rec Status    Shift worked:  night     Shift summary and any significant changes:     patient NPO after midnight. Consent signed     Concerns for physician to address:  none     Zone phone for oncoming shift:   [phone]       Activity:  Activity Level: Up with Assistance  Number times ambulated in hallways past shift: 0  Number of times OOB to chair past shift: 0    Cardiac:   Cardiac Monitoring: No           Access:   Current line(s): PIV     Genitourinary:   Urinary status: voiding    Respiratory:   O2 Device: None (Room air)  Chronic home O2 use?: NO  Incentive spirometer at bedside: NO     GI:  Last Bowel Movement Date: 07/12/21  Current diet:  DIET NPO  Passing flatus: YES  Tolerating current diet: YES       Pain Management:   Patient states pain is manageable on current regimen: YES    Skin:  Ottoniel Score: 19  Interventions: float heels and increase time out of bed    Patient Safety:  Fall Score:  Total Score: 3  Interventions: assistive device (walker, cane, etc), gripper socks and pt to call before getting OOB  High Fall Risk: Yes    Length of Stay:  Expected LOS: 3d 12h  Actual LOS: [address]

## 2021-07-13 NOTE — PROGRESS NOTES
Problem: Diabetes Self-Management  Goal: *Disease process and treatment process  Description: Define diabetes and identify own type of diabetes; list 3 options for treating diabetes. Outcome: Progressing Towards Goal  Goal: *Incorporating nutritional management into lifestyle  Description: Describe effect of type, amount and timing of food on blood glucose; list 3 methods for planning meals. Outcome: Progressing Towards Goal  Goal: *Incorporating physical activity into lifestyle  Description: State effect of exercise on blood glucose levels. Outcome: Progressing Towards Goal  Goal: *Developing strategies to promote health/change behavior  Description: Define the ABC's of diabetes; identify appropriate screenings, schedule and personal plan for screenings. Outcome: Progressing Towards Goal  Goal: *Using medications safely  Description: State effect of diabetes medications on diabetes; name diabetes medication taking, action and side effects. Outcome: Progressing Towards Goal  Goal: *Monitoring blood glucose, interpreting and using results  Description: Identify recommended blood glucose targets  and personal targets. Outcome: Progressing Towards Goal  Goal: *Prevention, detection, treatment of acute complications  Description: List symptoms of hyper- and hypoglycemia; describe how to treat low blood sugar and actions for lowering  high blood glucose level. Outcome: Progressing Towards Goal  Goal: *Prevention, detection and treatment of chronic complications  Description: Define the natural course of diabetes and describe the relationship of blood glucose levels to long term complications of diabetes.   Outcome: Progressing Towards Goal  Goal: *Developing strategies to address psychosocial issues  Description: Describe feelings about living with diabetes; identify support needed and support network  Outcome: Progressing Towards Goal  Goal: *Insulin pump training  Outcome: Progressing Towards Goal  Goal: *Sick day guidelines  Outcome: Progressing Towards Goal  Goal: *Patient Specific Goal (EDIT GOAL, INSERT TEXT)  Outcome: Progressing Towards Goal     Problem: Patient Education: Go to Patient Education Activity  Goal: Patient/Family Education  Outcome: Progressing Towards Goal     Problem: Falls - Risk of  Goal: *Absence of Falls  Description: Document Star Santos Fall Risk and appropriate interventions in the flowsheet.   Outcome: Progressing Towards Goal  Note: Fall Risk Interventions:  Mobility Interventions: Communicate number of staff needed for ambulation/transfer         Medication Interventions: Patient to call before getting OOB    Elimination Interventions: Patient to call for help with toileting needs              Problem: Patient Education: Go to Patient Education Activity  Goal: Patient/Family Education  Outcome: Progressing Towards Goal

## 2021-07-13 NOTE — PERIOP NOTES
[address] from Operating Room to PACU    Report received from [facility]  and Jasmyn Lopez regarding Jan Del Toro. Surgeon(s):  Jennifer Hager DPM  And Procedure(s) (LRB):  DEBRIDEMENT OF BONE RIGHT FOURTH METATARSAL, BONE BIOPSY RIGHT THIRD METATARSAL, BONE BIOPSY RIGHT FIFTH METATARSAL (Right)  confirmed   with allergies and dressings discussed. Anesthesia type, drugs, patient history, complications, estimated blood loss, vital signs, intake and output, and lines and temperature were reviewed. [id number] TRANSFER - OUT REPORT:    Verbal report given to [address] RN(name) on Jan Del Toro  being transferred to Ortho(unit) for routine post - op       Report consisted of patients Situation, Background, Assessment and   Recommendations(SBAR). Information from the following report(s) SBAR, Kardex, OR Summary and MAR was reviewed with the receiving nurse. Lines:   Peripheral IV 07/10/21 Posterior;Right Wrist (Active)   Site Assessment Clean, dry, & intact 07/13/21 0841   Phlebitis Assessment 0 07/13/21 0841   Infiltration Assessment 0 07/13/21 0841   Dressing Status Clean, dry, & intact 07/13/21 0841   Dressing Type Transparent;Tape 07/13/21 0841   Hub Color/Line Status Blue; Infusing 07/13/21 0841        Opportunity for questions and clarification was provided.       Patient transported with:   [employer]

## 2021-07-13 NOTE — ANESTHESIA PREPROCEDURE EVALUATION
Anesthetic History               Review of Systems / Medical History  Patient summary reviewed, nursing notes reviewed and pertinent labs reviewed    Pulmonary        Sleep apnea: No treatment        Comments: Former Smoker - Quit 2006 - 35 pack years   Neuro/Psych             Comments: Cervical radiculopathy due to degenerative joint disease of spine  Cerebral aneurysm without rupture, Left ICA    Diabetic peripheral neuropathy     Benign essential tremor syndrome    Carpal tunnel syndrome of right wrist    Cerebral microvascular disease    Tension vascular headache   Cardiovascular    Hypertension              Exercise tolerance: >4 METS  Comments: Bilateral carotid artery stenosis    ECG (6/13/21):  Normal sinus rhythm   Left anterior fascicular block   When compared with ECG of 23-APR-2019 10:38,   FL interval has decreased    GI/Hepatic/Renal           Liver disease     Endo/Other    Diabetes: poorly controlled, type 1, using insulin  Hypothyroidism  Arthritis, cancer and anemia    Comments: H/O Prostate Cancer s/p hormonal treatment + XRT (2015)  Leukopenia (WBC = 3.5 on 7/12/21)  Sickle cell trait  Other Findings   Comments: Osteomyelitis of right foot  Sepsis    Vitamin D deficiency         Physical Exam    Airway  Mallampati: I  TM Distance: > 6 cm  Neck ROM: normal range of motion   Mouth opening: Normal     Cardiovascular  Regular rate and rhythm,  S1 and S2 normal,  no murmur, click, rub, or gallop             Dental    Dentition: Full upper dentures  Comments: Multiple missing lower teeth, none loose.    Pulmonary  Breath sounds clear to auscultation               Abdominal  GI exam deferred       Other Findings            Anesthetic Plan    ASA: 3  Anesthesia type: MAC and total IV anesthesia          Induction: Intravenous  Anesthetic plan and risks discussed with: Patient

## 2021-07-13 NOTE — PROGRESS NOTES
Bedside shift change report given to Caroline Mclean RN (oncoming nurse) by Christopher Can RN (offgoing nurse). Report included the following information SBAR, Kardex, ED Summary, Procedure Summary, Intake/Output, MAR, Accordion, Recent Results and Med Rec Status.

## 2021-07-13 NOTE — OP NOTES
[address]  OPERATIVE REPORT    Name:  Luca Hastings  MR#:  [id number]  :  1945  ACCOUNT #:  [de-identified]  DATE OF SERVICE:  2021    PREOPERATIVE DIAGNOSIS:  Osteomyelitis, right foot. POSTOPERATIVE DIAGNOSIS:  Osteomyelitis, right foot. PROCEDURE PERFORMED:  1. Debridement of bone, right fourth metatarsal.  2.  Bone biopsy, right third metatarsal.  3.  Bone biopsy, right fifth metatarsal.    SURGEON:  Lucrecia Hager DPM    ASSISTANT:  None. ANESTHESIA:  IV sedation with right ankle block. COMPLICATIONS:  None. SPECIMENS REMOVED:  1. Osteomyelitis, right fourth metatarsal.  2.  Bone margin, right fourth metatarsal.  3.  Bone biopsy, right third metatarsal.  4.  Bone biopsy, right fifth metatarsal.  5.  Aerobic and anaerobic cultures, right third metatarsal.  6.  Aerobic and anaerobic cultures, right fourth metatarsal.  7.  Aerobic and anaerobic cultures, right fifth metatarsal.    IMPLANTS:  None. ESTIMATED BLOOD LOSS:  Minimal.    PROCEDURE DETAIL:  The patient was brought into the operating room and placed supine upon the OR table. After administration of IV sedation, I injected the right ankle with 26 mL of 0.5% plain Marcaine. The foot was prepped and draped in the usual sterile technique. The foot was elevated for 3 minutes before the inflation of a tourniquet around the right ankle and during this wait time, a time-out was observed. Attention was directed to the right foot. A linear incision was made beginning at the dorsal remainder of the right fourth metatarsal from proximal to distal and deepened through the subcutaneous tissue down to bone. A small rongeur was used to take samples of the distal fourth metatarsal for aerobic and anaerobic cultures.   A power microsagittal saw was used to resect the end of the fourth metatarsal and this will be sent to Pathology and then the same saw was used to resect a thin wafer of additional bone from the fourth metatarsal and this will be sent to Pathology as a bone margin. At this resection of the bone margin, the bone appeared to be hard and normal color. The Misonex was then used to clean and debride the interior of the wound. The incision was deepened medially progressing to the distal remainder of the right third metatarsal.  A new rongeur was used to take samples of the distal third metatarsal for aerobic, anaerobic bone cultures. A power microsagittal saw was then used to resect the distal remainder of the right third metatarsal bringing it to a length roughly equal to that of the fourth metatarsal to eliminate prominence on the plantar distal foot as well as to obtain a specimen that will be sent to Pathology as a bone biopsy. The incision was deepened laterally to the distal remainder of the right fifth metatarsal.  Clean rongeurs were used to take samples of bone for aerobic and anaerobic culture and also a sample of bone was taken and will be sent to Pathology as a bone biopsy, right fifth metatarsal.    The surgical site was thoroughly cleaned with the Misonix ultrasonic debriding tool. Closure was then carried out utilizing 4-0 Vicryl for closure of subcutaneous tissue and the skin was closed with 4-0 nylon in interrupted simple sutures. The tourniquet was released and hemostasis was observed. The foot was cleaned and dried and then wrapped with dry sterile dressings incorporating Adaptic over the surgical incision. The patient was then transported to the recovery room with vital signs stable and vascular status intact. He will remain until stable for transfer back up to his hospital bed. He should remain until the culture and pathology results are back and if they are positive for infection, then an Infectious Disease consult will be warranted.         April Fernando DPM [id number]  D:  07/13/2021 8:49  T:  07/13/2021 9:54  JOB #:  [id number]

## 2021-07-13 NOTE — BRIEF OP NOTE
Brief Postoperative Note    Patient: Narciso Molina  YOB: 1945  MRN: [id number]    Date of Procedure: 7/13/2021     Pre-Op Diagnosis: OSTEOMYELITIS RIGHT FOOT    Post-Op Diagnosis: Same as preoperative diagnosis. Procedure(s):  1. DEBRIDEMENT OF BONE RIGHT FOURTH METATARSAL, 2. BONE BIOPSY RIGHT THIRD METATARSAL, 3. BONE BIOPSY RIGHT FIFTH METATARSAL    Surgeon(s):  Nano Gaines DPM    Surgical Assistant: None    Anesthesia: MAC     Estimated Blood Loss (mL): Minimal    Complications: None    Specimens:   ID Type Source Tests Collected by Time Destination   1 : bone biopsy right third metatarsal Preservative Foot, Right  [facility], [facility]KK[facility] 7/13/2021 0744 Pathology   2 : bone biopsy right fifth metatarsal Preservative Foot, Right  [facility], [facility] 7/13/2021 0744 Pathology   3 : osteomyelitis right fourth metatarsal Preservative Foot, Right  [facility], [facility] 7/13/2021 0745 Pathology   4 : bone margin right fourth metatarsal Preservative Foot, Right  [facility], [facility] 7/13/2021 0745 Pathology   1 : osteomyelitis right third metatarsal Bone   [facility], [facility] 7/13/2021 0755 Microbiology   2 : osteomyelitis right fourth metatarsal Bone   [facility], [facility] 7/13/2021 0756 Microbiology   3 : osteomyelitis right fifth metatarsal [facility]        Implants: * No implants in log *    Drains: * No LDAs found *    Findings: No eduardo pus.     Electronically Signed by Jeremiah Vela DPM on 7/13/2021 at 8:39 AM

## 2021-07-14 LAB
ALBUMIN SERPL-MCNC: 2.4 G/DL (ref 3.5–5)
ALBUMIN/GLOB SERPL: 0.5 {RATIO} (ref 1.1–2.2)
ALP SERPL-CCNC: 58 U/L (ref 45–117)
ALT SERPL-CCNC: 16 U/L (ref 12–78)
ANION GAP SERPL CALC-SCNC: 5 MMOL/L (ref 5–15)
AST SERPL-CCNC: 14 U/L (ref 15–37)
BASOPHILS # BLD: 0 K/UL (ref 0–0.1)
BASOPHILS # BLD: 0 K/UL (ref 0–0.1)
BASOPHILS NFR BLD: 0 % (ref 0–1)
BASOPHILS NFR BLD: 0 % (ref 0–1)
BILIRUB SERPL-MCNC: 0.4 MG/DL (ref 0.2–1)
BUN SERPL-MCNC: 11 MG/DL (ref 6–20)
BUN/CREAT SERPL: 12 (ref 12–20)
CALCIUM SERPL-MCNC: 8.6 MG/DL (ref 8.5–10.1)
CHLORIDE SERPL-SCNC: 103 MMOL/L (ref 97–108)
CO2 SERPL-SCNC: 27 MMOL/L (ref 21–32)
CREAT SERPL-MCNC: 0.89 MG/DL (ref 0.7–1.3)
DIFFERENTIAL METHOD BLD: ABNORMAL
DIFFERENTIAL METHOD BLD: ABNORMAL
EOSINOPHIL # BLD: 0 K/UL (ref 0–0.4)
EOSINOPHIL # BLD: 0.1 K/UL (ref 0–0.4)
EOSINOPHIL NFR BLD: 1 % (ref 0–7)
EOSINOPHIL NFR BLD: 1 % (ref 0–7)
ERYTHROCYTE [DISTWIDTH] IN BLOOD BY AUTOMATED COUNT: 12.7 % (ref 11.5–14.5)
ERYTHROCYTE [DISTWIDTH] IN BLOOD BY AUTOMATED COUNT: 12.9 % (ref 11.5–14.5)
GLOBULIN SER CALC-MCNC: 4.7 G/DL (ref 2–4)
GLUCOSE BLD STRIP.AUTO-MCNC: 192 MG/DL (ref 65–117)
GLUCOSE BLD STRIP.AUTO-MCNC: 243 MG/DL (ref 65–117)
GLUCOSE BLD STRIP.AUTO-MCNC: 259 MG/DL (ref 65–117)
GLUCOSE BLD STRIP.AUTO-MCNC: 316 MG/DL (ref 65–117)
GLUCOSE SERPL-MCNC: 258 MG/DL (ref 65–100)
HCT VFR BLD AUTO: 29.4 % (ref 36.6–50.3)
HCT VFR BLD AUTO: 29.4 % (ref 36.6–50.3)
HGB BLD-MCNC: 9.6 G/DL (ref 12.1–17)
HGB BLD-MCNC: 9.6 G/DL (ref 12.1–17)
IMM GRANULOCYTES # BLD AUTO: 0 K/UL (ref 0–0.04)
IMM GRANULOCYTES # BLD AUTO: 0.1 K/UL (ref 0–0.04)
IMM GRANULOCYTES NFR BLD AUTO: 0 % (ref 0–0.5)
IMM GRANULOCYTES NFR BLD AUTO: 1 % (ref 0–0.5)
LYMPHOCYTES # BLD: 0.6 K/UL (ref 0.8–3.5)
LYMPHOCYTES # BLD: 0.7 K/UL (ref 0.8–3.5)
LYMPHOCYTES NFR BLD: 10 % (ref 12–49)
LYMPHOCYTES NFR BLD: 12 % (ref 12–49)
MCH RBC QN AUTO: 27.4 PG (ref 26–34)
MCH RBC QN AUTO: 27.7 PG (ref 26–34)
MCHC RBC AUTO-ENTMCNC: 32.7 G/DL (ref 30–36.5)
MCHC RBC AUTO-ENTMCNC: 32.7 G/DL (ref 30–36.5)
MCV RBC AUTO: 84 FL (ref 80–99)
MCV RBC AUTO: 85 FL (ref 80–99)
MONOCYTES # BLD: 0.6 K/UL (ref 0–1)
MONOCYTES # BLD: 0.6 K/UL (ref 0–1)
MONOCYTES NFR BLD: 10 % (ref 5–13)
MONOCYTES NFR BLD: 10 % (ref 5–13)
NEUTS SEG # BLD: 4.5 K/UL (ref 1.8–8)
NEUTS SEG # BLD: 4.7 K/UL (ref 1.8–8)
NEUTS SEG NFR BLD: 76 % (ref 32–75)
NEUTS SEG NFR BLD: 78 % (ref 32–75)
NRBC # BLD: 0 K/UL (ref 0–0.01)
NRBC # BLD: 0 K/UL (ref 0–0.01)
NRBC BLD-RTO: 0 PER 100 WBC
NRBC BLD-RTO: 0 PER 100 WBC
PLATELET # BLD AUTO: 261 K/UL (ref 150–400)
PLATELET # BLD AUTO: 269 K/UL (ref 150–400)
PMV BLD AUTO: 9 FL (ref 8.9–12.9)
PMV BLD AUTO: 9 FL (ref 8.9–12.9)
POTASSIUM SERPL-SCNC: 3.9 MMOL/L (ref 3.5–5.1)
PROT SERPL-MCNC: 7.1 G/DL (ref 6.4–8.2)
RBC # BLD AUTO: 3.46 M/UL (ref 4.1–5.7)
RBC # BLD AUTO: 3.5 M/UL (ref 4.1–5.7)
RBC MORPH BLD: ABNORMAL
SERVICE CMNT-IMP: ABNORMAL
SODIUM SERPL-SCNC: 135 MMOL/L (ref 136–145)
WBC # BLD AUTO: 5.8 K/UL (ref 4.1–11.1)
WBC # BLD AUTO: 6.2 K/UL (ref 4.1–11.1)

## 2021-07-14 PROCEDURE — 74011250637 HC RX REV CODE- 250/637: Performed by: PODIATRIST

## 2021-07-14 PROCEDURE — 80053 COMPREHEN METABOLIC PANEL: CPT

## 2021-07-14 PROCEDURE — 74011250636 HC RX REV CODE- 250/636: Performed by: PODIATRIST

## 2021-07-14 PROCEDURE — 65270000029 HC RM PRIVATE

## 2021-07-14 PROCEDURE — 74011000258 HC RX REV CODE- 258: Performed by: PODIATRIST

## 2021-07-14 PROCEDURE — 74011636637 HC RX REV CODE- 636/637: Performed by: PODIATRIST

## 2021-07-14 PROCEDURE — 36415 COLL VENOUS BLD VENIPUNCTURE: CPT

## 2021-07-14 PROCEDURE — 77030040718 HC DRSG HYDRGEL SKINTEGRITY MDII -A

## 2021-07-14 PROCEDURE — 85025 COMPLETE CBC W/AUTO DIFF WBC: CPT

## 2021-07-14 PROCEDURE — 2709999900 HC NON-CHARGEABLE SUPPLY

## 2021-07-14 PROCEDURE — 82962 GLUCOSE BLOOD TEST: CPT

## 2021-07-14 RX ADMIN — PIPERACILLIN AND TAZOBACTAM 3.38 G: 3; .375 INJECTION, POWDER, LYOPHILIZED, FOR SOLUTION INTRAVENOUS at 18:12

## 2021-07-14 RX ADMIN — Medication 10 ML: at 21:34

## 2021-07-14 RX ADMIN — Medication 10 ML: at 05:29

## 2021-07-14 RX ADMIN — DOCUSATE SODIUM 100 MG: 100 CAPSULE, LIQUID FILLED ORAL at 18:13

## 2021-07-14 RX ADMIN — NEPHROCAP 1 CAPSULE: 1 CAP ORAL at 08:13

## 2021-07-14 RX ADMIN — VANCOMYCIN HYDROCHLORIDE 1250 MG: 10 INJECTION, POWDER, LYOPHILIZED, FOR SOLUTION INTRAVENOUS at 11:00

## 2021-07-14 RX ADMIN — INSULIN GLARGINE 12 UNITS: 100 INJECTION, SOLUTION SUBCUTANEOUS at 21:35

## 2021-07-14 RX ADMIN — ENOXAPARIN SODIUM 40 MG: 40 INJECTION SUBCUTANEOUS at 08:12

## 2021-07-14 RX ADMIN — ATORVASTATIN CALCIUM 20 MG: 20 TABLET, FILM COATED ORAL at 08:13

## 2021-07-14 RX ADMIN — Medication 10 ML: at 13:30

## 2021-07-14 RX ADMIN — PIPERACILLIN AND TAZOBACTAM 3.38 G: 3; .375 INJECTION, POWDER, LYOPHILIZED, FOR SOLUTION INTRAVENOUS at 02:44

## 2021-07-14 RX ADMIN — DOCUSATE SODIUM 100 MG: 100 CAPSULE, LIQUID FILLED ORAL at 08:13

## 2021-07-14 RX ADMIN — Medication 10 ML: at 13:29

## 2021-07-14 RX ADMIN — Medication 4000 UNITS: at 08:13

## 2021-07-14 RX ADMIN — Medication 1 AMPULE: at 08:13

## 2021-07-14 RX ADMIN — INSULIN LISPRO 4 UNITS: 100 INJECTION, SOLUTION INTRAVENOUS; SUBCUTANEOUS at 13:28

## 2021-07-14 RX ADMIN — VANCOMYCIN HYDROCHLORIDE 1250 MG: 10 INJECTION, POWDER, LYOPHILIZED, FOR SOLUTION INTRAVENOUS at 22:38

## 2021-07-14 RX ADMIN — INSULIN LISPRO 10 UNITS: 100 INJECTION, SOLUTION INTRAVENOUS; SUBCUTANEOUS at 16:55

## 2021-07-14 RX ADMIN — Medication 1 AMPULE: at 21:34

## 2021-07-14 RX ADMIN — OXYCODONE HYDROCHLORIDE AND ACETAMINOPHEN 250 MG: 500 TABLET ORAL at 08:12

## 2021-07-14 RX ADMIN — THERA TABS 1 TABLET: TAB at 08:17

## 2021-07-14 RX ADMIN — PIPERACILLIN AND TAZOBACTAM 3.38 G: 3; .375 INJECTION, POWDER, LYOPHILIZED, FOR SOLUTION INTRAVENOUS at 10:28

## 2021-07-14 RX ADMIN — INSULIN LISPRO 7 UNITS: 100 INJECTION, SOLUTION INTRAVENOUS; SUBCUTANEOUS at 08:11

## 2021-07-14 RX ADMIN — ASPIRIN 81 MG: 81 TABLET, CHEWABLE ORAL at 08:13

## 2021-07-14 RX ADMIN — TAMSULOSIN HYDROCHLORIDE 0.4 MG: 0.4 CAPSULE ORAL at 08:17

## 2021-07-14 NOTE — PROGRESS NOTES
Referral received and acknowledged. The patient has no podiatry WB orders so will hold therapy until OOB and WB status are clarified.

## 2021-07-14 NOTE — PROGRESS NOTES
Physician Progress Note      PATIENT:               Home Lei  CSN #:                  [id number]  :                       1945  ADMIT DATE:       2021 5:28 PM  [address] [place] DATE:  RESPONDING  PROVIDER #:        Paresh Regalado DPM          QUERY TEXT:    Dear Dr. Juvenal Hurley,  Patient admitted with osteomyelitis of right foot. Per Op note dated 21 documentation of debridement. To accurately reflect the procedure performed please document if debridement was excisional or nonexcisional and the deepest depth of tissue removed as down to and including: The medical record reflects the following:  Risk Factors: 67 yr. old male admitted with a chronic non healing diabetic wound of the right foot and sepsis. Clinical Indicators: Op note on  notes \" A linear incision was made beginning at the dorsal remainder of the right fourth metatarsal from proximal to distal and deepened through the subcutaneous tissue down to bone. \"  Treatment: Debridement of bone, right fourth metatarsal on 21  Options provided:  -- Nonexcisional debridement of bone  -- Excisional debridement of bone  -- Other - I will add my own diagnosis  -- Disagree - Not applicable / Not valid  -- Disagree - Clinically unable to determine / Unknown  -- Refer to Clinical Documentation Reviewer    PROVIDER RESPONSE TEXT:    Excisional debridement of bone of right foot was performed during procedure on 21.     Query created by: Jose R Mccormick on 2021 2:39 PM      Electronically signed by:  Paresh Regalado DPM 2021 5:33 PM

## 2021-07-14 NOTE — PROGRESS NOTES
Spiritual Care Assessment/Progress Note  [facility]      NAME: Esther Fernández      MRN: [id number]  AGE: 67 y.o. SEX: male  Alevism Affiliation: Gnosticism   Language: English     7/14/2021     Total Time (in minutes): 6     Spiritual Assessment begun in [facility] through conversation with:         []Patient        [] Family    [] Friend(s)        Reason for Consult: Initial/Spiritual assessment, patient floor     Spiritual beliefs: (Please include comment if needed)     [] Identifies with a rito tradition:         [] Supported by a rito community:            [] Claims no spiritual orientation:           [] Seeking spiritual identity:                [] Adheres to an individual form of spirituality:           [x] Not able to assess:                           Identified resources for coping:      [] Prayer                               [] Music                  [] Guided Imagery     [] Family/friends                 [] Pet visits     [] Devotional reading                         [x] Unknown     [] Other:                                             Interventions offered during this visit: (See comments for more details)    Patient Interventions: Initial visit           Plan of Care:     [x] Support spiritual and/or cultural needs    [] Support AMD and/or advance care planning process      [] Support grieving process   [] Coordinate Rites and/or Rituals    [] Coordination with community clergy   [] No spiritual needs identified at this time   [] Detailed Plan of Care below (See Comments)  [] Make referral to Music Therapy  [] Make referral to Pet Therapy     [] Make referral to Addiction services  [] Make referral to [facility]  [] Make referral to Spiritual Care Partner  [] No future visits requested        [x] Follow up upon further referrals     Comments:  Reviewed chart prior to visit on Ortho for spiritual assessment.   Patient could be viewed through window into his room appearing to be asleep.  did not attempt to wake him. Unable to assess for spiritual needs or concerns at this time.      SAMM Clark, [facility], Staff [address] [place] [address] Paging Service  [phone])

## 2021-07-14 NOTE — PROGRESS NOTES
Clarification given for WB status by Dr. Elizabeth Fernandez via phone. Pt is WBAT with post op shoe and should limit ambulation to bed to chair and to and from bathroom only.

## 2021-07-14 NOTE — PROGRESS NOTES
Hospitalist Progress Note    NAME: Susan Blanchard   :  1945   MRN:  [id number]       Assessment / Plan:  Chronic non healing diabetic wound of the R foot, worsening, failed outpt ABx   Sepsis   MRI positive for osteomyelitis. Continue empiric vancomycin and Zosyn  He is status post debridement now post op day 1  We will continue to follow patient perioperatively. We will follow-up his pathology report and culture and sensitivity to chest and de-escalate antibiotics and know duration. Hyperkalemia,Mild. corrected   IDDM  Blood sugars borderline low this a.m. Continue Lantus at 12 units as he will be kept n.p.o. Continue insulin lispro sliding scale. Prostate Ca  HLD  [place], not using CPAP  Continue home aspirin, Lipitor, Flomax  Code Status: full   Surrogate Decision Maker: daughter, Isabel Holman, [phone], [phone]  I spoke to patient's sister and updated on plan of care today. DVT Prophylaxis: lovenox  GI Prophylaxis: not indicated  Baseline: independent   Body mass index is 23.21 kg/m². Subjective:     Chief Complaint / Reason for Physician Visit  Patient was seen postop and dressing seen no leakage or bleeding seen. Patient said that his pain is controlled as well. Review of Systems:  Symptom Y/N Comments  Symptom Y/N Comments   Fever/Chills n   Chest Pain n    Poor Appetite    Edema     Cough    Abdominal Pain n    Sputum    Joint Pain     SOB/DEL CASTILLO n   Pruritis/Rash     Nausea/vomit    Tolerating PT/OT     Diarrhea    Tolerating Diet     Constipation    Other       Could NOT obtain due to:      Objective:     VITALS:   Last 24hrs VS reviewed since prior progress note.  Most recent are:  Patient Vitals for the past 24 hrs:   Temp Pulse Resp BP SpO2   21 0736 98.9 °F (37.2 °C) 88 18 (!) 129/54 95 %   21 0404 99.8 °F (37.7 °C) 91 18 (!) 118/57 96 %   21 0005 99.1 °F (37.3 °C) 96 18 (!) 122/58 100 %   21 1927 98.7 °F (37.1 °C) 92 18 128/62 97 %   21 1515 98.6 °F (37 °C) 91 18 (!) 108/49 96 %   07/13/21 1355 98.2 °F (36.8 °C) 77 18 130/68 97 %   07/13/21 1250 98.3 °F (36.8 °C) 70 18 137/62 99 %   07/13/21 1144 98 °F (36.7 °C) 82 18 (!) 131/55 99 %   07/13/21 1044 98 °F (36.7 °C) 79 18 (!) 140/79 98 %   07/13/21 0943 97.9 °F (36.6 °C) 74 18 (!) 146/77 100 %   07/13/21 0915 98 °F (36.7 °C) 79 16 (!) 144/67 98 %   07/13/21 0900  79 14 139/67 98 %   07/13/21 0850  89 15 133/66 100 %   07/13/21 0845  79 15 (!) 142/67 100 %   07/13/21 0841 97.9 °F (36.6 °C) 85 12 132/80 100 %   07/13/21 0840  87 18 131/66 100 %       Intake/Output Summary (Last 24 hours) at 7/14/2021 0750  Last data filed at 7/14/2021 0246  Gross per 24 hour   Intake 1290 ml   Output 1610 ml   Net -320 ml        PHYSICAL EXAM:  General: Alert, cooperative, no acute distress    EENT:  EOMI. Anicteric sclerae. MMM  Resp:  CTA bilaterally, no wheezing or rales. No accessory muscle use  CV:  Regular  rhythm,  No edema  GI:  Soft, Non distended, Non tender.  +Bowel sounds  Neurologic:  Alert and oriented X 3, normal speech,   Psych:   Not anxious nor agitated  Skin:  Right foot wound present  Right foot dressed very well postoperatively.     Reviewed most current lab test results and cultures  YES  Reviewed most current radiology test results   YES  Review and summation of old records today    NO  Reviewed patient's current orders and MAR    YES  PMH/SH reviewed - no change compared to H&P          Current Facility-Administered Medications:     sodium chloride (NS) flush 5-40 mL, 5-40 mL, IntraVENous, Q8H, Yumiko Dillon MD, 10 mL at 07/14/21 0529    sodium chloride (NS) flush 5-40 mL, 5-40 mL, IntraVENous, PRN, Yumiko Dillon MD    lidocaine (PF) (XYLOCAINE) 10 mg/mL (1 %) injection 0.1 mL, 0.1 mL, SubCUTAneous, PRN, Yumiko Dillon MD    alcohol 62% (NOZIN) nasal  1 Ampule, 1 Ampule, Topical, Q12H, Nicolette Hager DPM, 1 Ampule at 07/13/21 2350    insulin glargine (LANTUS) injection 12 Units, 12 Units, SubCUTAneous, QHS, Belock, Norvell Goodell, DPM, 12 Units at 07/13/21 2153    vancomycin (VANCOCIN) 1250 mg in  ml infusion, 1,250 mg, IntraVENous, Q12H, Belock, Norvell Goodell, DPM, Last Rate: 125 mL/hr at 07/13/21 2319, 1,250 mg at 07/13/21 2319    sodium chloride (NS) flush 5-10 mL, 5-10 mL, IntraVENous, PRN, Belock, Norvell Goodell, DPM    ascorbic acid (vitamin C) (VITAMIN C) tablet 250 mg, 250 mg, Oral, DAILY, Belock, Norvell Goodell, DPM, 250 mg at 07/13/21 1107    aspirin chewable tablet 81 mg, 81 mg, Oral, DAILY, Madan, Norvell Goodell, DPM, 81 mg at 07/13/21 1107    atorvastatin (LIPITOR) tablet 20 mg, 20 mg, Oral, DAILY, Belock, Norvell Goodell, DPM, 20 mg at 07/13/21 1107    B complex-vitaminC-folic acid (NEPHROCAP) cap, 1 Capsule, Oral, DAILY, Belock, Norvell Goodell, DPM, 1 Capsule at 07/13/21 1107    cholecalciferol (VITAMIN D3) (1000 Units /25 mcg) tablet 4,000 Units, 4,000 Units, Oral, DAILY, Belock, Norvell Goodell, DPM, 1,000 Units at 07/13/21 1106    docusate sodium (COLACE) capsule 100 mg, 100 mg, Oral, BID, Belock, Norvell Goodell, DPM, 100 mg at 07/13/21 1704    HYDROcodone-acetaminophen (NORCO) 5-325 mg per tablet 1 Tablet, 1 Tablet, Oral, Q4H PRN, Belock, Norvell Goodell, DPM    tamsulosin (FLOMAX) capsule 0.4 mg, 0.4 mg, Oral, DAILY, Belock, Norvell Goodell, DPM, 0.4 mg at 07/13/21 1107    therapeutic multivitamin (THERAGRAN) tablet 1 Tablet, 1 Tablet, Oral, DAILY, Belock, Norvell Goodell, DPM, 1 Tablet at 07/13/21 1107    insulin lispro (HUMALOG) injection, , SubCUTAneous, AC&HS, Belock, Norvell Goodell, DPM, 4 Units at 07/13/21 2200    glucose chewable tablet 16 g, 4 Tablet, Oral, PRN, Belock, Norvell Goodell, DPM    dextrose (D50W) injection syrg 12.5-25 g, 12.5-25 g, IntraVENous, PRN, Belock, Norvell Goodell, DPM    glucagon (GLUCAGEN) injection 1 mg, 1 mg, IntraMUSCular, PRN, Belock, Norvell Goodell, DPM    sodium chloride (NS) flush 5-40 mL, 5-40 mL, IntraVENous, Q8H, Ish Hager DPM, 10 mL at 07/14/21 0529    sodium chloride (NS) flush 5-40 mL, 5-40 mL, IntraVENous, PRN, Pilar Hager, DPM    acetaminophen (TYLENOL) tablet 650 mg, 650 mg, Oral, Q6H PRN, 650 mg at 07/09/21 2343 **OR** acetaminophen (TYLENOL) suppository 650 mg, 650 mg, Rectal, Q6H PRN, Pilar Hager, DPM    polyethylene glycol (MIRALAX) packet 17 g, 17 g, Oral, DAILY PRN, Pilar Hager, DPM    ondansetron (ZOFRAN ODT) tablet 4 mg, 4 mg, Oral, Q8H PRN **OR** ondansetron (ZOFRAN) injection 4 mg, 4 mg, IntraVENous, Q6H PRN, Pilar Hager DPM    enoxaparin (LOVENOX) injection 40 mg, 40 mg, SubCUTAneous, DAILY, Pilar Hager DPM, 40 mg at 07/13/21 1106    piperacillin-tazobactam (ZOSYN) 3.375 g in 0.9% sodium chloride (MBP/ADV) 100 mL MBP, 3.375 g, IntraVENous, Q8H, Pilar Hager DPM, Last Rate: 25 mL/hr at 07/14/21 0244, 3.375 g at 07/14/21 0244  ________________________________________________________________________  Care Plan discussed with:    Comments   Patient y    Family      RN y    Care Manager     Consultant                        Multidiciplinary team rounds were held today with , nursing, pharmacist and clinical coordinator. Patient's plan of care was discussed; medications were reviewed and discharge planning was addressed. ________________________________________________________________________  Total NON critical care TIME:  35   Minutes    Total CRITICAL CARE TIME Spent:   Minutes non procedure based      Comments   >50% of visit spent in counseling and coordination of care y    ________________________________________________________________________  Lori Marina MD     Procedures: see electronic medical records for all procedures/Xrays and details which were not copied into this note but were reviewed prior to creation of Plan. LABS:  I reviewed today's most current labs and imaging studies.   Pertinent labs include:  Recent Labs     07/14/21  0248 07/12/21  0312   WBC 5.8 3.5*   HGB 9.6* 9.2*   HCT 29.4* 28.4*    269     Recent Labs 07/13/21  0453 07/12/21  0312    139   K 3.9 3.9    108   CO2 27 24   * 138*   BUN 8 9   CREA 0.84 0.75   CA 8.8 8.8       Signed:  Kelley Parry MD

## 2021-07-14 NOTE — PROGRESS NOTES
Bedside and Verbal shift change report given to [address] [place] [address] (oncoming nurse) by Ely Young (offgoing nurse). Report included the following information SBAR, Kardex, Intake/Output, MAR and Recent Results.

## 2021-07-14 NOTE — PROGRESS NOTES
Attempted to see pt for OT services. Pt had surgery with podiatry yesterday. Current orders for OT services are not from podiatry. Called Dr. Giles Batool office to obtain WB status and activity restrictions. Message was left and will await a call back prior to start of OT services.

## 2021-07-15 LAB
ANION GAP SERPL CALC-SCNC: 4 MMOL/L (ref 5–15)
BACTERIA SPEC CULT: NORMAL
BASOPHILS # BLD: 0 K/UL (ref 0–0.1)
BASOPHILS NFR BLD: 0 % (ref 0–1)
BUN SERPL-MCNC: 11 MG/DL (ref 6–20)
BUN/CREAT SERPL: 14 (ref 12–20)
CALCIUM SERPL-MCNC: 8.3 MG/DL (ref 8.5–10.1)
CHLORIDE SERPL-SCNC: 104 MMOL/L (ref 97–108)
CO2 SERPL-SCNC: 27 MMOL/L (ref 21–32)
CREAT SERPL-MCNC: 0.78 MG/DL (ref 0.7–1.3)
DIFFERENTIAL METHOD BLD: ABNORMAL
EOSINOPHIL # BLD: 0.2 K/UL (ref 0–0.4)
EOSINOPHIL NFR BLD: 3 % (ref 0–7)
ERYTHROCYTE [DISTWIDTH] IN BLOOD BY AUTOMATED COUNT: 12.8 % (ref 11.5–14.5)
GLUCOSE BLD STRIP.AUTO-MCNC: 164 MG/DL (ref 65–117)
GLUCOSE BLD STRIP.AUTO-MCNC: 242 MG/DL (ref 65–117)
GLUCOSE BLD STRIP.AUTO-MCNC: 344 MG/DL (ref 65–117)
GLUCOSE BLD STRIP.AUTO-MCNC: 381 MG/DL (ref 65–117)
GLUCOSE SERPL-MCNC: 183 MG/DL (ref 65–100)
HCT VFR BLD AUTO: 26.6 % (ref 36.6–50.3)
HGB BLD-MCNC: 9 G/DL (ref 12.1–17)
IMM GRANULOCYTES # BLD AUTO: 0 K/UL (ref 0–0.04)
IMM GRANULOCYTES NFR BLD AUTO: 0 % (ref 0–0.5)
LYMPHOCYTES # BLD: 0.7 K/UL (ref 0.8–3.5)
LYMPHOCYTES NFR BLD: 13 % (ref 12–49)
MCH RBC QN AUTO: 28 PG (ref 26–34)
MCHC RBC AUTO-ENTMCNC: 33.8 G/DL (ref 30–36.5)
MCV RBC AUTO: 82.6 FL (ref 80–99)
MONOCYTES # BLD: 0.5 K/UL (ref 0–1)
MONOCYTES NFR BLD: 9 % (ref 5–13)
NEUTS SEG # BLD: 4.2 K/UL (ref 1.8–8)
NEUTS SEG NFR BLD: 75 % (ref 32–75)
NRBC # BLD: 0 K/UL (ref 0–0.01)
NRBC BLD-RTO: 0 PER 100 WBC
PLATELET # BLD AUTO: 251 K/UL (ref 150–400)
PMV BLD AUTO: 9.1 FL (ref 8.9–12.9)
POTASSIUM SERPL-SCNC: 3.9 MMOL/L (ref 3.5–5.1)
RBC # BLD AUTO: 3.22 M/UL (ref 4.1–5.7)
RBC MORPH BLD: ABNORMAL
RBC MORPH BLD: ABNORMAL
SERVICE CMNT-IMP: ABNORMAL
SERVICE CMNT-IMP: NORMAL
SODIUM SERPL-SCNC: 135 MMOL/L (ref 136–145)
WBC # BLD AUTO: 5.6 K/UL (ref 4.1–11.1)

## 2021-07-15 PROCEDURE — 77030036687 HC SHOE PSTOP S2SG -A

## 2021-07-15 PROCEDURE — 85025 COMPLETE CBC W/AUTO DIFF WBC: CPT

## 2021-07-15 PROCEDURE — 74011250637 HC RX REV CODE- 250/637: Performed by: PODIATRIST

## 2021-07-15 PROCEDURE — 74011636637 HC RX REV CODE- 636/637: Performed by: PODIATRIST

## 2021-07-15 PROCEDURE — 74011000258 HC RX REV CODE- 258: Performed by: INTERNAL MEDICINE

## 2021-07-15 PROCEDURE — 97116 GAIT TRAINING THERAPY: CPT

## 2021-07-15 PROCEDURE — 74011250636 HC RX REV CODE- 250/636: Performed by: PODIATRIST

## 2021-07-15 PROCEDURE — 74011000258 HC RX REV CODE- 258: Performed by: PODIATRIST

## 2021-07-15 PROCEDURE — 74011250636 HC RX REV CODE- 250/636: Performed by: INTERNAL MEDICINE

## 2021-07-15 PROCEDURE — 97530 THERAPEUTIC ACTIVITIES: CPT

## 2021-07-15 PROCEDURE — 97165 OT EVAL LOW COMPLEX 30 MIN: CPT

## 2021-07-15 PROCEDURE — 97535 SELF CARE MNGMENT TRAINING: CPT

## 2021-07-15 PROCEDURE — 82962 GLUCOSE BLOOD TEST: CPT

## 2021-07-15 PROCEDURE — 80048 BASIC METABOLIC PNL TOTAL CA: CPT

## 2021-07-15 PROCEDURE — 65270000029 HC RM PRIVATE

## 2021-07-15 PROCEDURE — 97161 PT EVAL LOW COMPLEX 20 MIN: CPT

## 2021-07-15 PROCEDURE — 36415 COLL VENOUS BLD VENIPUNCTURE: CPT

## 2021-07-15 PROCEDURE — 2709999900 HC NON-CHARGEABLE SUPPLY

## 2021-07-15 RX ORDER — VANCOMYCIN HYDROCHLORIDE
1250 EVERY 12 HOURS
Status: DISCONTINUED | OUTPATIENT
Start: 2021-07-16 | End: 2021-07-16 | Stop reason: DRUGHIGH

## 2021-07-15 RX ADMIN — Medication 4000 UNITS: at 10:33

## 2021-07-15 RX ADMIN — VANCOMYCIN HYDROCHLORIDE 1250 MG: 10 INJECTION, POWDER, LYOPHILIZED, FOR SOLUTION INTRAVENOUS at 13:08

## 2021-07-15 RX ADMIN — PIPERACILLIN AND TAZOBACTAM 3.38 G: 3; .375 INJECTION, POWDER, LYOPHILIZED, FOR SOLUTION INTRAVENOUS at 02:11

## 2021-07-15 RX ADMIN — ENOXAPARIN SODIUM 40 MG: 40 INJECTION SUBCUTANEOUS at 10:38

## 2021-07-15 RX ADMIN — DOCUSATE SODIUM 100 MG: 100 CAPSULE, LIQUID FILLED ORAL at 10:34

## 2021-07-15 RX ADMIN — NEPHROCAP 1 CAPSULE: 1 CAP ORAL at 10:33

## 2021-07-15 RX ADMIN — Medication 10 ML: at 22:02

## 2021-07-15 RX ADMIN — Medication 1 AMPULE: at 10:34

## 2021-07-15 RX ADMIN — ASPIRIN 81 MG: 81 TABLET, CHEWABLE ORAL at 10:33

## 2021-07-15 RX ADMIN — Medication 10 ML: at 06:47

## 2021-07-15 RX ADMIN — THERA TABS 1 TABLET: TAB at 10:34

## 2021-07-15 RX ADMIN — Medication 10 ML: at 13:10

## 2021-07-15 RX ADMIN — INSULIN GLARGINE 12 UNITS: 100 INJECTION, SOLUTION SUBCUTANEOUS at 22:03

## 2021-07-15 RX ADMIN — TAMSULOSIN HYDROCHLORIDE 0.4 MG: 0.4 CAPSULE ORAL at 10:34

## 2021-07-15 RX ADMIN — DOCUSATE SODIUM 100 MG: 100 CAPSULE, LIQUID FILLED ORAL at 17:30

## 2021-07-15 RX ADMIN — INSULIN LISPRO 2 UNITS: 100 INJECTION, SOLUTION INTRAVENOUS; SUBCUTANEOUS at 22:02

## 2021-07-15 RX ADMIN — PIPERACILLIN AND TAZOBACTAM 3.38 G: 3; .375 INJECTION, POWDER, LYOPHILIZED, FOR SOLUTION INTRAVENOUS at 10:32

## 2021-07-15 RX ADMIN — Medication 1 AMPULE: at 22:02

## 2021-07-15 RX ADMIN — POLYETHYLENE GLYCOL 3350 17 G: 17 POWDER, FOR SOLUTION ORAL at 10:33

## 2021-07-15 RX ADMIN — INSULIN LISPRO 3 UNITS: 100 INJECTION, SOLUTION INTRAVENOUS; SUBCUTANEOUS at 08:31

## 2021-07-15 RX ADMIN — ATORVASTATIN CALCIUM 20 MG: 20 TABLET, FILM COATED ORAL at 10:34

## 2021-07-15 RX ADMIN — PIPERACILLIN AND TAZOBACTAM 3.38 G: 3; .375 INJECTION, POWDER, LYOPHILIZED, FOR SOLUTION INTRAVENOUS at 17:28

## 2021-07-15 RX ADMIN — OXYCODONE HYDROCHLORIDE AND ACETAMINOPHEN 250 MG: 500 TABLET ORAL at 10:33

## 2021-07-15 RX ADMIN — INSULIN LISPRO 10 UNITS: 100 INJECTION, SOLUTION INTRAVENOUS; SUBCUTANEOUS at 13:09

## 2021-07-15 RX ADMIN — INSULIN LISPRO 10 UNITS: 100 INJECTION, SOLUTION INTRAVENOUS; SUBCUTANEOUS at 17:27

## 2021-07-15 NOTE — PROGRESS NOTES
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 Continue empiric vancomycin and Zosyn  He is status post debridement now post op day 2  We will continue to follow patient perioperatively. We will follow-up his pathology report and culture and sensitivity   Awaiting pathology and CS and also podiatry input   Possible Dc tomorrow   Hyperkalemia,Mild. corrected   IDDM  Blood sugars borderline low this a.m. Continue Lantus at 12 units as he will be kept n.p.o. Continue insulin lispro sliding scale. Prostate Ca  HLD  [place], not using CPAP  Continue home aspirin, Lipitor, Flomax  Code Status: full   Surrogate Decision Maker: daughter, Didi Pardo, [phone], [phone]  I spoke to patient's sister and updated on plan of care today. DVT Prophylaxis: lovenox  GI Prophylaxis: not indicated  Baseline: independent   Body mass index is 23.21 kg/m². Subjective:     Chief Complaint / Reason for Physician Visit   patient was seen and examined today , no complaints doing fine post op         Review of Systems:  Symptom Y/N Comments  Symptom Y/N Comments   Fever/Chills n   Chest Pain n    Poor Appetite    Edema     Cough    Abdominal Pain n    Sputum    Joint Pain     SOB/DEL CASTILLO n   Pruritis/Rash     Nausea/vomit    Tolerating PT/OT     Diarrhea    Tolerating Diet     Constipation    Other       Could NOT obtain due to:      Objective:     VITALS:   Last 24hrs VS reviewed since prior progress note.  Most recent are:  Patient Vitals for the past 24 hrs:   Temp Pulse Resp BP SpO2   07/15/21 1251 99 °F (37.2 °C) 86 18 109/85 99 %   07/15/21 0743 98.8 °F (37.1 °C) 80 18 (!) 136/58 97 %   21 2045 98.9 °F (37.2 °C) 80 18 114/66 95 %   21 1548 98.7 °F (37.1 °C) 83 18 118/60 97 %       Intake/Output Summary (Last 24 hours) at 7/15/2021 1427  Last data filed at 7/15/2021 9565  Gross per 24 hour   Intake 1310 ml   Output 1050 ml   Net 260 ml        PHYSICAL EXAM:  General: Alert, cooperative, no acute distress    EENT:  EOMI. Anicteric sclerae. MMM  Resp:  CTA bilaterally, no wheezing or rales. No accessory muscle use  CV:  Regular  rhythm,  No edema  GI:  Soft, Non distended, Non tender.  +Bowel sounds  Neurologic:  Alert and oriented X 3, normal speech,   Psych:   Not anxious nor agitated  Skin:  Right foot wound present  Right foot dressed very well postoperatively. Reviewed most current lab test results and cultures  YES  Reviewed most current radiology test results   YES  Review and summation of old records today    NO  Reviewed patient's current orders and MAR    YES  PMH/SH reviewed - no change compared to H&P          Current Facility-Administered Medications:     [START ON 7/16/2021] vancomycin (VANCOCIN) 1250 mg in  ml infusion, 1,250 mg, IntraVENous, Q12H, Anna Arevalo MD    piperacillin-tazobactam (ZOSYN) 3.375 g in 0.9% sodium chloride (MBP/ADV) 100 mL MBP, 3.375 g, IntraVENous, Q8H, Anna Arevalo MD    [START ON 7/16/2021] Vancomycin trough - please draw prior to dose 7/16 at 1300.   Thank you!, 1 Each, Other, ONCE, Anna Arevalo MD    sodium chloride (NS) flush 5-40 mL, 5-40 mL, IntraVENous, Q8H, Enrique Dillon MD, 10 mL at 07/15/21 1310    sodium chloride (NS) flush 5-40 mL, 5-40 mL, IntraVENous, PRN, Enrique Dillon MD    lidocaine (PF) (XYLOCAINE) 10 mg/mL (1 %) injection 0.1 mL, 0.1 mL, SubCUTAneous, PRN, Enrique Dillon MD    alcohol 62% (NOZIN) nasal  1 Ampule, 1 Ampule, Topical, Q12H, Beldl, Kavin Keep, DPM, 1 Ampule at 07/15/21 1034    insulin glargine (LANTUS) injection 12 Units, 12 Units, SubCUTAneous, QHS, Belock, Kavin Keep, DPM, 12 Units at 07/14/21 1114    sodium chloride (NS) flush 5-10 mL, 5-10 mL, IntraVENous, PRN, Belock, Kavin Keep, DPM   ascorbic acid (vitamin C) (VITAMIN C) tablet 250 mg, 250 mg, Oral, DAILY, Beldl, Dave Vy, DPM, 250 mg at 07/15/21 1033    aspirin chewable tablet 81 mg, 81 mg, Oral, DAILY, Beldl, Dave Vy, DPM, 81 mg at 07/15/21 1033    atorvastatin (LIPITOR) tablet 20 mg, 20 mg, Oral, DAILY, Beldl, Dave Vy, DPM, 20 mg at 07/15/21 1034    B complex-vitaminC-folic acid (NEPHROCAP) cap, 1 Capsule, Oral, DAILY, Beldl, Dave Vy, DPM, 1 Capsule at 07/15/21 1033    cholecalciferol (VITAMIN D3) (1000 Units /25 mcg) tablet 4,000 Units, 4,000 Units, Oral, DAILY, Beldl, Dave Vy, DPM, 4,000 Units at 07/15/21 1033    docusate sodium (COLACE) capsule 100 mg, 100 mg, Oral, BID, Dave Hager, DPM, 100 mg at 07/15/21 1034    HYDROcodone-acetaminophen (NORCO) 5-325 mg per tablet 1 Tablet, 1 Tablet, Oral, Q4H PRN, Dave Hager DPDIMITRI    tamsulosin (FLOMAX) capsule 0.4 mg, 0.4 mg, Oral, DAILY, Dave Hager, DPM, 0.4 mg at 07/15/21 1034    therapeutic multivitamin (THERAGRAN) tablet 1 Tablet, 1 Tablet, Oral, DAILY, Dave Hager, DPM, 1 Tablet at 07/15/21 1034    insulin lispro (HUMALOG) injection, , SubCUTAneous, AC&HS, Dave Hager, DPM, 10 Units at 07/15/21 1309    glucose chewable tablet 16 g, 4 Tablet, Oral, PRN, Dave Hagre, DPDIMITRI    dextrose (D50W) injection syrg 12.5-25 g, 12.5-25 g, IntraVENous, PRN, Dave Hager, DPM    glucagon (GLUCAGEN) injection 1 mg, 1 mg, IntraMUSCular, PRN, Dave Hager, DPDIMITRI    sodium chloride (NS) flush 5-40 mL, 5-40 mL, IntraVENous, Q8H, Ish Hager, DPM, 10 mL at 07/15/21 1310    sodium chloride (NS) flush 5-40 mL, 5-40 mL, IntraVENous, PRN, Madan, Dave Kanges, DPM    acetaminophen (TYLENOL) tablet 650 mg, 650 mg, Oral, Q6H PRN, 650 mg at 07/09/21 2343 **OR** acetaminophen (TYLENOL) suppository 650 mg, 650 mg, Rectal, Q6H PRN, Madan, Dave Kanges, DPM    polyethylene glycol (MIRALAX) packet 17 g, 17 g, Oral, DAILY PRN, Dave Hager, DPM, 17 g at 07/15/21 1033    ondansetron (ZOFRAN ODT) tablet 4 mg, 4 mg, Oral, Q8H PRN **OR** ondansetron (ZOFRAN) injection 4 mg, 4 mg, IntraVENous, Q6H PRN, Bernard Hager Males, DPM    enoxaparin (LOVENOX) injection 40 mg, 40 mg, SubCUTAneous, DAILY, BelDemetrius lizamadra Males, DPM, 40 mg at 07/15/21 1038  ________________________________________________________________________  Care Plan discussed with:    Comments   Patient y    Family      RN y    Care Manager     Consultant                        Multidiciplinary team rounds were held today with , nursing, pharmacist and clinical coordinator. Patient's plan of care was discussed; medications were reviewed and discharge planning was addressed. ________________________________________________________________________  Total NON critical care TIME:  35   Minutes    Total CRITICAL CARE TIME Spent:   Minutes non procedure based      Comments   >50% of visit spent in counseling and coordination of care y    ________________________________________________________________________  Elias Chavez MD     Procedures: see electronic medical records for all procedures/Xrays and details which were not copied into this note but were reviewed prior to creation of Plan. LABS:  I reviewed today's most current labs and imaging studies. Pertinent labs include:  Recent Labs     07/15/21  0210 07/14/21  1150 07/14/21  0248   WBC 5.6 6.2 5.8   HGB 9.0* 9.6* 9.6*   HCT 26.6* 29.4* 29.4*    261 269     Recent Labs     07/15/21  0210 07/14/21  1150 07/13/21  0453   * 135* 138   K 3.9 3.9 3.9    103 106   CO2 27 27 27   * 258* 231*   BUN 11 11 8   CREA 0.78 0.89 0.84   CA 8.3* 8.6 8.8   ALB  --  2.4*  --    TBILI  --  0.4  --    ALT  --  16  --        Signed:  Elias Chavez MD
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
## 2021-07-15 NOTE — PROGRESS NOTES
End of Shift Note    Bedside shift change report given to Marcie Latif RN(oncoming nurse) by Erin West (offgoing nurse). Report included the following information SBAR, Kardex, Intake/Output, MAR and Recent Results    Shift worked:  [id number]     Shift summary and any significant changes:     Pt denies pain. No BM since 7/3/21. Pt given Miralax, stool softeners and warmed prune juice today, awaiting results. Concerns for physician to address:       Zone phone for oncoming shift:          Activity:  Activity Level: Up with Assistance  Number times ambulated in hallways past shift: 0  Number of times OOB to chair past shift: 1    Cardiac:   Cardiac Monitoring: No      Cardiac Rhythm: Sinus Rhythm    Access:   Current line(s): PIV     Genitourinary:   Urinary status: voiding    Respiratory:   O2 Device: None (Room air)  Chronic home O2 use?: NO  Incentive spirometer at bedside:      GI:  Last Bowel Movement Date: 07/08/21 (given warmed prune juice)  Current diet:  ADULT DIET Regular; 4 carb choices (60 gm/meal)  Passing flatus: YES  Tolerating current diet: YES       Pain Management:   Patient states pain is manageable on current regimen: N/A    Skin:  Ottoniel Score: 19  Interventions: float heels, increase time out of bed, PT/OT consult and nutritional support     Patient Safety:  Fall Score:  Total Score: 2  Interventions: gripper socks, pt to call before getting OOB and stay with me (per policy)  High Fall Risk: Yes    Length of Stay:  Expected LOS: 5d 7h  Actual LOS: [address]

## 2021-07-15 NOTE — PROGRESS NOTES
Occupational Therapy    Orders received, chart reviewed and patient evaluated by occupational therapy. Pending progression with skilled acute occupational therapy, recommend:  No skilled occupational therapy/ follow up rehabilitation needs identified at this time. Recommend with nursing ADLs with supervision/setup, OOB to chair 3x/day and toileting via functional mobility to and from bathroom with cane and one-person assist. Thank you for completing as able in order to maintain patient strength, endurance and independence. Full evaluation to follow.      Gwen Benito OTR/L
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
## 2021-07-15 NOTE — PROGRESS NOTES
Bedside and Verbal shift change report given to Harvey Nielson (oncoming nurse) by Miguel Ryder (offgoing nurse). Report included the following information SBAR, Kardex, Intake/Output, MAR and Recent Results.

## 2021-07-16 LAB
ANION GAP SERPL CALC-SCNC: 4 MMOL/L (ref 5–15)
BACTERIA SPEC CULT: ABNORMAL
BACTERIA SPEC CULT: NORMAL
BUN SERPL-MCNC: 11 MG/DL (ref 6–20)
BUN/CREAT SERPL: 14 (ref 12–20)
CALCIUM SERPL-MCNC: 8.6 MG/DL (ref 8.5–10.1)
CHLORIDE SERPL-SCNC: 106 MMOL/L (ref 97–108)
CO2 SERPL-SCNC: 27 MMOL/L (ref 21–32)
CREAT SERPL-MCNC: 0.8 MG/DL (ref 0.7–1.3)
DATE LAST DOSE: ABNORMAL
GLUCOSE BLD STRIP.AUTO-MCNC: 203 MG/DL (ref 65–117)
GLUCOSE BLD STRIP.AUTO-MCNC: 205 MG/DL (ref 65–117)
GLUCOSE BLD STRIP.AUTO-MCNC: 280 MG/DL (ref 65–117)
GLUCOSE BLD STRIP.AUTO-MCNC: 348 MG/DL (ref 65–117)
GLUCOSE BLD STRIP.AUTO-MCNC: 387 MG/DL (ref 65–117)
GLUCOSE BLD STRIP.AUTO-MCNC: 450 MG/DL (ref 65–117)
GLUCOSE BLD STRIP.AUTO-MCNC: 474 MG/DL (ref 65–117)
GLUCOSE SERPL-MCNC: 169 MG/DL (ref 65–100)
GRAM STN SPEC: ABNORMAL
GRAM STN SPEC: ABNORMAL
POTASSIUM SERPL-SCNC: 4 MMOL/L (ref 3.5–5.1)
REPORTED DOSE,DOSE: ABNORMAL UNITS
REPORTED DOSE/TIME,TMG: ABNORMAL
SERVICE CMNT-IMP: ABNORMAL
SERVICE CMNT-IMP: NORMAL
SODIUM SERPL-SCNC: 137 MMOL/L (ref 136–145)
VANCOMYCIN TROUGH SERPL-MCNC: 12.4 UG/ML (ref 5–10)

## 2021-07-16 PROCEDURE — 74011250636 HC RX REV CODE- 250/636: Performed by: INTERNAL MEDICINE

## 2021-07-16 PROCEDURE — 82962 GLUCOSE BLOOD TEST: CPT

## 2021-07-16 PROCEDURE — 74011250636 HC RX REV CODE- 250/636: Performed by: STUDENT IN AN ORGANIZED HEALTH CARE EDUCATION/TRAINING PROGRAM

## 2021-07-16 PROCEDURE — 80202 ASSAY OF VANCOMYCIN: CPT

## 2021-07-16 PROCEDURE — 74011250636 HC RX REV CODE- 250/636: Performed by: PODIATRIST

## 2021-07-16 PROCEDURE — 74011636637 HC RX REV CODE- 636/637: Performed by: INTERNAL MEDICINE

## 2021-07-16 PROCEDURE — 94760 N-INVAS EAR/PLS OXIMETRY 1: CPT

## 2021-07-16 PROCEDURE — 74011000258 HC RX REV CODE- 258: Performed by: INTERNAL MEDICINE

## 2021-07-16 PROCEDURE — 65270000029 HC RM PRIVATE

## 2021-07-16 PROCEDURE — 80048 BASIC METABOLIC PNL TOTAL CA: CPT

## 2021-07-16 PROCEDURE — 74011250637 HC RX REV CODE- 250/637: Performed by: PODIATRIST

## 2021-07-16 PROCEDURE — 36415 COLL VENOUS BLD VENIPUNCTURE: CPT

## 2021-07-16 PROCEDURE — 2709999900 HC NON-CHARGEABLE SUPPLY

## 2021-07-16 PROCEDURE — 74011636637 HC RX REV CODE- 636/637: Performed by: PODIATRIST

## 2021-07-16 RX ORDER — VANCOMYCIN/0.9 % SOD CHLORIDE 1.5G/250ML
1500 PLASTIC BAG, INJECTION (ML) INTRAVENOUS EVERY 12 HOURS
Status: DISCONTINUED | OUTPATIENT
Start: 2021-07-16 | End: 2021-07-17

## 2021-07-16 RX ORDER — INSULIN GLARGINE 100 [IU]/ML
15 INJECTION, SOLUTION SUBCUTANEOUS
Status: DISCONTINUED | OUTPATIENT
Start: 2021-07-16 | End: 2021-07-17

## 2021-07-16 RX ORDER — INSULIN LISPRO 100 [IU]/ML
10 INJECTION, SOLUTION INTRAVENOUS; SUBCUTANEOUS ONCE
Status: COMPLETED | OUTPATIENT
Start: 2021-07-16 | End: 2021-07-16

## 2021-07-16 RX ADMIN — Medication 10 ML: at 05:40

## 2021-07-16 RX ADMIN — Medication 10 ML: at 21:03

## 2021-07-16 RX ADMIN — ENOXAPARIN SODIUM 40 MG: 40 INJECTION SUBCUTANEOUS at 08:09

## 2021-07-16 RX ADMIN — PIPERACILLIN AND TAZOBACTAM 3.38 G: 3; .375 INJECTION, POWDER, LYOPHILIZED, FOR SOLUTION INTRAVENOUS at 03:25

## 2021-07-16 RX ADMIN — INSULIN LISPRO 10 UNITS: 100 INJECTION, SOLUTION INTRAVENOUS; SUBCUTANEOUS at 12:07

## 2021-07-16 RX ADMIN — Medication 10 ML: at 13:04

## 2021-07-16 RX ADMIN — INSULIN LISPRO 4 UNITS: 100 INJECTION, SOLUTION INTRAVENOUS; SUBCUTANEOUS at 22:51

## 2021-07-16 RX ADMIN — Medication 10 ML: at 13:03

## 2021-07-16 RX ADMIN — OXYCODONE HYDROCHLORIDE AND ACETAMINOPHEN 250 MG: 500 TABLET ORAL at 08:11

## 2021-07-16 RX ADMIN — ATORVASTATIN CALCIUM 20 MG: 20 TABLET, FILM COATED ORAL at 08:11

## 2021-07-16 RX ADMIN — THERA TABS 1 TABLET: TAB at 08:11

## 2021-07-16 RX ADMIN — Medication 1 AMPULE: at 21:00

## 2021-07-16 RX ADMIN — Medication 4000 UNITS: at 08:11

## 2021-07-16 RX ADMIN — ASPIRIN 81 MG: 81 TABLET, CHEWABLE ORAL at 08:11

## 2021-07-16 RX ADMIN — INSULIN LISPRO 4 UNITS: 100 INJECTION, SOLUTION INTRAVENOUS; SUBCUTANEOUS at 08:09

## 2021-07-16 RX ADMIN — DOCUSATE SODIUM 100 MG: 100 CAPSULE, LIQUID FILLED ORAL at 17:09

## 2021-07-16 RX ADMIN — INSULIN GLARGINE 15 UNITS: 100 INJECTION, SOLUTION SUBCUTANEOUS at 17:09

## 2021-07-16 RX ADMIN — PIPERACILLIN AND TAZOBACTAM 3.38 G: 3; .375 INJECTION, POWDER, LYOPHILIZED, FOR SOLUTION INTRAVENOUS at 17:09

## 2021-07-16 RX ADMIN — NEPHROCAP 1 CAPSULE: 1 CAP ORAL at 08:11

## 2021-07-16 RX ADMIN — Medication 1 AMPULE: at 08:12

## 2021-07-16 RX ADMIN — INSULIN LISPRO 10 UNITS: 100 INJECTION, SOLUTION INTRAVENOUS; SUBCUTANEOUS at 19:57

## 2021-07-16 RX ADMIN — VANCOMYCIN HYDROCHLORIDE 1250 MG: 10 INJECTION, POWDER, LYOPHILIZED, FOR SOLUTION INTRAVENOUS at 01:17

## 2021-07-16 RX ADMIN — DOCUSATE SODIUM 100 MG: 100 CAPSULE, LIQUID FILLED ORAL at 08:10

## 2021-07-16 RX ADMIN — VANCOMYCIN HYDROCHLORIDE 1250 MG: 10 INJECTION, POWDER, LYOPHILIZED, FOR SOLUTION INTRAVENOUS at 13:04

## 2021-07-16 RX ADMIN — PIPERACILLIN AND TAZOBACTAM 3.38 G: 3; .375 INJECTION, POWDER, LYOPHILIZED, FOR SOLUTION INTRAVENOUS at 09:42

## 2021-07-16 RX ADMIN — INSULIN LISPRO 10 UNITS: 100 INJECTION, SOLUTION INTRAVENOUS; SUBCUTANEOUS at 17:09

## 2021-07-16 RX ADMIN — VANCOMYCIN HYDROCHLORIDE 1500 MG: 10 INJECTION, POWDER, LYOPHILIZED, FOR SOLUTION INTRAVENOUS at 22:54

## 2021-07-16 RX ADMIN — TAMSULOSIN HYDROCHLORIDE 0.4 MG: 0.4 CAPSULE ORAL at 08:11
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
## 2021-07-16 NOTE — PROGRESS NOTES
Transition of Care Plan:  RUR: 15%  Disposition: goal is to return home   Follow up appointments: PCP, podiatry? DME needed: TBD  Transportation at Discharge: friend Corrinne Cowper or means to access home: friend to provide       IM Medicare letter: to be provided prior to d/c  Caregiver Contact: friend Sukhi Sheikh [phone]  Discharge Caregiver contacted prior to discharge? To be contacted prior to d/c          Per chart review, no anticipated therapy needs at this time. ID consulted. CM will continue to follow for discharge planning needs.        Alexandria Cartwright, [facility], CM  [facility]

## 2021-07-16 NOTE — PROGRESS NOTES
Spiritual Care Assessment/Progress Note  [facility]      NAME: Guanakito Guardado      MRN: [id number]  AGE: 67 y.o. SEX: male  Evangelical Affiliation: Adventism   Language: English     7/16/2021     Total Time (in minutes): 5     Spiritual Assessment begun in [facility] through conversation with:         []Patient        [] Family    [] Friend(s)        Reason for Consult: Initial/Spiritual assessment, patient floor     Spiritual beliefs: (Please include comment if needed)     [] Identifies with a rito tradition:         [] Supported by a rito community:            [] Claims no spiritual orientation:           [] Seeking spiritual identity:                [] Adheres to an individual form of spirituality:           [x] Not able to assess:                           Identified resources for coping:      [] Prayer                               [] Music                  [] Guided Imagery     [] Family/friends                 [] Pet visits     [] Devotional reading                         [x] Unknown     [] Other:                                              Interventions offered during this visit: (See comments for more details)    Patient Interventions: Other (comment) (Pt was talking on the phone)           Plan of Care:     [] Support spiritual and/or cultural needs    [] Support AMD and/or advance care planning process      [] Support grieving process   [] Coordinate Rites and/or Rituals    [] Coordination with community clergy   [] No spiritual needs identified at this time   [] Detailed Plan of Care below (See Comments)  [] Make referral to Music Therapy  [] Make referral to Pet Therapy     [] Make referral to Addiction services  [] Make referral to [facility]  [] Make referral to Spiritual Care Partner  [] No future visits requested        [x] Follow up upon further referrals     Comments:     Initial Spiritual Care Assessment attempt for Mr. Linn Mercado in [phone].  Performed chart review before the visit. Upon arrival, patient was talking in the phone.  respected patient's privacy.       [id number] [employer] Jesika Langley, M.S., Th.M.  Spiritual Care Provider   Paging Service [phone]

## 2021-07-16 NOTE — PROGRESS NOTES
Pharmacy Automatic Renal Dosing Protocol - Antimicrobials    Indication for Antimicrobials: SSTI (OM)    Current Regimen of Each Antimicrobial:  Vancomycin 1500 mg (increase from 1250 mg) Q12H (Start Date 21; Day 8)  Zosyn  3.375g IV q 8h (start: 7/10, day 7)    Previous Antimicrobial Therapy:    Vancomycin Goal Level: 15-20 mcg/ml    Vancomycin Levels  Date Dose & Interval Measured (mcg/mL) Steady State (mcg/mL)    @ 0929 1000mg q 12h 10.0 10.8    1116 1250mg q12h 12.9 16.8    1300 1250 mg q12h 12.4 11.0     Date & time of next level: to be ordered    Significant Cultures:   : blood- NG (final)   Bone tissue: NG. Pseudomonas in Thio only  (pending)   anaerobic: rare pseudomonas, CoNS, scant anaerobic GPC. Pending      Radiology / Imaging results: (X-ray, CT scan or MRI):       Labs:  Recent Labs     21  0249 07/15/21  0210 21  1150 21  0248   CREA 0.80 0.78 0.89  --    BUN 11 11 11  --    WBC  --  5.6 6.2 5.8     Temp (24hrs), Av.5 °F (36.9 °C), Min:98 °F (36.7 °C), Max:99 °F (37.2 °C)      Paralysis, amputations, malnutrition:   Creatinine Clearance (mL/min) or Dialysis: 89.9 ml/in    Impression/Plan:   ID and Podiatry on board  Vancomycin trough sub-therapeutic. Increase Vancomycin to 1.5 g iv q12h for predicted Tr ~15, AUC~600  Continue zosyn as ordered  BMP daily. Recheck Vancomycin trough in 2 days  Stop date currently TBD     Pharmacy will follow daily and adjust medications as appropriate for renal function and/or serum levels.     Thank you,  Radha Wilkerson, PHARMD

## 2021-07-16 NOTE — PROGRESS NOTES
Bedside and Verbal shift change report given to Royal Butler (oncoming nurse) by Katya Harry (offgoing nurse). Report included the following information SBAR, Kardex, Intake/Output, MAR and Recent Results.

## 2021-07-16 NOTE — PROGRESS NOTES
MD returning phone call- instructed to administer 10U Humalog SSI now and to increase Lantus dose 15U BID with starting dose administered now. Ordered placed.

## 2021-07-16 NOTE — PROGRESS NOTES
Comprehensive Nutrition Assessment    Type and Reason for Visit: Initial, RD nutrition re-screen/LOS    Nutrition Recommendations/Plan:   · Continue CCD/60 g CHO/meal.  · RD added Ensure High Protein po BID. · Please document % meals and supplements consumed in flowsheet I/O's under intake. Nutrition Assessment:     7/16: Chart reviewed; med noted for wound infection, s/p debridement right foot wound and bone. Hx of prostate ca and DM. BG levels fair, A1C 8.6%. Possible discharge pending pathology and podiatry. Good documented po intake. No weight loss identified. Patient Vitals for the past 168 hrs:   % Diet Eaten   07/15/21 1735 76 - 100%   07/15/21 1518 76 - 100%   07/14/21 1500 76 - 100%   07/13/21 1510 76 - 100%   07/13/21 1304 76 - 100%   07/13/21 0943 51 - 75%   07/11/21 0937 76 - 100%   07/10/21 0943 76 - 100%     Last Weight Metric  Weight Loss Metrics 7/12/2021 7/9/2021 6/12/2021 5/20/2021 10/19/2020 7/6/2020 5/3/2020   Today's Wt 180 lb 12.8 oz - 181 lb 7 oz 187 lb 13.3 oz 190 lb 191 lb 190 lb 7.6 oz   BMI - 23.21 kg/m2 23.3 kg/m2 23.79 kg/m2 24.07 kg/m2 24.52 kg/m2 24.46 kg/m2     Estimated Daily Nutrient Needs:  Energy (kcal): 2000 (BMR 1565 x 1. 3AF); Weight Used for Energy Requirements: Current  Protein (g): 100 (1.2 g/kg bw); Weight Used for Protein Requirements: Current  Fluid (ml/day): 2000 ml/day; Method Used for Fluid Requirements: 1 ml/kcal    Nutrition Related Findings:  BM: none documented; bowel regimen administered, awaiting results per RN note; Labs: ; Meds: nephrocap, Vitamin C, B-complex, MVI      Wounds:    Surgical incision, Diabetic ulcer       Current Nutrition Therapies:  ADULT DIET Regular; 4 carb choices (60 gm/meal)  ADULT ORAL NUTRITION SUPPLEMENT Breakfast, Dinner;  Low Calorie/High Protein    Anthropometric Measures:  · Height:  6' 2\" (188 cm)  · Current Body Wt:  82 kg (180 lb 12.4 oz)     · Ideal Body Wt:  190 lbs:  95.1 %   · Adjusted Body Weight:   ; Weight Adjustment for:       Nutrition Diagnosis:   · Increased nutrient needs related to  (wound debridement) as evidenced by  (increased energy and protein needs to optimize wound healing)    Nutrition Interventions:   Food and/or Nutrient Delivery: Continue current diet, Start oral nutrition supplement  Nutrition Education and Counseling: No recommendations at this time  Coordination of Nutrition Care: No recommendation at this time    Goals:  Continue PO intake >75% of meals + consume 240 ml ONS next 5-7 days       Nutrition Monitoring and Evaluation:   Behavioral-Environmental Outcomes: None identified  Food/Nutrient Intake Outcomes: Food and nutrient intake, Supplement intake  Physical Signs/Symptoms Outcomes: Biochemical data, Weight, Skin    Discharge Planning:    Continue current diet     Electronically signed by Robb Bates RD on 7/16/2021 at 11:29 AM

## 2021-07-16 NOTE — PROGRESS NOTES
Hospitalist Progress Note    NAME: Yahir Yu   :  1945   MRN:  [id number]       Assessment / Plan:  Chronic non healing diabetic wound of the R foot, worsening, failed outpt ABx   Sepsis   MRI positive for osteomyelitis. Continue empiric vancomycin and Zosyn  He is status post debridement now post op day 2  We will continue to follow patient perioperatively. We will follow-up his pathology report and culture and sensitivity   Awaiting pathology and CS and also podiatry input   ID consulted today by podiatry , will be awaiting ID recommendations on discharge planning. Hyperkalemia,Mild. corrected   IDDM  Blood sugars borderline low this a.m. Continue Lantus at 12 units as he will be kept n.p.o. Continue insulin lispro sliding scale. Prostate Ca  HLD  [place], not using CPAP  Continue home aspirin, Lipitor, Flomax  Code Status: full   Surrogate Decision Maker: daughter, Carla Cleaning, [phone], [phone]  Patient's daughter need updates. DVT Prophylaxis: lovenox  GI Prophylaxis: not indicated  Baseline: independent   Body mass index is 23.21 kg/m². Subjective:     Chief Complaint / Reason for Physician Visit   Patient was seen and examined no specific complaints. Pain is controlled in the lower extremities. No chest pain or shortness of breath        Review of Systems:  Symptom Y/N Comments  Symptom Y/N Comments   Fever/Chills n   Chest Pain n    Poor Appetite    Edema     Cough    Abdominal Pain n    Sputum    Joint Pain     SOB/DEL CASTILLO n   Pruritis/Rash     Nausea/vomit    Tolerating PT/OT     Diarrhea    Tolerating Diet     Constipation    Other       Could NOT obtain due to:      Objective:     VITALS:   Last 24hrs VS reviewed since prior progress note.  Most recent are:  Patient Vitals for the past 24 hrs:   Temp Pulse Resp BP SpO2   21 0259 98.6 °F (37 °C) 78 18 (!) 133/59 97 %   07/15/21 2316 98.5 °F (36.9 °C) 80 18 133/63 100 %   07/15/21 1629 99 °F (37.2 °C) 82 18 131/74 100 % 07/15/21 1251 99 °F (37.2 °C) 86 18 109/85 99 %       Intake/Output Summary (Last 24 hours) at 7/16/2021 0753  Last data filed at 7/16/2021 0992  Gross per 24 hour   Intake 1550 ml   Output 1400 ml   Net 150 ml        PHYSICAL EXAM:  General: Alert, cooperative, no acute distress    EENT:  EOMI. Anicteric sclerae. MMM  Resp:  CTA bilaterally, no wheezing or rales. No accessory muscle use  CV:  Regular  rhythm,  No edema  GI:  Soft, Non distended, Non tender.  +Bowel sounds  Neurologic:  Alert and oriented X 3, normal speech,   Psych:   Not anxious nor agitated  Skin:  Right foot wound present  Right foot dressed very well postoperatively. Reviewed most current lab test results and cultures  YES  Reviewed most current radiology test results   YES  Review and summation of old records today    NO  Reviewed patient's current orders and MAR    YES  PMH/SH reviewed - no change compared to H&P          Current Facility-Administered Medications:     vancomycin (VANCOCIN) 1250 mg in  ml infusion, 1,250 mg, IntraVENous, Q12H, Anna Arevalo MD, Last Rate: 125 mL/hr at 07/16/21 0117, 1,250 mg at 07/16/21 0117    piperacillin-tazobactam (ZOSYN) 3.375 g in 0.9% sodium chloride (MBP/ADV) 100 mL MBP, 3.375 g, IntraVENous, Q8H, Anna Arevalo MD, Last Rate: 25 mL/hr at 07/16/21 0325, 3.375 g at 07/16/21 0325    Vancomycin trough - please draw prior to dose 7/16 at 1300.   Thank you!, 1 Each, Other, ONCE, Anna Arevalo MD    sodium chloride (NS) flush 5-40 mL, 5-40 mL, IntraVENous, Q8H, Fernando Dillon MD, 10 mL at 07/16/21 0540    sodium chloride (NS) flush 5-40 mL, 5-40 mL, IntraVENous, PRN, Fernando Dillon MD    lidocaine (PF) (XYLOCAINE) 10 mg/mL (1 %) injection 0.1 mL, 0.1 mL, SubCUTAneous, PRN, Fernando Dillon MD    alcohol 62% (NOZIN) nasal  1 Ampule, 1 Ampule, Topical, Q12H, Oxana Hager DPM, 1 Ampule at 07/15/21 2202    insulin glargine (LANTUS) injection 12 Units, 12 Units, SubCUTAneous, QHS, Bernabe Hagera Pagoda, DPM, 12 Units at 07/15/21 2203    sodium chloride (NS) flush 5-10 mL, 5-10 mL, IntraVENous, PRN, Zina Hager Pagoda, DPM    ascorbic acid (vitamin C) (VITAMIN C) tablet 250 mg, 250 mg, Oral, DAILY, Beldl, Zina Pagoda, DPM, 250 mg at 07/15/21 1033    aspirin chewable tablet 81 mg, 81 mg, Oral, DAILY, Beldl, Zina Pagoda, DPM, 81 mg at 07/15/21 1033    atorvastatin (LIPITOR) tablet 20 mg, 20 mg, Oral, DAILY, Madan, Zina Pagoda, DPM, 20 mg at 07/15/21 1034    B complex-vitaminC-folic acid (NEPHROCAP) cap, 1 Capsule, Oral, DAILY, Bernabe Hagera Pagoda, DPM, 1 Capsule at 07/15/21 1033    cholecalciferol (VITAMIN D3) (1000 Units /25 mcg) tablet 4,000 Units, 4,000 Units, Oral, DAILY, Bernabe Hagera Pagoda, DPM, 4,000 Units at 07/15/21 1033    docusate sodium (COLACE) capsule 100 mg, 100 mg, Oral, BID, Bernabe Hagera Pagoda, DPM, 100 mg at 07/15/21 1730    HYDROcodone-acetaminophen (NORCO) 5-325 mg per tablet 1 Tablet, 1 Tablet, Oral, Q4H PRN, Zina Hager Pagoda, DPM    tamsulosin (FLOMAX) capsule 0.4 mg, 0.4 mg, Oral, DAILY, Bernabe Hagera Pagoda, DPM, 0.4 mg at 07/15/21 1034    therapeutic multivitamin (THERAGRAN) tablet 1 Tablet, 1 Tablet, Oral, DAILY, Bernabe Hagera Pagoda, DPM, 1 Tablet at 07/15/21 1034    insulin lispro (HUMALOG) injection, , SubCUTAneous, AC&HS, Bernabe Hagera Pagoda, DPM, 2 Units at 07/15/21 2202    glucose chewable tablet 16 g, 4 Tablet, Oral, PRN, Bernabe Hagera Pagoda, DPM    dextrose (D50W) injection syrg 12.5-25 g, 12.5-25 g, IntraVENous, PRN, Zina Hager, DPM    glucagon (GLUCAGEN) injection 1 mg, 1 mg, IntraMUSCular, PRN, Zina Hager, DPM    sodium chloride (NS) flush 5-40 mL, 5-40 mL, IntraVENous, Q8H, Ish Hager, DPM, 10 mL at 07/16/21 0540    sodium chloride (NS) flush 5-40 mL, 5-40 mL, IntraVENous, PRN, Zina Hager, DPM    acetaminophen (TYLENOL) tablet 650 mg, 650 mg, Oral, Q6H PRN, 650 mg at 07/09/21 3308 **OR** acetaminophen (TYLENOL) suppository 650 mg, 650 mg, Rectal, Q6H PRN, Belock, [place], DPM    polyethylene glycol (MIRALAX) packet 17 g, 17 g, Oral, DAILY PRN, Belock, [place], DPM, 17 g at 07/15/21 1033    ondansetron (ZOFRAN ODT) tablet 4 mg, 4 mg, Oral, Q8H PRN **OR** ondansetron (ZOFRAN) injection 4 mg, 4 mg, IntraVENous, Q6H PRN, Belock, [place], DPM    enoxaparin (LOVENOX) injection 40 mg, 40 mg, SubCUTAneous, DAILY, Belock, [place], DPM, 40 mg at 07/15/21 1038  ________________________________________________________________________  Care Plan discussed with:    Comments   Patient y    Family      RN y    Care Manager     Consultant                        Multidiciplinary team rounds were held today with , nursing, pharmacist and clinical coordinator. Patient's plan of care was discussed; medications were reviewed and discharge planning was addressed. ________________________________________________________________________  Total NON critical care TIME:  35   Minutes    Total CRITICAL CARE TIME Spent:   Minutes non procedure based      Comments   >50% of visit spent in counseling and coordination of care y    ________________________________________________________________________  Jessica Villalobos MD     Procedures: see electronic medical records for all procedures/Xrays and details which were not copied into this note but were reviewed prior to creation of Plan. LABS:  I reviewed today's most current labs and imaging studies. Pertinent labs include:  Recent Labs     07/15/21  0210 07/14/21  1150 07/14/21  0248   WBC 5.6 6.2 5.8   HGB 9.0* 9.6* 9.6*   HCT 26.6* 29.4* 29.4*    261 269     Recent Labs     07/16/21  0249 07/15/21  0210 07/14/21  1150    135* 135*   K 4.0 3.9 3.9    104 103   CO2 27 27 27   * 183* 258*   BUN 11 11 11   CREA 0.80 0.78 0.89   CA 8.6 8.3* 8.6   ALB  --   --  2.4*   TBILI  --   --  0.4   ALT  --   --  16       Signed:  Jessica Villalobos MD

## 2021-07-16 NOTE — PROGRESS NOTES
Tech alerted RN that blood sugar 450, requested recheck and . Dr. Irby Stager paged. Awaiting response.

## 2021-07-16 NOTE — PROGRESS NOTES
Patient seen and examined. Right foot OM, T1DM. 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 S/p right 4th metatarsal debridement, and bone biopsy of right third and fifth metatarsal by Dr. Gregory Laguna on 7/13/2021. Cultures are so far growing Enterococcus faecalis. Follow up operative cultures and pathology. Continue empiric vancomycin and Zosyn for now. We will follow cultures and examined the wound on Monday to base final antibiotic choice and duration orders.       Ignacia Neal MD  Infectious Diseases

## 2021-07-17 LAB
ANION GAP SERPL CALC-SCNC: 6 MMOL/L (ref 5–15)
BACTERIA SPEC CULT: ABNORMAL
BASOPHILS # BLD: 0 K/UL (ref 0–0.1)
BASOPHILS NFR BLD: 0 % (ref 0–1)
BUN SERPL-MCNC: 10 MG/DL (ref 6–20)
BUN/CREAT SERPL: 14 (ref 12–20)
CALCIUM SERPL-MCNC: 8.8 MG/DL (ref 8.5–10.1)
CHLORIDE SERPL-SCNC: 106 MMOL/L (ref 97–108)
CO2 SERPL-SCNC: 26 MMOL/L (ref 21–32)
CREAT SERPL-MCNC: 0.72 MG/DL (ref 0.7–1.3)
DIFFERENTIAL METHOD BLD: ABNORMAL
EOSINOPHIL # BLD: 0.2 K/UL (ref 0–0.4)
EOSINOPHIL NFR BLD: 6 % (ref 0–7)
ERYTHROCYTE [DISTWIDTH] IN BLOOD BY AUTOMATED COUNT: 12.9 % (ref 11.5–14.5)
GLUCOSE BLD STRIP.AUTO-MCNC: 107 MG/DL (ref 65–117)
GLUCOSE BLD STRIP.AUTO-MCNC: 249 MG/DL (ref 65–117)
GLUCOSE BLD STRIP.AUTO-MCNC: 262 MG/DL (ref 65–117)
GLUCOSE BLD STRIP.AUTO-MCNC: 308 MG/DL (ref 65–117)
GLUCOSE BLD STRIP.AUTO-MCNC: 86 MG/DL (ref 65–117)
GLUCOSE SERPL-MCNC: 71 MG/DL (ref 65–100)
GRAM STN SPEC: ABNORMAL
HCT VFR BLD AUTO: 29.1 % (ref 36.6–50.3)
HGB BLD-MCNC: 9.4 G/DL (ref 12.1–17)
IMM GRANULOCYTES # BLD AUTO: 0 K/UL (ref 0–0.04)
IMM GRANULOCYTES NFR BLD AUTO: 0 % (ref 0–0.5)
LYMPHOCYTES # BLD: 0.6 K/UL (ref 0.8–3.5)
LYMPHOCYTES NFR BLD: 21 % (ref 12–49)
MCH RBC QN AUTO: 27 PG (ref 26–34)
MCHC RBC AUTO-ENTMCNC: 32.3 G/DL (ref 30–36.5)
MCV RBC AUTO: 83.6 FL (ref 80–99)
MONOCYTES # BLD: 0.4 K/UL (ref 0–1)
MONOCYTES NFR BLD: 14 % (ref 5–13)
NEUTS SEG # BLD: 1.7 K/UL (ref 1.8–8)
NEUTS SEG NFR BLD: 58 % (ref 32–75)
NRBC # BLD: 0 K/UL (ref 0–0.01)
NRBC BLD-RTO: 0 PER 100 WBC
PLATELET # BLD AUTO: 290 K/UL (ref 150–400)
PMV BLD AUTO: 9.2 FL (ref 8.9–12.9)
POTASSIUM SERPL-SCNC: 3.7 MMOL/L (ref 3.5–5.1)
RBC # BLD AUTO: 3.48 M/UL (ref 4.1–5.7)
SERVICE CMNT-IMP: ABNORMAL
SERVICE CMNT-IMP: NORMAL
SERVICE CMNT-IMP: NORMAL
SODIUM SERPL-SCNC: 138 MMOL/L (ref 136–145)
WBC # BLD AUTO: 2.9 K/UL (ref 4.1–11.1)

## 2021-07-17 PROCEDURE — 74011636637 HC RX REV CODE- 636/637: Performed by: INTERNAL MEDICINE

## 2021-07-17 PROCEDURE — 74011000258 HC RX REV CODE- 258: Performed by: INTERNAL MEDICINE

## 2021-07-17 PROCEDURE — 74011250636 HC RX REV CODE- 250/636: Performed by: INTERNAL MEDICINE

## 2021-07-17 PROCEDURE — 74011250636 HC RX REV CODE- 250/636: Performed by: PODIATRIST

## 2021-07-17 PROCEDURE — 2709999900 HC NON-CHARGEABLE SUPPLY

## 2021-07-17 PROCEDURE — 65270000029 HC RM PRIVATE

## 2021-07-17 PROCEDURE — 82962 GLUCOSE BLOOD TEST: CPT

## 2021-07-17 PROCEDURE — 74011636637 HC RX REV CODE- 636/637: Performed by: PODIATRIST

## 2021-07-17 PROCEDURE — 74011250637 HC RX REV CODE- 250/637: Performed by: PODIATRIST

## 2021-07-17 PROCEDURE — 85025 COMPLETE CBC W/AUTO DIFF WBC: CPT

## 2021-07-17 PROCEDURE — 36415 COLL VENOUS BLD VENIPUNCTURE: CPT

## 2021-07-17 PROCEDURE — 80048 BASIC METABOLIC PNL TOTAL CA: CPT

## 2021-07-17 RX ORDER — INSULIN LISPRO 100 [IU]/ML
5 INJECTION, SOLUTION INTRAVENOUS; SUBCUTANEOUS
Status: DISCONTINUED | OUTPATIENT
Start: 2021-07-17 | End: 2021-07-17

## 2021-07-17 RX ORDER — INSULIN GLARGINE 100 [IU]/ML
20 INJECTION, SOLUTION SUBCUTANEOUS
Status: DISCONTINUED | OUTPATIENT
Start: 2021-07-17 | End: 2021-07-20 | Stop reason: HOSPADM

## 2021-07-17 RX ORDER — INSULIN LISPRO 100 [IU]/ML
4 INJECTION, SOLUTION INTRAVENOUS; SUBCUTANEOUS
Status: DISCONTINUED | OUTPATIENT
Start: 2021-07-17 | End: 2021-07-20 | Stop reason: HOSPADM

## 2021-07-17 RX ADMIN — INSULIN LISPRO 4 UNITS: 100 INJECTION, SOLUTION INTRAVENOUS; SUBCUTANEOUS at 12:43

## 2021-07-17 RX ADMIN — Medication 10 ML: at 05:47

## 2021-07-17 RX ADMIN — INSULIN GLARGINE 20 UNITS: 100 INJECTION, SOLUTION SUBCUTANEOUS at 22:14

## 2021-07-17 RX ADMIN — INSULIN LISPRO 4 UNITS: 100 INJECTION, SOLUTION INTRAVENOUS; SUBCUTANEOUS at 17:33

## 2021-07-17 RX ADMIN — NEPHROCAP 1 CAPSULE: 1 CAP ORAL at 08:41

## 2021-07-17 RX ADMIN — INSULIN LISPRO 4 UNITS: 100 INJECTION, SOLUTION INTRAVENOUS; SUBCUTANEOUS at 22:14

## 2021-07-17 RX ADMIN — Medication 10 ML: at 22:00

## 2021-07-17 RX ADMIN — ATORVASTATIN CALCIUM 20 MG: 20 TABLET, FILM COATED ORAL at 08:41

## 2021-07-17 RX ADMIN — ENOXAPARIN SODIUM 40 MG: 40 INJECTION SUBCUTANEOUS at 08:41

## 2021-07-17 RX ADMIN — INSULIN LISPRO 10 UNITS: 100 INJECTION, SOLUTION INTRAVENOUS; SUBCUTANEOUS at 17:34

## 2021-07-17 RX ADMIN — PIPERACILLIN AND TAZOBACTAM 3.38 G: 3; .375 INJECTION, POWDER, LYOPHILIZED, FOR SOLUTION INTRAVENOUS at 08:43

## 2021-07-17 RX ADMIN — PIPERACILLIN AND TAZOBACTAM 3.38 G: 3; .375 INJECTION, POWDER, LYOPHILIZED, FOR SOLUTION INTRAVENOUS at 17:34

## 2021-07-17 RX ADMIN — INSULIN GLARGINE 15 UNITS: 100 INJECTION, SOLUTION SUBCUTANEOUS at 08:40

## 2021-07-17 RX ADMIN — PIPERACILLIN AND TAZOBACTAM 3.38 G: 3; .375 INJECTION, POWDER, LYOPHILIZED, FOR SOLUTION INTRAVENOUS at 02:06

## 2021-07-17 RX ADMIN — Medication 10 ML: at 12:44

## 2021-07-17 RX ADMIN — TAMSULOSIN HYDROCHLORIDE 0.4 MG: 0.4 CAPSULE ORAL at 08:41

## 2021-07-17 RX ADMIN — THERA TABS 1 TABLET: TAB at 08:42

## 2021-07-17 RX ADMIN — Medication 1 AMPULE: at 08:42

## 2021-07-17 RX ADMIN — ASPIRIN 81 MG: 81 TABLET, CHEWABLE ORAL at 08:41

## 2021-07-17 RX ADMIN — Medication 4000 UNITS: at 08:42

## 2021-07-17 RX ADMIN — OXYCODONE HYDROCHLORIDE AND ACETAMINOPHEN 250 MG: 500 TABLET ORAL at 08:41
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
## 2021-07-17 NOTE — PROGRESS NOTES
End of Shift Note    Bedside shift change report given to [address] (oncoming nurse) by Vidya Osuna RN (offgoing nurse). Report included the following information SBAR, Kardex, OR Summary, Procedure Summary, Intake/Output, MAR and Recent Results    Shift worked:  7p -7a     Shift summary and any significant changes:    FSBS this am is 86 dressing changed as ordered this am     Concerns for physician to address:       Zone phone for oncoming shift:          Activity:  Activity Level: Up with Assistance  Number times ambulated in hallways past shift: 0  Number of times OOB to chair past shift: 0    Cardiac:   Cardiac Monitoring: No      Cardiac Rhythm: Sinus Rhythm    Access:   Current line(s): PIV     Genitourinary:   Urinary status: voiding    Respiratory:   O2 Device: None (Room air)  Chronic home O2 use?: NO  Incentive spirometer at bedside: N/A     GI:  Last Bowel Movement Date: 07/12/21  Current diet:  ADULT DIET Regular; 4 carb choices (60 gm/meal)  ADULT ORAL NUTRITION SUPPLEMENT Breakfast, Dinner; Low Calorie/High Protein  Passing flatus: YES  Tolerating current diet: YES       Pain Management:   Patient states pain is manageable on current regimen: YES    Skin:  Ottoniel Score: 19  Interventions: increase time out of bed, PT/OT consult and nutritional support     Patient Safety:  Fall Score:  Total Score: 3  Interventions: assistive device (walker, cane, etc), pt to call before getting OOB and stay with me (per policy)  High Fall Risk: Yes    Length of Stay:  Expected LOS: 5d 7h  Actual LOS: Panchito Arceo RN

## 2021-07-17 NOTE — PROGRESS NOTES
Hospitalist Progress Note    NAME: Esther Fernández   :  1945   MRN:  [id number]       Assessment / Plan:  Sepsis due to  Right foot osteomyelitis   S/p right 4th metatarsal debridement, and bone biopsy of right third and fifth metatarsal by Dr. Aditi Fritz on 2021.   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 Osteomyelitis of the remnant fourth metatarsal shaft periosteal abscess. 2. Abnormal signal in distal remnant shafts of third and fifth metatarsals,  equivocal for osteomyelitis. -Cultures growing Enterococcus faecalis. Pseudomonas growing from thio broth  -Continue Zosyn. Stopping vancomycin  -Plan for PICC placement on Monday  -Final antibiotic recommendations from ID, pending  -podiatry help appreciated    IDDM  -Change Lantus 20 units nightly.  -Add premeal Humalog, continue sliding scale. Patient counts carbohydrates and averages 5 to 7 units before meals. Prostate Ca  HLD  [place], not using CPAP  -Continue home aspirin, Lipitor, Flomax    History of right TMA  History of left third toe amputation    Code Status: full   Surrogate Decision Maker: daughter, Faye Matthews, [phone], [phone]  Patient's daughter need updates. DVT Prophylaxis: lovenox  GI Prophylaxis: not indicated  Baseline: independent   Body mass index is 23.21 kg/m². Subjective:     Chief Complaint / Reason for Physician Visit   Follow-up osteomyelitis, diabetes    Review of Systems:  Symptom Y/N Comments  Symptom Y/N Comments   Fever/Chills n   Chest Pain n    Poor Appetite    Edema     Cough    Abdominal Pain n    Sputum    Joint Pain     SOB/DEL CASTILLO n   Pruritis/Rash     Nausea/vomit    Tolerating PT/OT     Diarrhea    Tolerating Diet     Constipation    Other       Could NOT obtain due to:      Objective:     VITALS:   Last 24hrs VS reviewed since prior progress note.  Most recent are:  Patient Vitals for the past 24 hrs:   Temp Pulse Resp BP SpO2   21 1141 98 °F (36.7 °C) 82 16 (!) 138/58 96 %   07/17/21 0736 97.9 °F (36.6 °C) 83 16 139/68 97 %   07/17/21 0359 97.9 °F (36.6 °C) 90 16 (!) 154/70 97 %   07/17/21 0000 98.4 °F (36.9 °C) 85 16 (!) 152/58 100 %   07/16/21 2007 98.4 °F (36.9 °C) 83 18 (!) 149/61 98 %   07/16/21 1437 98.1 °F (36.7 °C) 80 18 (!) 140/58 97 %       Intake/Output Summary (Last 24 hours) at 7/17/2021 1354  Last data filed at 7/17/2021 0546  Gross per 24 hour   Intake 1410 ml   Output 1925 ml   Net -515 ml        PHYSICAL EXAM:  General: Alert, cooperative, no acute distress    EENT:  EOMI. Anicteric sclerae. MMM  Resp:  CTA bilaterally, no wheezing or rales. No accessory muscle use  CV:  Regular  rhythm,  No edema  GI:  Soft, Non distended, Non tender.  +Bowel sounds  Neurologic:  Alert and oriented X 3, normal speech,   Psych:   Not anxious nor agitated  Skin:  Right foot wound present  Right foot dressed very well postoperatively.     Reviewed most current lab test results and cultures  YES  Reviewed most current radiology test results   YES  Review and summation of old records today    NO  Reviewed patient's current orders and MAR    YES  PMH/SH reviewed - no change compared to H&P          Current Facility-Administered Medications:     insulin glargine (LANTUS) injection 20 Units, 20 Units, SubCUTAneous, QHS, Anna Lieberamn MD    insulin lispro (HUMALOG) injection 4 Units, 4 Units, SubCUTAneous, TIDAC, Anna Lieberman MD    piperacillin-tazobactam (ZOSYN) 3.375 g in 0.9% sodium chloride (MBP/ADV) 100 mL MBP, 3.375 g, IntraVENous, Q8H, Anna Arevalo MD, Last Rate: 25 mL/hr at 07/17/21 0843, 3.375 g at 07/17/21 0843    lidocaine (PF) (XYLOCAINE) 10 mg/mL (1 %) injection 0.1 mL, 0.1 mL, SubCUTAneous, PRN, Shravan Dillon MD    alcohol 62% (NOZIN) nasal  1 Ampule, 1 Ampule, Topical, Q12H, Zuleika Hager DPM, 1 Ampule at 07/17/21 0842    ascorbic acid (vitamin C) (VITAMIN C) tablet 250 mg, 250 mg, Oral, DAILY, Madan, Opal Marker, DPM, 250 mg at 07/17/21 3176    aspirin chewable tablet 81 mg, 81 mg, Oral, DAILY, Beldl, Opal Marker, DPM, 81 mg at 07/17/21 0841    atorvastatin (LIPITOR) tablet 20 mg, 20 mg, Oral, DAILY, Beldl, Opal Marker, DPM, 20 mg at 07/17/21 0841    B complex-vitaminC-folic acid (NEPHROCAP) cap, 1 Capsule, Oral, DAILY, Beldl, Opal Marker, DPM, 1 Capsule at 07/17/21 0841    cholecalciferol (VITAMIN D3) (1000 Units /25 mcg) tablet 4,000 Units, 4,000 Units, Oral, DAILY, Beldl, Opal Marker, DPM, 4,000 Units at 07/17/21 7819    docusate sodium (COLACE) capsule 100 mg, 100 mg, Oral, BID, Beldl, Opal Marker, DPM, 100 mg at 07/16/21 1709    HYDROcodone-acetaminophen (NORCO) 5-325 mg per tablet 1 Tablet, 1 Tablet, Oral, Q4H PRN, Edwar Hageria Marker, DPM    tamsulosin (FLOMAX) capsule 0.4 mg, 0.4 mg, Oral, DAILY, Beldl, Opal Marker, DPM, 0.4 mg at 07/17/21 0841    therapeutic multivitamin (THERAGRAN) tablet 1 Tablet, 1 Tablet, Oral, DAILY, Madan, Opal Marker, DPM, 1 Tablet at 07/17/21 0842    insulin lispro (HUMALOG) injection, , SubCUTAneous, AC&HS, Edwar Hageria Marker, DPM, 4 Units at 07/17/21 1243    glucose chewable tablet 16 g, 4 Tablet, Oral, PRN, Edwar Hageria Marker, DPM    dextrose (D50W) injection syrg 12.5-25 g, 12.5-25 g, IntraVENous, PRN, Opal Hager Marker, DPM    glucagon (GLUCAGEN) injection 1 mg, 1 mg, IntraMUSCular, PRN, Opal Hager Marker, DPM    sodium chloride (NS) flush 5-40 mL, 5-40 mL, IntraVENous, Q8H, Madan Ish J, DPM, 10 mL at 07/17/21 1244    sodium chloride (NS) flush 5-40 mL, 5-40 mL, IntraVENous, PRN, Opal Hager Marker, DPM    acetaminophen (TYLENOL) tablet 650 mg, 650 mg, Oral, Q6H PRN, 650 mg at 07/09/21 2343 **OR** acetaminophen (TYLENOL) suppository 650 mg, 650 mg, Rectal, Q6H PRN, Opal Hager, DPM    polyethylene glycol (MIRALAX) packet 17 g, 17 g, Oral, DAILY PRN, Ish Hager, DPM, 17 g at 07/15/21 1033    ondansetron (ZOFRAN ODT) tablet 4 mg, 4 mg, Oral, Q8H PRN **OR** ondansetron (ZOFRAN) injection 4 mg, 4 mg, IntraVENous, Q6H PRN, Dave Hager, DPM    enoxaparin (LOVENOX) injection 40 mg, 40 mg, SubCUTAneous, DAILY, Dave Hager, DPM, 40 mg at 07/17/21 0029  ________________________________________________________________________  Care Plan discussed with:    Comments   Patient y    Family  y   at bedside   RN y    Care Manager     Consultant                        Multidiciplinary team rounds were held today with , nursing, pharmacist and clinical coordinator. Patient's plan of care was discussed; medications were reviewed and discharge planning was addressed. ________________________________________________________________________  Total NON critical care TIME:  25   Minutes    Total CRITICAL CARE TIME Spent:   Minutes non procedure based      Comments   >50% of visit spent in counseling and coordination of care y    ________________________________________________________________________  Jerrell Sheehan MD     Procedures: see electronic medical records for all procedures/Xrays and details which were not copied into this note but were reviewed prior to creation of Plan. LABS:  I reviewed today's most current labs and imaging studies.   Pertinent labs include:  Recent Labs     07/17/21  0404 07/15/21  0210   WBC 2.9* 5.6   HGB 9.4* 9.0*   HCT 29.1* 26.6*    251     Recent Labs     07/17/21  0404 07/16/21  0249 07/15/21  0210    137 135*   K 3.7 4.0 3.9    106 104   CO2 26 27 27   GLU 71 169* 183*   BUN 10 11 11   CREA 0.72 0.80 0.78   CA 8.8 8.6 8.3*       Signed: Jerrell Sheehan MD

## 2021-07-17 NOTE — PROGRESS NOTES
Cultures growing Amp-S Enterococcus faecalis and Pseudomonas. Pathology reviewed. Stop vancomycin. Continue zosyn. OK to place PICC line. Final recommendations 7/19/21.          Temi Olivia MD  Infectious Diseases

## 2021-07-17 NOTE — PROGRESS NOTES
Bedside and Verbal shift change report given to AMI ALFORD (oncoming nurse) by Angela Perez (offgoing nurse). Report included the following information SBAR, Kardex, Accordion, Recent Results and Med Rec Status.

## 2021-07-18 LAB
ANION GAP SERPL CALC-SCNC: 4 MMOL/L (ref 5–15)
BUN SERPL-MCNC: 13 MG/DL (ref 6–20)
BUN/CREAT SERPL: 18 (ref 12–20)
CALCIUM SERPL-MCNC: 9.2 MG/DL (ref 8.5–10.1)
CHLORIDE SERPL-SCNC: 105 MMOL/L (ref 97–108)
CO2 SERPL-SCNC: 29 MMOL/L (ref 21–32)
CREAT SERPL-MCNC: 0.74 MG/DL (ref 0.7–1.3)
GLUCOSE BLD STRIP.AUTO-MCNC: 125 MG/DL (ref 65–117)
GLUCOSE BLD STRIP.AUTO-MCNC: 228 MG/DL (ref 65–117)
GLUCOSE BLD STRIP.AUTO-MCNC: 237 MG/DL (ref 65–117)
GLUCOSE BLD STRIP.AUTO-MCNC: 278 MG/DL (ref 65–117)
GLUCOSE SERPL-MCNC: 144 MG/DL (ref 65–100)
POTASSIUM SERPL-SCNC: 4.2 MMOL/L (ref 3.5–5.1)
SERVICE CMNT-IMP: ABNORMAL
SODIUM SERPL-SCNC: 138 MMOL/L (ref 136–145)

## 2021-07-18 PROCEDURE — 74011250636 HC RX REV CODE- 250/636: Performed by: PODIATRIST

## 2021-07-18 PROCEDURE — 74011250637 HC RX REV CODE- 250/637: Performed by: PODIATRIST

## 2021-07-18 PROCEDURE — 74011000258 HC RX REV CODE- 258: Performed by: INTERNAL MEDICINE

## 2021-07-18 PROCEDURE — 36415 COLL VENOUS BLD VENIPUNCTURE: CPT

## 2021-07-18 PROCEDURE — 65270000029 HC RM PRIVATE

## 2021-07-18 PROCEDURE — 74011250636 HC RX REV CODE- 250/636: Performed by: INTERNAL MEDICINE

## 2021-07-18 PROCEDURE — 80048 BASIC METABOLIC PNL TOTAL CA: CPT

## 2021-07-18 PROCEDURE — 74011636637 HC RX REV CODE- 636/637: Performed by: PODIATRIST

## 2021-07-18 PROCEDURE — 82962 GLUCOSE BLOOD TEST: CPT

## 2021-07-18 PROCEDURE — 74011636637 HC RX REV CODE- 636/637: Performed by: INTERNAL MEDICINE

## 2021-07-18 RX ADMIN — Medication 1 AMPULE: at 21:00

## 2021-07-18 RX ADMIN — Medication 4000 UNITS: at 09:21

## 2021-07-18 RX ADMIN — ASPIRIN 81 MG: 81 TABLET, CHEWABLE ORAL at 09:21

## 2021-07-18 RX ADMIN — NEPHROCAP 1 CAPSULE: 1 CAP ORAL at 09:21

## 2021-07-18 RX ADMIN — INSULIN LISPRO 4 UNITS: 100 INJECTION, SOLUTION INTRAVENOUS; SUBCUTANEOUS at 17:47

## 2021-07-18 RX ADMIN — TAMSULOSIN HYDROCHLORIDE 0.4 MG: 0.4 CAPSULE ORAL at 09:21

## 2021-07-18 RX ADMIN — Medication 10 ML: at 22:00

## 2021-07-18 RX ADMIN — Medication 10 ML: at 16:33

## 2021-07-18 RX ADMIN — INSULIN LISPRO 4 UNITS: 100 INJECTION, SOLUTION INTRAVENOUS; SUBCUTANEOUS at 22:41

## 2021-07-18 RX ADMIN — ENOXAPARIN SODIUM 40 MG: 40 INJECTION SUBCUTANEOUS at 09:20

## 2021-07-18 RX ADMIN — Medication 1 AMPULE: at 09:20

## 2021-07-18 RX ADMIN — THERA TABS 1 TABLET: TAB at 09:21

## 2021-07-18 RX ADMIN — INSULIN GLARGINE 20 UNITS: 100 INJECTION, SOLUTION SUBCUTANEOUS at 22:42

## 2021-07-18 RX ADMIN — PIPERACILLIN AND TAZOBACTAM 3.38 G: 3; .375 INJECTION, POWDER, LYOPHILIZED, FOR SOLUTION INTRAVENOUS at 17:47

## 2021-07-18 RX ADMIN — INSULIN LISPRO 4 UNITS: 100 INJECTION, SOLUTION INTRAVENOUS; SUBCUTANEOUS at 17:46

## 2021-07-18 RX ADMIN — OXYCODONE HYDROCHLORIDE AND ACETAMINOPHEN 250 MG: 500 TABLET ORAL at 09:21

## 2021-07-18 RX ADMIN — PIPERACILLIN AND TAZOBACTAM 3.38 G: 3; .375 INJECTION, POWDER, LYOPHILIZED, FOR SOLUTION INTRAVENOUS at 02:15

## 2021-07-18 RX ADMIN — Medication 10 ML: at 06:00

## 2021-07-18 RX ADMIN — INSULIN LISPRO 4 UNITS: 100 INJECTION, SOLUTION INTRAVENOUS; SUBCUTANEOUS at 11:48

## 2021-07-18 RX ADMIN — PIPERACILLIN AND TAZOBACTAM 3.38 G: 3; .375 INJECTION, POWDER, LYOPHILIZED, FOR SOLUTION INTRAVENOUS at 09:20

## 2021-07-18 RX ADMIN — INSULIN LISPRO 4 UNITS: 100 INJECTION, SOLUTION INTRAVENOUS; SUBCUTANEOUS at 07:23

## 2021-07-18 RX ADMIN — ATORVASTATIN CALCIUM 20 MG: 20 TABLET, FILM COATED ORAL at 09:21

## 2021-07-18 NOTE — PROGRESS NOTES
Hospitalist Progress Note    NAME: Polina Ledesma   :  1945   MRN:  [id number]       Assessment / Plan:  Plan of care same as documented on yesterday   Sepsis due to  Right foot osteomyelitis   S/p right 4th metatarsal debridement, and bone biopsy of right third and fifth metatarsal by Dr. Garland Alcocer on 2021.   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 Osteomyelitis of the remnant fourth metatarsal shaft periosteal abscess. 2. Abnormal signal in distal remnant shafts of third and fifth metatarsals,  equivocal for osteomyelitis. -Cultures growing Enterococcus faecalis. Pseudomonas growing from thio broth  -Continue Zosyn. Stopping vancomycin  -Plan for PICC placement on Monday  -Final antibiotic recommendations from ID, pending  -podiatry help appreciated    IDDM  -Change Lantus 20 units nightly.  -Add premeal Humalog, continue sliding scale. Patient counts carbohydrates and averages 5 to 7 units before meals. Prostate Ca  HLD  [place], not using CPAP  -Continue home aspirin, Lipitor, Flomax    History of right TMA  History of left third toe amputation    Code Status: full   Surrogate Decision Maker: daughter, Ac Wayne, [phone], [phone]  Patient's daughter need updates. DVT Prophylaxis: lovenox  GI Prophylaxis: not indicated  Baseline: independent   Body mass index is 23.21 kg/m². Subjective:     Chief Complaint / Reason for Physician Visit   Follow-up osteomyelitis, diabetes  Blood sugar better. Review of Systems:  Symptom Y/N Comments  Symptom Y/N Comments   Fever/Chills n   Chest Pain n    Poor Appetite    Edema     Cough    Abdominal Pain n    Sputum    Joint Pain     SOB/DEL CASTILLO n   Pruritis/Rash     Nausea/vomit    Tolerating PT/OT     Diarrhea    Tolerating Diet     Constipation    Other       Could NOT obtain due to:      Objective:     VITALS:   Last 24hrs VS reviewed since prior progress note.  Most recent are:  Patient Vitals for the past 24 hrs:   Temp Pulse Resp BP SpO2   07/18/21 0723 98.1 °F (36.7 °C) 80 16 (!) 144/73 97 %   07/18/21 0228 98 °F (36.7 °C) 83 18 (!) 136/59 98 %   07/17/21 2000 98.3 °F (36.8 °C) 87 18 (!) 118/56 99 %   07/17/21 1552 97.8 °F (36.6 °C) 85 16 (!) 122/55 98 %       Intake/Output Summary (Last 24 hours) at 7/18/2021 1142  Last data filed at 7/17/2021 1552  Gross per 24 hour   Intake    Output 600 ml   Net -600 ml        PHYSICAL EXAM:  General: Alert, cooperative, no acute distress    EENT:  EOMI. Anicteric sclerae. MMM  Resp:  CTA bilaterally, no wheezing or rales. No accessory muscle use  CV:  Regular  rhythm,  No edema  GI:  Soft, Non distended, Non tender.  +Bowel sounds  Neurologic:  Alert and oriented X 3, normal speech,   Psych:   Not anxious nor agitated  Skin:  Right foot wound present  Right foot dressed very well postoperatively.     Reviewed most current lab test results and cultures  YES  Reviewed most current radiology test results   YES  Review and summation of old records today    NO  Reviewed patient's current orders and MAR    YES  PMH/SH reviewed - no change compared to H&P          Current Facility-Administered Medications:     insulin glargine (LANTUS) injection 20 Units, 20 Units, SubCUTAneous, QHS, Warden Shiva MD, 20 Units at 07/17/21 2214    insulin lispro (HUMALOG) injection 4 Units, 4 Units, SubCUTAneous, TIDAC, Warden Shiva MD, 4 Units at 07/18/21 0723    piperacillin-tazobactam (ZOSYN) 3.375 g in 0.9% sodium chloride (MBP/ADV) 100 mL MBP, 3.375 g, IntraVENous, Q8H, Anna Arevalo MD, Last Rate: 25 mL/hr at 07/18/21 0920, 3.375 g at 07/18/21 0920    lidocaine (PF) (XYLOCAINE) 10 mg/mL (1 %) injection 0.1 mL, 0.1 mL, SubCUTAneous, PRN, Bernard Dillon MD    alcohol 62% (NOZIN) nasal  1 Ampule, 1 Ampule, Topical, Q12H, López Hager DPM, 1 Ampule at 07/18/21 0920    ascorbic acid (vitamin C) (VITAMIN C) tablet 250 mg, 250 mg, Oral, DAILY, Dana Hager, DPM, 250 mg at 07/18/21 8616    aspirin chewable tablet 81 mg, 81 mg, Oral, DAILY, Dana Hager, DPM, 81 mg at 07/18/21 1429    atorvastatin (LIPITOR) tablet 20 mg, 20 mg, Oral, DAILY, Dana Hager, DPM, 20 mg at 07/18/21 8619    B complex-vitaminC-folic acid (NEPHROCAP) cap, 1 Capsule, Oral, DAILY, Dana Hager, DPM, 1 Capsule at 07/18/21 8817    cholecalciferol (VITAMIN D3) (1000 Units /25 mcg) tablet 4,000 Units, 4,000 Units, Oral, DAILY, Dana Hager DPM, 4,000 Units at 07/18/21 2661    docusate sodium (COLACE) capsule 100 mg, 100 mg, Oral, BID, Dana Hager DPDIMITRI, 100 mg at 07/16/21 1709    HYDROcodone-acetaminophen (NORCO) 5-325 mg per tablet 1 Tablet, 1 Tablet, Oral, Q4H PRN, Dana Hager DPM    tamsulosin (FLOMAX) capsule 0.4 mg, 0.4 mg, Oral, DAILY, Dana Hager DPDIMITRI, 0.4 mg at 07/18/21 9164    therapeutic multivitamin (THERAGRAN) tablet 1 Tablet, 1 Tablet, Oral, DAILY, Dana Hager DPM, 1 Tablet at 07/18/21 0921    insulin lispro (HUMALOG) injection, , SubCUTAneous, AC&HS, Dana Hager DPM, 4 Units at 07/17/21 2214    glucose chewable tablet 16 g, 4 Tablet, Oral, PRN, Dana Hager, DPM    dextrose (D50W) injection syrg 12.5-25 g, 12.5-25 g, IntraVENous, PRN, Dana Hager DPM    glucagon (GLUCAGEN) injection 1 mg, 1 mg, IntraMUSCular, PRN, Dana Hager, DPM    sodium chloride (NS) flush 5-40 mL, 5-40 mL, IntraVENous, Q8H, Ish Hager DPM, 10 mL at 07/18/21 0600    sodium chloride (NS) flush 5-40 mL, 5-40 mL, IntraVENous, PRN, Dana Hager DPM    acetaminophen (TYLENOL) tablet 650 mg, 650 mg, Oral, Q6H PRN, 650 mg at 07/09/21 2343 **OR** acetaminophen (TYLENOL) suppository 650 mg, 650 mg, Rectal, Q6H PRN, Dana Hager DPM    polyethylene glycol (MIRALAX) packet 17 g, 17 g, Oral, DAILY PRN, Ish Hager DPM, 17 g at 07/15/21 1033    ondansetron (ZOFRAN ODT) tablet 4 mg, 4 mg, Oral, Q8H PRN **OR** ondansetron (ZOFRAN) injection 4 mg, 4 mg, IntraVENous, Q6H PRN, Belock, Erika Canes, DPM    enoxaparin (LOVENOX) injection 40 mg, 40 mg, SubCUTAneous, DAILY, Belock, Erika Canes, DPM, 40 mg at 07/18/21 0920  ________________________________________________________________________  Care Plan discussed with:    Comments   Patient y    Family  y   at bedside   RN y    Care Manager     Consultant                        Multidiciplinary team rounds were held today with , nursing, pharmacist and clinical coordinator. Patient's plan of care was discussed; medications were reviewed and discharge planning was addressed. ________________________________________________________________________  Total NON critical care TIME:  25   Minutes    Total CRITICAL CARE TIME Spent:   Minutes non procedure based      Comments   >50% of visit spent in counseling and coordination of care y    ________________________________________________________________________  Zafar Bertrand MD     Procedures: see electronic medical records for all procedures/Xrays and details which were not copied into this note but were reviewed prior to creation of Plan. LABS:  I reviewed today's most current labs and imaging studies.   Pertinent labs include:  Recent Labs     07/17/21  0404   WBC 2.9*   HGB 9.4*   HCT 29.1*        Recent Labs     07/18/21  0230 07/17/21  0404 07/16/21  0249    138 137   K 4.2 3.7 4.0    106 106   CO2 29 26 27   * 71 169*   BUN 13 10 11   CREA 0.74 0.72 0.80   CA 9.2 8.8 8.6       Signed: Zafar Bertrand MD

## 2021-07-18 NOTE — PROGRESS NOTES
Problem: Diabetes Self-Management  Goal: *Disease process and treatment process  Description: Define diabetes and identify own type of diabetes; list 3 options for treating diabetes. Outcome: Progressing Towards Goal  Goal: *Incorporating nutritional management into lifestyle  Description: Describe effect of type, amount and timing of food on blood glucose; list 3 methods for planning meals. Outcome: Progressing Towards Goal  Goal: *Incorporating physical activity into lifestyle  Description: State effect of exercise on blood glucose levels. Outcome: Progressing Towards Goal  Goal: *Developing strategies to promote health/change behavior  Description: Define the ABC's of diabetes; identify appropriate screenings, schedule and personal plan for screenings. Outcome: Progressing Towards Goal  Goal: *Using medications safely  Description: State effect of diabetes medications on diabetes; name diabetes medication taking, action and side effects. Outcome: Progressing Towards Goal  Goal: *Monitoring blood glucose, interpreting and using results  Description: Identify recommended blood glucose targets  and personal targets. Outcome: Progressing Towards Goal  Goal: *Prevention, detection, treatment of acute complications  Description: List symptoms of hyper- and hypoglycemia; describe how to treat low blood sugar and actions for lowering  high blood glucose level. Outcome: Progressing Towards Goal  Goal: *Prevention, detection and treatment of chronic complications  Description: Define the natural course of diabetes and describe the relationship of blood glucose levels to long term complications of diabetes.   Outcome: Progressing Towards Goal  Goal: *Developing strategies to address psychosocial issues  Description: Describe feelings about living with diabetes; identify support needed and support network  Outcome: Progressing Towards Goal  Goal: *Insulin pump training  Outcome: Progressing Towards Goal  Goal: *Sick day guidelines  Outcome: Progressing Towards Goal  Goal: *Patient Specific Goal (EDIT GOAL, INSERT TEXT)  Outcome: Progressing Towards Goal     Problem: Patient Education: Go to Patient Education Activity  Goal: Patient/Family Education  Outcome: Progressing Towards Goal     Problem: Falls - Risk of  Goal: *Absence of Falls  Description: Document So Gill Fall Risk and appropriate interventions in the flowsheet. Outcome: Progressing Towards Goal  Note: Fall Risk Interventions:  Mobility Interventions: Communicate number of staff needed for ambulation/transfer, Patient to call before getting OOB, Utilize walker, cane, or other assistive device, Utilize gait belt for transfers/ambulation         Medication Interventions: Patient to call before getting OOB, Teach patient to arise slowly, Utilize gait belt for transfers/ambulation    Elimination Interventions: Patient to call for help with toileting needs, Toileting schedule/hourly rounds, Urinal in reach              Problem: Patient Education: Go to Patient Education Activity  Goal: Patient/Family Education  Outcome: Progressing Towards Goal     Problem: Surgical Pathway Day of Surgery  Goal: Off Pathway (Use only if patient is Off Pathway)  Outcome: Progressing Towards Goal  Goal: Activity/Safety  Outcome: Progressing Towards Goal  Goal: Consults, if ordered  Outcome: Progressing Towards Goal  Goal: Nutrition/Diet  Outcome: Progressing Towards Goal  Goal: Medications  Outcome: Progressing Towards Goal     Problem: Patient Education: Go to Patient Education Activity  Goal: Patient/Family Education  Outcome: Progressing Towards Goal     Problem: Patient Education: Go to Patient Education Activity  Goal: Patient/Family Education  Outcome: Progressing Towards Goal     Problem: Pressure Injury - Risk of  Goal: *Prevention of pressure injury  Description: Document Ottoniel Scale and appropriate interventions in the flowsheet.   Outcome: Progressing Towards Goal     Problem: Patient Education: Go to Patient Education Activity  Goal: Patient/Family Education  Outcome: Progressing Towards Goal

## 2021-07-18 NOTE — PROGRESS NOTES
End of Shift Note    Bedside shift change report given to ROCÍO Villareal (oncoming nurse) by Destiny Ramsey RN (offgoing nurse). Report included the following information SBAR, Kardex, OR Summary, Procedure Summary, Intake/Output, MAR, Recent Results and Med Rec Status    Shift worked:  0628-4039     Shift summary and any significant changes:     dressing to R foot changed this shift. Started 20 U lantus this shift. Pt denied pain. Voiding via urinal.      Concerns for physician to address:  d/c plan? Zone phone for oncoming shift:   2285       Activity:  Activity Level: Up with Assistance  Number times ambulated in hallways past shift: 0  Number of times OOB to chair past shift: 0    Cardiac:   Cardiac Monitoring: No      Cardiac Rhythm: Sinus Rhythm    Access:   Current line(s): PICC     Genitourinary:   Urinary status: voiding    Respiratory:   O2 Device: None (Room air)  Chronic home O2 use?: NO  Incentive spirometer at bedside: NO     GI:  Last Bowel Movement Date: 07/17/21  Current diet:  ADULT DIET Regular; 4 carb choices (60 gm/meal)  ADULT ORAL NUTRITION SUPPLEMENT Breakfast, Dinner; Low Calorie/High Protein  Passing flatus: YES  Tolerating current diet: YES       Pain Management:   Patient states pain is manageable on current regimen: YES    Skin:  Ottoniel Score: 17  Interventions: increase time out of bed and nutritional support     Patient Safety:  Fall Score:  Total Score: 3  Interventions: assistive device (walker, cane, etc), gripper socks and pt to call before getting OOB  High Fall Risk: Yes    Length of Stay:  Expected LOS: 5d 7h  Actual LOS: 9      Destiny Ramsey RN

## 2021-07-19 LAB
ANION GAP SERPL CALC-SCNC: 3 MMOL/L (ref 5–15)
BUN SERPL-MCNC: 15 MG/DL (ref 6–20)
BUN/CREAT SERPL: 17 (ref 12–20)
CALCIUM SERPL-MCNC: 9.2 MG/DL (ref 8.5–10.1)
CHLORIDE SERPL-SCNC: 104 MMOL/L (ref 97–108)
CK SERPL-CCNC: 143 U/L (ref 39–308)
CO2 SERPL-SCNC: 31 MMOL/L (ref 21–32)
CREAT SERPL-MCNC: 0.87 MG/DL (ref 0.7–1.3)
GLUCOSE BLD STRIP.AUTO-MCNC: 132 MG/DL (ref 65–117)
GLUCOSE BLD STRIP.AUTO-MCNC: 241 MG/DL (ref 65–117)
GLUCOSE BLD STRIP.AUTO-MCNC: 248 MG/DL (ref 65–117)
GLUCOSE BLD STRIP.AUTO-MCNC: 281 MG/DL (ref 65–117)
GLUCOSE SERPL-MCNC: 174 MG/DL (ref 65–100)
POTASSIUM SERPL-SCNC: 4 MMOL/L (ref 3.5–5.1)
SERVICE CMNT-IMP: ABNORMAL
SODIUM SERPL-SCNC: 138 MMOL/L (ref 136–145)

## 2021-07-19 PROCEDURE — 36415 COLL VENOUS BLD VENIPUNCTURE: CPT

## 2021-07-19 PROCEDURE — 65270000029 HC RM PRIVATE

## 2021-07-19 PROCEDURE — 36573 INSJ PICC RS&I 5 YR+: CPT | Performed by: INTERNAL MEDICINE

## 2021-07-19 PROCEDURE — 74011250636 HC RX REV CODE- 250/636: Performed by: STUDENT IN AN ORGANIZED HEALTH CARE EDUCATION/TRAINING PROGRAM

## 2021-07-19 PROCEDURE — 76937 US GUIDE VASCULAR ACCESS: CPT

## 2021-07-19 PROCEDURE — 2709999900 HC NON-CHARGEABLE SUPPLY

## 2021-07-19 PROCEDURE — 99233 SBSQ HOSP IP/OBS HIGH 50: CPT | Performed by: STUDENT IN AN ORGANIZED HEALTH CARE EDUCATION/TRAINING PROGRAM

## 2021-07-19 PROCEDURE — 82550 ASSAY OF CK (CPK): CPT

## 2021-07-19 PROCEDURE — 82962 GLUCOSE BLOOD TEST: CPT

## 2021-07-19 PROCEDURE — 74011250636 HC RX REV CODE- 250/636: Performed by: PODIATRIST

## 2021-07-19 PROCEDURE — 74011250637 HC RX REV CODE- 250/637: Performed by: PODIATRIST

## 2021-07-19 PROCEDURE — 97535 SELF CARE MNGMENT TRAINING: CPT | Performed by: OCCUPATIONAL THERAPIST

## 2021-07-19 PROCEDURE — 77030018786 HC NDL GD F/USND BARD -B

## 2021-07-19 PROCEDURE — 74011000258 HC RX REV CODE- 258: Performed by: INTERNAL MEDICINE

## 2021-07-19 PROCEDURE — 02HV33Z INSERTION OF INFUSION DEVICE INTO SUPERIOR VENA CAVA, PERCUTANEOUS APPROACH: ICD-10-PCS | Performed by: INTERNAL MEDICINE

## 2021-07-19 PROCEDURE — 74011636637 HC RX REV CODE- 636/637: Performed by: INTERNAL MEDICINE

## 2021-07-19 PROCEDURE — 74011636637 HC RX REV CODE- 636/637: Performed by: PODIATRIST

## 2021-07-19 PROCEDURE — 80048 BASIC METABOLIC PNL TOTAL CA: CPT

## 2021-07-19 PROCEDURE — C1751 CATH, INF, PER/CENT/MIDLINE: HCPCS

## 2021-07-19 PROCEDURE — 74011000258 HC RX REV CODE- 258: Performed by: STUDENT IN AN ORGANIZED HEALTH CARE EDUCATION/TRAINING PROGRAM

## 2021-07-19 PROCEDURE — 74011250636 HC RX REV CODE- 250/636: Performed by: INTERNAL MEDICINE

## 2021-07-19 RX ORDER — HEPARIN 100 UNIT/ML
300 SYRINGE INTRAVENOUS AS NEEDED
Status: DISCONTINUED | OUTPATIENT
Start: 2021-07-19 | End: 2021-07-20 | Stop reason: HOSPADM

## 2021-07-19 RX ORDER — BACITRACIN 500 UNIT/G
1 PACKET (EA) TOPICAL AS NEEDED
Status: DISCONTINUED | OUTPATIENT
Start: 2021-07-19 | End: 2021-07-20 | Stop reason: HOSPADM

## 2021-07-19 RX ADMIN — ASPIRIN 81 MG: 81 TABLET, CHEWABLE ORAL at 09:08

## 2021-07-19 RX ADMIN — INSULIN LISPRO 4 UNITS: 100 INJECTION, SOLUTION INTRAVENOUS; SUBCUTANEOUS at 17:23

## 2021-07-19 RX ADMIN — OXYCODONE HYDROCHLORIDE AND ACETAMINOPHEN 250 MG: 500 TABLET ORAL at 09:07

## 2021-07-19 RX ADMIN — PIPERACILLIN AND TAZOBACTAM 3.38 G: 3; .375 INJECTION, POWDER, LYOPHILIZED, FOR SOLUTION INTRAVENOUS at 02:01

## 2021-07-19 RX ADMIN — Medication 1 AMPULE: at 09:04

## 2021-07-19 RX ADMIN — INSULIN LISPRO 4 UNITS: 100 INJECTION, SOLUTION INTRAVENOUS; SUBCUTANEOUS at 12:25

## 2021-07-19 RX ADMIN — CEFEPIME HYDROCHLORIDE 2 G: 2 INJECTION, POWDER, FOR SOLUTION INTRAVENOUS at 17:59

## 2021-07-19 RX ADMIN — ATORVASTATIN CALCIUM 20 MG: 20 TABLET, FILM COATED ORAL at 09:07

## 2021-07-19 RX ADMIN — Medication 1 AMPULE: at 21:19

## 2021-07-19 RX ADMIN — INSULIN LISPRO 4 UNITS: 100 INJECTION, SOLUTION INTRAVENOUS; SUBCUTANEOUS at 22:39

## 2021-07-19 RX ADMIN — NEPHROCAP 1 CAPSULE: 1 CAP ORAL at 09:07

## 2021-07-19 RX ADMIN — DAPTOMYCIN 600 MG: 500 INJECTION, POWDER, LYOPHILIZED, FOR SOLUTION INTRAVENOUS at 21:19

## 2021-07-19 RX ADMIN — TAMSULOSIN HYDROCHLORIDE 0.4 MG: 0.4 CAPSULE ORAL at 09:07

## 2021-07-19 RX ADMIN — Medication 10 ML: at 21:19

## 2021-07-19 RX ADMIN — PIPERACILLIN AND TAZOBACTAM 3.38 G: 3; .375 INJECTION, POWDER, LYOPHILIZED, FOR SOLUTION INTRAVENOUS at 11:03

## 2021-07-19 RX ADMIN — THERA TABS 1 TABLET: TAB at 09:07

## 2021-07-19 RX ADMIN — INSULIN GLARGINE 20 UNITS: 100 INJECTION, SOLUTION SUBCUTANEOUS at 22:40

## 2021-07-19 RX ADMIN — ENOXAPARIN SODIUM 40 MG: 40 INJECTION SUBCUTANEOUS at 09:05

## 2021-07-19 RX ADMIN — Medication 4000 UNITS: at 09:08

## 2021-07-19 RX ADMIN — INSULIN LISPRO 4 UNITS: 100 INJECTION, SOLUTION INTRAVENOUS; SUBCUTANEOUS at 09:04

## 2021-07-19 NOTE — PROGRESS NOTES
Problem: Diabetes Self-Management  Goal: *Disease process and treatment process  Description: Define diabetes and identify own type of diabetes; list 3 options for treating diabetes. Outcome: Progressing Towards Goal  Goal: *Incorporating nutritional management into lifestyle  Description: Describe effect of type, amount and timing of food on blood glucose; list 3 methods for planning meals. Outcome: Progressing Towards Goal  Goal: *Incorporating physical activity into lifestyle  Description: State effect of exercise on blood glucose levels. Outcome: Progressing Towards Goal  Goal: *Developing strategies to promote health/change behavior  Description: Define the ABC's of diabetes; identify appropriate screenings, schedule and personal plan for screenings. Outcome: Progressing Towards Goal  Goal: *Using medications safely  Description: State effect of diabetes medications on diabetes; name diabetes medication taking, action and side effects. Outcome: Progressing Towards Goal  Goal: *Monitoring blood glucose, interpreting and using results  Description: Identify recommended blood glucose targets  and personal targets. Outcome: Progressing Towards Goal  Goal: *Prevention, detection, treatment of acute complications  Description: List symptoms of hyper- and hypoglycemia; describe how to treat low blood sugar and actions for lowering  high blood glucose level. Outcome: Progressing Towards Goal  Goal: *Prevention, detection and treatment of chronic complications  Description: Define the natural course of diabetes and describe the relationship of blood glucose levels to long term complications of diabetes.   Outcome: Progressing Towards Goal  Goal: *Developing strategies to address psychosocial issues  Description: Describe feelings about living with diabetes; identify support needed and support network  Outcome: Progressing Towards Goal  Goal: *Insulin pump training  Outcome: Progressing Towards Goal  Goal: *Sick day guidelines  Outcome: Progressing Towards Goal  Goal: *Patient Specific Goal (EDIT GOAL, INSERT TEXT)  Outcome: Progressing Towards Goal     Problem: Patient Education: Go to Patient Education Activity  Goal: Patient/Family Education  Outcome: Progressing Towards Goal     Problem: Falls - Risk of  Goal: *Absence of Falls  Description: Document So Gill Fall Risk and appropriate interventions in the flowsheet. Outcome: Progressing Towards Goal  Note: Fall Risk Interventions:  Mobility Interventions: Communicate number of staff needed for ambulation/transfer, Patient to call before getting OOB, Utilize walker, cane, or other assistive device         Medication Interventions: Patient to call before getting OOB, Teach patient to arise slowly, Utilize gait belt for transfers/ambulation    Elimination Interventions: Patient to call for help with toileting needs, Stay With Me (per policy), Toileting schedule/hourly rounds, Urinal in reach              Problem: Patient Education: Go to Patient Education Activity  Goal: Patient/Family Education  Outcome: Progressing Towards Goal     Problem: Surgical Pathway Day of Surgery  Goal: Off Pathway (Use only if patient is Off Pathway)  Outcome: Progressing Towards Goal  Goal: Activity/Safety  Outcome: Progressing Towards Goal  Goal: Consults, if ordered  Outcome: Progressing Towards Goal  Goal: Nutrition/Diet  Outcome: Progressing Towards Goal  Goal: Medications  Outcome: Progressing Towards Goal     Problem: Patient Education: Go to Patient Education Activity  Goal: Patient/Family Education  Outcome: Progressing Towards Goal     Problem: Patient Education: Go to Patient Education Activity  Goal: Patient/Family Education  Outcome: Progressing Towards Goal     Problem: Pressure Injury - Risk of  Goal: *Prevention of pressure injury  Description: Document Ottoniel Scale and appropriate interventions in the flowsheet.   Outcome: Progressing Towards Goal     Problem: Patient Education: Go to Patient Education Activity  Goal: Patient/Family Education  Outcome: Progressing Towards Goal

## 2021-07-19 NOTE — PROGRESS NOTES
End of Shift Note    Bedside shift change report given to Bob Clemons RN (oncoming nurse) by Keila Son RN (offgoing nurse). Report included the following information SBAR, Kardex, Procedure Summary, Intake/Output, MAR, Recent Results and Med Rec Status    Shift worked:  3812-6056     Shift summary and any significant changes:     pt voiding via urinal in bed. PICC placement today? dressing to R TMA remained c/d/i. Concerns for physician to address:       Zone phone for oncoming shift:   2019       Activity:  Activity Level: Up with Assistance  Number times ambulated in hallways past shift: 0  Number of times OOB to chair past shift: 0    Cardiac:   Cardiac Monitoring: No      Cardiac Rhythm: Sinus Rhythm    Access:   Current line(s): PIV     Genitourinary:   Urinary status: voiding    Respiratory:   O2 Device: None (Room air)  Chronic home O2 use?: NO  Incentive spirometer at bedside: NO  Actual Volume (ml): 1000 ml  GI:  Last Bowel Movement Date: 07/17/21  Current diet:  ADULT DIET Regular; 4 carb choices (60 gm/meal)  ADULT ORAL NUTRITION SUPPLEMENT Breakfast, Dinner; Low Calorie/High Protein  Passing flatus: YES  Tolerating current diet: YES       Pain Management:   Patient states pain is manageable on current regimen: YES    Skin:  Ottoniel Score: 18  Interventions: nutritional support     Patient Safety:  Fall Score:  Total Score: 3  Interventions: assistive device (walker, cane, etc), gripper socks and pt to call before getting OOB  High Fall Risk: Yes    Length of Stay:  Expected LOS: 5d 7h  Actual LOS: [address] [facility] Henrique England RN

## 2021-07-19 NOTE — PROGRESS NOTES
Attempted to see pt for OT services. Pt is currently having PICC placed at bedside. Will defer but continue to follow.

## 2021-07-19 NOTE — PROGRESS NOTES
Hospitalist Progress Note    NAME: Scout Ohara   :  1945   MRN:  [id number]       Assessment / Plan:  Plan of care same as documented on yesterday   Sepsis due to  Right foot osteomyelitis   S/p right 4th metatarsal debridement, and bone biopsy of right third and fifth metatarsal by Dr. Francia Prealta on 2021.   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 Osteomyelitis of the remnant fourth metatarsal shaft periosteal abscess. 2. Abnormal signal in distal remnant shafts of third and fifth metatarsals,  equivocal for osteomyelitis. -Cultures growing Enterococcus faecalis. Pseudomonas growing from thio broth  -Continue Zosyn. -PICC line placed   -Final antibiotic recommendations from ID, pending. I sent DM to Dr Sherman Hall  -podiatry help appreciated. Follow up as OP    Dispo: Aim for DC on  once insurance approves home antibiotics and HH set up    IDDM  -continue Lantus 20 units nightly.  -Added premeal Humalog.    -continue sliding scale. Patient counts carbohydrates and averages 5 to 7 units before meals. Prostate Ca  HLD  [place], not using CPAP  -Continue home aspirin, Lipitor, Flomax    History of right TMA  History of left third toe amputation    Code Status: full   Surrogate Decision Maker: daughter, Johnathan Smiley, [phone], [phone]  Patient's daughter need updates. DVT Prophylaxis: lovenox  GI Prophylaxis: not indicated  Baseline: independent   Body mass index is 23.21 kg/m².       Subjective:     Chief Complaint / Reason for Physician Visit   Follow-up osteomyelitis, diabetes  PICC inserted    Review of Systems:  Symptom Y/N Comments  Symptom Y/N Comments   Fever/Chills n   Chest Pain n    Poor Appetite    Edema     Cough    Abdominal Pain n    Sputum    Joint Pain     SOB/DEL CASTILLO n   Pruritis/Rash     Nausea/vomit    Tolerating PT/OT     Diarrhea    Tolerating Diet     Constipation    Other       Could NOT obtain due to: Objective:     VITALS:   Last 24hrs VS reviewed since prior progress note. Most recent are:  Patient Vitals for the past 24 hrs:   Temp Pulse Resp BP SpO2   07/19/21 1545 97.8 °F (36.6 °C) 83 18 (!) 137/55 99 %   07/19/21 1235 98.4 °F (36.9 °C) 95 18 (!) 131/51 93 %   07/19/21 0850 98.7 °F (37.1 °C) 95 16 (!) 145/71 98 %   07/19/21 0210 98.2 °F (36.8 °C) 72 16 (!) 141/67 98 %   07/18/21 2026 97.7 °F (36.5 °C) 92 16 139/60 97 %   07/18/21 1633 97.9 °F (36.6 °C) 83 16 (!) 148/76 97 %       Intake/Output Summary (Last 24 hours) at 7/19/2021 1551  Last data filed at 7/19/2021 0834  Gross per 24 hour   Intake    Output 750 ml   Net -750 ml        PHYSICAL EXAM:  General: Alert, cooperative, no acute distress    EENT:  EOMI. Anicteric sclerae. MMM  Resp:  CTA bilaterally, no wheezing or rales. No accessory muscle use  CV:  Regular  rhythm,  No edema  GI:  Soft, Non distended, Non tender.  +Bowel sounds  Neurologic:  Alert and oriented X 3, normal speech,   Psych:   Not anxious nor agitated  Skin:  Right foot wound present  Right foot dressed very well postoperatively.     Reviewed most current lab test results and cultures  YES  Reviewed most current radiology test results   YES  Review and summation of old records today    NO  Reviewed patient's current orders and MAR    YES  PMH/SH reviewed - no change compared to H&P          Current Facility-Administered Medications:     bacitracin 500 unit/gram packet 1 Packet, 1 Packet, Topical, PRN, Zabrina Wiggins MD    heparin (porcine) pf 300 Units, 300 Units, InterCATHeter, PRN, Zabrina Wiggins MD    insulin glargine (LANTUS) injection 20 Units, 20 Units, SubCUTAneous, QHS, Zabrina Wiggins MD, 20 Units at 07/18/21 2242    insulin lispro (HUMALOG) injection 4 Units, 4 Units, SubCUTAneous, TIDAC, Zabrina Wiggins MD, 4 Units at 07/19/21 1225    piperacillin-tazobactam (ZOSYN) 3.375 g in 0.9% sodium chloride (MBP/ADV) 100 mL MBP, 3.375 g, IntraVENous, Q8H, Irina Daniele Gillette MD, Last Rate: 25 mL/hr at 07/19/21 1103, 3.375 g at 07/19/21 1103    lidocaine (PF) (XYLOCAINE) 10 mg/mL (1 %) injection 0.1 mL, 0.1 mL, SubCUTAneous, PRN, Shana Haynes MD    alcohol 62% (NOZIN) nasal  1 Ampule, 1 Ampule, Topical, Q12H, Eli Hagerumefunmi, DPM, 1 Ampule at 07/19/21 0904    ascorbic acid (vitamin C) (VITAMIN C) tablet 250 mg, 250 mg, Oral, DAILY, Eli Hager Grumet, DPM, 250 mg at 07/19/21 0907    aspirin chewable tablet 81 mg, 81 mg, Oral, DAILY, Eli Hager Grumet, DPM, 81 mg at 07/19/21 0908    atorvastatin (LIPITOR) tablet 20 mg, 20 mg, Oral, DAILY, Eli Hagerumet, DPM, 20 mg at 07/19/21 0907    B complex-vitaminC-folic acid (NEPHROCAP) cap, 1 Capsule, Oral, DAILY, Eli Hagerumet, DPM, 1 Capsule at 07/19/21 9851    cholecalciferol (VITAMIN D3) (1000 Units /25 mcg) tablet 4,000 Units, 4,000 Units, Oral, DAILY, Eli Hager Grumet, DPM, 4,000 Units at 07/19/21 0908    docusate sodium (COLACE) capsule 100 mg, 100 mg, Oral, BID, Eli Hagerumet, DPM, 100 mg at 07/16/21 1709    HYDROcodone-acetaminophen (NORCO) 5-325 mg per tablet 1 Tablet, 1 Tablet, Oral, Q4H PRN, Eli Hager, DPM    tamsulosin (FLOMAX) capsule 0.4 mg, 0.4 mg, Oral, DAILY, Eli Hagerumet, DPM, 0.4 mg at 07/19/21 0907    therapeutic multivitamin (THERAGRAN) tablet 1 Tablet, 1 Tablet, Oral, DAILY, Eli Hagerumet, DPM, 1 Tablet at 07/19/21 0907    insulin lispro (HUMALOG) injection, , SubCUTAneous, AC&HS, Eli Hager, NIKKOM, 4 Units at 07/19/21 1225    glucose chewable tablet 16 g, 4 Tablet, Oral, PRN, Eli Hager, TESSIE    dextrose (D50W) injection syrg 12.5-25 g, 12.5-25 g, IntraVENous, PRN, Eli Hager, DPDIMITRI    glucagon (GLUCAGEN) injection 1 mg, 1 mg, IntraMUSCular, PRN, Eli Hager, DPM    sodium chloride (NS) flush 5-40 mL, 5-40 mL, IntraVENous, Q8H, Ish Hager DPM, 10 mL at 07/18/21 2200    sodium chloride (NS) flush 5-40 mL, 5-40 mL, IntraVENous, PRN, Eli Hager, DPM   acetaminophen (TYLENOL) tablet 650 mg, 650 mg, Oral, Q6H PRN, 650 mg at 07/09/21 2343 **OR** acetaminophen (TYLENOL) suppository 650 mg, 650 mg, Rectal, Q6H PRN, Belock, Jojo Brood, DPM    polyethylene glycol (MIRALAX) packet 17 g, 17 g, Oral, DAILY PRN, Belock, Jojo Brood, DPM, 17 g at 07/15/21 1033    ondansetron (ZOFRAN ODT) tablet 4 mg, 4 mg, Oral, Q8H PRN **OR** ondansetron (ZOFRAN) injection 4 mg, 4 mg, IntraVENous, Q6H PRN, Belock, Jojo Brood, DPM    enoxaparin (LOVENOX) injection 40 mg, 40 mg, SubCUTAneous, DAILY, Belock, Jojo Brood, DPM, 40 mg at 07/19/21 1827  ________________________________________________________________________  Care Plan discussed with:    Comments   Patient y    Family  y   at bedside   RN y    Care Manager     Consultant                        Multidiciplinary team rounds were held today with , nursing, pharmacist and clinical coordinator. Patient's plan of care was discussed; medications were reviewed and discharge planning was addressed. ________________________________________________________________________  Total NON critical care TIME:  25   Minutes    Total CRITICAL CARE TIME Spent:   Minutes non procedure based      Comments   >50% of visit spent in counseling and coordination of care y    ________________________________________________________________________  Reji Dee MD     Procedures: see electronic medical records for all procedures/Xrays and details which were not copied into this note but were reviewed prior to creation of Plan. LABS:  I reviewed today's most current labs and imaging studies.   Pertinent labs include:  Recent Labs     07/17/21  0404   WBC 2.9*   HGB 9.4*   HCT 29.1*        Recent Labs     07/19/21  0208 07/18/21  0230 07/17/21  0404    138 138   K 4.0 4.2 3.7    105 106   CO2 31 29 26   * 144* 71   BUN 15 13 10   CREA 0.87 0.74 0.72   CA 9.2 9.2 8.8       Signed: Reji Dee MD

## 2021-07-19 NOTE — PROGRESS NOTES
PICC Education: Explained reason and rationale for PICC placement along with providing education in order to make an informed consent including nature, risks, benefits, potential complications, care and maintenance of PICC line. The opportunity for questions or concerns was given. A 'Patient PICC Handbook was also provided. Patient gave written consent for PICC procedure to be done at the bedside. Patient verbalizes understanding at this time. Procedure:  Time out completed. Pre procedure assessment done. Maximum sterile barrier precautions observed throughout procedure. Lidocaine was not used secondary to history of Novocain allergy. Cannulated right basilic vein using ultrasound guidance and modified seldinger technique. Inserted single lumen 4.5 fr PICC in right upper arm using Sherlock 3CG   Patient has sinus rhythm. 3CG Tip Positioning System indicating tall P wave and no negative deflection before P wave which would indicate the PICC tip is properly placed in the distal SVC or the CAJ. PICC tip was confirmed by 2 PICC nurses and 3CG printout was placed on patients chart. Blood return verified and flushed with 20ml NS in each port. Sterile dressing applied with Biopatch, Stat loc, occlusive dressing per protocol. Curios caps applied to each port. Reason for access (Long-Term Antibiotics). Complications related to insertion (none). Patient tolerated procedure well with minimal blood loss. PICC procedure performed by: Maikel Dillard RN / Vascular Access Nurse  Assisted by: BERNA Whatley, RN [facility] / Vascular Access Nurse  Right upper arm circumference: 30.5 cm. Total Catheter Length:  42 cm  Catheter internal length:  42 cm  Catheter external length: 0 cm  PICC catheter occupies 12% of vein  Brand of catheter ARROW,  Lot #[id number]   REF# [id number]    EXP 2022-03-31. Primary nurse, Smita Anderson RN, notified PICC line may be used and to hang new infusion tubing prior to use.       Maikel Dillard RN    Vascular Access Team

## 2021-07-19 NOTE — PROGRESS NOTES
Infectious Disease Progress Note         Interval:  No acute events overnight    Subjective:   Patient reports feeling well, no acute concerns. Received PICC line on right arm today. Objective:    Vitals:   Reviewed in chart. Physical Exam:  Gen: No apparent distress  HEENT:  Normocephalic, atraumatic, no scleral icterus  CV: Hemodynamically stable  Lungs:  On room air  Abdomen: Soft nondistended  Genitourinary:   no abraham catheter   Skin: no rash   Psych: good affect, good eye contact, non tearful  Neuro: alert, oriented to time,  place, and situation, moves all extremities to commands, verbal  Musculoskeletal: Foot in dressing  Lines: PICC line        Labs:  Recent Results (from the past 24 hour(s))   GLUCOSE, POC    Collection Time: 07/18/21  5:18 PM   Result Value Ref Range    Glucose (POC) 237 (H) 65 - 117 mg/dL    Performed by Tia Maynard, POC    Collection Time: 07/18/21 10:34 PM   Result Value Ref Range    Glucose (POC) 278 (H) 65 - 117 mg/dL    Performed by Dorine Mortimer (JENNIFER RN)    METABOLIC PANEL, BASIC    Collection Time: 07/19/21  2:08 AM   Result Value Ref Range    Sodium 138 136 - 145 mmol/L    Potassium 4.0 3.5 - 5.1 mmol/L    Chloride 104 97 - 108 mmol/L    CO2 31 21 - 32 mmol/L    Anion gap 3 (L) 5 - 15 mmol/L    Glucose 174 (H) 65 - 100 mg/dL    BUN 15 6 - 20 MG/DL    Creatinine 0.87 0.70 - 1.30 MG/DL    BUN/Creatinine ratio 17 12 - 20      GFR est AA >60 >60 ml/min/1.73m2    GFR est non-AA >60 >60 ml/min/1.73m2    Calcium 9.2 8.5 - 10.1 MG/DL   GLUCOSE, POC    Collection Time: 07/19/21  6:49 AM   Result Value Ref Range    Glucose (POC) 132 (H) 65 - 117 mg/dL    Performed by Dorine Mortimer (JENNIFER RN)    GLUCOSE, POC    Collection Time: 07/19/21 12:10 PM   Result Value Ref Range    Glucose (POC) 248 (H) 65 - 117 mg/dL    Performed by Kvng Koch    GLUCOSE, POC    Collection Time: 07/19/21  4:25 PM   Result Value Ref Range    Glucose (POC) 241 (H) 65 - 117 mg/dL    Performed by Carter Sanchez PCT                Assessment:  Right foot OM, T1DM. 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 S/p right 4th metatarsal debridement, and bone biopsy of right third and fifth metatarsal by Dr. Shaye Jacob on 7/13/2021. Cultures growing ampicillin sensitive Enterococcus faecalis and Pseudomonas aeruginosa. Recommendations:  -Based on the susceptibilities of one of the strains of Pseudomonas it is only intermediate susceptible to Zosyn. We will switch to cefepime 2 g every 8 hour this will cover the Pseudomonas aeruginosa. -For the Enterococcus faecalis ampicillin would have covered it however double beta-lactam therapy is not recommended. Hence will need to either switch to vancomycin or daptomycin. I will start him on daptomycin 8 mg/kg daily for now. Check CK. -Duration 6 weeks 7/13/2021 to 8/24/2021.    - Final recommendations pending above and CM discussions. Thank you for the opportunity to participate in the care of this patient. Please contact with questions or concerns.       Jerry Meraz MD  Infectious Diseases

## 2021-07-19 NOTE — PROGRESS NOTES
Problem: Self Care Deficits Care Plan (Adult)  Goal: *Acute Goals and Plan of Care (Insert Text)  Description:   FUNCTIONAL STATUS PRIOR TO ADMISSION: Patient was independent and active without use of DME.     HOME SUPPORT: Patient reports living alone; he has a fiance who checks in on him and is able to assist as needed. Occupational Therapy Goals  Initiated 7/15/2021  1. Patient will perform standing grooming with modified independence within 7 day(s). 2.  Patient will perform lower body dressing with modified independence within 7 day(s). 3.  Patient will perform simple home management with modified independence within 7 day(s). 4.  Patient will perform toilet transfers with modified independence within 7 day(s). 5.  Patient will perform all aspects of toileting with modified independence within 7 day(s). 6.  Patient will participate in upper extremity therapeutic exercise/activities with independence for 15 minutes within 7 day(s). 7.  Patient will utilize energy conservation techniques during functional activities without cues within 7 day(s). Outcome: Progressing Towards Goal   OCCUPATIONAL THERAPY TREATMENT  Patient: Scout Ohara (49 y.o. male)  Date: 7/19/2021  Diagnosis: Wound infection [T14. 8XXA, L08.9] <principal problem not specified>  Procedure(s) (LRB):  DEBRIDEMENT OF BONE RIGHT FOURTH METATARSAL, BONE BIOPSY RIGHT THIRD METATARSAL, BONE BIOPSY RIGHT FIFTH METATARSAL (Right) 6 Days Post-Op  Precautions: WBAT (RLE)  Chart, occupational therapy assessment, plan of care, and goals were reviewed. ASSESSMENT  Patient continues with skilled OT services and is progressing towards goals. Pt reports not getting up OOB over the weekend and feeling fatigued after OT session. Educated pt on the importance of OOB activity with assist to regain his independence. He verbalized understanding.   Pt was able to don lace up tennis shoe on left foot and right post op shoe without assist.  Multiple LOB with min assist to correct needed for mobility without assist devices and with attempt at [facility]. Pt was able to perform grooming standing at sink with CGA. Balance improved with RW and pt was educated on safe walker management during mobility and ADLs. Recommend home health at discharge. Current Level of Function Impacting Discharge (ADLs):  Sit to Stand: Contact guard assistance  Functional Transfers  Bathroom Mobility: Minimum assistance (without assist devices, CGA with RW)  Bed to Chair: Minimum assistance (without assist devices, CGA with RW)    Grooming  Washing Hands: Contact guard assistance  Brushing Teeth: Contact guard assistance      Lower Body Dressing Assistance  Shoes with Velcro:  (post op shoe seated bending forward method)  Shoes with Cloth Laces: Stand-by assistance    Other factors to consider for discharge: needs RW         PLAN :  Patient continues to benefit from skilled intervention to address the above impairments. Continue treatment per established plan of care to address goals. Recommend with staff: OOB to chair most of the day and to bathroom for toielting    Recommend next OT session: standing balance, ADLS    Recommendation for discharge: (in order for the patient to meet his/her long term goals)  Occupational therapy at least 2 days/week in the home AND ensure assist and/or supervision for safety with ADLs and mobility    This discharge recommendation:  Has been made in collaboration with the attending provider and/or case management    IF patient discharges home will need the following DME: walker: rolling       SUBJECTIVE:   Patient stated [facility] I felt that. It wore me out.  at end of OT session    OBJECTIVE DATA SUMMARY:   Cognitive/Behavioral Status:           Perception: Appears intact  Perseveration: No perseveration noted  Safety/Judgement: Awareness of environment; Fall prevention    Functional Mobility and Transfers for ADLs:  Bed Mobility:  Rolling: Independent  Supine to Sit: Independent  Scooting: Contact guard assistance    Transfers:  Sit to Stand: Contact guard assistance  Functional Transfers  Bathroom Mobility: Minimum assistance (without assist devices, CGA with RW)  Bed to Chair: Minimum assistance (without assist devices, CGA with RW)    Balance:  Sitting: Intact  Standing: Impaired  Standing - Static: Fair  Standing - Dynamic : Fair (moments of poor balance with min assist to correct)    ADL Intervention:       Grooming  Washing Hands: Contact guard assistance  Brushing Teeth: Contact guard assistance      Lower Body Dressing Assistance  Shoes with Velcro:  (post op shoe seated bending forward method)  Shoes with Cloth Laces: Stand-by assistance         Cognitive Retraining  Safety/Judgement: Awareness of environment; Fall prevention        Pain:  2/10 operative foot    Activity Tolerance:   Fair and requires rest breaks    After treatment patient left in no apparent distress:   Sitting in chair and Call bell within reach    COMMUNICATION/COLLABORATION:   The patients plan of care was discussed with: Registered nurse and patient .      Cici Chi OTR/L  Time Calculation: 16 mins

## 2021-07-20 VITALS
WEIGHT: 180.8 LBS | SYSTOLIC BLOOD PRESSURE: 127 MMHG | HEART RATE: 98 BPM | DIASTOLIC BLOOD PRESSURE: 82 MMHG | TEMPERATURE: 98.1 F | HEIGHT: 74 IN | OXYGEN SATURATION: 100 % | RESPIRATION RATE: 18 BRPM | BODY MASS INDEX: 23.2 KG/M2

## 2021-07-20 LAB
ANION GAP SERPL CALC-SCNC: 6 MMOL/L (ref 5–15)
BUN SERPL-MCNC: 15 MG/DL (ref 6–20)
BUN/CREAT SERPL: 22 (ref 12–20)
CALCIUM SERPL-MCNC: 9.1 MG/DL (ref 8.5–10.1)
CHLORIDE SERPL-SCNC: 106 MMOL/L (ref 97–108)
CO2 SERPL-SCNC: 28 MMOL/L (ref 21–32)
CREAT SERPL-MCNC: 0.69 MG/DL (ref 0.7–1.3)
GLUCOSE BLD STRIP.AUTO-MCNC: 144 MG/DL (ref 65–117)
GLUCOSE BLD STRIP.AUTO-MCNC: 182 MG/DL (ref 65–117)
GLUCOSE BLD STRIP.AUTO-MCNC: 88 MG/DL (ref 65–117)
GLUCOSE SERPL-MCNC: 96 MG/DL (ref 65–100)
POTASSIUM SERPL-SCNC: 4 MMOL/L (ref 3.5–5.1)
SERVICE CMNT-IMP: ABNORMAL
SERVICE CMNT-IMP: ABNORMAL
SERVICE CMNT-IMP: NORMAL
SODIUM SERPL-SCNC: 140 MMOL/L (ref 136–145)

## 2021-07-20 PROCEDURE — 74011636637 HC RX REV CODE- 636/637: Performed by: INTERNAL MEDICINE

## 2021-07-20 PROCEDURE — 97535 SELF CARE MNGMENT TRAINING: CPT | Performed by: OCCUPATIONAL THERAPIST

## 2021-07-20 PROCEDURE — 74011636637 HC RX REV CODE- 636/637: Performed by: PODIATRIST

## 2021-07-20 PROCEDURE — 74011000258 HC RX REV CODE- 258: Performed by: STUDENT IN AN ORGANIZED HEALTH CARE EDUCATION/TRAINING PROGRAM

## 2021-07-20 PROCEDURE — 36415 COLL VENOUS BLD VENIPUNCTURE: CPT

## 2021-07-20 PROCEDURE — 99233 SBSQ HOSP IP/OBS HIGH 50: CPT | Performed by: STUDENT IN AN ORGANIZED HEALTH CARE EDUCATION/TRAINING PROGRAM

## 2021-07-20 PROCEDURE — 80048 BASIC METABOLIC PNL TOTAL CA: CPT

## 2021-07-20 PROCEDURE — 74011250637 HC RX REV CODE- 250/637: Performed by: PODIATRIST

## 2021-07-20 PROCEDURE — 74011250636 HC RX REV CODE- 250/636: Performed by: STUDENT IN AN ORGANIZED HEALTH CARE EDUCATION/TRAINING PROGRAM

## 2021-07-20 PROCEDURE — 82962 GLUCOSE BLOOD TEST: CPT

## 2021-07-20 PROCEDURE — 74011250636 HC RX REV CODE- 250/636: Performed by: PODIATRIST

## 2021-07-20 PROCEDURE — 97116 GAIT TRAINING THERAPY: CPT

## 2021-07-20 RX ORDER — INSULIN GLARGINE 100 [IU]/ML
20 INJECTION, SOLUTION SUBCUTANEOUS
Qty: 15 ML | Refills: 2 | Status: SHIPPED | OUTPATIENT
Start: 2021-07-20 | End: 2021-08-10

## 2021-07-20 RX ORDER — TAMSULOSIN HYDROCHLORIDE 0.4 MG/1
0.4 CAPSULE ORAL DAILY
Qty: 30 CAPSULE | Refills: 0 | Status: SHIPPED | OUTPATIENT
Start: 2021-07-21 | End: 2021-08-20

## 2021-07-20 RX ADMIN — Medication 10 ML: at 05:51

## 2021-07-20 RX ADMIN — DAPTOMYCIN 600 MG: 500 INJECTION, POWDER, LYOPHILIZED, FOR SOLUTION INTRAVENOUS at 19:31

## 2021-07-20 RX ADMIN — Medication 4000 UNITS: at 08:09

## 2021-07-20 RX ADMIN — INSULIN LISPRO 4 UNITS: 100 INJECTION, SOLUTION INTRAVENOUS; SUBCUTANEOUS at 07:45

## 2021-07-20 RX ADMIN — CEFEPIME HYDROCHLORIDE 2 G: 2 INJECTION, POWDER, FOR SOLUTION INTRAVENOUS at 02:24

## 2021-07-20 RX ADMIN — Medication 10 ML: at 12:18

## 2021-07-20 RX ADMIN — INSULIN LISPRO 3 UNITS: 100 INJECTION, SOLUTION INTRAVENOUS; SUBCUTANEOUS at 12:04

## 2021-07-20 RX ADMIN — CEFEPIME HYDROCHLORIDE 2 G: 2 INJECTION, POWDER, FOR SOLUTION INTRAVENOUS at 17:43

## 2021-07-20 RX ADMIN — Medication 1 AMPULE: at 08:08

## 2021-07-20 RX ADMIN — OXYCODONE HYDROCHLORIDE AND ACETAMINOPHEN 250 MG: 500 TABLET ORAL at 08:09

## 2021-07-20 RX ADMIN — INSULIN LISPRO 4 UNITS: 100 INJECTION, SOLUTION INTRAVENOUS; SUBCUTANEOUS at 17:42

## 2021-07-20 RX ADMIN — INSULIN LISPRO 3 UNITS: 100 INJECTION, SOLUTION INTRAVENOUS; SUBCUTANEOUS at 17:42

## 2021-07-20 RX ADMIN — THERA TABS 1 TABLET: TAB at 08:09

## 2021-07-20 RX ADMIN — NEPHROCAP 1 CAPSULE: 1 CAP ORAL at 08:09

## 2021-07-20 RX ADMIN — ENOXAPARIN SODIUM 40 MG: 40 INJECTION SUBCUTANEOUS at 08:08

## 2021-07-20 RX ADMIN — TAMSULOSIN HYDROCHLORIDE 0.4 MG: 0.4 CAPSULE ORAL at 08:09

## 2021-07-20 RX ADMIN — ASPIRIN 81 MG: 81 TABLET, CHEWABLE ORAL at 08:09

## 2021-07-20 RX ADMIN — INSULIN LISPRO 4 UNITS: 100 INJECTION, SOLUTION INTRAVENOUS; SUBCUTANEOUS at 12:04

## 2021-07-20 RX ADMIN — CEFEPIME HYDROCHLORIDE 2 G: 2 INJECTION, POWDER, FOR SOLUTION INTRAVENOUS at 12:03

## 2021-07-20 NOTE — PROGRESS NOTES
Problem: Self Care Deficits Care Plan (Adult)  Goal: *Acute Goals and Plan of Care (Insert Text)  Description:   FUNCTIONAL STATUS PRIOR TO ADMISSION: Patient was independent and active without use of DME.     HOME SUPPORT: Patient reports living alone; he has a fiance who checks in on him and is able to assist as needed. Occupational Therapy Goals  Initiated 7/15/2021  1. Patient will perform standing grooming with modified independence within 7 day(s). 2.  Patient will perform lower body dressing with modified independence within 7 day(s). 3.  Patient will perform simple home management with modified independence within 7 day(s). 4.  Patient will perform toilet transfers with modified independence within 7 day(s). 5.  Patient will perform all aspects of toileting with modified independence within 7 day(s). 6.  Patient will participate in upper extremity therapeutic exercise/activities with independence for 15 minutes within 7 day(s). 7.  Patient will utilize energy conservation techniques during functional activities without cues within 7 day(s). Outcome: Progressing Towards Goal   OCCUPATIONAL THERAPY TREATMENT  Patient: Db Nicolas (16 y.o. male)  Date: 7/20/2021  Diagnosis: Wound infection [T14. 8XXA, L08.9] <principal problem not specified>  Procedure(s) (LRB):  DEBRIDEMENT OF BONE RIGHT FOURTH METATARSAL, BONE BIOPSY RIGHT THIRD METATARSAL, BONE BIOPSY RIGHT FIFTH METATARSAL (Right) 7 Days Post-Op  Precautions: WBAT (RLE)  Chart, occupational therapy assessment, plan of care, and goals were reviewed. ASSESSMENT  Patient continues with skilled OT services and is progressing towards goals. Pt continues to need verbal cues for safety with walker management and presented with STM. He kept asking the same questions repeatedly. His significant other arrived and both parties were educated on home safety and fall prevention and walker management during functional tasks.   Pt reported feeling uneven with walking with post op shoe on and tennis shoe. CGA overall needed for mobility with RW. Informed pt to not shower and to sponge bathe at this time. Recommend home health at discharge. Current Level of Function Impacting Discharge (ADLs):   Sit to Stand: Stand-by assistance  Functional Transfers  Bathroom Mobility: Contact guard assistance (with RW)  Bed to Chair: Stand-by assistance (with RW)    Lower Body Dressing Assistance  Socks: Set-up  Shoes with Velcro: Set-up  Shoes with Cloth Laces: Set-up    Other factors to consider for discharge: need RW, STM loss         PLAN :  Patient continues to benefit from skilled intervention to address the above impairments. Continue treatment per established plan of care to address goals. Recommend with staff: OOB to chair most of the day with elevation of LE, to bathroom for ADLS    Recommend next OT session: standing balance, walker management during ADLS    Recommendation for discharge: (in order for the patient to meet his/her long term goals)  Occupational therapy at least 2 days/week in the home AND ensure assist and/or supervision for safety with ADLS and mobility    This discharge recommendation:  Has been made in collaboration with the attending provider and/or case management    IF patient discharges home will need the following DME: rolling walker       SUBJECTIVE:   Patient stated So I have to use the walker? So I have to use the walker?     OBJECTIVE DATA SUMMARY:   Cognitive/Behavioral Status:  Neurologic State: Alert  Orientation Level: Oriented X4  Cognition:  (STM loss)  Perception: Appears intact  Perseveration: No perseveration noted  Safety/Judgement: Awareness of environment; Fall prevention;Home safety    Functional Mobility and Transfers for ADLs:  Bed Mobility:  Rolling: Independent  Supine to Sit: Independent  Scooting: Supervision    Transfers:  Sit to Stand: Stand-by assistance  Functional Transfers  Bathroom Mobility: Contact guard assistance (with RW)  Bed to Chair: Stand-by assistance (with RW)    Balance:  Sitting: Intact  Standing: Impaired  Standing - Static: Good  Standing - Dynamic : Fair (with RW)    ADL Intervention:         Lower Body Dressing Assistance  Socks: Set-up  Shoes with Velcro: Set-up  Shoes with Cloth Laces: Set-up         Cognitive Retraining  Safety/Judgement: Awareness of environment; Fall prevention;Home safety      Pain:  2/10    Activity Tolerance:   Fair    After treatment patient left in no apparent distress:   Sitting in chair    COMMUNICATION/COLLABORATION:   The patients plan of care was discussed with: Physical therapist, Registered nurse and patient and his girlfriend.      Janae Hughes OTR/L  Time Calculation: 23 mins

## 2021-07-20 NOTE — DISCHARGE SUMMARY
Hospitalist Discharge Summary     Patient ID:  Jan Del Toro  [id number]  67 y.o.  1945 7/9/2021    PCP on record: Shila Robles MD    Admit date: 7/9/2021  Discharge date and time: 7/20/2021    DISCHARGE DIAGNOSIS:  Sepsis due to  Right foot osteomyelitis   S/p right 4th metatarsal debridement, and bone biopsy of right third and fifth metatarsal   -IDDM  Prostate Ca  HLD  [place], not using CPAP    CONSULTATIONS:  IP CONSULT TO HOSPITALIST  IP CONSULT TO PODIATRY  IP CONSULT TO INFECTIOUS DISEASES    Excerpted HPI from H&P of Madhavi Currie MD:  CHIEF COMPLAINT: Rt foot pain and chronic foot ulcer     HISTORY OF PRESENT ILLNESS:     Jan Del Toro is a 67 y.o.  male who presents with the above CC. Pt w previous right metatarsal amputation and diabetic foot ulcer for ~ 5 months, has been following w Podiatry for it and is on po ABx (does not name) started almost a week ago. Pt still c/o drainage, yellowish in color, not foul smelling wound. Pt started having severe foot pain, radiating to his leg, has chills, denies fever, leg edema and diarrhea. Pt recently completed a course of ABx for UTI. C/o mild SOB few days ago. ED w/u w normal WBC, Hb 10.7, K 5.2, Glu 309. T Max 100.6,  w normal sats.     XR FOOT RT MIN 3 V     Result Date: 7/9/2021  Previous transmetatarsal amputations. No radiographic evidence of acute osteomyelitis. .     We were asked to admit for work up and evaluation of the above problems. ______________________________________________________________________  DISCHARGE SUMMARY/HOSPITAL COURSE:  for full details see H&P, daily progress notes, labs, consult notes. Sepsis due to  Right foot osteomyelitis   S/p right 4th metatarsal debridement, and bone biopsy of right third and fifth metatarsal by Dr. Hager on 7/13/2021.   -Chronic non healing diabetic wound of the R foot, worsening, failed outpt ABx   -MRI IMPRESSION  1.  Osteomyelitis of the remnant fourth metatarsal shaft periosteal abscess. 2. Abnormal signal in distal remnant shafts of third and fifth metatarsals,  equivocal for osteomyelitis. -Cultures growing Enterococcus faecalis. Pseudomonas growing from thio broth  S/p Zosyn. -PICC line placed 7/19  -podiatry help appreciated. Follow up as OP  ID recs :  Cefepime 2 g every 8 hour and daptomycin 8 mg/kg daily.  -Duration 6 weeks 7/13/2021 to 8/24/2021.    - Patient is on Lipitor 20mg daily for cholesterol. No hx of CAD per patient. I told him to hold the atorvastatin while on daptomycin therapy.         ? Please have labs done on weekly basis for CBC with diff, CMP, ESR, CRP  and have results sent to me by faxing to  [phone].   ? Call with critical labs at [phone].  ? If patient is on Daptomycin IV, please check weekly CK and send results to me by faxing to [phone].    ? Please ensure that patient has PICC care arranged per protocol   ? Once IV antibiotics have been discontinued, please ensure that PICC/IV access is promptly removed. ? Smoking cessation encouraged if patient is current smoker to aid in infection and wound healing     IDDM  -continue Lantus 20 units nightly.  -Added premeal Humalog.    -continue sliding scale. Patient counts carbohydrates and averages 5 to 7 units before meals.     Prostate Ca  HLD  [place], not using CPAP  -Continue home aspirin, Lipitor, Flomax     History of right TMA  History of left third toe amputation    _______________________________________________________________________  Patient seen and examined by me on discharge day. Pertinent Findings:  Gen:    Not in distress  Chest: Clear lungs  CVS:   Regular rhythm.   No edema  Abd:  Soft, not distended, not tender  Neuro:  Alert, orientedx3  _______________________________________________________________________  DISCHARGE MEDICATIONS:   Current Discharge Medication List      START taking these medications    Details   cefepime 2 gram 2 g IVPB 2 g by IntraVENous route every eight (8) hours for 35 days. Qty: 105 Dose, Refills: 0  Start date: 7/20/2021, End date: 8/24/2021      DAPTOmycin (CUBICIN RF) IVPB 600 mg by IntraVENous route every twenty-four (24) hours for 35 days. Qty: 35 Dose, Refills: 0  Start date: 7/20/2021, End date: 8/24/2021         CONTINUE these medications which have CHANGED    Details   insulin glargine (Lantus Solostar U-100 Insulin) 100 unit/mL (3 mL) inpn 20 Units by SubCUTAneous route nightly. INJECT 16 UNITS UNDER THE SKIN AT BEDTIME--Dose change 3/22/21--updated med list--did not send prescription to the pharmacy  Qty: 15 mL, Refills: 2  Start date: 7/20/2021         CONTINUE these medications which have NOT CHANGED    Details   docusate sodium (COLACE) 100 mg capsule Take 100 mg by mouth two (2) times a day. HYDROcodone-acetaminophen (Norco) 5-325 mg per tablet Take  by mouth.      phenazopyridine (Pyridium) 200 mg tablet Take  by mouth three (3) times daily as needed for Pain.      tamsulosin (Flomax) 0.4 mg capsule Take 0.4 mg by mouth daily. insulin aspart U-100 (NovoLOG Flexpen U-100 Insulin) 100 unit/mL (3 mL) inpn Inject 1:8 for carbs for breakfast and 1:9 for lunch and dinner before 9pm and 1:10 after 9pm and 1:50 > 150 for correction during the day and 1:50 > 180 after 9pm.  MAX 35/D  Qty: 30 mL, Refills: 3      BD Luer-Rafa Syringe 3 mL 18 x 1 1/2\" syrg USE TO DRAW UP TESTOSTERONE UTD      Disposable Needles 22 gauge x 1 1/2\" ndle USE TO INJECT TESTOSTERONE UTD      butalbital-acetaminophen-caffeine (FIORICET, ESGIC) -40 mg per tablet Take 2 Tabs by mouth every eight (8) hours as needed for Headache. Refill at 1 month intervals  Qty: 50 Tab, Refills: 5    Associated Diagnoses: Cervical radiculopathy due to degenerative joint disease of spine; Carpal tunnel syndrome of right wrist; Bilateral carotid artery stenosis;  Cerebral microvascular disease; Tension vascular headache; Diabetic peripheral neuropathy associated with type 2 diabetes mellitus ([facility] [address] Benign essential tremor syndrome; Idiopathic small and large fiber sensory neuropathy; Ulnar neuropathy at elbow, right; Ulnar neuropathy at elbow of left upper extremity; B12 deficiency; Vitamin D deficiency; Hypothyroidism due to acquired atrophy of thyroid      ascorbic acid, vitamin C, (Vitamin C) 250 mg tablet Take  by mouth daily. b complex vitamins (B COMPLEX 1) tablet Take 1 Tab by mouth daily. aspirin 81 mg chewable tablet Take 81 mg by mouth daily. ONETOUCH ULTRA TEST strip TEST UP TO 5 TIMES DAILY   (INSULIN FLUCTUATING SUGARS). DX: E10.65  Qty: 500 Strip, Refills: 3      therapeutic multivitamin (THERA) tablet Take 1 Tab by mouth daily. cholecalciferol (Vitamin D) (2,000 UNITS /50 MCG) cap capsule Take 2 Tabs by mouth daily. STOP taking these medications       cephALEXin (Keflex) 500 mg capsule Comments:   Reason for Stopping:         atorvastatin (LIPITOR) 10 mg tablet Comments:   Reason for Stopping:                 Patient Follow Up Instructions: Activity: Activity as tolerated  Diet: Diabetic Diet  Wound Care: As directed  Remove old bandage. Clean wound with either sterile saline or dermal wound . Apply Silvasorb gel to plantar wound right foot. Cover ulcer and incision with 4x4 gauze, wrap with Kerlix. Secure with tape. Follow-up Information     Follow up With Specialties Details Why [address] [facility] home infusion    this is your home IV infusion provider. Please contact them directly if you have any questions or concerns regarding IV infusion. [facility]  this is your home health provider. Please contact them directly if you do not hear from them within 24 hours of discharge.   [address]    Vianney Whitaker MD Family Medicine Go on 7/30/2021 For hospital follow up appointment at 10:00AM [phone] [address]  [phone]          ________________________________________________________________    Risk of deterioration: Moderate    Condition at Discharge:  Stable  __________________________________________________________________    Disposition  Home with family and home health services    ____________________________________________________________________    Code Status: Full Code  ___________________________________________________________________      Total time in minutes spent coordinating this discharge (includes going over instructions, follow-up, prescriptions, and preparing report for sign off to her PCP) :  >30 minutes    Signed:  Porfirio Campbell MD

## 2021-07-20 NOTE — PROGRESS NOTES
Infectious Disease Progress Note         Interval:  No acute events overnight    Subjective:   Patient reports feeling well, no acute concerns. Received PICC line on right arm today. Objective:    Vitals:   Reviewed in chart. Physical Exam:  Gen: No apparent distress  HEENT:  Normocephalic, atraumatic, no scleral icterus  CV: Hemodynamically stable  Lungs: On room air  Abdomen: Soft nondistended  Genitourinary:   no abraham catheter   Skin: no rash   Psych: good affect, good eye contact, non tearful  Neuro: alert, oriented to time,  place, and situation, moves all extremities to commands, verbal  Musculoskeletal: Foot in dressing  Lines: PICC line        Labs:  Recent Results (from the past 24 hour(s))   GLUCOSE, POC    Collection Time: 07/19/21  4:25 PM   Result Value Ref Range    Glucose (POC) 241 (H) 65 - 117 mg/dL    Performed by Alfa BRITTON    CK    Collection Time: 07/19/21  5:51 PM   Result Value Ref Range     39 - 308 U/L   GLUCOSE, POC    Collection Time: 07/19/21  9:03 PM   Result Value Ref Range    Glucose (POC) 281 (H) 65 - 117 mg/dL    Performed by Efren Murphy    METABOLIC PANEL, BASIC    Collection Time: 07/20/21  4:19 AM   Result Value Ref Range    Sodium 140 136 - 145 mmol/L    Potassium 4.0 3.5 - 5.1 mmol/L    Chloride 106 97 - 108 mmol/L    CO2 28 21 - 32 mmol/L    Anion gap 6 5 - 15 mmol/L    Glucose 96 65 - 100 mg/dL    BUN 15 6 - 20 MG/DL    Creatinine 0.69 (L) 0.70 - 1.30 MG/DL    BUN/Creatinine ratio 22 (H) 12 - 20      GFR est AA >60 >60 ml/min/1.73m2    GFR est non-AA >60 >60 ml/min/1.73m2    Calcium 9.1 8.5 - 10.1 MG/DL   GLUCOSE, POC    Collection Time: 07/20/21  7:25 AM   Result Value Ref Range    Glucose (POC) 88 65 - 117 mg/dL    Performed by Asim Meraz    GLUCOSE, POC    Collection Time: 07/20/21 11:14 AM   Result Value Ref Range    Glucose (POC) 144 (H) 65 - 117 mg/dL    Performed by Asim Meraz                Assessment:  Right foot OM, T1DM.  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 S/p right 4th metatarsal debridement, and bone biopsy of right third and fifth metatarsal by Dr. Macy Valladares on 7/13/2021. Cultures growing ampicillin sensitive Enterococcus faecalis and Pseudomonas aeruginosa. Recommendations:  -Cefepime 2 g every 8 hour and daptomycin 8 mg/kg daily.  -Duration 6 weeks 7/13/2021 to 8/24/2021.    - Patient is on Lipitor 20mg daily for cholesterol. No hx of CAD per patient. I told him to hold the atorvastatin while on daptomycin therapy. · Please have labs done on weekly basis for CBC with diff, CMP, ESR, CRP  and have results sent to me by faxing to  [phone]. · Call with critical labs at [phone]. · If patient is on Daptomycin IV, please check weekly CK and send results to me by faxing to [phone]. · Please ensure that patient has PICC care arranged per protocol   · Once IV antibiotics have been discontinued, please ensure that PICC/IV access is promptly removed. · Smoking cessation encouraged if patient is current smoker to aid in infection and wound healing          Thank you for the opportunity to participate in the care of this patient. Please contact with questions or concerns.       Rain Paz MD  Infectious Diseases

## 2021-07-20 NOTE — PROGRESS NOTES
Transition of Care Plan:  RUR: 16%  Disposition: home with [place] ([facility]) and home IV infusion (Rossy Dominguezcarlos Henriquez Freddie)     Follow up appointments: PCP, podiatry? DME needed: TBD  Transportation at [address] [facility] or means to access home: friend to provide       IM Medicare letter: to be provided prior to d/c  Caregiver Contact: friend Lulú Ho [phone]  Discharge Caregiver contacted prior to discharge? To be contacted prior to d/c            2:30pm: CM notified that Rossy Leonel Henriquez Freddie would meet with Pt at bedside this evening to do teaching. 10:40am: CM notified by Rossy Henriquez 1237, Pt is covered at 100% for Dapto and cefepime. Referral sent to [facility] for [place].       9:30am: CM sent preliminary information to [facility] to provide pricing information for dapto cefepime  IV ABX. CM awaiting final orders from ID.            Deepak Murphy, EDI Houser

## 2021-07-20 NOTE — PROGRESS NOTES
Problem: Mobility Impaired (Adult and Pediatric)  Goal: *Acute Goals and Plan of Care (Insert Text)  Description: FUNCTIONAL STATUS PRIOR TO ADMISSION: Patient was independent and active without use of DME.    HOME SUPPORT PRIOR TO ADMISSION: The patient lived with alone with family and friends to assist as needed. Physical Therapy Goals  Initiated 7/15/2021  1. Patient will transfer from bed to chair and chair to bed with modified independence using the least restrictive device within 7 day(s). 2.  Patient will perform sit to stand with modified independence within 7 day(s). 3.  Patient will ambulate with modified independence for 150 feet with the least restrictive device within 7 day(s). 4.  Patient will ascend/descend 3 stairs with R handrail(s) with modified independence within 7 day(s). Outcome: Progressing Towards Goal     PHYSICAL THERAPY TREATMENT  Patient: Ginna De Jesus (40 y.o. male)  Date: 7/20/2021  Diagnosis: Wound infection [T14. 8XXA, L08.9] <principal problem not specified>  Procedure(s) (LRB):  DEBRIDEMENT OF BONE RIGHT FOURTH METATARSAL, BONE BIOPSY RIGHT THIRD METATARSAL, BONE BIOPSY RIGHT FIFTH METATARSAL (Right) 7 Days Post-Op  Precautions: WBAT (RLE)  Chart, physical therapy assessment, plan of care and goals were reviewed. ASSESSMENT  Patient continues with skilled PT services and is progressing towards goals. Pt in need of RW use as safer option instead of SPC at this time for mobility efforts. He is able to geovanny/doff post-op shoe on R foot without assist, no overt LOB but intermittent unsteadiness due to height difference in post-op shoe vs. Regular shoe. Recommend pt have assist for safety in the home upon discharge. Continue to follow. Current Level of Function Impacting Discharge (mobility/balance): CGA for gait with RW use ;  Independent for bed mob. ; transfers SBA    Other factors to consider for discharge: pt has supportive Fiance          PLAN :  Patient continues to benefit from skilled intervention to address the above impairments. Continue treatment per established plan of care. to address goals. Recommendation for discharge: (in order for the patient to meet his/her long term goals)  Physical therapy at least 2 days/week in the home AND ensure assist and/or supervision for safety with mobility    This discharge recommendation:  Has been made in collaboration with the attending provider and/or case management    IF patient discharges home will need the following DME: rolling walker       SUBJECTIVE:   Patient stated I agree with the walker, more support.     OBJECTIVE DATA SUMMARY:   Critical Behavior:  Neurologic State: Alert  Orientation Level: Oriented X4  Cognition:  (STM loss)  Safety/Judgement: Awareness of environment, Fall prevention, Home safety  Functional Mobility Training:  Bed Mobility:  Rolling: Independent  Supine to Sit: Independent     Scooting: Supervision        Transfers:  Sit to Stand: Stand-by assistance  Stand to Sit: Stand-by assistance (verbal cues for hand placement)                Balance:  Sitting: Intact  Standing: Impaired  Standing - Static: Good  Standing - Dynamic : Fair (with RW)  Ambulation/Gait Training:  Distance (ft): 200 Feet (ft)  Assistive Device: Gait belt;Walker, rolling  Ambulation - Level of Assistance: Contact guard assistance        Gait Abnormalities: Decreased step clearance;Trunk sway increased              Speed/Nallely: Pace decreased (<100 feet/min)  Step Length: Left shortened;Right shortened  Number of Stairs Trained: 4  Stairs - Level of Assistance: Contact guard assistance  Rail Use: Left  (BUE support)        Pain Rating:  No c/o pain    Activity Tolerance:   Good    After treatment patient left in no apparent distress:   Sitting in chair, Call bell within reach, Caregiver / family present, and nurse notified     COMMUNICATION/COLLABORATION:   The patients plan of care was discussed with: Occupational therapist and Registered nurse.      Ronnie Tucker, PT, DPT   Time Calculation: 20 mins

## 2021-07-20 NOTE — PROGRESS NOTES
2300 :Bedside and Verbal shift change report given to patric (oncoming nurse) by Marco Alvarado (offgoing nurse). Report included the following information SBAR, Kardex, Intake/Output, MAR and Recent Results. End of Shift Note    Bedside shift change report given to omaira coles(oncoming nurse) by Lori Nixon (offgoing nurse). Report included the following information SBAR, Kardex, Intake/Output, MAR and Recent Results    Shift worked:  night     Shift summary and any significant changes:     none     Concerns for physician to address: None, d/c today     Zone phone for oncoming shift:   0915     Activity:  Activity Level: Up with Assistance  Number times ambulated in hallways past shift: 0  Number of times OOB to chair past shift: 0    Cardiac:   Cardiac Monitoring: No      Cardiac Rhythm: Sinus Rhythm    Access:   Current line(s): PICC     Genitourinary:   Urinary status: voiding    Respiratory:   O2 Device: None (Room air)  Chronic home O2 use?: NO  Incentive spirometer at bedside: NO  Actual Volume (ml): 1000 ml  GI:  Last Bowel Movement Date: 07/17/21  Current diet:  ADULT DIET Regular; 4 carb choices (60 gm/meal)  ADULT ORAL NUTRITION SUPPLEMENT Breakfast, Dinner; Low Calorie/High Protein  Passing flatus: YES  Tolerating current diet: YES       Pain Management:   Patient states pain is manageable on current regimen: YES    Skin:  Ottoniel Score: 18  Interventions: float heels, increase time out of bed, foam dressing, PT/OT consult and internal/external urinary devices    Patient Safety:  Fall Score:  Total Score: 3  Interventions: assistive device (walker, cane, etc), gripper socks, pt to call before getting OOB and stay with me (per policy)  High Fall Risk: Yes    Length of Stay:  Expected LOS: 5d 7h  Actual LOS: [facility]
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
## 2021-07-20 NOTE — PROGRESS NOTES
Bedside shift change report given to Dona Tellez RN (oncoming nurse) by Valeria Gale RN (offgoing nurse). Report included the following information SBAR, Kardex, OR Summary, Procedure Summary, Intake/Output, MAR, Accordion and Recent Results.

## 2021-07-20 NOTE — DISCHARGE INSTRUCTIONS
DISCHARGE DIAGNOSIS:  Sepsis due to  Right foot osteomyelitis   S/p right 4th metatarsal debridement, and bone biopsy of right third and fifth metatarsal by Dr. Hager on 7/13/2021.   -IDDM  Prostate Ca  HLD  [place], not using CPAP    MEDICATIONS:  · It is important that you take the medication exactly as they are prescribed. · Keep your medication in the bottles provided by the pharmacist and keep a list of the medication names, dosages, and times to be taken in your wallet. · Do not take other medications without consulting your doctor. Pain Management: per above medications    What to do at Home    Recommended diet:  Diabetic Diet    Recommended activity: Activity as tolerated    If you have questions regarding the hospital related prescriptions or hospital related issues please call [facility] at . You can always direct your questions to your primary care doctor if you are unable to reach your hospital physician; your PCP works as an extension of your hospital doctor just like your hospital doctor is an extension of your PCP for your time at the hospital [facility], [facility]).     If you experience any of the following symptoms then please call your primary care physician or return to the emergency room if you cannot get hold of your doctor:  Fever, chills, nausea, vomiting, diarrhea, change in mentation, falling, bleeding, shortness of breath

## 2021-07-21 NOTE — PROGRESS NOTES
Discharge instructions reviewed with the patient jeri at bedside. Both able to verbalize an understanding. All personal belongings with the patient. Message sent to on isabel NP to e scribe flomax 0.4 mg to [address] [place] [address].

## 2021-07-22 ENCOUNTER — HOSPITAL ENCOUNTER (OUTPATIENT)
Dept: MRI IMAGING | Age: 76
Discharge: HOME | End: 2021-07-22
Attending: PODIATRIST
Payer: MEDICARE

## 2021-07-22 DIAGNOSIS — M86.171 OSTEOMYELITIS OF FOOT, RIGHT, ACUTE (HCC): ICD-10-CM

## 2021-07-22 PROCEDURE — 73718 MRI LOWER EXTREMITY W/O DYE: CPT

## 2021-08-05 ENCOUNTER — HOSPITAL ENCOUNTER (EMERGENCY)
Age: 76
Discharge: HOME | End: 2021-08-05
Attending: EMERGENCY MEDICINE
Payer: MEDICARE

## 2021-08-05 ENCOUNTER — APPOINTMENT (OUTPATIENT)
Dept: CT IMAGING | Age: 76
End: 2021-08-05
Attending: EMERGENCY MEDICINE
Payer: MEDICARE

## 2021-08-05 VITALS
BODY MASS INDEX: 22.46 KG/M2 | OXYGEN SATURATION: 100 % | HEIGHT: 74 IN | RESPIRATION RATE: 16 BRPM | SYSTOLIC BLOOD PRESSURE: 157 MMHG | DIASTOLIC BLOOD PRESSURE: 71 MMHG | WEIGHT: 175.04 LBS | HEART RATE: 94 BPM | TEMPERATURE: 98.3 F

## 2021-08-05 DIAGNOSIS — R40.4 TRANSIENT ALTERATION OF AWARENESS: Primary | ICD-10-CM

## 2021-08-05 DIAGNOSIS — R44.3 HALLUCINATION: ICD-10-CM

## 2021-08-05 LAB
ALBUMIN SERPL-MCNC: 3.1 G/DL (ref 3.5–5)
ALBUMIN/GLOB SERPL: 0.6 {RATIO} (ref 1.1–2.2)
ALP SERPL-CCNC: 98 U/L (ref 45–117)
ALT SERPL-CCNC: 37 U/L (ref 12–78)
ANION GAP SERPL CALC-SCNC: 4 MMOL/L (ref 5–15)
APPEARANCE UR: CLEAR
AST SERPL-CCNC: 34 U/L (ref 15–37)
ATRIAL RATE: 85 BPM
BACTERIA URNS QL MICRO: NEGATIVE /HPF
BASOPHILS # BLD: 0 K/UL (ref 0–0.1)
BASOPHILS NFR BLD: 0 % (ref 0–1)
BILIRUB SERPL-MCNC: 0.2 MG/DL (ref 0.2–1)
BILIRUB UR QL: NEGATIVE
BUN SERPL-MCNC: 17 MG/DL (ref 6–20)
BUN/CREAT SERPL: 20 (ref 12–20)
CALCIUM SERPL-MCNC: 9.3 MG/DL (ref 8.5–10.1)
CALCULATED P AXIS, ECG09: 73 DEGREES
CALCULATED R AXIS, ECG10: -58 DEGREES
CALCULATED T AXIS, ECG11: 64 DEGREES
CHLORIDE SERPL-SCNC: 104 MMOL/L (ref 97–108)
CO2 SERPL-SCNC: 29 MMOL/L (ref 21–32)
COLOR UR: ABNORMAL
CREAT SERPL-MCNC: 0.85 MG/DL (ref 0.7–1.3)
DIAGNOSIS, 93000: NORMAL
DIFFERENTIAL METHOD BLD: ABNORMAL
EOSINOPHIL # BLD: 0.8 K/UL (ref 0–0.4)
EOSINOPHIL NFR BLD: 26 % (ref 0–7)
EPITH CASTS URNS QL MICRO: ABNORMAL /LPF
ERYTHROCYTE [DISTWIDTH] IN BLOOD BY AUTOMATED COUNT: 13.9 % (ref 11.5–14.5)
GLOBULIN SER CALC-MCNC: 5 G/DL (ref 2–4)
GLUCOSE BLD STRIP.AUTO-MCNC: 205 MG/DL (ref 65–117)
GLUCOSE SERPL-MCNC: 212 MG/DL (ref 65–100)
GLUCOSE UR STRIP.AUTO-MCNC: NEGATIVE MG/DL
HCT VFR BLD AUTO: 31.5 % (ref 36.6–50.3)
HGB BLD-MCNC: 10.1 G/DL (ref 12.1–17)
HGB UR QL STRIP: ABNORMAL
IMM GRANULOCYTES # BLD AUTO: 0 K/UL (ref 0–0.04)
IMM GRANULOCYTES NFR BLD AUTO: 0 % (ref 0–0.5)
KETONES UR QL STRIP.AUTO: NEGATIVE MG/DL
LEUKOCYTE ESTERASE UR QL STRIP.AUTO: NEGATIVE
LYMPHOCYTES # BLD: 0.6 K/UL (ref 0.8–3.5)
LYMPHOCYTES NFR BLD: 19 % (ref 12–49)
MCH RBC QN AUTO: 26.9 PG (ref 26–34)
MCHC RBC AUTO-ENTMCNC: 32.1 G/DL (ref 30–36.5)
MCV RBC AUTO: 83.8 FL (ref 80–99)
MONOCYTES # BLD: 0.3 K/UL (ref 0–1)
MONOCYTES NFR BLD: 12 % (ref 5–13)
NEUTS SEG # BLD: 1.2 K/UL (ref 1.8–8)
NEUTS SEG NFR BLD: 43 % (ref 32–75)
NITRITE UR QL STRIP.AUTO: NEGATIVE
NRBC # BLD: 0 K/UL (ref 0–0.01)
NRBC BLD-RTO: 0 PER 100 WBC
P-R INTERVAL, ECG05: 182 MS
PH UR STRIP: 6 [PH] (ref 5–8)
PLATELET # BLD AUTO: 135 K/UL (ref 150–400)
PMV BLD AUTO: 10.7 FL (ref 8.9–12.9)
POTASSIUM SERPL-SCNC: 4.7 MMOL/L (ref 3.5–5.1)
PROT SERPL-MCNC: 8.1 G/DL (ref 6.4–8.2)
PROT UR STRIP-MCNC: ABNORMAL MG/DL
Q-T INTERVAL, ECG07: 372 MS
QRS DURATION, ECG06: 96 MS
QTC CALCULATION (BEZET), ECG08: 442 MS
RBC # BLD AUTO: 3.76 M/UL (ref 4.1–5.7)
RBC #/AREA URNS HPF: ABNORMAL /HPF (ref 0–5)
RBC MORPH BLD: ABNORMAL
RBC MORPH BLD: ABNORMAL
SERVICE CMNT-IMP: ABNORMAL
SODIUM SERPL-SCNC: 137 MMOL/L (ref 136–145)
SP GR UR REFRACTOMETRY: 1.01 (ref 1–1.03)
TROPONIN I SERPL-MCNC: <0.05 NG/ML
UA: UC IF INDICATED,UAUC: ABNORMAL
UROBILINOGEN UR QL STRIP.AUTO: 0.2 EU/DL (ref 0.2–1)
VENTRICULAR RATE, ECG03: 85 BPM
WBC # BLD AUTO: 2.9 K/UL (ref 4.1–11.1)
WBC URNS QL MICRO: ABNORMAL /HPF (ref 0–4)

## 2021-08-05 PROCEDURE — 81001 URINALYSIS AUTO W/SCOPE: CPT

## 2021-08-05 PROCEDURE — 80053 COMPREHEN METABOLIC PANEL: CPT

## 2021-08-05 PROCEDURE — 82962 GLUCOSE BLOOD TEST: CPT

## 2021-08-05 PROCEDURE — 36415 COLL VENOUS BLD VENIPUNCTURE: CPT

## 2021-08-05 PROCEDURE — 99284 EMERGENCY DEPT VISIT MOD MDM: CPT

## 2021-08-05 PROCEDURE — 93005 ELECTROCARDIOGRAM TRACING: CPT

## 2021-08-05 PROCEDURE — 70450 CT HEAD/BRAIN W/O DYE: CPT

## 2021-08-05 PROCEDURE — 84484 ASSAY OF TROPONIN QUANT: CPT

## 2021-08-05 PROCEDURE — 85025 COMPLETE CBC W/AUTO DIFF WBC: CPT
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
## 2021-08-05 NOTE — ED NOTES
Patient to ED accompanied by his fiance and sister w c/o memory loss since his discharge, patient denies any urinary symptoms. 1907- Bedside shift change report given to Yuli Montoya RN (oncoming nurse) by Diya Grijalva RN (offgoing nurse). Report included the following information SBAR, Kardex and Recent Results.

## 2021-08-06 NOTE — DISCHARGE INSTRUCTIONS
You were seen for intermittent episodes where you have memory loss issues, disorientation and strange dreams with hallucinations. Your lab work and urinalysis came back normal as did your CT head. After long discussion, we decided that you will have to follow-up with primary care doctor for further work-up and also for glucose management.

## 2021-08-06 NOTE — ED NOTES
Patient discharged, discharge instructions provided to patient and reviewed, all questions answered.  Patient ambulatory with walker on discharge

## 2021-08-09 ENCOUNTER — TELEPHONE (OUTPATIENT)
Dept: ENDOCRINOLOGY | Age: 76
End: 2021-08-09

## 2021-08-09 NOTE — TELEPHONE ENCOUNTER
Patient called to speak with Dr. Nguyen Adjutant. Patient is taking antibiotics and is wondering if antibiotics can cause blood sugar to bounce around? His blood sugar has been erratic and is causing him concern.     Please call him at:  [phone]

## 2021-08-09 NOTE — TELEPHONE ENCOUNTER
Please let him know that often antibiotics can cause a rise in blood sugars and if needed to keep his sugars under 200, he is welcome to increase his lantus by 1 unit while completing his course of antibiotics to help keep his sugars under 200.

## 2021-08-09 NOTE — TELEPHONE ENCOUNTER
Placed call to pt. Two pt identifiers confirmed. Message from Dr. Iliana Salas relayed to pt. Pt was also advised that if increasing Lantus by 1 unit does not help keep his blood glucose below 200 give office a call back. Pt verbalized understanding of information discussed w/ no further questions at this time.

## 2021-08-10 ENCOUNTER — TELEPHONE (OUTPATIENT)
Dept: ENDOCRINOLOGY | Age: 76
End: 2021-08-10

## 2021-08-10 RX ORDER — INSULIN GLARGINE 100 [IU]/ML
INJECTION, SOLUTION SUBCUTANEOUS
Qty: 15 ML | Refills: 2
Start: 2021-08-10 | End: 2021-09-27

## 2021-08-10 NOTE — TELEPHONE ENCOUNTER
----- Message from Meng Stratton sent at 8/10/2021  8:24 AM EDT -----  Regarding: Dr. Naya Varela/Telephone  General Message/Vendor Calls    Caller's first and last name: Linda English      Reason for call:trying to get a baseline for his diabetes      Callback required yes/no and why:yes      Best contact number(s):[phone]      Details to clarify the request:please call the patient to discuss health concerns      Meng Stratton

## 2021-08-10 NOTE — TELEPHONE ENCOUNTER
Called and spoke with pt. He is currently completing a course of abx and has 13 days to go. He was on lantus 20 units upon discharge in 7/21 but then went back to home dose of 16 units on his own until he spoke with us yesterday and then went up to 17 units. I advised him my goal is to keep his sugars between 100-200 as best as possible to heal his wound and want him to stay on 17 units at bedtime until he finishes his abx and then go back to 16 units at bedtime. He should keep his novolog the same. he voiced understanding of this plan.

## 2021-08-10 NOTE — TELEPHONE ENCOUNTER
Patient stated that he still need guidance for his sugar. He stated he received the message on yesterday about increasing his lantus by 1 units if needed however his sugar is not controlled. He stated that it is fluctuating and he does not know what to do.  He would like to know what his baseline should be?        12:37 am 108    5:35 am 67    6:00 am 147    6:08 am 180    6:29 am 234    6:55 am 200

## 2021-08-13 ENCOUNTER — TELEPHONE (OUTPATIENT)
Dept: FAMILY MEDICINE CLINIC | Age: 76
End: 2021-08-13

## 2021-08-13 NOTE — TELEPHONE ENCOUNTER
Weekly labs show leukopenia that is persisting. Past labs reviewed. Could be due to the cefepime. I called Luca Rowland and have spoken to Elma Crump and we will stop the cefepime for now. Plan was until 8/24/21. But hold cefepime for now. Continue daptomycin. Will get repeat labs next week and see. VV will reach out to patient regarding this.        Jerry Meraz MD  Infectious Diseases

## 2021-08-19 ENCOUNTER — TELEPHONE (OUTPATIENT)
Dept: ENDOCRINOLOGY | Age: 76
End: 2021-08-19

## 2021-08-19 NOTE — TELEPHONE ENCOUNTER
Patient stated he passed out on Saturday 8/14 and was taking to [facility] by ambulance. He stated that he was discharged on yesterday and they added Humalog quik pen for his diabetes 5 units tid with meals. He also stated that he is on Cipro q12 hours. He has not started the Humalog because he would like to speak with Dr. Marshall People to see what he think he should be taking. He stated that his sugars have been ranging from 's since yesterday.

## 2021-08-19 NOTE — TELEPHONE ENCOUNTER
Was released from the hospital a few days ago and says they changed his meds and dosage. Needs to discuss the changes.     [phone]    Thanks,  Renita Mendez

## 2021-08-19 NOTE — TELEPHONE ENCOUNTER
Called and spoke with pt. He states he passed out on 8/14/21 but couldn't tell me if this was from a low blood sugar or not. He states he was told that he could not leave the hospital unless he bought humalog so he ended up purchasing this but still has novolog at home. I'm unclear why [facility] made him buy humalog if he already had novolog at home and I asked him if had started taking the humalog and he said no, he had just been using the novolog and is still taking per the directions I had given him previously. He is still using lantus 17 units at night. He was given 13 tabs of cipro and has not yet taken a dose since getting home last night around 5pm.  I told him to complete this course and continue taking 17 units of lantus while on the cipro and then go back to 16 units at night when he finished the cipro. he voiced understanding of this plan.

## 2021-09-13 DIAGNOSIS — E78.5 HYPERLIPIDEMIA LDL GOAL <100: ICD-10-CM

## 2021-09-13 DIAGNOSIS — R03.0 ELEVATED BLOOD PRESSURE READING WITHOUT DIAGNOSIS OF HYPERTENSION: ICD-10-CM

## 2021-09-13 DIAGNOSIS — E10.65 UNCONTROLLED TYPE 1 DIABETES MELLITUS WITH HYPERGLYCEMIA (HCC): ICD-10-CM

## 2021-09-25 LAB
BUN SERPL-MCNC: 13 MG/DL (ref 8–27)
BUN/CREAT SERPL: 13 (ref 10–24)
CALCIUM SERPL-MCNC: 9.4 MG/DL (ref 8.6–10.2)
CHLORIDE SERPL-SCNC: 106 MMOL/L (ref 96–106)
CO2 SERPL-SCNC: 25 MMOL/L (ref 20–29)
CREAT SERPL-MCNC: 0.97 MG/DL (ref 0.76–1.27)
EST. AVERAGE GLUCOSE BLD GHB EST-MCNC: 192 MG/DL
GLUCOSE SERPL-MCNC: 185 MG/DL (ref 65–99)
HBA1C MFR BLD: 8.3 % (ref 4.8–5.6)
POTASSIUM SERPL-SCNC: 4.7 MMOL/L (ref 3.5–5.2)
SODIUM SERPL-SCNC: 144 MMOL/L (ref 134–144)

## 2021-09-27 ENCOUNTER — OFFICE VISIT (OUTPATIENT)
Dept: ENDOCRINOLOGY | Age: 76
End: 2021-09-27
Payer: COMMERCIAL

## 2021-09-27 ENCOUNTER — TELEPHONE (OUTPATIENT)
Dept: ENDOCRINOLOGY | Age: 76
End: 2021-09-27

## 2021-09-27 VITALS
HEART RATE: 81 BPM | DIASTOLIC BLOOD PRESSURE: 56 MMHG | SYSTOLIC BLOOD PRESSURE: 97 MMHG | BODY MASS INDEX: 23.49 KG/M2 | WEIGHT: 183 LBS | HEIGHT: 74 IN

## 2021-09-27 DIAGNOSIS — E10.65 UNCONTROLLED TYPE 1 DIABETES MELLITUS WITH HYPERGLYCEMIA (HCC): Primary | ICD-10-CM

## 2021-09-27 DIAGNOSIS — R03.0 ELEVATED BLOOD PRESSURE READING WITHOUT DIAGNOSIS OF HYPERTENSION: ICD-10-CM

## 2021-09-27 DIAGNOSIS — E78.5 HYPERLIPIDEMIA LDL GOAL <100: ICD-10-CM

## 2021-09-27 PROCEDURE — 1101F PT FALLS ASSESS-DOCD LE1/YR: CPT | Performed by: INTERNAL MEDICINE

## 2021-09-27 PROCEDURE — 3052F HG A1C>EQUAL 8.0%<EQUAL 9.0%: CPT | Performed by: INTERNAL MEDICINE

## 2021-09-27 PROCEDURE — G8536 NO DOC ELDER MAL SCRN: HCPCS | Performed by: INTERNAL MEDICINE

## 2021-09-27 PROCEDURE — G8432 DEP SCR NOT DOC, RNG: HCPCS | Performed by: INTERNAL MEDICINE

## 2021-09-27 PROCEDURE — G8420 CALC BMI NORM PARAMETERS: HCPCS | Performed by: INTERNAL MEDICINE

## 2021-09-27 PROCEDURE — 99214 OFFICE O/P EST MOD 30 MIN: CPT | Performed by: INTERNAL MEDICINE

## 2021-09-27 PROCEDURE — G8427 DOCREV CUR MEDS BY ELIG CLIN: HCPCS | Performed by: INTERNAL MEDICINE

## 2021-09-27 RX ORDER — COLLAGENASE SANTYL 250 [ARB'U]/G
OINTMENT TOPICAL
COMMUNITY
Start: 2021-08-10 | End: 2021-11-29

## 2021-09-27 RX ORDER — INSULIN GLARGINE 100 [IU]/ML
INJECTION, SOLUTION SUBCUTANEOUS
Qty: 15 ML | Refills: 2
Start: 2021-09-27 | End: 2021-10-12

## 2021-09-27 RX ORDER — SILODOSIN 8 MG/1
8 CAPSULE ORAL
COMMUNITY
Start: 2021-07-01

## 2021-09-27 RX ORDER — CLONIDINE HYDROCHLORIDE 0.1 MG/1
TABLET ORAL
COMMUNITY
Start: 2021-07-28 | End: 2021-09-27 | Stop reason: SINTOL

## 2021-09-27 NOTE — TELEPHONE ENCOUNTER
My Shaun White wanted me to let Dr. Shaun White know that he would like to start the [facility]. He said he has new insurance which I had the  put into his chart.   Tesha Cliffrod

## 2021-09-27 NOTE — PROGRESS NOTES
Chief Complaint   Patient presents with    Diabetes     History of Present Illness: Anuel Castro is a 67 y.o. male here for follow up of diabetes. Since he and I spoke on the phone on 8/19/21, he finished his course of cipro and has not been back to the hospital again. Thinks that possibly the episode of passing out may have been related to eating a shrimp salad but doesn't have an iodine allergy that he knows of but plans to avoid this in the future. However, it appears he was put on clonidine by Dr. Aruna Sainz for hot flashes and his BP was low today so I don't know if this was possibly contributing but given this hasn't helped his hot flashes, he agreed to stop this. He decreased his lantus from 17 back to 16 units at bedtime per my direction after finishing the cipro. He has some days where his sugars stay in the  range all day but other times when it can go in the 200s when he snack between meals or at bedtime as he is trying to get his weight back up. He has lost from 191 in 3/20 at his last in person visit to 183 today.     he has the following indications to begin treatment with Freestyle Delroy:  1) he has type 1 diabetes (E10.65) and is on an intensive insulin regimen with 4 injections per day  2) he tests his blood sugar 5 times per day and makes treatment decisions off his blood sugar readings and will do the same off freestyle delroy sensor readings  3) he will require frequent adjustments to his insulin injection doses based on his freestyle delroy sensor readings  4) he will benefit from therapeutic continuous glucose monitoring and I recommend that he begin this  5) he is seen in my office every 6 months        Current Outpatient Medications   Medication Sig    cloNIDine HCL (CATAPRES) 0.1 mg tablet TAKE 1 TABLET BY MOUTH TWICE DAILY AS NEEDED FOR HOT FLASHES    silodosin (RAPAFLO) 8 mg capsule     insulin glargine (Lantus Solostar U-100 Insulin) 100 unit/mL (3 mL) inpn INJECT 16 UNITS UNDER THE SKIN AT BEDTIME--Dose change 9/27/21--updated med list--did not send prescription to the pharmacy    insulin aspart U-100 (NovoLOG Flexpen U-100 Insulin) 100 unit/mL (3 mL) inpn Inject 1:8 for carbs for breakfast and 1:9 for lunch and dinner before 9pm and 1:10 after 9pm and 1:50 > 150 for correction during the day and 1:50 > 180 after 9pm.  MAX 35/D    ascorbic acid, vitamin C, (Vitamin C) 250 mg tablet Take  by mouth daily.  b complex vitamins (B COMPLEX 1) tablet Take 1 Tab by mouth daily.  aspirin 81 mg chewable tablet Take 81 mg by mouth daily.  therapeutic multivitamin (THERA) tablet Take 1 Tab by mouth daily.  cholecalciferol (Vitamin D) (2,000 UNITS /50 MCG) cap capsule Take 2 Tabs by mouth daily.  SantyL 250 unit/gram ointment     BD Luer-Rafa Syringe 3 mL 18 x 1 1/2\" syrg USE TO DRAW UP TESTOSTERONE UTD    Disposable Needles 22 gauge x 1 1/2\" ndle USE TO INJECT TESTOSTERONE UTD    butalbital-acetaminophen-caffeine (FIORICET, ESGIC) -40 mg per tablet Take 2 Tabs by mouth every eight (8) hours as needed for Headache. Refill at 1 month intervals    ONETOUCH ULTRA TEST strip TEST UP TO 5 TIMES DAILY   (INSULIN FLUCTUATING SUGARS). DX: E10.65     No current facility-administered medications for this visit. Allergies   Allergen Reactions    Lisinopril Cough    Novocain [Procaine] Palpitations    Tamsulosin Other (comments)     Higher blood sugars     Review of Systems: PER HPI    Physical Examination:  Blood pressure (!) 97/56, pulse 81, height 6' 2\" (1.88 m), weight 183 lb (83 kg).   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   Pulse DP: 1+ (weak)   Filament test: absent sensation Vibratory sensation: absent      Right Foot:   Visual Exam: amputation- transmetatarsal   Pulse DP: 1+ (weak)   Filament test: absent sensation    Vibratory sensation: absent        Data Reviewed:   Component      Latest Ref Rng & Units 9/24/2021 9/24/2021           8:33 AM  8:33 AM   Glucose      65 - 99 mg/dL 185 (H)    BUN      8 - 27 mg/dL 13    Creatinine      0.76 - 1.27 mg/dL 0.97    GFR est non-AA      >59 mL/min/1.73 75    GFR est AA      >59 mL/min/1.73 87    BUN/Creatinine ratio      10 - 24 13    Sodium      134 - 144 mmol/L 144    Potassium      3.5 - 5.2 mmol/L 4.7    Chloride      96 - 106 mmol/L 106    CO2      20 - 29 mmol/L 25    Calcium      8.6 - 10.2 mg/dL 9.4    Hemoglobin A1c, (calculated)      4.8 - 5.6 %  8.3 (H)   Estimated average glucose      mg/dL  192     Assessment/Plan:     1. Type I diabetes mellitus, uncontrolled: his most recent Hgb A1c was 8.3% in 9/21 down from 8.7% in 3/21 stable from 9/20 up from 7.9% in 3/20 down from 8.4% in 10/19 up from 8.1% in 5/19 up from 8% in 12/18 down from 8.1% in 7/18 down from 8.3% in 1/18 stable from 8/17 up from 7.9% in 3/17 down from 8.5% in 10/16 stable from 5/16 down from 9.5% in 12/15 up from 7.8% in 8/15 down from 7.9% in 4/15 up from 7.7% in 12/14 down from 8.3% in 7/14 down from 9.3% in 3/14 up from 8.5% in 11/13 up from 7.9% in 8/13 down from 8.4% 3/13 up from 7.4% in 12/12. I don't know how accurate this test will be because of his sickle cell trait so have been drawing both a Hgb A1c and a fructosamine. His last fructosamine was 371 in 12/14 up from 366 in 7/14 down form 428 in 3/14 up from 400 in 11/13 up from 338 in 8/13 down from 373 in 3/13 up from 345 in 12/12 and 330 in 6/12. His A1c is just above goal < 7.5-8% due to snacking but no trends to warrant changes.   - cont Lantus 16 units at bedtime  - cont novolog 1:8 for carbs for breakfast and cont 1:9 for lunch and dinner before 9pm and 1:10 after 9pm and 1:50 > 150 for correction during the day and 1:50 > 180 after 9pm  - check bs 5 times daily due to fluctuating sugars and risk of hypoglycemia  - foot exam done 9/21  - optho UTD 6/17  - microalbumin nl 3/21  - check A1c and cmp and microalbumin prior to next visit        2. Other and unspecified hyperlipidemia: Given DM, Goal LDL < 100, non-HDL < 130, and TG < 150. LDL 79 3/12, 78 12/12 and 84 3/13 and 75 in 11/13 and 70 in 7/14 and 63 in 4/15. Up to 99 in 12/15 and 100 in 5/16 so went up to a while tab daily and LDL down to 74 in 10/16 and 73 in 8/17 and 69 in 7/18 and 64 in 12/18, up to 96 in 5/19, down to 71 in 3/20 and in 3/21  - cont lipitor 10 mg daily  - check lipids prior to next visit      3. Elevated blood pressure (not hypertension): his BP was above goal < 140/90 in 12/14 and prior to that visit was at goal so monitor home readings and they are at goal off any meds. Up in 8/17 and in 1/18 but home readings are at goal and was on hypotensive side today on clonidine so will stop this. - stop clonidine  - monitor home readings off meds for now        Patient Instructions   1) Stop the clonidine as this does not seem to be helping your hot flashes and is causing your blood pressure to go too low. 2) Your Hemoglobin A1c (3 month test of blood sugar) is 8.3% down from 8.7% the past 2 times and my goal is to get this back under 8% so you are not far from goal and if you try to be more careful snacking in between meals and after dinner I think your next one will be at goal.  Stay on the same dose of lantus 16 and same scale for novolog. 3) BUN and creatinine are markers of kidney function. Your values are normal.        Follow-up and Dispositions    · Return in about 6 months (around 3/27/2022).                Copy sent to:  Dr. Claudean Clear Dr. Gareld Linden Dr. Nels Heinz Dr. Lorelle Morgan Dr. Sherri Spittle

## 2021-09-27 NOTE — TELEPHONE ENCOUNTER
I attempted to call . Darling Coates and reached his voice mail. I left a message asking him to call his insurance company and have them fax us any forms that may need to be filled out and gave him the fax number here as well. I also said to give me a call back with any further questions.   Lila Flores

## 2021-09-27 NOTE — PATIENT INSTRUCTIONS
1) Stop the clonidine as this does not seem to be helping your hot flashes and is causing your blood pressure to go too low. 2) Your Hemoglobin A1c (3 month test of blood sugar) is 8.3% down from 8.7% the past 2 times and my goal is to get this back under 8% so you are not far from goal and if you try to be more careful snacking in between meals and after dinner I think your next one will be at goal.  Stay on the same dose of lantus 16 and same scale for novolog. 3) BUN and creatinine are markers of kidney function.   Your values are normal.

## 2021-09-27 NOTE — TELEPHONE ENCOUNTER
If this is the case, have him call [address] [place] [address] Nolan Ling for his [address]. Call [phone] and ask them to fax me a form for your supplies and I'll complete this to see if we can get him approved.

## 2021-10-10 DIAGNOSIS — E10.65 UNCONTROLLED TYPE 1 DIABETES MELLITUS WITH HYPERGLYCEMIA (HCC): Primary | ICD-10-CM

## 2021-10-10 NOTE — TELEPHONE ENCOUNTER
I had received a fax from [address] [place] [address] to start patient on the freestyle anastasia 2 system and Ricarda faxed this back but then received a reply fax from [facility] asking for clarification of whether I am prescribing the sensors, reader, or both? Can you clarify where he is trying to get his prescription from and I can try to send the correct prescription electronically to the correct location? Thanks so much.

## 2021-10-11 NOTE — TELEPHONE ENCOUNTER
I attempted to call Mr. Goddard Serve and reached his voice mail. I left a message asking him to give me a call back.   Chris Eid

## 2021-10-12 RX ORDER — INSULIN GLARGINE 100 [IU]/ML
INJECTION, SOLUTION SUBCUTANEOUS
Qty: 15 ML | Refills: 3 | Status: SHIPPED | OUTPATIENT
Start: 2021-10-12 | End: 2021-11-11 | Stop reason: SDUPTHER

## 2021-10-14 RX ORDER — FLASH GLUCOSE SCANNING READER
EACH MISCELLANEOUS
Qty: 1 EACH | Refills: 0 | Status: SHIPPED | OUTPATIENT
Start: 2021-10-14 | End: 2022-09-30

## 2021-10-14 RX ORDER — FLASH GLUCOSE SENSOR
KIT MISCELLANEOUS
Qty: 6 KIT | Refills: 3 | Status: SHIPPED | OUTPATIENT
Start: 2021-10-14 | End: 2022-09-30

## 2021-10-14 NOTE — TELEPHONE ENCOUNTER
Pt notified of message per Dr. Manjula Carmen and voiced understanding of what was read to him.  He stated that he is using [employer]

## 2021-10-20 ENCOUNTER — TRANSCRIBE ORDER (OUTPATIENT)
Dept: REGISTRATION | Age: 76
End: 2021-10-20

## 2021-10-20 ENCOUNTER — HOSPITAL ENCOUNTER (OUTPATIENT)
Dept: GENERAL RADIOLOGY | Age: 76
Discharge: HOME | End: 2021-10-20
Payer: MEDICARE

## 2021-10-20 DIAGNOSIS — R06.02 SHORTNESS OF BREATH: ICD-10-CM

## 2021-10-20 DIAGNOSIS — R06.02 SHORTNESS OF BREATH: Primary | ICD-10-CM

## 2021-10-20 PROCEDURE — 71046 X-RAY EXAM CHEST 2 VIEWS: CPT

## 2021-11-11 RX ORDER — INSULIN GLARGINE 100 [IU]/ML
INJECTION, SOLUTION SUBCUTANEOUS
Qty: 15 ML | Refills: 3 | Status: SHIPPED | OUTPATIENT
Start: 2021-11-11 | End: 2022-03-03 | Stop reason: SDUPTHER

## 2021-11-11 NOTE — TELEPHONE ENCOUNTER
----- Message from Jaime Samuel sent at 11/11/2021 11:32 AM EST -----  Regarding: /Telephone      Medication Refill         Caller (if not patient): Self              Relationship of caller (if not patient): N/A              Best contact number(s): [phone]              Name of medication and dosage if known:   insulin glargine (Lantus Solostar U-100 Insulin) 100 unit/mL (3 mL)              Is patient out of this medication (yes/no): No              Pharmacy name: [id number] listed in chart? (yes/no):  No    Pharmacy phone number: [phone]              Details to clarify the request: PT NEEDS REFILL ON insulin glargine (Lantus Solostar U-100 Insulin) 100 unit/mL (3 mL)        Jaime Beau

## 2021-11-29 ENCOUNTER — OFFICE VISIT (OUTPATIENT)
Dept: CARDIOLOGY CLINIC | Age: 76
End: 2021-11-29
Payer: COMMERCIAL

## 2021-11-29 VITALS
HEIGHT: 74 IN | HEART RATE: 84 BPM | SYSTOLIC BLOOD PRESSURE: 160 MMHG | OXYGEN SATURATION: 99 % | BODY MASS INDEX: 23.91 KG/M2 | RESPIRATION RATE: 18 BRPM | WEIGHT: 186.3 LBS | DIASTOLIC BLOOD PRESSURE: 80 MMHG

## 2021-11-29 DIAGNOSIS — I10 BENIGN ESSENTIAL HTN: ICD-10-CM

## 2021-11-29 DIAGNOSIS — Z79.4 CONTROLLED TYPE 2 DIABETES MELLITUS WITH COMPLICATION, WITH LONG-TERM CURRENT USE OF INSULIN (HCC): ICD-10-CM

## 2021-11-29 DIAGNOSIS — E11.8 CONTROLLED TYPE 2 DIABETES MELLITUS WITH COMPLICATION, WITH LONG-TERM CURRENT USE OF INSULIN (HCC): ICD-10-CM

## 2021-11-29 DIAGNOSIS — R06.02 SOB (SHORTNESS OF BREATH): Primary | ICD-10-CM

## 2021-11-29 DIAGNOSIS — E78.2 MIXED HYPERLIPIDEMIA: ICD-10-CM

## 2021-11-29 PROCEDURE — 93000 ELECTROCARDIOGRAM COMPLETE: CPT | Performed by: INTERNAL MEDICINE

## 2021-11-29 PROCEDURE — 99204 OFFICE O/P NEW MOD 45 MIN: CPT | Performed by: INTERNAL MEDICINE

## 2021-11-29 PROCEDURE — G8510 SCR DEP NEG, NO PLAN REQD: HCPCS | Performed by: INTERNAL MEDICINE

## 2021-11-29 PROCEDURE — 1101F PT FALLS ASSESS-DOCD LE1/YR: CPT | Performed by: INTERNAL MEDICINE

## 2021-11-29 PROCEDURE — G8420 CALC BMI NORM PARAMETERS: HCPCS | Performed by: INTERNAL MEDICINE

## 2021-11-29 PROCEDURE — G8427 DOCREV CUR MEDS BY ELIG CLIN: HCPCS | Performed by: INTERNAL MEDICINE

## 2021-11-29 PROCEDURE — G8536 NO DOC ELDER MAL SCRN: HCPCS | Performed by: INTERNAL MEDICINE

## 2021-11-29 PROCEDURE — 3052F HG A1C>EQUAL 8.0%<EQUAL 9.0%: CPT | Performed by: INTERNAL MEDICINE

## 2021-11-29 RX ORDER — MIRABEGRON 50 MG/1
50 TABLET, FILM COATED, EXTENDED RELEASE ORAL DAILY
COMMUNITY
Start: 2021-11-26 | End: 2022-03-28

## 2021-11-29 NOTE — PROGRESS NOTES
1. Have you been to the ER, urgent care clinic since your last visit? Hospitalized since your last visit? Yes, 8/5/21, ED, Hallucination    2. Have you seen or consulted any other health care providers outside of the [address] [place] [address] since your last visit? Include any pap smears or colon screening.  No       Chief Complaint   Patient presents with    Shortness of Breath     C/O  SOB, Hot flashes

## 2021-11-29 NOTE — LETTER
11/29/2021    Patient: Courtney Walker   YOB: 1945   Date of Visit: 11/29/2021     Janene Canas MD  [place] [address]  Via Fax: [phone]    Dear Janene Canas MD,      Thank you for referring Mr. Courtney Walker to [facility] for evaluation. My notes for this consultation are attached. If you have questions, please do not hesitate to call me. I look forward to following your patient along with you.       Sincerely,    Brandy Salamanca MD

## 2022-01-13 ENCOUNTER — ANCILLARY PROCEDURE (OUTPATIENT)
Dept: CARDIOLOGY CLINIC | Age: 77
End: 2022-01-13

## 2022-01-13 ENCOUNTER — ANCILLARY PROCEDURE (OUTPATIENT)
Dept: CARDIOLOGY CLINIC | Age: 77
End: 2022-01-13
Payer: MEDICARE

## 2022-01-13 VITALS
DIASTOLIC BLOOD PRESSURE: 68 MMHG | WEIGHT: 180 LBS | HEIGHT: 74 IN | BODY MASS INDEX: 23.1 KG/M2 | SYSTOLIC BLOOD PRESSURE: 142 MMHG

## 2022-01-13 VITALS
BODY MASS INDEX: 23.87 KG/M2 | HEIGHT: 74 IN | SYSTOLIC BLOOD PRESSURE: 160 MMHG | DIASTOLIC BLOOD PRESSURE: 80 MMHG | WEIGHT: 186 LBS

## 2022-01-13 DIAGNOSIS — E11.8 CONTROLLED TYPE 2 DIABETES MELLITUS WITH COMPLICATION, WITH LONG-TERM CURRENT USE OF INSULIN (HCC): ICD-10-CM

## 2022-01-13 DIAGNOSIS — I10 BENIGN ESSENTIAL HTN: ICD-10-CM

## 2022-01-13 DIAGNOSIS — E78.2 MIXED HYPERLIPIDEMIA: ICD-10-CM

## 2022-01-13 DIAGNOSIS — Z79.4 CONTROLLED TYPE 2 DIABETES MELLITUS WITH COMPLICATION, WITH LONG-TERM CURRENT USE OF INSULIN (HCC): ICD-10-CM

## 2022-01-13 DIAGNOSIS — R06.02 SOB (SHORTNESS OF BREATH): ICD-10-CM

## 2022-01-13 LAB
NUC STRESS EJECTION FRACTION: 64 %
STRESS BASELINE DIAS BP: 68 MMHG
STRESS BASELINE HR: 82 BPM
STRESS BASELINE ST DEPRESSION: 0 MM
STRESS BASELINE SYS BP: 142 MMHG
STRESS PEAK DIAS BP: 68 MMHG
STRESS PEAK SYS BP: 142 MMHG
STRESS PERCENT HR ACHIEVED: 74 %
STRESS POST PEAK HR: 106 BPM
STRESS RATE PRESSURE PRODUCT: NORMAL BPM*MMHG
STRESS ST DEPRESSION: 0 MM
STRESS TARGET HR: 143 BPM

## 2022-01-13 PROCEDURE — 93015 CV STRESS TEST SUPVJ I&R: CPT | Performed by: INTERNAL MEDICINE

## 2022-01-13 PROCEDURE — 78452 HT MUSCLE IMAGE SPECT MULT: CPT | Performed by: INTERNAL MEDICINE

## 2022-01-13 PROCEDURE — 93306 TTE W/DOPPLER COMPLETE: CPT | Performed by: INTERNAL MEDICINE

## 2022-01-13 PROCEDURE — A9502 TC99M TETROFOSMIN: HCPCS | Performed by: INTERNAL MEDICINE

## 2022-01-14 DIAGNOSIS — R94.30 ABNORMAL CARDIOVASCULAR FUNCTION STUDY: Primary | ICD-10-CM

## 2022-01-14 DIAGNOSIS — R06.09 DOE (DYSPNEA ON EXERTION): ICD-10-CM

## 2022-01-14 LAB
ECHO AO ASC DIAM: 3.4 CM
ECHO AO ASCENDING AORTA INDEX: 1.61 CM/M2
ECHO AO ROOT DIAM: 3.3 CM
ECHO AO ROOT INDEX: 1.56 CM/M2
ECHO AR MAX VEL PISA: 4 M/S
ECHO AV AREA PEAK VELOCITY: 1.3 CM2
ECHO AV AREA VTI: 1.4 CM2
ECHO AV AREA/BSA PEAK VELOCITY: 0.6 CM2/M2
ECHO AV AREA/BSA VTI: 0.7 CM2/M2
ECHO AV MEAN GRADIENT: 12 MMHG
ECHO AV MEAN VELOCITY: 1.6 M/S
ECHO AV PEAK GRADIENT: 23 MMHG
ECHO AV PEAK VELOCITY: 2.4 M/S
ECHO AV REGURGITANT PHT: 632.8 MILLISECOND
ECHO AV VTI: 44.7 CM
ECHO LA DIAMETER INDEX: 1.52 CM/M2
ECHO LA DIAMETER: 3.2 CM
ECHO LA TO AORTIC ROOT RATIO: 0.97
ECHO LA VOL 2C: 79 ML (ref 18–58)
ECHO LA VOL 4C: 44 ML (ref 18–58)
ECHO LA VOL BP: 60 ML (ref 18–58)
ECHO LA VOL/BSA BIPLANE: 28 ML/M2 (ref 16–34)
ECHO LA VOLUME AREA LENGTH: 66 ML
ECHO LA VOLUME INDEX A2C: 37 ML/M2 (ref 16–34)
ECHO LA VOLUME INDEX A4C: 21 ML/M2 (ref 16–34)
ECHO LA VOLUME INDEX AREA LENGTH: 31 ML/M2 (ref 16–34)
ECHO LV E' LATERAL VELOCITY: 11 CM/S
ECHO LV E' SEPTAL VELOCITY: 9 CM/S
ECHO LV FRACTIONAL SHORTENING: 27 % (ref 28–44)
ECHO LV INTERNAL DIMENSION DIASTOLE INDEX: 2.46 CM/M2
ECHO LV INTERNAL DIMENSION DIASTOLIC: 5.2 CM (ref 4.2–5.9)
ECHO LV INTERNAL DIMENSION SYSTOLIC INDEX: 1.8 CM/M2
ECHO LV INTERNAL DIMENSION SYSTOLIC: 3.8 CM
ECHO LV IVSD: 1.3 CM (ref 0.6–1)
ECHO LV MASS 2D: 263.4 G (ref 88–224)
ECHO LV MASS INDEX 2D: 124.9 G/M2 (ref 49–115)
ECHO LV POSTERIOR WALL DIASTOLIC: 1.2 CM (ref 0.6–1)
ECHO LV RELATIVE WALL THICKNESS RATIO: 0.46
ECHO LVOT AREA: 3.1 CM2
ECHO LVOT AV VTI INDEX: 0.42
ECHO LVOT DIAM: 2 CM
ECHO LVOT MEAN GRADIENT: 2 MMHG
ECHO LVOT PEAK GRADIENT: 4 MMHG
ECHO LVOT PEAK GRADIENT: 4 MMHG
ECHO LVOT PEAK VELOCITY: 0.9 M/S
ECHO LVOT PEAK VELOCITY: 1 M/S
ECHO LVOT STROKE VOLUME INDEX: 27.8 ML/M2
ECHO LVOT SV: 58.7 ML
ECHO LVOT VTI: 18.7 CM
ECHO MV A VELOCITY: 1.28 M/S
ECHO MV E DECELERATION TIME (DT): 135.8 MS
ECHO MV E VELOCITY: 0.76 M/S
ECHO MV E/A RATIO: 0.59
ECHO MV E/E' LATERAL: 6.91
ECHO MV E/E' RATIO (AVERAGED): 7.68
ECHO MV E/E' SEPTAL: 8.44
ECHO RV TAPSE: 1.7 CM (ref 1.5–2)

## 2022-01-14 NOTE — PROGRESS NOTES
Please advise the patient I reviewed his stress test results. Please advise that in my opinion it is equivocal meaning on the borderline for whether there is a blood flow problem to the bottom of the heart versus just a shadow from what we called diaphragmatic attenuation which means difficulty with delayed particles coming from the heart to the camera through the diaphragm or breathing muscle. States that shortness of breath is also somewhat equivocal, I will recommend a tie breaker test called a coronary CT angiogram with coronary calcium scoring. I will order this test and it should be scheduled through radiology. Please provide the phone number for radiology scheduling and advised him to call them if he has not heard within a few days. If there are problems with insurance approval, we could proceed with the coronary calcium scoring only and then follow-up with me after which ever test is done.

## 2022-01-17 ENCOUNTER — TELEPHONE (OUTPATIENT)
Dept: CARDIOLOGY CLINIC | Age: 77
End: 2022-01-17

## 2022-01-17 NOTE — TELEPHONE ENCOUNTER
Patient informed he is thinking someone called from hospital for scheduling will check his # if cant find ask him to call back

## 2022-01-17 NOTE — TELEPHONE ENCOUNTER
----- Message from Spencer Francisco MD sent at 1/14/2022  4:51 PM EST -----  Please advise the patient I reviewed his stress test results. Please advise that in my opinion it is equivocal meaning on the borderline for whether there is a blood flow problem to the bottom of the heart versus just a shadow from what we called diaphragmatic attenuation which means difficulty with delayed particles coming from the heart to the camera through the diaphragm or breathing muscle. States that shortness of breath is also somewhat equivocal, I will recommend a tie breaker test called a coronary CT angiogram with coronary calcium scoring. I will order this test and it should be scheduled through radiology. Please provide the phone number for radiology scheduling and advised him to call them if he has not heard within a few days. If there are problems with insurance approval, we could proceed with the coronary calcium scoring only and then follow-up with me after which ever test is done.

## 2022-01-17 NOTE — TELEPHONE ENCOUNTER
----- Message from Roxanne Castaneda NP sent at 1/14/2022  4:39 PM EST -----  Echo showed normal pumping heart strength  He has mild aortic stenosis, which is a narrowing of his aortic valve.   This is mild right now, not of big concern but we will repeat echo in 1-2 years for surveillance, sooner if he has any cp sob or lightheadedness

## 2022-01-18 ENCOUNTER — TELEPHONE (OUTPATIENT)
Dept: CARDIOLOGY CLINIC | Age: 77
End: 2022-01-18

## 2022-01-18 NOTE — TELEPHONE ENCOUNTER
Please isabel pt he needs a bata blocker prescription, he is having a CT of his heart, they said he needs a bata blocker and has to take it 3 days prior to his appointment on 1/25/2022. Please call him at [phone].         Thanks Sulema

## 2022-01-20 ENCOUNTER — TELEPHONE (OUTPATIENT)
Dept: CARDIOLOGY CLINIC | Age: 77
End: 2022-01-20

## 2022-01-20 RX ORDER — METOPROLOL TARTRATE 50 MG/1
50 TABLET ORAL 2 TIMES DAILY
Qty: 6 TABLET | Refills: 0 | Status: SHIPPED
Start: 2022-01-20 | End: 2022-03-28

## 2022-01-20 NOTE — TELEPHONE ENCOUNTER
:30 am Please call Radha from Pre registration regarding pt metoprolol, he needs to take it the night before his procedure 1/25/2022. Please call pt at [phone].         Thanks Sulema

## 2022-01-21 NOTE — TELEPHONE ENCOUNTER
Please advise I am prescribing metoprolol 50 mg twice a day for his coronary CT angiogram.  This may be good for his high blood pressure in the long run. Please monitor his blood pressure after being on it for 3 days and we may consider continuing this medication if he does not have any side effects.

## 2022-01-25 ENCOUNTER — HOSPITAL ENCOUNTER (OUTPATIENT)
Dept: CT IMAGING | Age: 77
Discharge: HOME | End: 2022-01-25
Attending: INTERNAL MEDICINE
Payer: MEDICARE

## 2022-01-25 VITALS — RESPIRATION RATE: 20 BRPM | SYSTOLIC BLOOD PRESSURE: 142 MMHG | HEART RATE: 65 BPM | DIASTOLIC BLOOD PRESSURE: 72 MMHG

## 2022-01-25 DIAGNOSIS — R94.30 ABNORMAL CARDIOVASCULAR FUNCTION STUDY: ICD-10-CM

## 2022-01-25 DIAGNOSIS — R06.09 DOE (DYSPNEA ON EXERTION): ICD-10-CM

## 2022-01-25 LAB — CREAT BLD-MCNC: 0.9 MG/DL (ref 0.6–1.3)

## 2022-01-25 PROCEDURE — 75574 CT ANGIO HRT W/3D IMAGE: CPT

## 2022-01-25 PROCEDURE — 82565 ASSAY OF CREATININE: CPT

## 2022-01-25 PROCEDURE — 74011250637 HC RX REV CODE- 250/637: Performed by: INTERNAL MEDICINE

## 2022-01-25 PROCEDURE — 74011000636 HC RX REV CODE- 636: Performed by: INTERNAL MEDICINE

## 2022-01-25 RX ORDER — NITROGLYCERIN 0.4 MG/1
0.4 TABLET SUBLINGUAL AS NEEDED
Status: DISCONTINUED | OUTPATIENT
Start: 2022-01-25 | End: 2022-01-25

## 2022-01-25 RX ORDER — METOPROLOL TARTRATE 5 MG/5ML
20 INJECTION INTRAVENOUS ONCE
Status: ACTIVE | OUTPATIENT
Start: 2022-01-25 | End: 2022-01-25

## 2022-01-25 RX ORDER — METOPROLOL TARTRATE 5 MG/5ML
INJECTION INTRAVENOUS
Status: DISCONTINUED
Start: 2022-01-25 | End: 2022-01-25 | Stop reason: WASHOUT

## 2022-01-25 RX ADMIN — IOPAMIDOL 100 ML: 755 INJECTION, SOLUTION INTRAVENOUS at 10:12

## 2022-01-25 RX ADMIN — NITROGLYCERIN 0.8 MG: 0.4 TABLET SUBLINGUAL at 10:22

## 2022-01-25 NOTE — PROGRESS NOTES
1020 - called into CT room via [employer], for pt. To get Nitro 0.8mg for his Cardiac CTA-see vitals on flowsheet.

## 2022-01-26 ENCOUNTER — TELEPHONE (OUTPATIENT)
Dept: CARDIOLOGY CLINIC | Age: 77
End: 2022-01-26

## 2022-01-26 RX ORDER — ATORVASTATIN CALCIUM 10 MG/1
10 TABLET, FILM COATED ORAL DAILY
COMMUNITY
End: 2022-03-03 | Stop reason: SDUPTHER

## 2022-01-26 NOTE — TELEPHONE ENCOUNTER
----- Message from nAgi Enamorado MD sent at 1/26/2022  1:17 PM EST -----  Please advise the patient that his CT scan confirms he does have some cholesterol plaque in the arteries of his heart, but that there is no evidence of flow-limiting blockage in the main branches of the major coronary arteries. Furthermore, there is no suspicious narrowing in the area that was questioning the blood flow to the heart on the nuclear stress test.  Therefore he needs aspirin, cholesterol medicine, and to continue his regular exercise, and follow-up with me in about 3 months to reassess symptoms. If any chest pain or progressive shortness of breath in the meantime he can come back sooner. Our last office note stated that he had a good cholesterol on 10 mg of Lipitor but I do not see that on his list now. Please clarify and tell him to discuss with his PCP that he definitely needs cholesterol medicine if he is not on it now.

## 2022-01-26 NOTE — PROGRESS NOTES
Please advise the patient that his CT scan confirms he does have some cholesterol plaque in the arteries of his heart, but that there is no evidence of flow-limiting blockage in the main branches of the major coronary arteries. Furthermore, there is no suspicious narrowing in the area that was questioning the blood flow to the heart on the nuclear stress test.  Therefore he needs aspirin, cholesterol medicine, and to continue his regular exercise, and follow-up with me in about 3 months to reassess symptoms. If any chest pain or progressive shortness of breath in the meantime he can come back sooner. Our last office note stated that he had a good cholesterol on 10 mg of Lipitor but I do not see that on his list now. Please clarify and tell him to discuss with his PCP that he definitely needs cholesterol medicine if he is not on it now.

## 2022-02-08 ENCOUNTER — TRANSCRIBE ORDER (OUTPATIENT)
Dept: SCHEDULING | Age: 77
End: 2022-02-08

## 2022-02-08 DIAGNOSIS — R06.02 SHORTNESS OF BREATH: Primary | ICD-10-CM

## 2022-02-17 ENCOUNTER — HOSPITAL ENCOUNTER (OUTPATIENT)
Dept: CT IMAGING | Age: 77
Discharge: HOME | End: 2022-02-17
Attending: FAMILY MEDICINE
Payer: MEDICARE

## 2022-02-17 DIAGNOSIS — R06.02 SHORTNESS OF BREATH: ICD-10-CM

## 2022-02-17 PROCEDURE — 74011000636 HC RX REV CODE- 636: Performed by: FAMILY MEDICINE

## 2022-02-17 PROCEDURE — 71260 CT THORAX DX C+: CPT

## 2022-02-17 RX ADMIN — IOPAMIDOL 100 ML: 755 INJECTION, SOLUTION INTRAVENOUS at 15:50

## 2022-03-03 ENCOUNTER — OFFICE VISIT (OUTPATIENT)
Dept: CARDIOLOGY CLINIC | Age: 77
End: 2022-03-03
Payer: MEDICARE

## 2022-03-03 VITALS
HEIGHT: 74 IN | BODY MASS INDEX: 23.36 KG/M2 | HEART RATE: 80 BPM | DIASTOLIC BLOOD PRESSURE: 80 MMHG | RESPIRATION RATE: 16 BRPM | OXYGEN SATURATION: 99 % | WEIGHT: 182 LBS | SYSTOLIC BLOOD PRESSURE: 120 MMHG

## 2022-03-03 DIAGNOSIS — I10 BENIGN ESSENTIAL HTN: ICD-10-CM

## 2022-03-03 DIAGNOSIS — Z79.4 CONTROLLED TYPE 2 DIABETES MELLITUS WITH COMPLICATION, WITH LONG-TERM CURRENT USE OF INSULIN (HCC): ICD-10-CM

## 2022-03-03 DIAGNOSIS — R06.02 SOB (SHORTNESS OF BREATH): ICD-10-CM

## 2022-03-03 DIAGNOSIS — I25.10 CORONARY ARTERY DISEASE INVOLVING NATIVE CORONARY ARTERY OF NATIVE HEART WITHOUT ANGINA PECTORIS: Primary | ICD-10-CM

## 2022-03-03 DIAGNOSIS — E11.8 CONTROLLED TYPE 2 DIABETES MELLITUS WITH COMPLICATION, WITH LONG-TERM CURRENT USE OF INSULIN (HCC): ICD-10-CM

## 2022-03-03 DIAGNOSIS — E78.2 MIXED HYPERLIPIDEMIA: ICD-10-CM

## 2022-03-03 PROCEDURE — G8427 DOCREV CUR MEDS BY ELIG CLIN: HCPCS | Performed by: INTERNAL MEDICINE

## 2022-03-03 PROCEDURE — G8754 DIAS BP LESS 90: HCPCS | Performed by: INTERNAL MEDICINE

## 2022-03-03 PROCEDURE — 99214 OFFICE O/P EST MOD 30 MIN: CPT | Performed by: INTERNAL MEDICINE

## 2022-03-03 PROCEDURE — 1101F PT FALLS ASSESS-DOCD LE1/YR: CPT | Performed by: INTERNAL MEDICINE

## 2022-03-03 PROCEDURE — G8510 SCR DEP NEG, NO PLAN REQD: HCPCS | Performed by: INTERNAL MEDICINE

## 2022-03-03 PROCEDURE — G8752 SYS BP LESS 140: HCPCS | Performed by: INTERNAL MEDICINE

## 2022-03-03 PROCEDURE — G8536 NO DOC ELDER MAL SCRN: HCPCS | Performed by: INTERNAL MEDICINE

## 2022-03-03 PROCEDURE — G8420 CALC BMI NORM PARAMETERS: HCPCS | Performed by: INTERNAL MEDICINE

## 2022-03-03 RX ORDER — INSULIN ASPART 100 [IU]/ML
INJECTION, SOLUTION INTRAVENOUS; SUBCUTANEOUS
Qty: 30 ML | Refills: 3 | Status: SHIPPED | OUTPATIENT
Start: 2022-03-03

## 2022-03-03 RX ORDER — ATORVASTATIN CALCIUM 10 MG/1
10 TABLET, FILM COATED ORAL DAILY
Qty: 90 TABLET | Refills: 3 | Status: SHIPPED | OUTPATIENT
Start: 2022-03-03

## 2022-03-03 RX ORDER — BLOOD-GLUCOSE METER
EACH MISCELLANEOUS
Qty: 1 EACH | Refills: 0 | Status: SHIPPED | OUTPATIENT
Start: 2022-03-03

## 2022-03-03 RX ORDER — LANCETS 33 GAUGE
EACH MISCELLANEOUS
Qty: 500 LANCET | Refills: 3 | Status: SHIPPED | OUTPATIENT
Start: 2022-03-03

## 2022-03-03 RX ORDER — ISOPROPYL ALCOHOL 70 ML/100ML
SWAB TOPICAL
Qty: 500 PAD | Refills: 3 | Status: SHIPPED | OUTPATIENT
Start: 2022-03-03

## 2022-03-03 RX ORDER — MEGESTROL ACETATE 20 MG/1
20 TABLET ORAL
COMMUNITY
Start: 2022-01-05

## 2022-03-03 RX ORDER — INSULIN GLARGINE 100 [IU]/ML
INJECTION, SOLUTION SUBCUTANEOUS
Qty: 15 ML | Refills: 3 | Status: SHIPPED | OUTPATIENT
Start: 2022-03-03 | End: 2022-03-28

## 2022-03-03 RX ORDER — BLOOD-GLUCOSE METER
EACH MISCELLANEOUS
Qty: 1 EACH | Refills: 1 | Status: SHIPPED | OUTPATIENT
Start: 2022-03-03

## 2022-03-03 NOTE — LETTER
3/3/2022    Patient: Yoanna Williamson   YOB: 1945   Date of Visit: 3/3/2022     Heather Alvarez MD  [place] [address]  Via Fax: [phone]    Dear Heahter Alvarez MD,      Thank you for referring Mr. Yoanna Williamson to [facility] for evaluation. My notes for this consultation are attached. If you have questions, please do not hesitate to call me. I look forward to following your patient along with you.       Sincerely,    Francisco Juan MD
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
## 2022-03-03 NOTE — PROGRESS NOTES
Chief Complaint   Patient presents with    Follow-up    Results     Chest CT, echocardiogram & Nuclear stress    Cholesterol Problem       1. Have you been to the ER, urgent care clinic since your last visit? No Hospitalized since your last visit? No    2. Have you seen or consulted any other health care providers outside of the [address] [place] [address] since your last visit? Yes PCP & Pulmonary  Include any pap smears or colon screening. No    Patient C/O SOB.  Patient states has been Covid vaccinated ([employer]) no card

## 2022-03-12 DIAGNOSIS — E10.65 UNCONTROLLED TYPE 1 DIABETES MELLITUS WITH HYPERGLYCEMIA (HCC): ICD-10-CM

## 2022-03-12 DIAGNOSIS — E78.5 HYPERLIPIDEMIA LDL GOAL <100: ICD-10-CM

## 2022-03-12 DIAGNOSIS — R03.0 ELEVATED BLOOD PRESSURE READING WITHOUT DIAGNOSIS OF HYPERTENSION: ICD-10-CM

## 2022-03-18 PROBLEM — E55.9 VITAMIN D DEFICIENCY: Status: ACTIVE | Noted: 2020-07-06

## 2022-03-18 PROBLEM — I67.1 CEREBRAL ANEURYSM WITHOUT RUPTURE: Status: ACTIVE | Noted: 2020-07-22

## 2022-03-18 PROBLEM — G60.8 IDIOPATHIC SMALL AND LARGE FIBER SENSORY NEUROPATHY: Status: ACTIVE | Noted: 2020-07-06

## 2022-03-18 PROBLEM — I67.89 CEREBRAL MICROVASCULAR DISEASE: Status: ACTIVE | Noted: 2020-07-06

## 2022-03-18 PROBLEM — E53.8 B12 DEFICIENCY: Status: ACTIVE | Noted: 2020-07-06

## 2022-03-18 PROBLEM — E11.42 DIABETIC PERIPHERAL NEUROPATHY ASSOCIATED WITH TYPE 2 DIABETES MELLITUS (HCC): Status: ACTIVE | Noted: 2020-07-06

## 2022-03-19 PROBLEM — T14.8XXA WOUND INFECTION: Status: ACTIVE | Noted: 2021-07-09

## 2022-03-19 PROBLEM — G56.01 CARPAL TUNNEL SYNDROME OF RIGHT WRIST: Status: ACTIVE | Noted: 2020-07-06

## 2022-03-19 PROBLEM — I65.23 BILATERAL CAROTID ARTERY STENOSIS: Status: ACTIVE | Noted: 2020-07-06

## 2022-03-19 PROBLEM — R76.8 POSITIVE ANA (ANTINUCLEAR ANTIBODY): Status: ACTIVE | Noted: 2020-07-22

## 2022-03-19 PROBLEM — G56.21 ULNAR NEUROPATHY AT ELBOW, RIGHT: Status: ACTIVE | Noted: 2020-07-06

## 2022-03-19 PROBLEM — L08.9 WOUND INFECTION: Status: ACTIVE | Noted: 2021-07-09

## 2022-03-19 PROBLEM — E03.4 HYPOTHYROIDISM DUE TO ACQUIRED ATROPHY OF THYROID: Status: ACTIVE | Noted: 2020-07-06

## 2022-03-19 PROBLEM — M47.22 CERVICAL RADICULOPATHY DUE TO DEGENERATIVE JOINT DISEASE OF SPINE: Status: ACTIVE | Noted: 2020-07-06

## 2022-03-19 PROBLEM — G44.209 TENSION VASCULAR HEADACHE: Status: ACTIVE | Noted: 2020-07-06

## 2022-03-19 PROBLEM — G44.019 EPISODIC CLUSTER HEADACHE, NOT INTRACTABLE: Status: ACTIVE | Noted: 2020-07-06

## 2022-03-19 PROBLEM — G25.0 BENIGN ESSENTIAL TREMOR SYNDROME: Status: ACTIVE | Noted: 2020-07-06

## 2022-03-20 PROBLEM — G56.22 ULNAR NEUROPATHY AT ELBOW OF LEFT UPPER EXTREMITY: Status: ACTIVE | Noted: 2020-07-06

## 2022-03-23 LAB
ALBUMIN SERPL-MCNC: 3.7 G/DL (ref 3.7–4.7)
ALBUMIN/CREAT UR: 339 MG/G CREAT (ref 0–29)
ALBUMIN/GLOB SERPL: 1 {RATIO} (ref 1.2–2.2)
ALP SERPL-CCNC: 72 IU/L (ref 44–121)
ALT SERPL-CCNC: 15 IU/L (ref 0–44)
AST SERPL-CCNC: 24 IU/L (ref 0–40)
BILIRUB SERPL-MCNC: 0.2 MG/DL (ref 0–1.2)
BUN SERPL-MCNC: 12 MG/DL (ref 8–27)
BUN/CREAT SERPL: 10 (ref 10–24)
CALCIUM SERPL-MCNC: 9.2 MG/DL (ref 8.6–10.2)
CHLORIDE SERPL-SCNC: 103 MMOL/L (ref 96–106)
CHOLEST SERPL-MCNC: 108 MG/DL (ref 100–199)
CO2 SERPL-SCNC: 20 MMOL/L (ref 20–29)
CREAT SERPL-MCNC: 1.16 MG/DL (ref 0.76–1.27)
CREAT UR-MCNC: 84.8 MG/DL
EGFR: 64 ML/MIN/1.73
EST. AVERAGE GLUCOSE BLD GHB EST-MCNC: 217 MG/DL
GLOBULIN SER CALC-MCNC: 3.8 G/DL (ref 1.5–4.5)
GLUCOSE SERPL-MCNC: 45 MG/DL (ref 65–99)
HBA1C MFR BLD: 9.2 % (ref 4.8–5.6)
HDLC SERPL-MCNC: 43 MG/DL
IMP & REVIEW OF LAB RESULTS: NORMAL
LDLC SERPL CALC-MCNC: 54 MG/DL (ref 0–99)
MICROALBUMIN UR-MCNC: 287.6 UG/ML
POTASSIUM SERPL-SCNC: 4.3 MMOL/L (ref 3.5–5.2)
PROT SERPL-MCNC: 7.5 G/DL (ref 6–8.5)
SODIUM SERPL-SCNC: 139 MMOL/L (ref 134–144)
TRIGL SERPL-MCNC: 40 MG/DL (ref 0–149)
VLDLC SERPL CALC-MCNC: 11 MG/DL (ref 5–40)

## 2022-03-28 ENCOUNTER — OFFICE VISIT (OUTPATIENT)
Dept: ENDOCRINOLOGY | Age: 77
End: 2022-03-28
Payer: MEDICARE

## 2022-03-28 VITALS
DIASTOLIC BLOOD PRESSURE: 68 MMHG | SYSTOLIC BLOOD PRESSURE: 157 MMHG | HEIGHT: 74 IN | HEART RATE: 100 BPM | BODY MASS INDEX: 22.56 KG/M2 | WEIGHT: 175.8 LBS

## 2022-03-28 DIAGNOSIS — E78.5 HYPERLIPIDEMIA LDL GOAL <100: ICD-10-CM

## 2022-03-28 DIAGNOSIS — E10.65 UNCONTROLLED TYPE 1 DIABETES MELLITUS WITH HYPERGLYCEMIA (HCC): Primary | ICD-10-CM

## 2022-03-28 DIAGNOSIS — R03.0 ELEVATED BLOOD PRESSURE READING WITHOUT DIAGNOSIS OF HYPERTENSION: ICD-10-CM

## 2022-03-28 PROCEDURE — G8427 DOCREV CUR MEDS BY ELIG CLIN: HCPCS | Performed by: INTERNAL MEDICINE

## 2022-03-28 PROCEDURE — 3046F HEMOGLOBIN A1C LEVEL >9.0%: CPT | Performed by: INTERNAL MEDICINE

## 2022-03-28 PROCEDURE — G8753 SYS BP > OR = 140: HCPCS | Performed by: INTERNAL MEDICINE

## 2022-03-28 PROCEDURE — 99214 OFFICE O/P EST MOD 30 MIN: CPT | Performed by: INTERNAL MEDICINE

## 2022-03-28 PROCEDURE — G8420 CALC BMI NORM PARAMETERS: HCPCS | Performed by: INTERNAL MEDICINE

## 2022-03-28 PROCEDURE — G8432 DEP SCR NOT DOC, RNG: HCPCS | Performed by: INTERNAL MEDICINE

## 2022-03-28 PROCEDURE — G8754 DIAS BP LESS 90: HCPCS | Performed by: INTERNAL MEDICINE

## 2022-03-28 PROCEDURE — G8536 NO DOC ELDER MAL SCRN: HCPCS | Performed by: INTERNAL MEDICINE

## 2022-03-28 PROCEDURE — 1101F PT FALLS ASSESS-DOCD LE1/YR: CPT | Performed by: INTERNAL MEDICINE

## 2022-03-28 RX ORDER — INSULIN GLARGINE 100 [IU]/ML
INJECTION, SOLUTION SUBCUTANEOUS
Qty: 15 ML | Refills: 3
Start: 2022-03-28 | End: 2022-09-30

## 2022-03-28 NOTE — PROGRESS NOTES
Chief Complaint   Patient presents with    Diabetes     pcp and pharmacy confirmed     History of Present Illness: Kay Mendez is a 65 y.o. male here for follow up of diabetes. Weight down 8 lbs since last visit in 9/21. Has had 24 doctors visits since last visit with me and has been dealing with a lot of UTIs. Has received 2 courses of cipro from the PA at his PCP's office. Was most recently given uroxatral 10 mg daily and bactrim by the PA again at his PCP's office 2 weeks ago and was told to f/u with his urologist, Dr. Kaitlyn Fitzpatrick, and has an appt with him on 4/20/22. He decided not to take these 2 tabs until he speaks with Dr. Kaitlyn Fitzpatrick but I recommended he start them to treat any residual UTI. He did get the freestyle delroy but hasn't started this yet as he was having more foggy thinking but may try this in the spring. Review of his readings checked 5 times daily over the past 30 days shows many readings in the 200-300 range and not having any lows. Was also started on megace by Dr. Kaitlyn Fitzpatrick to help with hot flashes and these have helped and unclear if this could be driving up his sugars too. he has the following indications to begin treatment with Freestyle Delroy:  1) he has type 1 diabetes (E10.65) and is on an intensive insulin regimen with 4 injections per day  2) he tests his blood sugar 5 times per day and makes treatment decisions off his blood sugar readings and will do the same off freestyle delroy sensor readings  3) he will require frequent adjustments to his insulin injection doses based on his freestyle delroy sensor readings  4) he will benefit from therapeutic continuous glucose monitoring and I recommend that he begin this  5) he is seen in my office every 6 months       Current Outpatient Medications   Medication Sig    megestroL (MEGACE) 20 mg tablet Take 20 mg by mouth three (3) times daily as needed.     insulin glargine (Lantus Solostar U-100 Insulin) 100 unit/mL (3 mL) inpn INJECT 16 UNITS UNDER THE SKIN AT BEDTIME    insulin aspart U-100 (NovoLOG Flexpen U-100 Insulin) 100 unit/mL (3 mL) inpn Inject 1:8 for carbs for breakfast and 1:9 for lunch and dinner before 9pm and 1:10 after 9pm and 1:50 > 150 for correction during the day and 1:50 > 180 after 9pm.  MAX 35/D    atorvastatin (Lipitor) 10 mg tablet Take 1 Tablet by mouth daily.  alcohol swabs (BD Single Use Swabs Regular) padm TEST UP TO 5 TIMES DAILY (INSULIN FLUCTUATING SUGARS). DX: E10.65    silodosin (RAPAFLO) 8 mg capsule     BD Luer-Rafa Syringe 3 mL 18 x 1 1/2\" syrg USE TO DRAW UP TESTOSTERONE UTD    Disposable Needles 22 gauge x 1 1/2\" ndle USE TO INJECT TESTOSTERONE UTD    b complex vitamins (B COMPLEX 1) tablet Take 1 Tab by mouth daily.  ONETOUCH ULTRA TEST strip TEST UP TO 5 TIMES DAILY   (INSULIN FLUCTUATING SUGARS). DX: E10.65    therapeutic multivitamin (THERA) tablet Take 1 Tab by mouth daily.  cholecalciferol (Vitamin D) (2,000 UNITS /50 MCG) cap capsule Take 3 Tablets by mouth daily.  True Metrix Glucose Meter misc TEST UP TO 5 TIMES DAILY (INSULIN FLUCTUATING SUGARS). DX: E10.65 (Patient not taking: Reported on 3/28/2022)    TRUEplus Lancets 33 gauge misc TEST UP TO 5 TIMES DAILY (INSULIN FLUCTUATING SUGARS). DX: E10.65 (Patient not taking: Reported on 3/28/2022)    True Metrix Level 1 soln Use as directed (Patient not taking: Reported on 3/28/2022)    FreeStyle Delroy 2 [place] misc Use as directed with freestyle delroy 2 sensors (Patient not taking: Reported on 3/28/2022)    FreeStyle Delroy 2 Sensor kit Use as directed to test blood sugars at least 4 times daily. Replace every 14 days (Patient not taking: Reported on 3/28/2022)    aspirin 81 mg chewable tablet Take 81 mg by mouth daily. No current facility-administered medications for this visit.      Allergies   Allergen Reactions    Lisinopril Cough    Novocain [Procaine] Palpitations    Tamsulosin Other (comments)     Higher blood sugars Review of Systems: PER HPI    Physical Examination:  Blood pressure (!) 157/68, pulse 100, height 6' 2\" (1.88 m), weight 175 lb 12.8 oz (79.7 kg). - General: pleasant, no distress, good eye contact   - Neck: no carotid bruits  - Cardiovascular: regular, normal rate, nl s1 and s2, no m/r/g,   - Respiratory: clear bilaterally  - Integumentary: no edema,   - Psychiatric: normal mood and affect    Data Reviewed:   Component      Latest Ref Rng & Units 3/22/2022 3/22/2022 3/22/2022 3/22/2022           8:57 AM  8:57 AM  8:57 AM  8:57 AM   Glucose      65 - 99 mg/dL  45 (L)     BUN      8 - 27 mg/dL  12     Creatinine      0.76 - 1.27 mg/dL  1.16     eGFR      >59 mL/min/1.73  64     BUN/Creatinine ratio      10 - 24  10     Sodium      134 - 144 mmol/L  139     Potassium      3.5 - 5.2 mmol/L  4.3     Chloride      96 - 106 mmol/L  103     CO2      20 - 29 mmol/L  20     Calcium      8.6 - 10.2 mg/dL  9.2     Protein, total      6.0 - 8.5 g/dL  7.5     Albumin      3.7 - 4.7 g/dL  3.7     GLOBULIN, TOTAL      1.5 - 4.5 g/dL  3.8     A-G Ratio      1.2 - 2.2  1.0 (L)     Bilirubin, total      0.0 - 1.2 mg/dL  0.2     Alk. phosphatase      44 - 121 IU/L  72     AST      0 - 40 IU/L  24     ALT      0 - 44 IU/L  15     Cholesterol, total      100 - 199 mg/dL 108      Triglyceride      0 - 149 mg/dL 40      HDL Cholesterol      >39 mg/dL 43      VLDL, calculated      5 - 40 mg/dL 11      LDL, calculated      0 - 99 mg/dL 54      Creatinine, urine      Not Estab. mg/dL    84.8   Microalbumin, urine      Not Estab. ug/mL    287.6   Microalbumin/Creat. Ratio      0 - 29 mg/g creat    339 (H)   Hemoglobin A1c, (calculated)      4.8 - 5.6 %   9.2 (H)    Estimated average glucose      mg/dL   217      Assessment/Plan:     1.  Type I diabetes mellitus, uncontrolled: his most recent Hgb A1c was 9.2% in 3/22 up from 8.3% in 9/21 down from 8.7% in 3/21 stable from 9/20 up from 7.9% in 3/20 down from 8.4% in 10/19 up from 8.1% in 5/19 up from 8% in 12/18 down from 8.1% in 7/18 down from 8.3% in 1/18 stable from 8/17 up from 7.9% in 3/17 down from 8.5% in 10/16 stable from 5/16 down from 9.5% in 12/15 up from 7.8% in 8/15 down from 7.9% in 4/15 up from 7.7% in 12/14 down from 8.3% in 7/14 down from 9.3% in 3/14 up from 8.5% in 11/13 up from 7.9% in 8/13 down from 8.4% 3/13 up from 7.4% in 12/12. I don't know how accurate this test will be because of his sickle cell trait so have been drawing both a Hgb A1c and a fructosamine. His last fructosamine was 371 in 12/14 up from 366 in 7/14 down form 428 in 3/14 up from 400 in 11/13 up from 338 in 8/13 down from 373 in 3/13 up from 345 in 12/12 and 330 in 6/12. His A1c is above goal < 7.5-8% due to recurrent UTIs and possibly the megace so increased his lantus  - increase Lantus to 18 units at bedtime  - cont novolog 1:8 for carbs for breakfast and cont 1:9 for lunch and dinner before 9pm and 1:10 after 9pm and 1:50 > 150 for correction during the day and 1:50 > 180 after 9pm  - check bs 5 times daily due to fluctuating sugars and risk of hypoglycemia  - foot exam done 9/21  - optho UTD 6/17  - microalbumin nl 3/21, up to 339 in 3/22  - check A1c and bmp and microalbumin prior to next visit        2. Other and unspecified hyperlipidemia: Given DM, Goal LDL < 100, non-HDL < 130, and TG < 150. LDL 79 3/12, 78 12/12 and 84 3/13 and 75 in 11/13 and 70 in 7/14 and 63 in 4/15. Up to 99 in 12/15 and 100 in 5/16 so went up to a while tab daily and LDL down to 74 in 10/16 and 73 in 8/17 and 69 in 7/18 and 64 in 12/18, up to 96 in 5/19, down to 71 in 3/20 and in 3/21 and 54 in 3/22  - cont lipitor 10 mg daily  - check lipids prior to next visit        3. Elevated blood pressure (not hypertension): his BP was above goal < 140/90 in 12/14 and prior to that visit was at goal so monitor home readings and they are at goal off any meds.   Up in 8/17 and in 1/18 but home readings are at goal  - monitor home readings off meds for now        Patient Instructions   1) Increase your lantus to 18 units at bedtime starting tonight and see if this helps keep your morning sugars under 150 more consistently and ideally 100-130. This may also help keep you from losing weight and having more urine infections. 2) If your sugars are coming down and if you have 2 or more readings under 100 in a week, then decrease your lantus to 17 units and if still going under 100 2 times a week, then go back to 16 units. 3) I would recommend taking the 2 meds from your PCP's office (bactrim and uroxatral) and follow up with Dr. Bubba Underwood. 4) Your Hemoglobin A1c (3 month test of blood sugar) likely went up from the recent urine infections. 5) BUN and creatinine are markers of kidney function. Your values are normal.    6) ALT and AST are markers of liver function. Your values are normal.    7) Your cholesterol is still controlled. 8) Your urine protein was likely high from the recent infections so we'll repeat next time. 9) If you continue having hand numbness, you can see Dr. Viki Lambert [phone]. Follow-up and Dispositions    · Return in about 6 months (around 9/28/2022).                Copy sent to:  Dr. Prentiss Carne Dr. Ida Lefort Dr. Katheleen Minium Dr. Orpah Deeds Dr. Darcus Sia

## 2022-04-28 ENCOUNTER — HOSPITAL ENCOUNTER (EMERGENCY)
Age: 77
Discharge: HOME | End: 2022-04-28
Attending: EMERGENCY MEDICINE | Admitting: EMERGENCY MEDICINE
Payer: MEDICARE

## 2022-04-28 VITALS
WEIGHT: 182.76 LBS | DIASTOLIC BLOOD PRESSURE: 65 MMHG | HEART RATE: 91 BPM | OXYGEN SATURATION: 100 % | SYSTOLIC BLOOD PRESSURE: 157 MMHG | TEMPERATURE: 98.5 F | RESPIRATION RATE: 16 BRPM | BODY MASS INDEX: 22.72 KG/M2 | HEIGHT: 75 IN

## 2022-04-28 DIAGNOSIS — R73.9 HYPERGLYCEMIA: ICD-10-CM

## 2022-04-28 DIAGNOSIS — N30.01 ACUTE CYSTITIS WITH HEMATURIA: Primary | ICD-10-CM

## 2022-04-28 DIAGNOSIS — E87.20 LACTIC ACIDOSIS: ICD-10-CM

## 2022-04-28 LAB
ALBUMIN SERPL-MCNC: 3.6 G/DL (ref 3.5–5)
ALBUMIN/GLOB SERPL: 0.7 {RATIO} (ref 1.1–2.2)
ALP SERPL-CCNC: 79 U/L (ref 45–117)
ALT SERPL-CCNC: 30 U/L (ref 12–78)
ANION GAP SERPL CALC-SCNC: 2 MMOL/L (ref 5–15)
APPEARANCE UR: ABNORMAL
AST SERPL-CCNC: 28 U/L (ref 15–37)
BACTERIA URNS QL MICRO: ABNORMAL /HPF
BASE DEFICIT BLD-SCNC: 0.3 MMOL/L
BASOPHILS # BLD: 0 K/UL (ref 0–0.1)
BASOPHILS NFR BLD: 0 % (ref 0–1)
BILIRUB SERPL-MCNC: 0.6 MG/DL (ref 0.2–1)
BILIRUB UR QL: NEGATIVE
BUN SERPL-MCNC: 13 MG/DL (ref 6–20)
BUN/CREAT SERPL: 11 (ref 12–20)
CA-I BLD-MCNC: 1.17 MMOL/L (ref 1.12–1.32)
CALCIUM SERPL-MCNC: 9.2 MG/DL (ref 8.5–10.1)
CHLORIDE BLD-SCNC: 108 MMOL/L (ref 100–108)
CHLORIDE SERPL-SCNC: 104 MMOL/L (ref 97–108)
CO2 BLD-SCNC: 27 MMOL/L (ref 19–24)
CO2 SERPL-SCNC: 28 MMOL/L (ref 21–32)
COLOR UR: ABNORMAL
CREAT SERPL-MCNC: 1.17 MG/DL (ref 0.7–1.3)
CREAT UR-MCNC: 0.9 MG/DL (ref 0.6–1.3)
DIFFERENTIAL METHOD BLD: ABNORMAL
EOSINOPHIL # BLD: 0.1 K/UL (ref 0–0.4)
EOSINOPHIL NFR BLD: 2 % (ref 0–7)
EPITH CASTS URNS QL MICRO: ABNORMAL /LPF
ERYTHROCYTE [DISTWIDTH] IN BLOOD BY AUTOMATED COUNT: 16 % (ref 11.5–14.5)
GLOBULIN SER CALC-MCNC: 5.1 G/DL (ref 2–4)
GLUCOSE BLD STRIP.AUTO-MCNC: 323 MG/DL (ref 74–106)
GLUCOSE SERPL-MCNC: 292 MG/DL (ref 65–100)
GLUCOSE UR STRIP.AUTO-MCNC: >1000 MG/DL
HCO3 BLDA-SCNC: 26 MMOL/L
HCT VFR BLD AUTO: 32.8 % (ref 36.6–50.3)
HGB BLD-MCNC: 10.6 G/DL (ref 12.1–17)
HGB UR QL STRIP: ABNORMAL
IMM GRANULOCYTES # BLD AUTO: 0 K/UL (ref 0–0.04)
IMM GRANULOCYTES NFR BLD AUTO: 0 % (ref 0–0.5)
KETONES UR QL STRIP.AUTO: NEGATIVE MG/DL
LACTATE BLD-SCNC: 2.24 MMOL/L (ref 0.4–2)
LEUKOCYTE ESTERASE UR QL STRIP.AUTO: ABNORMAL
LYMPHOCYTES # BLD: 0.8 K/UL (ref 0.8–3.5)
LYMPHOCYTES NFR BLD: 13 % (ref 12–49)
MCH RBC QN AUTO: 27.1 PG (ref 26–34)
MCHC RBC AUTO-ENTMCNC: 32.3 G/DL (ref 30–36.5)
MCV RBC AUTO: 83.9 FL (ref 80–99)
MONOCYTES # BLD: 0.5 K/UL (ref 0–1)
MONOCYTES NFR BLD: 8 % (ref 5–13)
NEUTS SEG # BLD: 4.7 K/UL (ref 1.8–8)
NEUTS SEG NFR BLD: 77 % (ref 32–75)
NITRITE UR QL STRIP.AUTO: POSITIVE
NRBC # BLD: 0 K/UL (ref 0–0.01)
NRBC BLD-RTO: 0 PER 100 WBC
PCO2 BLDV: 46.2 MMHG (ref 41–51)
PH BLDV: 7.36 [PH] (ref 7.32–7.42)
PH UR STRIP: 6 [PH] (ref 5–8)
PLATELET # BLD AUTO: 240 K/UL (ref 150–400)
PMV BLD AUTO: 9 FL (ref 8.9–12.9)
PO2 BLDV: 17 MMHG (ref 25–40)
POTASSIUM BLD-SCNC: 4.6 MMOL/L (ref 3.5–5.5)
POTASSIUM SERPL-SCNC: 4.1 MMOL/L (ref 3.5–5.1)
PROT SERPL-MCNC: 8.7 G/DL (ref 6.4–8.2)
PROT UR STRIP-MCNC: 30 MG/DL
RBC # BLD AUTO: 3.91 M/UL (ref 4.1–5.7)
RBC #/AREA URNS HPF: ABNORMAL /HPF (ref 0–5)
SODIUM BLD-SCNC: 141 MMOL/L (ref 136–145)
SODIUM SERPL-SCNC: 134 MMOL/L (ref 136–145)
SP GR UR REFRACTOMETRY: 1.02
SPECIMEN SITE: ABNORMAL
UA: UC IF INDICATED,UAUC: ABNORMAL
UROBILINOGEN UR QL STRIP.AUTO: 0.2 EU/DL (ref 0.2–1)
WBC # BLD AUTO: 6.1 K/UL (ref 4.1–11.1)
WBC URNS QL MICRO: >100 /HPF (ref 0–4)

## 2022-04-28 PROCEDURE — 99284 EMERGENCY DEPT VISIT MOD MDM: CPT

## 2022-04-28 PROCEDURE — 74011636637 HC RX REV CODE- 636/637: Performed by: EMERGENCY MEDICINE

## 2022-04-28 PROCEDURE — 96375 TX/PRO/DX INJ NEW DRUG ADDON: CPT

## 2022-04-28 PROCEDURE — 87077 CULTURE AEROBIC IDENTIFY: CPT

## 2022-04-28 PROCEDURE — 82947 ASSAY GLUCOSE BLOOD QUANT: CPT

## 2022-04-28 PROCEDURE — 80053 COMPREHEN METABOLIC PANEL: CPT

## 2022-04-28 PROCEDURE — 87186 SC STD MICRODIL/AGAR DIL: CPT

## 2022-04-28 PROCEDURE — 96365 THER/PROPH/DIAG IV INF INIT: CPT

## 2022-04-28 PROCEDURE — 74011000258 HC RX REV CODE- 258: Performed by: EMERGENCY MEDICINE

## 2022-04-28 PROCEDURE — 87086 URINE CULTURE/COLONY COUNT: CPT

## 2022-04-28 PROCEDURE — 85025 COMPLETE CBC W/AUTO DIFF WBC: CPT

## 2022-04-28 PROCEDURE — 74011250636 HC RX REV CODE- 250/636: Performed by: EMERGENCY MEDICINE

## 2022-04-28 PROCEDURE — 81001 URINALYSIS AUTO W/SCOPE: CPT

## 2022-04-28 PROCEDURE — 36415 COLL VENOUS BLD VENIPUNCTURE: CPT

## 2022-04-28 PROCEDURE — 87040 BLOOD CULTURE FOR BACTERIA: CPT

## 2022-04-28 RX ORDER — CEPHALEXIN 500 MG/1
500 CAPSULE ORAL 3 TIMES DAILY
Qty: 21 CAPSULE | Refills: 0 | Status: SHIPPED | OUTPATIENT
Start: 2022-04-28 | End: 2022-05-01 | Stop reason: ALTCHOICE

## 2022-04-28 RX ADMIN — CEFTRIAXONE 1 G: 1 INJECTION, POWDER, FOR SOLUTION INTRAMUSCULAR; INTRAVENOUS at 16:04

## 2022-04-28 RX ADMIN — Medication 2 UNITS: at 16:11

## 2022-04-28 RX ADMIN — SODIUM CHLORIDE 1000 ML: 9 INJECTION, SOLUTION INTRAVENOUS at 16:12

## 2022-04-28 NOTE — ED NOTES
Pt was on Nitrofurantoin on October 20, for 14 JHUO([id number]); then on Levofloxacin November 24(2021)- ; Treated for UTI January 25, February 15, and March 10 2022; Seen at [facility] on April 8th diagnosis UTI urinary bleeding, given Ciprofloxacin ;   Primary care doctor has told checked a Urine culture and sent him a letter on April 20 and informed him to stop antibiotics.
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
## 2022-04-28 NOTE — ED NOTES
Patient identified and read over and explained discharge instructions with time for questions by attending MD/PA. Patient has verbalized understanding of discharge instructions.  From myself and Dr. Frank Goodwin

## 2022-04-30 LAB
BACTERIA SPEC CULT: ABNORMAL
CC UR VC: ABNORMAL
SERVICE CMNT-IMP: ABNORMAL

## 2022-05-01 ENCOUNTER — HOSPITAL ENCOUNTER (INPATIENT)
Age: 77
LOS: 3 days | Discharge: NURSING, REHAB OR LONG-TERM CARE | DRG: 690 | End: 2022-05-06
Attending: EMERGENCY MEDICINE | Admitting: INTERNAL MEDICINE
Payer: MEDICARE

## 2022-05-01 DIAGNOSIS — N30.00 ACUTE CYSTITIS WITHOUT HEMATURIA: Primary | ICD-10-CM

## 2022-05-01 DIAGNOSIS — M86.9 OSTEOMYELITIS OF FIFTH TOE OF RIGHT FOOT (HCC): ICD-10-CM

## 2022-05-01 DIAGNOSIS — N39.0 PSEUDOMONAS URINARY TRACT INFECTION: ICD-10-CM

## 2022-05-01 DIAGNOSIS — G25.0 BENIGN ESSENTIAL TREMOR SYNDROME: ICD-10-CM

## 2022-05-01 DIAGNOSIS — N30.80 CYSTITIS DUE TO PSEUDOMONAS: ICD-10-CM

## 2022-05-01 DIAGNOSIS — N39.0 RECURRENT UTI: ICD-10-CM

## 2022-05-01 DIAGNOSIS — Z78.9 FAILURE OF OUTPATIENT TREATMENT: ICD-10-CM

## 2022-05-01 DIAGNOSIS — Z92.3 HISTORY OF EXTERNAL BEAM RADIATION THERAPY: ICD-10-CM

## 2022-05-01 DIAGNOSIS — Z85.46 HISTORY OF PROSTATE CANCER: ICD-10-CM

## 2022-05-01 DIAGNOSIS — I65.23 BILATERAL CAROTID ARTERY STENOSIS: ICD-10-CM

## 2022-05-01 DIAGNOSIS — A49.8 PSEUDOMONAS AERUGINOSA INFECTION: ICD-10-CM

## 2022-05-01 DIAGNOSIS — E11.42 DIABETIC PERIPHERAL NEUROPATHY ASSOCIATED WITH TYPE 2 DIABETES MELLITUS (HCC): ICD-10-CM

## 2022-05-01 DIAGNOSIS — N39.0 COMPLICATED UTI (URINARY TRACT INFECTION): ICD-10-CM

## 2022-05-01 DIAGNOSIS — Z96.89 HISTORY OF IMPLANTATION OF PENILE PROSTHESIS: ICD-10-CM

## 2022-05-01 DIAGNOSIS — B96.5 CYSTITIS DUE TO PSEUDOMONAS: ICD-10-CM

## 2022-05-01 DIAGNOSIS — B96.5 PSEUDOMONAS URINARY TRACT INFECTION: ICD-10-CM

## 2022-05-01 LAB
ALBUMIN SERPL-MCNC: 3.2 G/DL (ref 3.5–5)
ALBUMIN/GLOB SERPL: 0.6 {RATIO} (ref 1.1–2.2)
ALP SERPL-CCNC: 78 U/L (ref 45–117)
ALT SERPL-CCNC: 29 U/L (ref 12–78)
ANION GAP SERPL CALC-SCNC: 2 MMOL/L (ref 5–15)
APPEARANCE UR: ABNORMAL
AST SERPL-CCNC: 34 U/L (ref 15–37)
BACTERIA URNS QL MICRO: NEGATIVE /HPF
BASOPHILS # BLD: 0 K/UL (ref 0–0.1)
BASOPHILS NFR BLD: 1 % (ref 0–1)
BILIRUB SERPL-MCNC: 0.3 MG/DL (ref 0.2–1)
BILIRUB UR QL: NEGATIVE
BUN SERPL-MCNC: 12 MG/DL (ref 6–20)
BUN/CREAT SERPL: 11 (ref 12–20)
CALCIUM SERPL-MCNC: 9.5 MG/DL (ref 8.5–10.1)
CHLORIDE SERPL-SCNC: 106 MMOL/L (ref 97–108)
CO2 SERPL-SCNC: 31 MMOL/L (ref 21–32)
COLOR UR: ABNORMAL
CREAT SERPL-MCNC: 1.08 MG/DL (ref 0.7–1.3)
DIFFERENTIAL METHOD BLD: ABNORMAL
EOSINOPHIL # BLD: 0.2 K/UL (ref 0–0.4)
EOSINOPHIL NFR BLD: 6 % (ref 0–7)
EPITH CASTS URNS QL MICRO: ABNORMAL /LPF
ERYTHROCYTE [DISTWIDTH] IN BLOOD BY AUTOMATED COUNT: 15.6 % (ref 11.5–14.5)
GLOBULIN SER CALC-MCNC: 5.1 G/DL (ref 2–4)
GLUCOSE BLD STRIP.AUTO-MCNC: 151 MG/DL (ref 65–117)
GLUCOSE BLD STRIP.AUTO-MCNC: 343 MG/DL (ref 65–117)
GLUCOSE SERPL-MCNC: 160 MG/DL (ref 65–100)
GLUCOSE UR STRIP.AUTO-MCNC: NEGATIVE MG/DL
HCT VFR BLD AUTO: 29.9 % (ref 36.6–50.3)
HGB BLD-MCNC: 9.6 G/DL (ref 12.1–17)
HGB UR QL STRIP: ABNORMAL
HYALINE CASTS URNS QL MICRO: ABNORMAL /LPF (ref 0–2)
IMM GRANULOCYTES # BLD AUTO: 0 K/UL (ref 0–0.04)
IMM GRANULOCYTES NFR BLD AUTO: 0 % (ref 0–0.5)
KETONES UR QL STRIP.AUTO: NEGATIVE MG/DL
LACTATE BLD-SCNC: 1.56 MMOL/L (ref 0.4–2)
LEUKOCYTE ESTERASE UR QL STRIP.AUTO: ABNORMAL
LYMPHOCYTES # BLD: 0.7 K/UL (ref 0.8–3.5)
LYMPHOCYTES NFR BLD: 21 % (ref 12–49)
MCH RBC QN AUTO: 27.1 PG (ref 26–34)
MCHC RBC AUTO-ENTMCNC: 32.1 G/DL (ref 30–36.5)
MCV RBC AUTO: 84.5 FL (ref 80–99)
MONOCYTES # BLD: 0.4 K/UL (ref 0–1)
MONOCYTES NFR BLD: 12 % (ref 5–13)
NEUTS SEG # BLD: 1.8 K/UL (ref 1.8–8)
NEUTS SEG NFR BLD: 60 % (ref 32–75)
NITRITE UR QL STRIP.AUTO: NEGATIVE
NRBC # BLD: 0 K/UL (ref 0–0.01)
NRBC BLD-RTO: 0 PER 100 WBC
PH UR STRIP: 7 [PH] (ref 5–8)
PLATELET # BLD AUTO: 234 K/UL (ref 150–400)
PMV BLD AUTO: 8.7 FL (ref 8.9–12.9)
POTASSIUM SERPL-SCNC: 4.4 MMOL/L (ref 3.5–5.1)
PROT SERPL-MCNC: 8.3 G/DL (ref 6.4–8.2)
PROT UR STRIP-MCNC: ABNORMAL MG/DL
RBC # BLD AUTO: 3.54 M/UL (ref 4.1–5.7)
RBC #/AREA URNS HPF: ABNORMAL /HPF (ref 0–5)
RBC MORPH BLD: ABNORMAL
SERVICE CMNT-IMP: ABNORMAL
SERVICE CMNT-IMP: ABNORMAL
SODIUM SERPL-SCNC: 139 MMOL/L (ref 136–145)
SP GR UR REFRACTOMETRY: 1.01
UA: UC IF INDICATED,UAUC: ABNORMAL
UROBILINOGEN UR QL STRIP.AUTO: 1 EU/DL (ref 0.2–1)
WBC # BLD AUTO: 3.1 K/UL (ref 4.1–11.1)
WBC MORPH BLD: ABNORMAL
WBC URNS QL MICRO: >100 /HPF (ref 0–4)

## 2022-05-01 PROCEDURE — 82962 GLUCOSE BLOOD TEST: CPT

## 2022-05-01 PROCEDURE — 74011000258 HC RX REV CODE- 258: Performed by: INTERNAL MEDICINE

## 2022-05-01 PROCEDURE — 74011250636 HC RX REV CODE- 250/636: Performed by: INTERNAL MEDICINE

## 2022-05-01 PROCEDURE — G0378 HOSPITAL OBSERVATION PER HR: HCPCS

## 2022-05-01 PROCEDURE — 96365 THER/PROPH/DIAG IV INF INIT: CPT

## 2022-05-01 PROCEDURE — 87086 URINE CULTURE/COLONY COUNT: CPT

## 2022-05-01 PROCEDURE — 94760 N-INVAS EAR/PLS OXIMETRY 1: CPT

## 2022-05-01 PROCEDURE — 99285 EMERGENCY DEPT VISIT HI MDM: CPT

## 2022-05-01 PROCEDURE — 74011250636 HC RX REV CODE- 250/636: Performed by: EMERGENCY MEDICINE

## 2022-05-01 PROCEDURE — 96376 TX/PRO/DX INJ SAME DRUG ADON: CPT

## 2022-05-01 PROCEDURE — 81001 URINALYSIS AUTO W/SCOPE: CPT

## 2022-05-01 PROCEDURE — 74011000250 HC RX REV CODE- 250: Performed by: INTERNAL MEDICINE

## 2022-05-01 PROCEDURE — 83605 ASSAY OF LACTIC ACID: CPT

## 2022-05-01 PROCEDURE — 74011636637 HC RX REV CODE- 636/637: Performed by: INTERNAL MEDICINE

## 2022-05-01 PROCEDURE — 74011000258 HC RX REV CODE- 258: Performed by: EMERGENCY MEDICINE

## 2022-05-01 PROCEDURE — 80053 COMPREHEN METABOLIC PANEL: CPT

## 2022-05-01 PROCEDURE — 36415 COLL VENOUS BLD VENIPUNCTURE: CPT

## 2022-05-01 PROCEDURE — 85025 COMPLETE CBC W/AUTO DIFF WBC: CPT

## 2022-05-01 RX ORDER — THERA TABS 400 MCG
1 TAB ORAL DAILY
Status: DISCONTINUED | OUTPATIENT
Start: 2022-05-02 | End: 2022-05-06 | Stop reason: HOSPADM

## 2022-05-01 RX ORDER — DEXTROSE MONOHYDRATE 100 MG/ML
0-250 INJECTION, SOLUTION INTRAVENOUS AS NEEDED
Status: DISCONTINUED | OUTPATIENT
Start: 2022-05-01 | End: 2022-05-06 | Stop reason: HOSPADM

## 2022-05-01 RX ORDER — ACETAMINOPHEN 325 MG/1
650 TABLET ORAL
Status: DISCONTINUED | OUTPATIENT
Start: 2022-05-01 | End: 2022-05-06 | Stop reason: HOSPADM

## 2022-05-01 RX ORDER — BISACODYL 5 MG
5 TABLET, DELAYED RELEASE (ENTERIC COATED) ORAL DAILY PRN
Status: DISCONTINUED | OUTPATIENT
Start: 2022-05-01 | End: 2022-05-06 | Stop reason: HOSPADM

## 2022-05-01 RX ORDER — ACETAMINOPHEN 650 MG/1
650 SUPPOSITORY RECTAL
Status: DISCONTINUED | OUTPATIENT
Start: 2022-05-01 | End: 2022-05-06 | Stop reason: HOSPADM

## 2022-05-01 RX ORDER — MAGNESIUM SULFATE 100 %
4 CRYSTALS MISCELLANEOUS AS NEEDED
Status: DISCONTINUED | OUTPATIENT
Start: 2022-05-01 | End: 2022-05-06 | Stop reason: HOSPADM

## 2022-05-01 RX ORDER — ONDANSETRON 2 MG/ML
2 INJECTION INTRAMUSCULAR; INTRAVENOUS
Status: DISCONTINUED | OUTPATIENT
Start: 2022-05-01 | End: 2022-05-06 | Stop reason: HOSPADM

## 2022-05-01 RX ORDER — MELATONIN
6000 DAILY
Status: DISCONTINUED | OUTPATIENT
Start: 2022-05-02 | End: 2022-05-06 | Stop reason: HOSPADM

## 2022-05-01 RX ORDER — SILODOSIN 4 MG/1
8 CAPSULE ORAL
Status: DISCONTINUED | OUTPATIENT
Start: 2022-05-02 | End: 2022-05-03

## 2022-05-01 RX ORDER — INSULIN GLARGINE 100 [IU]/ML
15 INJECTION, SOLUTION SUBCUTANEOUS DAILY
Status: DISCONTINUED | OUTPATIENT
Start: 2022-05-02 | End: 2022-05-05

## 2022-05-01 RX ORDER — SODIUM CHLORIDE 0.9 % (FLUSH) 0.9 %
5-40 SYRINGE (ML) INJECTION AS NEEDED
Status: DISCONTINUED | OUTPATIENT
Start: 2022-05-01 | End: 2022-05-06 | Stop reason: HOSPADM

## 2022-05-01 RX ORDER — GUAIFENESIN 100 MG/5ML
81 LIQUID (ML) ORAL DAILY
Status: DISCONTINUED | OUTPATIENT
Start: 2022-05-02 | End: 2022-05-06 | Stop reason: HOSPADM

## 2022-05-01 RX ORDER — ACETAMINOPHEN 325 MG/1
650 TABLET ORAL
Status: DISCONTINUED | OUTPATIENT
Start: 2022-05-01 | End: 2022-05-01

## 2022-05-01 RX ORDER — ATORVASTATIN CALCIUM 10 MG/1
10 TABLET, FILM COATED ORAL DAILY
Status: DISCONTINUED | OUTPATIENT
Start: 2022-05-02 | End: 2022-05-06 | Stop reason: HOSPADM

## 2022-05-01 RX ORDER — INSULIN LISPRO 100 [IU]/ML
INJECTION, SOLUTION INTRAVENOUS; SUBCUTANEOUS
Status: DISCONTINUED | OUTPATIENT
Start: 2022-05-01 | End: 2022-05-06 | Stop reason: HOSPADM

## 2022-05-01 RX ADMIN — SODIUM CHLORIDE, PRESERVATIVE FREE 10 ML: 5 INJECTION INTRAVENOUS at 22:38

## 2022-05-01 RX ADMIN — CEFEPIME HYDROCHLORIDE 2 G: 2 INJECTION, POWDER, FOR SOLUTION INTRAVENOUS at 10:51

## 2022-05-01 RX ADMIN — Medication 2 UNITS: at 18:12

## 2022-05-01 RX ADMIN — CEFEPIME HYDROCHLORIDE 1 G: 1 INJECTION, POWDER, FOR SOLUTION INTRAMUSCULAR; INTRAVENOUS at 22:38

## 2022-05-01 RX ADMIN — Medication 9 UNITS: at 22:36

## 2022-05-01 NOTE — PROGRESS NOTES
End of Shift Note    Bedside shift change report given to Daniel Stewart RN (oncoming nurse) by SUJEY Elkins (offgoing nurse). Report included the following information SBAR, Kardex, Intake/Output and MAR    Shift worked:  7a-7p     Shift summary and any significant changes:    Pt was transferred to the unit. Pt did not complain of pain. Pt stable. Tele monitor is on patient. Pt had an uneventful shift upon arrival.    Concerns for physician to address:  none     Zone phone for oncoming shift:  None     Activity:  Activity Level: Up with Assistance  Number times ambulated in hallways past shift: 0  Number of times OOB to chair past shift: 0    Cardiac:   Cardiac Monitoring: Yes           Access:   Current line(s): PIV     Genitourinary:   Urinary status: voiding    Respiratory:   O2 Device: None (Room air)  Chronic home O2 use?: NO  Incentive spirometer at bedside: NO       GI:     Current diet:  ADULT DIET Regular; 4 carb choices (60 gm/meal)  Passing flatus: YES  Tolerating current diet: YES       Pain Management:   Patient states pain is manageable on current regimen: YES    Skin:  Ottoniel Score: 22  Interventions: float heels, increase time out of bed and PT/OT consult    Patient Safety:  Fall Score:  Total Score: 1  Interventions: bed/chair alarm, gripper socks and pt to call before getting OOB       Length of Stay:  Expected LOS: - - -  Actual LOS: 0      SUJEY Qureshi
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
## 2022-05-01 NOTE — ED NOTES
Pt arrives to ED room from [facility] with a cc of urinary frequency and pain with voiding; pt states that he was seen here last week with same symptoms, diagnosed with UTI and sent home with antibiotics. Pt states that P.A. called him today saying that his urine culture was positive and to come back to ED. Pt states that he has been dealing with these symptoms on and off x 3-4 weeks with no relief. Pt is alert and oriented x 4, resting comfortably in stretcher with side rails up and call bell within reach.
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
## 2022-05-01 NOTE — PROGRESS NOTES
Problem: Falls - Risk of  Goal: *Absence of Falls  Description: Document Alexia Ruffin Fall Risk and appropriate interventions in the flowsheet.   Outcome: Progressing Towards Goal  Note: Fall Risk Interventions:                                Problem: Patient Education: Go to Patient Education Activity  Goal: Patient/Family Education  Outcome: Progressing Towards Goal

## 2022-05-01 NOTE — ED NOTES
TRANSITION OF CARE - SBAR OUT    Patient is being transferred to [facility], Room# 015 3624. Report GIVEN TO Danilo Saez RN on [facility] for routine progression of care. Report is consisted of the following information SBAR. Patient transferred to receiving unit by: transport (RN or Tech Name)     Called outstanding consults: Yes   Collected routine labs: Yes     All current orders reviewed with accepting nurse: Yes    The following personal items will be sent with the patient during transfer to the floor:   All valuables:      Visual Aid: Glasses                   CARDIAC MONITORING ORDERED: Yes     The following CURRENT information were reported to the receiving RN:    CODE STATUS: Full Code    NIH SCORE:    EVER SCREENING:      NEURO ASSESSMENT:        RESTRAINTS IN USE: No      IS DOCUMENTATION COMPLETE: Yes      Vital Signs  Level of Consciousness: Alert (0) (05/01/22 1145)  Temp: 97.9 °F (36.6 °C) (05/01/22 1002)  Temp Source: Oral (05/01/22 1002)  Pulse (Heart Rate): 82 (05/01/22 1145)  Heart Rate Source: Monitor (05/01/22 1145)  Resp Rate: 14 (05/01/22 1145)  BP: 134/63 (05/01/22 1145)  MAP (Monitor): 83 (05/01/22 1145)  MAP (Calculated): 87 (05/01/22 1145)  BP 1 Location: Right arm (05/01/22 1145)  BP 1 Method: Automatic (05/01/22 1145)  BP Patient Position: At rest (05/01/22 1145)  MEWS Score: 1 (05/01/22 1002)  Pain 1  Pain Scale 1: Numeric (0 - 10) (05/01/22 1002)  Pain Intensity 1: 0 (05/01/22 1002)      OXYGEN: Oxygen Therapy  O2 Device: None (Room air) (05/01/22 1002)    ARAVIND FALL RISK: Alexia Ruffin Fall Risk  Mobility: Ambulates without gait disturbance (05/01/22 1145)  Mentation: Alert, oriented x 3 (05/01/22 1145)  Medication: Patient receiving anticonvulsants, sedatives(tranquilizers), psychotropics or hypnotics, hypoglycemics, narcotics, sleep aids, antihypertensives, laxatives, or diuretics (05/01/22 1145)  Elimination: Independent in elimination (05/01/22 1145)  Prior Fall History: Before admission in past 12 months _home or previous inpatient care) (05/01/22 1145)  Total Score: 2 (05/01/22 1145)  Standard Fall Precautions: Yes (05/01/22 1145)      WOUNDS: No      URINARY CATHETER: voiding    LINE ACCESS:   PICC Single Lumen 17/76/27 Right;Basilic (Active)       Peripheral IV 05/01/22 Right Antecubital (Active)   Site Assessment Clean, dry, & intact 05/01/22 1034   Phlebitis Assessment 0 05/01/22 1034   Infiltration Assessment 0 05/01/22 1034   Dressing Status Clean, dry, & intact 05/01/22 1034   Dressing Type Tape;Transparent 05/01/22 1034        Opportunity for questions and clarification were provided.   Desirae Pizano RN

## 2022-05-02 ENCOUNTER — APPOINTMENT (OUTPATIENT)
Dept: CT IMAGING | Age: 77
DRG: 690 | End: 2022-05-02
Attending: INTERNAL MEDICINE
Payer: MEDICARE

## 2022-05-02 LAB
ANION GAP SERPL CALC-SCNC: 5 MMOL/L (ref 5–15)
BACTERIA SPEC CULT: ABNORMAL
BASOPHILS # BLD: 0 K/UL (ref 0–0.1)
BASOPHILS NFR BLD: 0 % (ref 0–1)
BUN SERPL-MCNC: 16 MG/DL (ref 6–20)
BUN/CREAT SERPL: 17 (ref 12–20)
CALCIUM SERPL-MCNC: 9.2 MG/DL (ref 8.5–10.1)
CC UR VC: ABNORMAL
CHLORIDE SERPL-SCNC: 107 MMOL/L (ref 97–108)
CO2 SERPL-SCNC: 27 MMOL/L (ref 21–32)
CREAT SERPL-MCNC: 0.92 MG/DL (ref 0.7–1.3)
DIFFERENTIAL METHOD BLD: ABNORMAL
EOSINOPHIL # BLD: 0.2 K/UL (ref 0–0.4)
EOSINOPHIL NFR BLD: 6 % (ref 0–7)
ERYTHROCYTE [DISTWIDTH] IN BLOOD BY AUTOMATED COUNT: 15.6 % (ref 11.5–14.5)
GLUCOSE BLD STRIP.AUTO-MCNC: 182 MG/DL (ref 65–117)
GLUCOSE BLD STRIP.AUTO-MCNC: 214 MG/DL (ref 65–117)
GLUCOSE BLD STRIP.AUTO-MCNC: 236 MG/DL (ref 65–117)
GLUCOSE BLD STRIP.AUTO-MCNC: 326 MG/DL (ref 65–117)
GLUCOSE SERPL-MCNC: 136 MG/DL (ref 65–100)
HCT VFR BLD AUTO: 29.5 % (ref 36.6–50.3)
HGB BLD-MCNC: 9.6 G/DL (ref 12.1–17)
IMM GRANULOCYTES # BLD AUTO: 0 K/UL (ref 0–0.04)
IMM GRANULOCYTES NFR BLD AUTO: 0 % (ref 0–0.5)
LYMPHOCYTES # BLD: 0.6 K/UL (ref 0.8–3.5)
LYMPHOCYTES NFR BLD: 16 % (ref 12–49)
MCH RBC QN AUTO: 27 PG (ref 26–34)
MCHC RBC AUTO-ENTMCNC: 32.5 G/DL (ref 30–36.5)
MCV RBC AUTO: 83.1 FL (ref 80–99)
MONOCYTES # BLD: 0.3 K/UL (ref 0–1)
MONOCYTES NFR BLD: 9 % (ref 5–13)
NEUTS SEG # BLD: 2.6 K/UL (ref 1.8–8)
NEUTS SEG NFR BLD: 69 % (ref 32–75)
NRBC # BLD: 0 K/UL (ref 0–0.01)
NRBC BLD-RTO: 0 PER 100 WBC
PLATELET # BLD AUTO: 236 K/UL (ref 150–400)
PMV BLD AUTO: 8.7 FL (ref 8.9–12.9)
POTASSIUM SERPL-SCNC: 4.1 MMOL/L (ref 3.5–5.1)
RBC # BLD AUTO: 3.55 M/UL (ref 4.1–5.7)
RBC MORPH BLD: ABNORMAL
SERVICE CMNT-IMP: ABNORMAL
SODIUM SERPL-SCNC: 139 MMOL/L (ref 136–145)
WBC # BLD AUTO: 3.7 K/UL (ref 4.1–11.1)

## 2022-05-02 PROCEDURE — 99223 1ST HOSP IP/OBS HIGH 75: CPT | Performed by: INTERNAL MEDICINE

## 2022-05-02 PROCEDURE — 80048 BASIC METABOLIC PNL TOTAL CA: CPT

## 2022-05-02 PROCEDURE — 82962 GLUCOSE BLOOD TEST: CPT

## 2022-05-02 PROCEDURE — 74011636637 HC RX REV CODE- 636/637: Performed by: INTERNAL MEDICINE

## 2022-05-02 PROCEDURE — G0378 HOSPITAL OBSERVATION PER HR: HCPCS

## 2022-05-02 PROCEDURE — 36415 COLL VENOUS BLD VENIPUNCTURE: CPT

## 2022-05-02 PROCEDURE — 74011000258 HC RX REV CODE- 258: Performed by: INTERNAL MEDICINE

## 2022-05-02 PROCEDURE — 85025 COMPLETE CBC W/AUTO DIFF WBC: CPT

## 2022-05-02 PROCEDURE — 96376 TX/PRO/DX INJ SAME DRUG ADON: CPT

## 2022-05-02 PROCEDURE — 74011250636 HC RX REV CODE- 250/636: Performed by: INTERNAL MEDICINE

## 2022-05-02 PROCEDURE — 74011250637 HC RX REV CODE- 250/637: Performed by: INTERNAL MEDICINE

## 2022-05-02 PROCEDURE — 74176 CT ABD & PELVIS W/O CONTRAST: CPT

## 2022-05-02 RX ADMIN — Medication 2 UNITS: at 22:28

## 2022-05-02 RX ADMIN — Medication 6 UNITS: at 19:45

## 2022-05-02 RX ADMIN — NEPHROCAP 1 CAPSULE: 1 CAP ORAL at 09:09

## 2022-05-02 RX ADMIN — CEFEPIME HYDROCHLORIDE 1 G: 1 INJECTION, POWDER, FOR SOLUTION INTRAMUSCULAR; INTRAVENOUS at 22:27

## 2022-05-02 RX ADMIN — CHOLECALCIFEROL TAB 25 MCG (1000 UNIT) 6000 UNITS: 25 TAB at 09:09

## 2022-05-02 RX ADMIN — CEFEPIME HYDROCHLORIDE 1 G: 1 INJECTION, POWDER, FOR SOLUTION INTRAMUSCULAR; INTRAVENOUS at 06:28

## 2022-05-02 RX ADMIN — ATORVASTATIN CALCIUM 10 MG: 10 TABLET, FILM COATED ORAL at 09:09

## 2022-05-02 RX ADMIN — ASPIRIN 81 MG: 81 TABLET, CHEWABLE ORAL at 09:09

## 2022-05-02 RX ADMIN — CEFEPIME HYDROCHLORIDE 1 G: 1 INJECTION, POWDER, FOR SOLUTION INTRAMUSCULAR; INTRAVENOUS at 14:15

## 2022-05-02 RX ADMIN — Medication 9 UNITS: at 13:03

## 2022-05-02 RX ADMIN — Medication 2 UNITS: at 09:09

## 2022-05-02 RX ADMIN — INSULIN GLARGINE 15 UNITS: 100 INJECTION, SOLUTION SUBCUTANEOUS at 09:08

## 2022-05-02 RX ADMIN — THERA TABS 1 TABLET: TAB at 09:09

## 2022-05-02 RX ADMIN — SILODOSIN 8 MG: 4 CAPSULE ORAL at 09:10

## 2022-05-02 NOTE — PROGRESS NOTES
Physical Therapy    Received PT eval order, chart reviewed. Pt here s/p multiple UTIs, now with IV Abx. Pt noted to be up with SBA only with nursing. Spoke with Pt and wife who both state pt is having no new mobility issues, feel as if he is moving and walking at his baseline, no device. He politely declines services at this time. Verbally reviewed and encouraged edge of bed seated exercises, amb with nursing staff and importance of maintaining some activity level while receiving his treatment. Voices understanding that he should have S when [facility]. No current PT needs. Will sign off.     Cate Warner, PT

## 2022-05-02 NOTE — PROGRESS NOTES
Problem: Falls - Risk of  Goal: *Absence of Falls  Description: Document Jaime Munoz Fall Risk and appropriate interventions in the flowsheet.   Outcome: Progressing Towards Goal  Note: Fall Risk Interventions:  Mobility Interventions: Communicate number of staff needed for ambulation/transfer,Patient to call before getting OOB         Medication Interventions: Evaluate medications/consider consulting pharmacy,Patient to call before getting OOB,Teach patient to arise slowly         History of Falls Interventions: Consult care management for discharge planning,Door open when patient unattended,Room close to nurse's station

## 2022-05-02 NOTE — PROGRESS NOTES
Problem: Falls - Risk of  Goal: *Absence of Falls  Description: Document Sangeeta Jorgensen Fall Risk and appropriate interventions in the flowsheet.   Outcome: Progressing Towards Goal  Note: Fall Risk Interventions:  Mobility Interventions: Communicate number of staff needed for ambulation/transfer,Patient to call before getting OOB         Medication Interventions: Evaluate medications/consider consulting pharmacy,Patient to call before getting OOB,Teach patient to arise slowly         History of Falls Interventions: Consult care management for discharge planning,Door open when patient unattended,Room close to nurse's station         Problem: Patient Education: Go to Patient Education Activity  Goal: Patient/Family Education  Outcome: Progressing Towards Goal

## 2022-05-02 NOTE — PROGRESS NOTES
Hospitalist Progress Note    NAME: Myron Palacio   :  1945   MRN:  [id number]       Assessment / Plan:  Resistant Pseudomonas UTI POA -not amenable to PO Abx,   Pt has been increasingly symptomatic for last few days - increased frequency  · cont remote tele bed. · Cont IV Cefepime 1g/Q8H for now  · IP Infectious disease Consulted for further recommendations  · Pt might need continuation of IV abx at [facility] -  alerted      DM2 -chronic on Lantus   · Resume Lantus with lowered dose (since he will be on Hospital diet)   · SSI - Lispro      CA Prostate   S/p EBRT ~   S/p Cryotherapy one year ago      · Continue Rapaflo as was PTA      Hyperlipidemia -chronic   · Continue statin as was PTA       Estimated discharge date: May 3  Barriers: IV Abx need    Code status: Full  Prophylaxis: SCD's  Recommended Disposition: Home w/Family +/- IV Abx TBD     Subjective:     Chief Complaint / Reason for Physician Visit : F/u UTI by Resistant  Pseudomonas  \"I am still having some increased frequency of urination\". Discussed with RN events overnight. Review of Systems:  Symptom Y/N Comments  Symptom Y/N Comments   Fever/Chills n   Chest Pain n    Poor Appetite n   Edema n    Cough n   Abdominal Pain n    Sputum n   Joint Pain     SOB/DEL CASTILLO n   Pruritis/Rash     Nausea/vomit    Tolerating PT/OT y    Diarrhea    Tolerating Diet y    Constipation    Other y Dysuria/increased frequency     Could NOT obtain due to:      Objective:     VITALS:   Last 24hrs VS reviewed since prior progress note.  Most recent are:  Patient Vitals for the past 24 hrs:   Temp Pulse Resp BP SpO2   22 1515 97.9 °F (36.6 °C) 88 18 (!) 116/58 99 %   22 1200 -- 80 -- -- --   22 1120 98.6 °F (37 °C) 79 18 137/66 100 %   22 0800 -- 83 -- -- --   22 0744 98.5 °F (36.9 °C) 85 18 (!) 151/76 100 %   22 0359 98.4 °F (36.9 °C) 78 17 (!) 134/58 98 %   22 0000 -- 76 -- -- --   22 2243 98.8 °F (37.1 °C) 78 17 139/71 98 %   05/01/22 2012 98.5 °F (36.9 °C) 81 17 (!) 142/61 100 %       Intake/Output Summary (Last 24 hours) at 5/2/2022 1609  Last data filed at 5/2/2022 0745  Gross per 24 hour   Intake --   Output 1000 ml   Net -1000 ml        I had a face to face encounter and independently examined this patient on 5/2/2022, as outlined below:  PHYSICAL EXAM:  General: WD, WN. Alert, cooperative, no acute distress    EENT:  EOMI. Anicteric sclerae. MMM  Resp:  CTA bilaterally, no wheezing or rales. No accessory muscle use  CV:  Regular  rhythm,  No edema  GI:  Soft, Non distended, Non tender. +Bowel sounds  Neurologic:  Alert and oriented X 3, normal speech,   Psych:   Good insight. Not anxious nor agitated  Skin:  No rashes. No jaundice    Reviewed most current lab test results and cultures  YES  Reviewed most current radiology test results   YES  Review and summation of old records today    NO  Reviewed patient's current orders and MAR    YES  PMH/ reviewed - no change compared to H&P  ________________________________________________________________________  Care Plan discussed with:    Comments   Patient x    Family  x Significant other at bedside   RN x    Care Manager x Monalisa   Consultant                       x Multidiciplinary team rounds were held today with , nursing, pharmacist and clinical coordinator. Patient's plan of care was discussed; medications were reviewed and discharge planning was addressed.      ________________________________________________________________________  Total NON critical care TIME:  36   Minutes    Total CRITICAL CARE TIME Spent:   Minutes non procedure based      Comments   >50% of visit spent in counseling and coordination of care     ________________________________________________________________________  Alma Sneed MD     Procedures: see electronic medical records for all procedures/Xrays and details which were not copied into this note but were reviewed prior to creation of Plan. LABS:  I reviewed today's most current labs and imaging studies.   Pertinent labs include:  Recent Labs     05/02/22  0403 05/01/22  1150   WBC 3.7* 3.1*   HGB 9.6* 9.6*   HCT 29.5* 29.9*    234     Recent Labs     05/02/22  0403 05/01/22  1019    139   K 4.1 4.4    106   CO2 27 31   * 160*   BUN 16 12   CREA 0.92 1.08   CA 9.2 9.5   ALB  --  3.2*   TBILI  --  0.3   ALT  --  29       Signed: Heidy Garrett MD

## 2022-05-02 NOTE — PROGRESS NOTES
End of Shift Note    Bedside shift change report given to Margret Escobedo RN (oncoming nurse) by SUJEY Feldman (offgoing nurse). Report included the following information SBAR, Kardex, Intake/Output and MAR    Shift worked:  7a-7p     Shift summary and any significant changes:     Pt has had no complaints of pain. Pt stable, IV antibiotics given. Pt was given correctional insulin during all meals. Pt went for CT. Pt had an uneventful shift. Concerns for physician to address:  none     Zone phone for oncoming shift:  None     Activity:  Activity Level: Up with Assistance  Number times ambulated in hallways past shift: 0  Number of times OOB to chair past shift: 0    Cardiac:   Cardiac Monitoring: Yes      Cardiac Rhythm: Sinus Rhythm    Access:   Current line(s): PIV     Genitourinary:   Urinary status: voiding    Respiratory:   O2 Device: None (Room air)  Chronic home O2 use?: NO  Incentive spirometer at bedside: NO       GI:  Last Bowel Movement Date: 05/02/22  Current diet:  ADULT DIET Regular; 4 carb choices (60 gm/meal)  Passing flatus: YES  Tolerating current diet: YES       Pain Management:   Patient states pain is manageable on current regimen: YES    Skin:  Ottoniel Score: 21  Interventions: float heels, increase time out of bed and PT/OT consult    Patient Safety:  Fall Score:  Total Score: 3  Interventions: bed/chair alarm, gripper socks and pt to call before getting OOB  High Fall Risk: Yes    Length of Stay:  Expected LOS: - - -  Actual LOS: 0      SUJEY Pickering

## 2022-05-02 NOTE — CONSULTS
Infectious Disease Consult    Date of Consultation:  May 2, 2022  Reason for Consultation: Resistant Pseudomonal UTI in prostate   CA s/p EBRT+ Cryoptherapy pt  Referring Physician: Dr Ely Bragg  Date of Admission: 5/01/22. Impression  -Acute Pseudomonas UTI  Urine culture- 4/28->100,000 Pseudomonas aeruginosa  (Quinolone resistant)  Negative blood cultures  H/o recurrent UTI since 10/2021  Pt is s/p multiple courses of oral antibiotics. Recent h/o being on Cipro p.o.    -H/o CA prostate  H/o EBRT 2016, cryo 2021    - H/o osteomyelitis R/foot  Foot cultures were + for Pseudomonas ( Quinolone S ) & E.faecalis 7/2021  Pt was treated with cefepime and daptomycin x 6 weeks    -Diabetes type 2, poor control  A1c 9.2    - H/o penile prosthesis  No other indwelling prosthetic devices    Plan   -Continue cefepime 2 g IV every 8 hourly  -CT abdomen pelvis-evaluate urinary tract for stricture, calculus  -Adequate fluid intake  -Urology input. - Blood sugar control  - D/w pt & spouse    Maria Esther Kwan is a 67-year-old male with a history of prostate cancer, frequent UTIs, who presented with  with UTI resistant to oral medications. Per H&P Patient stated that he has been having urinary discomfort when he urinates for some time. He has been in and out of urgent care as   well as this ER and was told that he had a UTI and then that he did have a UTI and then told that he did have a UTI on the 28th and discharged   on Keflex. Patient was  called back as urine culture was growing out Pseudomonas and needed IV antibiotics. Patient states he has completed   a course of ciprofloxacin very recently. He has had multiple urinary tract infections since October 2021. He had a prostate operation in May 2021. He has been on antibiotics 7 times since October. Most recently, he was diagnosed with a urinary tract infection, and was treated with Cipro.     [facility] April [facility], stating that the culture indicated he needed IV antibiotics. He followed up with his PCP on ,   and was diagnosed with a UTI. On , his urine culture resulted and it was negative for infection. He was advised to follow-up with urology. Patient seen today at request of hospitalist service. Urine culture- ->100,000 Pseudomonas aeruginosa  (Quinolone resistant)  Negative blood cultures. Patient reports having some dysuria. Hesitancy . no pain or burning sensation.     Past Medical History:   Diagnosis Date    Arthritis     in shoulders    Diabetes ([facility]     Foot ulcer ([facility]     Hepatitis     while in the [employer] in     Hypertension     Leukopenia     benign due to being African American    Murmur     Other and unspecified hyperlipidemia     Prostate cancer ([facility]     hormone treatment and external beam radiation    Sickle cell trait ([facility] 7/3/2012    Thyroid disease     Type I (juvenile type) diabetes mellitus without mention of complication, uncontrolled since     Unspecified sleep apnea     has CPAP-BUT DOESN'T USE     psh  Allergies   Allergen Reactions    Lisinopril Cough    Novocain [Procaine] Palpitations    Tamsulosin Other (comments)     Higher blood sugars     Social History     Socioeconomic History    Marital status:      Spouse name: Not on file    Number of children: Not on file    Years of education: Not on file    Highest education level: Not on file   Occupational History    Not on file   Tobacco Use    Smoking status: Former Smoker     Packs/day: 1.00     Years: 35.00     Pack years: 35.00     Quit date: 2006     Years since quittin.2    Smokeless tobacco: Never Used   Vaping Use    Vaping Use: Never used   Substance and Sexual Activity    Alcohol use: Not Currently    Drug use: No    Sexual activity: Not Currently     Partners: Female     Birth control/protection: None   Other Topics Concern    Not on file   Social History Narrative Lives in [place] alone. Has 1 daughter. Retired. Used to work at [employer] until [address]. Likes to play golf and work in the yard. Likes to read. 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 Social Determinants of Health     Financial Resource Strain:     Difficulty of Paying Living Expenses: Not on file   Food Insecurity:     Worried About Running Out of Food in the Last Year: Not on file    Delicia of Food in the Last Year: Not on file   Transportation Needs:     Lack of Transportation (Medical): Not on file    Lack of Transportation (Non-Medical): Not on file   Physical Activity:     Days of Exercise per Week: Not on file    Minutes of Exercise per Session: Not on file   Stress:     Feeling of Stress : Not on file   Social Connections:     Frequency of Communication with Friends and Family: Not on file    Frequency of Social Gatherings with Friends and Family: Not on file    Attends Jain Services: Not on file    Active Member of [address] [place] [address] [employer] Liaison or Organizations: Not on file    Attends Club or Organization Meetings: Not on file    Marital Status: Not on file   Intimate Partner Violence:     Fear of Current or Ex-Partner: Not on file    Emotionally Abused: Not on file    Physically Abused: Not on file    Sexually Abused: Not on file   Housing Stability:     Unable to Pay for Housing in the Last Year: Not on file    Number of [place] in the Last Year: Not on file    Unstable Housing in the Last Year: Not on file     Family Status   Relation Name Status    Mother     Lyssa Pro Father     Lyssa Pro Sister      Brother      Brother      Sister  Alive    Sister  Alive    Neg Hx  (Not Specified)     Medication Documentation Review Audit     Reviewed by Darvin Koch RN (Registered Nurse) on 22 at [phone]    Medication Sig Documenting Provider Last Dose Status Taking?    alcohol swabs (BD Single Use Swabs Regular) padm TEST UP TO 5 TIMES DAILY (INSULIN FLUCTUATING SUGARS). DX: E10.65 Heriberto Sepulveda MD  Active    aspirin 81 mg chewable tablet Take 81 mg by mouth daily. Other, MD Tyree  Active            Med Note Rocheheather Slimmer May 13, 2019  2:24 PM)     atorvastatin (Lipitor) 10 mg tablet Take 1 Tablet by mouth daily. Heriberto Sepulveda MD  Active    b complex vitamins (B COMPLEX 1) tablet Take 1 Tab by mouth daily. Provider, Historical  Active    BD Luer-Rafa Syringe 3 mL 18 x 1 1/2\" syrg USE TO DRAW UP TESTOSTERONE UTD Provider, Historical  Active    cephALEXin (Keflex) 500 mg capsule Take 1 Capsule by mouth three (3) times daily. Skye Lemons MD  Active    cholecalciferol (Vitamin D) (2,000 UNITS /50 MCG) cap capsule Take 3 Tablets by mouth daily. Provider, Historical  Active            Med Note Rochele Slimmer May 13, 2019  2:24 PM)     Disposable Needles 22 gauge x 1 1/2\" ndle USE TO INJECT TESTOSTERONE UTD Provider, Historical  Active    FreeStyle Jc 2 [place] misc Use as directed with freestyle anastasia 2 sensors Heriberto Sepulveda MD  Active    FreeStyle Anastasia 2 Sensor kit Use as directed to test blood sugars at least 4 times daily. Replace every 14 days Heriberto Sepulveda MD  Active    insulin aspart U-100 (NovoLOG Flexpen U-100 Insulin) 100 unit/mL (3 mL) inpn Inject 1:8 for carbs for breakfast and 1:9 for lunch and dinner before 9pm and 1:10 after 9pm and 1:50 > 150 for correction during the day and 1:50 > 180 after 9pm.  MAX 35/D Heriberto Sepulveda MD  Active    insulin glargine (Lantus Solostar U-100 Insulin) 100 unit/mL (3 mL) inpn INJECT 18 UNITS UNDER THE SKIN AT BEDTIME--Dose change 03/28/22--updated med list--did not send prescription to the pharmacy Heriberto Sepulveda MD  Active    megestroL (MEGACE) 20 mg tablet Take 20 mg by mouth three (3) times daily as needed. Provider, Historical  Active    ONETOUCH ULTRA TEST strip TEST UP TO 5 TIMES DAILY   (INSULIN FLUCTUATING SUGARS).   DX: I00.28 Heriberto Sepulveda MD  Active silodosin (RAPAFLO) 8 mg capsule  Provider, Historical  Active    therapeutic multivitamin (THERA) tablet Take 1 Tab by mouth daily. Provider, Historical  Active            Med Note (Nery Fox   Mon May 13, 2019  2:24 PM)     True Metrix Glucose Meter misc TEST UP TO 5 TIMES DAILY (INSULIN FLUCTUATING SUGARS). DX: V28.11 Romana Areas, MD  Active    True Metrix Level 1 soln Use as directed Romana Areas, MD  Active    TRUEplus Lancets 33 gauge misc TEST UP TO 5 TIMES DAILY (INSULIN FLUCTUATING SUGARS). DX: E10.65 Romana Areas, MD  Active               Review of Systems - Negative except those reported in H&P  PHYSICAL EXAM:  General:          WD, WN. Alert, cooperative, no acute distress    EENT:              EOMI. Anicteric sclerae. MMM  Resp:               CTA bilaterally, no wheezing or rales. No accessory muscle use  CV:                  Regular  rhythm,  No edema  GI:                   Soft, Non distended, Non tender. +Bowel sounds  Neurologic:      Alert and oriented X 3, normal speech,   Psych:             Good insight. Not anxious nor agitated  Skin:                No rashes. No jaundice.   Jolene Koyanagi, MD [place]

## 2022-05-03 PROBLEM — N39.0 COMPLICATED UTI (URINARY TRACT INFECTION): Status: ACTIVE | Noted: 2022-05-03

## 2022-05-03 PROBLEM — N39.0 COMPLICATED UTI (URINARY TRACT INFECTION): Status: ACTIVE | Noted: 2022-01-01

## 2022-05-03 LAB
ANION GAP SERPL CALC-SCNC: 5 MMOL/L (ref 5–15)
BUN SERPL-MCNC: 18 MG/DL (ref 6–20)
BUN/CREAT SERPL: 21 (ref 12–20)
CALCIUM SERPL-MCNC: 9.2 MG/DL (ref 8.5–10.1)
CHLORIDE SERPL-SCNC: 106 MMOL/L (ref 97–108)
CO2 SERPL-SCNC: 27 MMOL/L (ref 21–32)
CREAT SERPL-MCNC: 0.87 MG/DL (ref 0.7–1.3)
ERYTHROCYTE [DISTWIDTH] IN BLOOD BY AUTOMATED COUNT: 15.7 % (ref 11.5–14.5)
GLUCOSE BLD STRIP.AUTO-MCNC: 197 MG/DL (ref 65–117)
GLUCOSE BLD STRIP.AUTO-MCNC: 336 MG/DL (ref 65–117)
GLUCOSE BLD STRIP.AUTO-MCNC: 374 MG/DL (ref 65–117)
GLUCOSE BLD STRIP.AUTO-MCNC: 392 MG/DL (ref 65–117)
GLUCOSE SERPL-MCNC: 120 MG/DL (ref 65–100)
HCT VFR BLD AUTO: 30.6 % (ref 36.6–50.3)
HGB BLD-MCNC: 9.9 G/DL (ref 12.1–17)
MCH RBC QN AUTO: 26.9 PG (ref 26–34)
MCHC RBC AUTO-ENTMCNC: 32.4 G/DL (ref 30–36.5)
MCV RBC AUTO: 83.2 FL (ref 80–99)
NRBC # BLD: 0 K/UL (ref 0–0.01)
NRBC BLD-RTO: 0 PER 100 WBC
PLATELET # BLD AUTO: 277 K/UL (ref 150–400)
PMV BLD AUTO: 9.6 FL (ref 8.9–12.9)
POTASSIUM SERPL-SCNC: 4.3 MMOL/L (ref 3.5–5.1)
RBC # BLD AUTO: 3.68 M/UL (ref 4.1–5.7)
SERVICE CMNT-IMP: ABNORMAL
SODIUM SERPL-SCNC: 138 MMOL/L (ref 136–145)
WBC # BLD AUTO: 3.9 K/UL (ref 4.1–11.1)

## 2022-05-03 PROCEDURE — 74011250636 HC RX REV CODE- 250/636: Performed by: INTERNAL MEDICINE

## 2022-05-03 PROCEDURE — 65270000015 HC RM PRIVATE ONCOLOGY

## 2022-05-03 PROCEDURE — 74011250637 HC RX REV CODE- 250/637: Performed by: INTERNAL MEDICINE

## 2022-05-03 PROCEDURE — 74011250637 HC RX REV CODE- 250/637: Performed by: NURSE PRACTITIONER

## 2022-05-03 PROCEDURE — G0378 HOSPITAL OBSERVATION PER HR: HCPCS

## 2022-05-03 PROCEDURE — 94760 N-INVAS EAR/PLS OXIMETRY 1: CPT

## 2022-05-03 PROCEDURE — 51798 US URINE CAPACITY MEASURE: CPT

## 2022-05-03 PROCEDURE — 77030005513 HC CATH URETH FOL11 MDII -B

## 2022-05-03 PROCEDURE — 80048 BASIC METABOLIC PNL TOTAL CA: CPT

## 2022-05-03 PROCEDURE — 74011636637 HC RX REV CODE- 636/637: Performed by: INTERNAL MEDICINE

## 2022-05-03 PROCEDURE — 74011000258 HC RX REV CODE- 258: Performed by: INTERNAL MEDICINE

## 2022-05-03 PROCEDURE — 77030019905 HC CATH URETH INTMIT MDII -A

## 2022-05-03 PROCEDURE — 82962 GLUCOSE BLOOD TEST: CPT

## 2022-05-03 PROCEDURE — 96376 TX/PRO/DX INJ SAME DRUG ADON: CPT

## 2022-05-03 PROCEDURE — 99233 SBSQ HOSP IP/OBS HIGH 50: CPT | Performed by: INTERNAL MEDICINE

## 2022-05-03 PROCEDURE — 36415 COLL VENOUS BLD VENIPUNCTURE: CPT

## 2022-05-03 PROCEDURE — 85027 COMPLETE CBC AUTOMATED: CPT

## 2022-05-03 RX ORDER — PHENAZOPYRIDINE HYDROCHLORIDE 100 MG/1
200 TABLET, FILM COATED ORAL
Status: DISCONTINUED | OUTPATIENT
Start: 2022-05-03 | End: 2022-05-06 | Stop reason: HOSPADM

## 2022-05-03 RX ORDER — SILODOSIN 4 MG/1
8 CAPSULE ORAL
Status: DISCONTINUED | OUTPATIENT
Start: 2022-05-04 | End: 2022-05-06 | Stop reason: HOSPADM

## 2022-05-03 RX ORDER — SILODOSIN 4 MG/1
8 CAPSULE ORAL
Status: DISCONTINUED | OUTPATIENT
Start: 2022-05-04 | End: 2022-05-03

## 2022-05-03 RX ADMIN — PHENAZOPYRIDINE HYDROCHLORIDE 200 MG: 100 TABLET ORAL at 14:10

## 2022-05-03 RX ADMIN — Medication 12 UNITS: at 14:11

## 2022-05-03 RX ADMIN — CEFEPIME HYDROCHLORIDE 2 G: 2 INJECTION, POWDER, FOR SOLUTION INTRAVENOUS at 22:01

## 2022-05-03 RX ADMIN — CHOLECALCIFEROL TAB 25 MCG (1000 UNIT) 6000 UNITS: 25 TAB at 10:20

## 2022-05-03 RX ADMIN — SILODOSIN 8 MG: 4 CAPSULE ORAL at 14:10

## 2022-05-03 RX ADMIN — THERA TABS 1 TABLET: TAB at 10:21

## 2022-05-03 RX ADMIN — CEFEPIME HYDROCHLORIDE 1 G: 1 INJECTION, POWDER, FOR SOLUTION INTRAMUSCULAR; INTRAVENOUS at 06:50

## 2022-05-03 RX ADMIN — NEPHROCAP 1 CAPSULE: 1 CAP ORAL at 10:20

## 2022-05-03 RX ADMIN — ATORVASTATIN CALCIUM 10 MG: 10 TABLET, FILM COATED ORAL at 10:20

## 2022-05-03 RX ADMIN — ASPIRIN 81 MG: 81 TABLET, CHEWABLE ORAL at 14:10

## 2022-05-03 RX ADMIN — Medication 2 UNITS: at 10:22

## 2022-05-03 RX ADMIN — PHENAZOPYRIDINE HYDROCHLORIDE 200 MG: 100 TABLET ORAL at 20:31

## 2022-05-03 RX ADMIN — Medication 6 UNITS: at 20:35

## 2022-05-03 RX ADMIN — Medication 1 CAPSULE: at 10:21

## 2022-05-03 RX ADMIN — CEFEPIME HYDROCHLORIDE 2 G: 2 INJECTION, POWDER, FOR SOLUTION INTRAVENOUS at 14:11

## 2022-05-03 RX ADMIN — INSULIN GLARGINE 15 UNITS: 100 INJECTION, SOLUTION SUBCUTANEOUS at 10:21

## 2022-05-03 NOTE — PROGRESS NOTES
Transition of Care Plan:    RUR: Obs   Disposition: Home with family support, Picc Line, IV abx  Follow up appointments: Follow up with PCP and/or Specialist   DME needed:N/A  Transportation at Discharge: Family will assist with transport   [address] or means to access home:  Family will provide   IM Medicare Letter: 2nd IM Medicare Letter to be given   Is patient a BCPI-A Bundle:   CM will provide if required         If yes, was Bundle Letter given?:    Is patient a [facility] and connected with the [facility]? N/A           If yes, was [facility] transfer form completed and [facility] notified? Caregiver Contact: Kelsey Root (relative) [phone] and Jennifer Marcanooms (friend) [phone]  Discharge Caregiver contacted prior to discharge? Family to be contacted   Care Conference needed?:    Not at this time      CM staffed case with clinical team, and it was reported that pt will require IV abx at the time of d/c. Pt is currently waiting for picc line to be placed. Once IV abx orders are placed, CM will send referral to infusion vendor for pricing and/or verify if pt has copay. Pt will receive education and/or teaching once approved. CM will arrange [place] [phone] to assist with additional services in the home. CM will continue to follow.     HANS Lester, [address] [place] [address]

## 2022-05-03 NOTE — PROGRESS NOTES
End of Shift Note    Bedside shift change report given to Prasad Subramanian RN (oncoming nurse) by Ene Newell RN (offgoing nurse). Report included the following information SBAR, Kardex, Intake/Output and MAR    Shift worked:  7p-7a     Shift summary and any significant changes:     Pt has had no complaints of pain. Pt stable, IV antibiotics given. Pt was given correctional insulin. Pt up to the BR and had BM. Pt had an uneventful shift. Concerns for physician to address:  none     Zone phone for oncoming shift:  None     Activity:  Activity Level: Up with Assistance  Number times ambulated in hallways past shift: 0  Number of times OOB to chair past shift: 0    Cardiac:   Cardiac Monitoring: Yes      Cardiac Rhythm: Sinus Rhythm    Access:   Current line(s): PIV     Genitourinary:   Urinary status: voiding    Respiratory:   O2 Device: None (Room air)  Chronic home O2 use?: NO  Incentive spirometer at bedside: NO       GI:  Last Bowel Movement Date: 05/02/22  Current diet:  ADULT DIET Regular; 4 carb choices (60 gm/meal)  Passing flatus: YES  Tolerating current diet: YES       Pain Management:   Patient states pain is manageable on current regimen: YES    Skin:  Ottoniel Score: 21  Interventions: float heels, increase time out of bed and PT/OT consult    Patient Safety:  Fall Score:  Total Score: 3  Interventions: bed/chair alarm, gripper socks and pt to call before getting OOB  High Fall Risk: Yes    Length of Stay:  Expected LOS: - - -  Actual LOS: 0      Ene Newell RN

## 2022-05-03 NOTE — PROGRESS NOTES
Hospitalist Progress Note    NAME: Stacey Briggs   :  1945   MRN:  [id number]     Interim Hospital Summary: 65 y.o. male whom presented on 2022 with      Assessment / Plan:  Pseudomonas UTI  CT AP: 1. Tiny nonobstructing right renal calculus, unchanged since . No other  urinary tract calculi. No hydronephrosis. 2. Central low attenuation within the prostate gland, most consistent with post  therapeutic change. Thickening of the wall of the urinary bladder    H/o recurrent UTI since 10/2021  Pt is s/p multiple courses of oral antibiotics. Recent h/o being on Cipro PO  Urine culture- ->100,000 Pseudomonas aeruginosa; (Quinolone resistant)  Negative blood cultures  Appreciate ID input. Continue cefepime IV  PT OT eval and treat  Urology input    H/o CA prostate  H/o EBRT , cryo   H/o osteomyelitis R/foot  Foot cultures were + for Pseudomonas (Quinolone S) & E.faecalis 2021  Pt was treated with cefepime and daptomycin x 6 weeks     Diabetes type 2  A1c 9.2  Continue lantus with SSI prn    H/o penile prosthesis  No other indwelling prosthetic devices      18.5 - 24.9 Normal weight / Body mass index is 22.73 kg/m². Code status: Full  Prophylaxis: SCD's  Recommended Disposition: Home w/Family       Subjective:     Chief Complaint / Reason for Physician Visit  Follow up of UTI, prostate cancer, DM, penile prosthesis  Chart reviewed in detail. Discussed with RN events overnight. Review of Systems:  Symptom Y/N Comments  Symptom Y/N Comments   Fever/Chills    Chest Pain     Poor Appetite    Edema     Cough    Abdominal Pain     Sputum    Joint Pain     SOB/DEL CASTILLO    Pruritis/Rash     Nausea/vomit    Tolerating PT/OT     Diarrhea    Tolerating Diet     Constipation    Other       Could NOT obtain due to:      PO intake: No data found. Objective:     VITALS:   Last 24hrs VS reviewed since prior progress note.  Most recent are:  Patient Vitals for the past 24 hrs:   Temp Pulse Resp BP SpO2   05/03/22 0825 98.2 °F (36.8 °C) 92 18 (!) 158/63 100 %   05/03/22 0351 98.6 °F (37 °C) 88 18 137/69 97 %   05/03/22 0000 98.5 °F (36.9 °C) 83 16 132/64 97 %   05/02/22 2000 98.4 °F (36.9 °C) 86 18 138/79 98 %   05/02/22 1600 -- 77 -- -- --   05/02/22 1515 97.9 °F (36.6 °C) 88 18 (!) 116/58 99 %   05/02/22 1200 -- 80 -- -- --   05/02/22 1120 98.6 °F (37 °C) 79 18 137/66 100 %       Intake/Output Summary (Last 24 hours) at 5/3/2022 1036  Last data filed at 5/3/2022 7552  Gross per 24 hour   Intake 250 ml   Output 850 ml   Net -600 ml        I had a face to face encounter, and independently examined this patient on 5/3/2022, as outlined below:  PHYSICAL EXAM:  General: WD, WN. Alert, cooperative, no acute distress    EENT:  EOMI. Anicteric sclerae. MMM  Resp:  CTA bilaterally, no wheezing or rales. No accessory muscle use  CV:  Regular  rhythm,  No edema  GI:  Soft, Non distended, Non tender. +Bowel sounds  Neurologic:  Alert and oriented X 3, normal speech,   Psych:   Good insight. Not anxious nor agitated  Skin:  No rashes. No jaundice    Reviewed most current lab test results and cultures  YES  Reviewed most current radiology test results   YES  Review and summation of old records today    NO  Reviewed patient's current orders and MAR    YES  PMH/SH reviewed - no change compared to H&P  ________________________________________________________________________  Care Plan discussed with:    Comments   Patient x    Family      RN     Care Manager     Consultant                        Multidiciplinary team rounds were held today with , nursing, pharmacist and clinical coordinator. Patient's plan of care was discussed; medications were reviewed and discharge planning was addressed.      ________________________________________________________________________  Total NON critical care TIME:  25   Minutes    Total CRITICAL CARE TIME Spent:   Minutes non procedure based      Comments   >50% of visit spent in counseling and coordination of care x     This includes time during multidisciplinary rounds if indicated above   ________________________________________________________________________  Halima Almeida MD     Procedures: see electronic medical records for all procedures/Xrays and details which were not copied into this note but were reviewed prior to creation of Plan. LABS:  I reviewed today's most current labs and imaging studies.   Pertinent labs include:  Recent Labs     05/03/22 0327 05/02/22 0403 05/01/22  1150   WBC 3.9* 3.7* 3.1*   HGB 9.9* 9.6* 9.6*   HCT 30.6* 29.5* 29.9*    236 234     Recent Labs     05/03/22 0327 05/02/22 0403 05/01/22  1019    139 139   K 4.3 4.1 4.4    107 106   CO2 27 27 31   * 136* 160*   BUN 18 16 12   CREA 0.87 0.92 1.08   CA 9.2 9.2 9.5   ALB  --   --  3.2*   TBILI  --   --  0.3   ALT  --   --  29

## 2022-05-03 NOTE — PROGRESS NOTES
Infectious Disease progress    . Impression  -Acute Pseudomonas UTI  Urine culture- 4/28->100,000 Pseudomonas aeruginosa  (Quinolone resistant)  Urine culture - 5/1- 5000 GNR  Negative blood cultures  H/o recurrent UTI since 10/2021  Pt is s/p multiple courses of oral antibiotics. Recent h/o being on Cipro p.o.PTA.  CT abdomen pelvis-Tiny nonobstructing right renal calculus, unchanged since 2020. No other  urinary tract calculi. No hydronephrosis. 2. Central low attenuation within the prostate gland, most consistent with post  therapeutic change. Thickening of the wall of the urinary bladder    -H/o CA prostate  H/o EBRT 2016, cryo 2021    - H/o osteomyelitis R/foot  Foot cultures were + for Pseudomonas ( Quinolone S ) & E.faecalis 7/2021  Pt was treated with cefepime and daptomycin x 6 weeks    -Diabetes type 2, poor control  A1c 9.2    - H/o penile prosthesis  No other indwelling prosthetic devices    Plan   -Continue cefepime 2 g IV every 8 hourly x 2 weeks end date 5/15  -Remove line at the end of therapy on 5/15  -Weekly CBC, CMP-fax reports to [phone], call with critical labs at [phone]  -Adequate fluid intake  -Daily probiotic/yogurt  -Blood sugar control  -Urology outpatient arpxox-gb-[id number] input. Possible adverse effects of long term antibiotics are inclusive of but not limited to following  BM suppression, neutropenia , cytopenias , aplastic anemia hemorrhage liver & renal dysfunction/ liver , renal failure  , GI dysfunction- N, V  Diarrhea,C.difficile disease, rash , allergy , anaphylaxis. toxic epidermal necrolysis  Neuro toxicity , seizure disorder  Side effects tend to be more pronounced in the elderly      Patient seen today. No fever. chills. Hayden Prajapati is a 65-year-old male with a history of prostate cancer, frequent UTIs, who presented with  with UTI resistant to oral medications.   Per H&P Patient stated that he has been having urinary discomfort when he urinates for some time.    He has been in and out of urgent care as   well as this ER and was told that he had a UTI and then that he did have a UTI and then told that he did have a UTI on the 28th and discharged   on Keflex. Patient was  called back as urine culture was growing out Pseudomonas and needed IV antibiotics. Patient states he has completed   a course of ciprofloxacin very recently. He has had multiple urinary tract infections since October 2021. He had a prostate operation in May 2021. He has been on antibiotics 7 times since October. Most recently, he was diagnosed with a urinary tract infection, and was treated with Cipro. [facility] April [facility], stating that the culture indicated he needed IV antibiotics. He followed up with his PCP on April 18,   and was diagnosed with a UTI. On April 20, his urine culture resulted and it was negative for infection. He was advised to follow-up with urology. Patient seen today at request of hospitalist service. Urine culture- 4/28->100,000 Pseudomonas aeruginosa  (Quinolone resistant)  Negative blood cultures. Patient reports having some dysuria. Hesitancy . no pain or burning sensation.     Past Medical History:   Diagnosis Date    Arthritis     in shoulders    Diabetes ([address]     Foot ulcer ([address]     Hepatitis     while in the [employer] in 1968    Hypertension     Leukopenia     benign due to being African American    Murmur     Other and unspecified hyperlipidemia     Prostate cancer ([address] Fall of 2015    hormone treatment and external beam radiation    Sickle [facility] ([facility] 7/3/2012    Thyroid disease     Type I (juvenile type) diabetes mellitus without mention of complication, uncontrolled since 1979    Unspecified sleep apnea     has CPAP-BUT DOESN'T USE     psh  Allergies   Allergen Reactions    Lisinopril Cough    Novocain [Procaine] Palpitations    Tamsulosin Other (comments)     Higher blood sugars     Social History Socioeconomic History    Marital status:      Spouse name: Not on file    Number of children: Not on file    Years of education: Not on file    Highest education level: Not on file   Occupational History    Not on file   Tobacco Use    Smoking status: Former Smoker     Packs/day: 1.00     Years: 35.00     Pack years: 35.00     Quit date: 2006     Years since quittin.2    Smokeless tobacco: Never Used   Vaping Use    Vaping Use: Never used   Substance and Sexual Activity    Alcohol use: Not Currently    Drug use: No    Sexual activity: Not Currently     Partners: Female     Birth control/protection: None   Other Topics Concern    Not on file   Social History Narrative    Lives in [address] alone. Has 1 daughter. Retired. Used to work at [employer] until [address]. Likes to play golf and work in the yard. Likes to read. 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 Social Determinants of Health     Financial Resource Strain:     Difficulty of Paying Living Expenses: Not on file   Food Insecurity:     Worried About Running Out of Food in the Last Year: Not on file    Delicia of Food in the Last Year: Not on file   Transportation Needs:     Lack of Transportation (Medical): Not on file    Lack of Transportation (Non-Medical):  Not on file   Physical Activity:     Days of Exercise per Week: Not on file    Minutes of Exercise per Session: Not on file   Stress:     Feeling of Stress : Not on file   Social Connections:     Frequency of Communication with Friends and Family: Not on file    Frequency of Social Gatherings with Friends and Family: Not on file    Attends Adventism Services: Not on file    Active Member of Clubs or Organizations: Not on file    Attends Club or Organization Meetings: Not on file    Marital Status: Not on file   Intimate Partner Violence:     Fear of Current or Ex-Partner: Not on file    Emotionally Abused: Not on file    Physically Abused: Not on file   Helena Mckee Sexually Abused: Not on file   Housing Stability:     Unable to Pay for Housing in the Last Year: Not on file    Number of Places Lived in the Last Year: Not on file    Unstable Housing in the Last Year: Not on file     Family Status   Relation Name Status    Mother      Father     Duenas Sister     Duenas Brother     Duenas Brother      Sister  Alive    Sister  Alive    Neg Hx  (Not Specified)     Medication Documentation Review Audit     Reviewed by Claude Leather, RN (Registered Nurse) on 22 at [phone]    Medication Sig Documenting Provider Last Dose Status Taking? alcohol swabs (BD Single Use Swabs Regular) padm TEST UP TO 5 TIMES DAILY (INSULIN FLUCTUATING SUGARS). DX: E10.65 Romana Areas, MD  Active    aspirin 81 mg chewable tablet Take 81 mg by mouth daily. Other, MD Tyree  Active            Med Note Hansel Mott May 13, 2019  2:24 PM)     atorvastatin (Lipitor) 10 mg tablet Take 1 Tablet by mouth daily. Romana Areas, MD  Active    b complex vitamins (B COMPLEX 1) tablet Take 1 Tab by mouth daily. Provider, Historical  Active    BD Luer-Rafa Syringe 3 mL 18 x 1 1/2\" syrg USE TO DRAW UP TESTOSTERONE UTD Provider, Historical  Active    cephALEXin (Keflex) 500 mg capsule Take 1 Capsule by mouth three (3) times daily. Scott Gonzalez MD  Active    cholecalciferol (Vitamin D) (2,000 UNITS /50 MCG) cap capsule Take 3 Tablets by mouth daily. Provider, Historical  Active            Med Note Hansel Mott May 13, 2019  2:24 PM)     Disposable Needles 22 gauge x 1 1/2\" ndle USE TO INJECT TESTOSTERONE UTD Provider, Historical  Active    FreeStyle Jc 2 [place] misc Use as directed with freestyle anastasia 2 sensors Romana Areas, MD  Active    FreeStyle Anastasia 2 Sensor kit Use as directed to test blood sugars at least 4 times daily.   Replace every 14 days Romana Areas, MD  Active    insulin aspart U-100 (NovoLOG Flexpen U-100 Insulin) 100 unit/mL (3 mL) inpn Inject 1:8 for carbs for breakfast and 1:9 for lunch and dinner before 9pm and 1:10 after 9pm and 1:50 > 150 for correction during the day and 1:50 > 180 after 9pm.  MAX 35/D Kamron Cartwright MD  Active    insulin glargine (Lantus Solostar U-100 Insulin) 100 unit/mL (3 mL) inpn INJECT 18 UNITS UNDER THE SKIN AT BEDTIME--Dose change 03/28/22--updated med list--did not send prescription to the pharmacy Kamron Cartwright MD  Active    megestroL (MEGACE) 20 mg tablet Take 20 mg by mouth three (3) times daily as needed. Provider, Historical  Active    ONETOUCH ULTRA TEST strip TEST UP TO 5 TIMES DAILY   (INSULIN FLUCTUATING SUGARS). DX: E10.65 Kamron Cartwright MD  Active    silodosin (RAPAFLO) 8 mg capsule  Provider, Historical  Active    therapeutic multivitamin (THERA) tablet Take 1 Tab by mouth daily. Provider, Historical  Active            Med Note (Mochila    Mon May 13, 2019  2:24 PM)     True Metrix Glucose Meter misc TEST UP TO 5 TIMES DAILY (INSULIN FLUCTUATING SUGARS). DX: Z03.11 Kamron Cartwright MD  Active    True Metrix Level 1 soln Use as directed Kamron Cartwright MD  Active    TRUEplus Lancets 33 gauge misc TEST UP TO 5 TIMES DAILY (INSULIN FLUCTUATING SUGARS). DX: E10.65 Kamron Cartwright MD  Active               Review of Systems - Negative except those reported in H&P  PHYSICAL EXAM:  General:          WD, WN. Alert, cooperative, no acute distress    EENT:              EOMI. Anicteric sclerae. MMM  Resp:               CTA bilaterally, no wheezing or rales. No accessory muscle use  CV:                  Regular  rhythm,  No edema  GI:                   Soft, Non distended, Non tender. +Bowel sounds  Neurologic:      Alert and oriented X 3, normal speech,   Psych:             Good insight. Not anxious nor agitated  Skin:                No rashes. No jaundice.   Ian Schulz MD Lawerence Rash

## 2022-05-03 NOTE — CONSULTS
Urology Consult    Patient: Dianelys Diane MRN: [id number]  SSN: [id number]    YOB: 1945  Age: 65 y.o. Sex: male          Date of Consultation:  May 3, 2022  Requesting Physician: Carina Stern MD  Reason for Consultation: recurrent UTIs           Assessment/Plan:  pseduomonas UTI- abx had been targeted to sensitivities- has persistent + urine cxs  Hx of prostate cancer-s/p cryoablation may 2021  Hx of recurrent UTIs with chronic LUTS  Nonobstructing small renal stone-noncontributory   Penile prosthesis    -no acute surgical urologic intervention. Check next PVR and record bladder scan. If bladder scan >350ml will need to place abraham.   -await repeat culture results, abx therapy per ID, suspect will d/c on IV abx.   -resume baseline Rapaflo, start prn AZO  -will arrange OP follow up with Dr. Darryll Dandy his primary urologist   -please call if needed. POC discussed with pt.     ++ pt in urinary retention this afternoon. >500ml. Abraham placed at the bedside. Pt will discharge with abraham and have voiding trial on 5/11/22 in clinic     Supervising Dr. Maggie VICTORIA       History of Present Illness:  Patient is a 65 y.o. male admitted 5/1/2022 to the hospital for Cystitis due to Pseudomonas [R46.03, U16.5]  Complicated UTI (urinary tract infection) [N39.0]. He presented to the ED after results of urgent care urine culture growing Pseudomonas and needed IV abx. He recently completely a course of ciprofloxacin with mult UTIs over the past six months to a year, ongoing since his cyroablation in May 2021. S/p mult courses of abx. Most recently around 4/27/22 he developed increased urinary frequency, dysuria and discomfort in the lower abdomen. [facility] and here he was started on PO cephalosporin for suspected UTI. He was recalled to the hospital after urine Cx result came back + for Pseudomonas that is only sensitive to IV abx.   PMHx: DM, foot ulcer, hepatitis, leukopenia, prostate cancer (hormone treatment and cyro), sickle cell trait, thyroid disease, sjogrens      Pt seen at request of ID  Reports some ongoing dysuria despite IV abx. These symptoms are unchanged from his baseline. x3-4 nocturia, frequency, urgency, some hesitancy. No hematuria or flank pain. No suprapubic dysfunction. Denies hx of urinary retention and feeling of incomplete voiding. Urology was consulted for recurrent UTI with hx of prostate cancer. He is a known pt of Dr Le Joy at [facility]. Reference last office note below    Chart reviewed:  Af, vss  Good UOP recorded, voiding independently   Creat NL  HD stable  Urine cx-4/28 + pseudomonas. Urine cx 5/1 GNR, prelim. +cefepime  CT abd personally reviewed:  1. Tiny nonobstructing right renal calculus, unchanged since 2020. No other  urinary tract calculi. No hydronephrosis. 2. Central low attenuation within the prostate gland, most consistent with post  therapeutic change. Thickening of the wall of the urinary bladder. Recent office cystoscopy in Nov 2021 with no bladder lesions. 3.5cm prostate    ==last urology office visit Jan 2022==    Mr. Gina Galdamez returns to the office in follow-up of his history of adenocarcinoma the prostate. He is status post salvage cryoablation of the prostate 5/18/2021. He had an elevated postvoid residual prior to the procedure and has struggled with irritative and obstructive voiding symptoms in the postoperative period. He says his voiding symptoms have improved. He remains on Rapaflo. Nocturia 1-2x per night. PSA today is undetectable, see below. Urinalyisis is contaminated with acellular debris. He complains of 3-4 hot flashes at night and during the day. PAST MEDICAL HISTORY:    Allergies: NOVOCAIN (Severe)  DENIES: Latex, Shellfish, X-Ray Dye, Iodine.      Medications: megestrol 20 mg tablet (megestrol) Take 1 tablet by mouth three times a day   Myrbetriq 50 mg tablet extended release 24 hr (mirabegron) Take 1 tablet by mouth once a day   cholecalciferol (vitamin D3) 50 mcg (2,000 unit) tablet (cholecalciferol (vitamin d3)) once a day   * VITAMIN C 500 MG ORAL TABLET once a day   Humalog KwikPen Insulin 100 unit/mL insulin pen (insulin lispro) as directed   multivitamin tablet (multivitamin) once a day   aspirin 81 mg tablet,delayed release (DR/EC) (aspirin) once a day   Lantus Solostar U-100 Insulin 100 unit/mL (3 mL) insulin pen (insulin glargine) Inject 19 unit every night   Lipitor 10 mg tablet (atorvastatin) once a day     Illnesses: Diabetes. DENIES: Heart Disease, Pacemaker/Defibrillator, Lung Disease, High Blood Pressure, Bowel Problems, Stroke/Seizure, Kidney Problems, Bleeding Problems, or HIV. Surgeries: Prostate Biopsy,Prostatectomy, andCataract Surgery. Family History: Prostate Cancer and Kidney Disease. DENIES: Kidney cancer, Kidney stones. Social History: Retired. . Smoking status: former smoker. Does not drink alcohol. System Review: Admits to: None. DENIES: Unexplained Weight Loss, Dry Eyes, Dry Mouth, Leg Swelling, Shortness of Breath, Constipation, Involuntary Urine Loss, Lower Extremity Weakness, Dry Skin, Difficulty Walking, Psychiatric Problems, Impaired Sex Drive, Easy Bleeding, Rash. EXAMINATION: Vitals: Pulse: 97 BP: 196/85 Weight: 186 lbs Height: 6' 2\" BMI: 23.97 kg/m^2  Appearance: well-developed NAD Respiratory Effort: breathing easily Skin Inspection: warm and dry Orientation: oriented to person; time and place Mood/Affect: normal       URINALYSIS from Voided specimen  Urine Dip: pH: 6.0, Bld: +, LE: ++, Nit: Pos, Prot: Neg, Ket: Neg, Gluc: Neg  Urine Micro: WBC: 3-5, RBC: 0, Bacteria: 0  Comment: Acellular debris present. PSA HISTORY  <0.1 ng/mL ng/ml on 01/05/2022  <0.1 ng/ml on 10/20/2021  <0.1 ng/ml on 07/01/2021    IMPRESSION:    1. PROSTATE ADENOCARCINOMA (JHZ40-R18) - Improved    2. URINARY FREQUENCY (WKK64-W35.0) - Improved    3.  HOT FLASHES IN MALE (LQV81-Q00.2) - Unchanged    PLAN: Continue Rapaflo. We will give him a prescription for Megace. He will follow up in 3 months with a PSA, sooner with any questions or difficulties. The patient was given a verbal/written log of Blood Pressure and recommendations. Crisis Hypertension: Refer patient to ER/PCP/Cardiologist/Renal for immediate (same day) followup. cc: MD Dr. Marisa Hollins, endocrinology      Dr. Marisa Stringer radiation oncology  Transcribed by Speech to Text Technology  Today's Services  E&M Service, Urinalysis Microscopic    Upcoming Orders  Return Office Visit - with Kyra Candelaria MD in 3 months  PSA          By signing my name below, I, Joel Thompson, attest that this documentation has been prepared under the direction and in the presence of Alexsander Love MD.   Electronically Signed: Phillip Dinero. January 5, 2022 9:56 AM.    I, Dr. Kyra Candelaria MD, personally performed the services described in this documentation. All medical record entries made by the scribe were at my direction and in my presence. I have reviewed the chart and agree that the record reflects my personal performance and is accurate and complete. Electronically signed by Kyra Candelaria MD on 01/05/2022 at 11:54 AM    ________________________________________________________________________        Past Medical History: Allergies   Allergen Reactions    Lisinopril Cough    Novocain [Procaine] Palpitations    Tamsulosin Other (comments)     Higher blood sugars      Prior to Admission medications    Medication Sig Start Date End Date Taking? Authorizing Provider   insulin glargine (Lantus Solostar U-100 Insulin) 100 unit/mL (3 mL) inpn INJECT 18 UNITS UNDER THE SKIN AT BEDTIME--Dose change 03/28/22--updated med list--did not send prescription to the pharmacy 3/28/22   Barbara Morillo MD   megestroL (MEGACE) 20 mg tablet Take 20 mg by mouth three (3) times daily as needed. 1/5/22   Provider, Historical   insulin aspart U-100 (NovoLOG Flexpen U-100 Insulin) 100 unit/mL (3 mL) inpn Inject 1:8 for carbs for breakfast and 1:9 for lunch and dinner before 9pm and 1:10 after 9pm and 1:50 > 150 for correction during the day and 1:50 > 180 after 9pm.  MAX 35/D 3/3/22   Dorina Ambriz MD   atorvastatin (Lipitor) 10 mg tablet Take 1 Tablet by mouth daily. 3/3/22   Dorina Ambriz MD   True Metrix Glucose Meter misc TEST UP TO 5 TIMES DAILY (INSULIN FLUCTUATING SUGARS). DX: E10.65 3/3/22   Dorina Ambriz MD   TRUEplus Lancets 33 gauge misc TEST UP TO 5 TIMES DAILY (INSULIN FLUCTUATING SUGARS). DX: E10.65 3/3/22   Dorina Ambriz MD   True Metrix Level 1 soln Use as directed 3/3/22   Dorina Ambriz MD   alcohol swabs (BD Single Use Swabs Regular) padm TEST UP TO 5 TIMES DAILY (INSULIN FLUCTUATING SUGARS). DX: E10.65 3/3/22   Dorina Ambriz MD   FreeStyle [place]roy 2 [place] misc Use as directed with freestyle delroy 2 sensors 10/14/21   Dorina Ambriz MD   FreeStyle Delroy 2 Sensor kit Use as directed to test blood sugars at least 4 times daily. Replace every 14 days 10/14/21   Dorina Ambriz MD   silodosin (RAPAFLO) 8 mg capsule  7/1/21   Provider, Historical   BD Luer-Rafa Syringe 3 mL 18 x 1 1/2\" syrg USE TO DRAW UP TESTOSTERONE UTD 8/13/20   Provider, Historical   Disposable Needles 22 gauge x 1 1/2\" ndle USE TO INJECT TESTOSTERONE UTD 8/13/20   Provider, Historical   b complex vitamins (B COMPLEX 1) tablet Take 1 Tab by mouth daily. Provider, Historical   aspirin 81 mg chewable tablet Take 81 mg by mouth daily. Other, MD Tyree   ONETOUCH ULTRA TEST strip TEST UP TO 5 TIMES DAILY   (INSULIN FLUCTUATING SUGARS). DX: E10.65 2/2/16   Dorina Ambriz MD   therapeutic multivitamin (THERA) tablet Take 1 Tab by mouth daily. Provider, Historical   cholecalciferol (Vitamin D) (2,000 UNITS /50 MCG) cap capsule Take 3 Tablets by mouth daily.     Provider, Historical      PMHx:  has a past medical history of Arthritis, Diabetes ([address] Foot ulcer ([address] Hepatitis, Hypertension, Leukopenia, Murmur, Other and unspecified hyperlipidemia, Prostate cancer ([address] (Fall of 2015), Sickle cell trait ([address] (7/3/2012), Thyroid disease, Type I (juvenile type) diabetes mellitus without mention of complication, uncontrolled (since 1979), and Unspecified sleep apnea. He has no past medical history of Asthma, Atrial fibrillation ([address] CAD (coronary artery disease), Carotid artery disease ([address] Chronic kidney disease, Chronic obstructive pulmonary disease ([address] Clotting disorder ([address] Congestive heart failure ([address] Glaucoma, HIV (human immunodeficiency virus infection) ([address] ICD (implantable cardioverter-defibrillator) in place, Long term current use of anticoagulant therapy, Myocardial infarction [facility]), Pacemaker, Pulmonary embolism ([address] Rheumatic fever, Seizures ([address] Stroke ([address] Syncope, or Valvular heart disease. PSurgHx:  has a past surgical history that includes hx urological (1991); hx heent (1969); hx heent (2010); hx orthopaedic (2002, 2006); hx orthopaedic; hx orthopaedic (Jan 2013); hx orthopaedic (Left, 2014); hx orthopaedic (Right); and hx cataract removal (Bilateral). PSocHx:  reports that he quit smoking about 16 years ago. He has a 35.00 pack-year smoking history. He has never used smokeless tobacco. He reports previous alcohol use. He reports that he does not use drugs. ROS:  Admission ROS by Severo Labella, MD from 5/1/2022 were reviewed with the patient and changes (other than per HPI) include: none.     Physical Exam    General Appearance: NAD, awake  HENT: atraumatic, normal ears  Cardiovascular: not tachycardic, no LE edema  Respiratory: no distress, room air  Abdomen: soft, no suprapubic fullness or tenderness  : no CVA tenderness  Extremities: moves all  Musculoskeletal: normal alignment of neck and head  Neuro: Appropriate, no focal neurological deficits  Mood/Affect: appropriate, A&O x 3      Lab Results   Component Value Date/Time    WBC 3.9 (L) 05/03/2022 03:27 AM    HCT 30.6 (L) 05/03/2022 03:27 AM    PLATELET 319 03/09/9622 03:27 AM    Sodium 138 05/03/2022 03:27 AM    Potassium 4.3 05/03/2022 03:27 AM    Chloride 106 05/03/2022 03:27 AM    CO2 27 05/03/2022 03:27 AM    BUN 18 05/03/2022 03:27 AM    Creatinine 0.87 05/03/2022 03:27 AM    Glucose 120 (H) 05/03/2022 03:27 AM    Calcium 9.2 05/03/2022 03:27 AM    Magnesium 2.2 07/10/2021 05:13 AM    INR 1.0 12/13/2016 12:26 PM       UA:   Lab Results   Component Value Date/Time    Color YELLOW/STRAW 05/01/2022 10:25 AM    Appearance CLOUDY (A) 05/01/2022 10:25 AM    Specific gravity 1.013 05/01/2022 10:25 AM    pH (UA) 7.0 05/01/2022 10:25 AM    Protein TRACE (A) 05/01/2022 10:25 AM    Glucose Negative 05/01/2022 10:25 AM    Ketone Negative 05/01/2022 10:25 AM    Bilirubin Negative 05/01/2022 10:25 AM    Urobilinogen 1.0 05/01/2022 10:25 AM    Nitrites Negative 05/01/2022 10:25 AM    Leukocyte Esterase LARGE (A) 05/01/2022 10:25 AM    Epithelial cells MANY (A) 05/01/2022 10:25 AM    Bacteria Negative 05/01/2022 10:25 AM    WBC >100 (H) 05/01/2022 10:25 AM    RBC 10-20 05/01/2022 10:25 AM         Signed By: Gwyn Tionco NP  - May 3, 2022

## 2022-05-04 LAB
ANION GAP SERPL CALC-SCNC: 4 MMOL/L (ref 5–15)
BACTERIA SPEC CULT: NORMAL
BUN SERPL-MCNC: 21 MG/DL (ref 6–20)
BUN/CREAT SERPL: 23 (ref 12–20)
CALCIUM SERPL-MCNC: 9 MG/DL (ref 8.5–10.1)
CHLORIDE SERPL-SCNC: 105 MMOL/L (ref 97–108)
CO2 SERPL-SCNC: 28 MMOL/L (ref 21–32)
CREAT SERPL-MCNC: 0.93 MG/DL (ref 0.7–1.3)
GLUCOSE BLD STRIP.AUTO-MCNC: 210 MG/DL (ref 65–117)
GLUCOSE BLD STRIP.AUTO-MCNC: 311 MG/DL (ref 65–117)
GLUCOSE BLD STRIP.AUTO-MCNC: 382 MG/DL (ref 65–117)
GLUCOSE BLD STRIP.AUTO-MCNC: 383 MG/DL (ref 65–117)
GLUCOSE BLD STRIP.AUTO-MCNC: 435 MG/DL (ref 65–117)
GLUCOSE SERPL-MCNC: 285 MG/DL (ref 65–100)
POTASSIUM SERPL-SCNC: 4.4 MMOL/L (ref 3.5–5.1)
SERVICE CMNT-IMP: ABNORMAL
SERVICE CMNT-IMP: NORMAL
SODIUM SERPL-SCNC: 137 MMOL/L (ref 136–145)

## 2022-05-04 PROCEDURE — 74011250636 HC RX REV CODE- 250/636: Performed by: INTERNAL MEDICINE

## 2022-05-04 PROCEDURE — 82962 GLUCOSE BLOOD TEST: CPT

## 2022-05-04 PROCEDURE — 74011250637 HC RX REV CODE- 250/637: Performed by: INTERNAL MEDICINE

## 2022-05-04 PROCEDURE — 65270000015 HC RM PRIVATE ONCOLOGY

## 2022-05-04 PROCEDURE — 36415 COLL VENOUS BLD VENIPUNCTURE: CPT

## 2022-05-04 PROCEDURE — 74011636637 HC RX REV CODE- 636/637: Performed by: INTERNAL MEDICINE

## 2022-05-04 PROCEDURE — 74011250637 HC RX REV CODE- 250/637: Performed by: NURSE PRACTITIONER

## 2022-05-04 PROCEDURE — 80048 BASIC METABOLIC PNL TOTAL CA: CPT

## 2022-05-04 PROCEDURE — 77030018786 HC NDL GD F/USND BARD -B

## 2022-05-04 PROCEDURE — C1751 CATH, INF, PER/CENT/MIDLINE: HCPCS

## 2022-05-04 PROCEDURE — 74011000258 HC RX REV CODE- 258: Performed by: INTERNAL MEDICINE

## 2022-05-04 PROCEDURE — 76937 US GUIDE VASCULAR ACCESS: CPT

## 2022-05-04 PROCEDURE — C1894 INTRO/SHEATH, NON-LASER: HCPCS

## 2022-05-04 PROCEDURE — 99358 PROLONG SERVICE W/O CONTACT: CPT | Performed by: INTERNAL MEDICINE

## 2022-05-04 PROCEDURE — 94760 N-INVAS EAR/PLS OXIMETRY 1: CPT

## 2022-05-04 RX ADMIN — CHOLECALCIFEROL TAB 25 MCG (1000 UNIT) 6000 UNITS: 25 TAB at 12:04

## 2022-05-04 RX ADMIN — ATORVASTATIN CALCIUM 10 MG: 10 TABLET, FILM COATED ORAL at 10:35

## 2022-05-04 RX ADMIN — PHENAZOPYRIDINE HYDROCHLORIDE 200 MG: 100 TABLET ORAL at 13:32

## 2022-05-04 RX ADMIN — NEPHROCAP 1 CAPSULE: 1 CAP ORAL at 10:35

## 2022-05-04 RX ADMIN — Medication 4 UNITS: at 18:10

## 2022-05-04 RX ADMIN — SILODOSIN 8 MG: 4 CAPSULE ORAL at 10:38

## 2022-05-04 RX ADMIN — ASPIRIN 81 MG: 81 TABLET, CHEWABLE ORAL at 10:35

## 2022-05-04 RX ADMIN — PHENAZOPYRIDINE HYDROCHLORIDE 200 MG: 100 TABLET ORAL at 18:10

## 2022-05-04 RX ADMIN — CEFEPIME HYDROCHLORIDE 2 G: 2 INJECTION, POWDER, FOR SOLUTION INTRAVENOUS at 06:41

## 2022-05-04 RX ADMIN — THERA TABS 1 TABLET: TAB at 10:37

## 2022-05-04 RX ADMIN — Medication 12 UNITS: at 10:36

## 2022-05-04 RX ADMIN — PIPERACILLIN AND TAZOBACTAM 3.38 G: 3; .375 INJECTION, POWDER, LYOPHILIZED, FOR SOLUTION INTRAVENOUS at 22:01

## 2022-05-04 RX ADMIN — Medication 12 UNITS: at 13:30

## 2022-05-04 RX ADMIN — PIPERACILLIN AND TAZOBACTAM 3.38 G: 3; .375 INJECTION, POWDER, LYOPHILIZED, FOR SOLUTION INTRAVENOUS at 15:02

## 2022-05-04 RX ADMIN — Medication 1 CAPSULE: at 10:37

## 2022-05-04 RX ADMIN — INSULIN GLARGINE 15 UNITS: 100 INJECTION, SOLUTION SUBCUTANEOUS at 10:36

## 2022-05-04 RX ADMIN — PHENAZOPYRIDINE HYDROCHLORIDE 200 MG: 100 TABLET ORAL at 10:37

## 2022-05-04 RX ADMIN — Medication 4.5 UNITS: at 22:01

## 2022-05-04 NOTE — PROGRESS NOTES
End of Shift Note    Bedside shift change report given to Elizabeth (oncoming nurse) by Brody Jimenez (offgoing nurse). Report included the following information SBAR, Kardex and MAR    Shift worked:  7am to 7pm     Shift summary and any significant changes:     Patient tolerated care fairly throughout shift. Hourly rounding completed. Medications given and education provided regarding all meds. Patient family present at bedside. No complaints of pain and patient abraham care completed.          Brody Jimenez

## 2022-05-04 NOTE — PROGRESS NOTES
End of Shift Note    Bedside shift change report given to Brian Orta (oncoming nurse) by Sam Jay RN (offgoing nurse). Report included the following information SBAR, Kardex, ED Summary, Intake/Output, MAR and Recent Results    Shift worked:  [id number]     Shift summary and any significant changes:     Patient no c/o pain. Vitals WNL. A febrile. Tolerating PO. Abraham patent draining orange , clear urine QS. Concerns for physician to address:  see above     Zone phone for oncoming shift:  [phone]     Activity:  Activity Level: Up with Assistance  Number times ambulated in hallways past shift: 0  Number of times OOB to chair past shift: 0    Cardiac:   Cardiac Monitoring: No      Cardiac Rhythm: Sinus Rhythm    Access:   Current line(s): PIV     Genitourinary:   Urinary status: abraham    Respiratory:   O2 Device: None (Room air)  Chronic home O2 use?: NO  Incentive spirometer at bedside: NO       GI:  Last Bowel Movement Date: 05/03/22  Current diet:  ADULT DIET Regular; 4 carb choices (60 gm/meal)  Passing flatus: YES  Tolerating current diet: YES       Pain Management:   Patient states pain is manageable on current regimen: NO c/o pain    Skin:  Ottoniel Score: 22  Interventions: increase time out of bed, PT/OT consult and internal/external urinary devices    Patient Safety:  Fall Score:  Total Score: 3  Interventions: bed/chair alarm, assistive device (walker, cane, etc), gripper socks and pt to call before getting OOB  High Fall Risk: Yes    Length of Stay:  Expected LOS: 2d 21h  Actual LOS: 1      Trupti Dsouza RN

## 2022-05-04 NOTE — PROGRESS NOTES
Hospitalist Progress Note    NAME: Guanakito Guardado   :  1945   MRN:  [id number]     HPI excerpted from admission H&P:  \"He reported that he has had recurrent UTI for last one year, since cryotherapy for his prostate. He also complains of a nagging cough and occasional phlegm - s/p extensive cardiac, pulmonary workup. He recently was diagnosed with UTI and he is s/p multiple courses of Abx. Most recently around 22 he developed increased urinary frequency, dysuria and discomfort in the lower abdomen. He came to [facility] and here he was started on PO cephalosporin for suspected UTI. Today he was recalled to the hospital after urine Cx result came back +ve for Pseudomonas that is only sensitive to IV abx. He reported his urinary symptoms have somewhat improved but not resolved. \"    Assessment / Plan:  Pseudomonal UTI in setting of multiple prior UTIs  H/O Prostate cancer EBRT  and Cryo   Non-obstructing renal calculi, non-contributory   Urinary retention  CT abdomen/pelvis 22:  1. Tiny nonobstructing right renal calculus, unchanged since . No other urinary tract calculi. No hydronephrosis. 2.  Central low attenuation within the prostate gland, most consistent with post therapeutic change. Thickening of the wall of the urinary bladder. Urine culture 22:  Pseudomonas Aeruginosa. Urine cultre 22:  GNR.  - ID input appreciated. - Urology input appreciated. - Patient had negative blood culture. - Continue current abx (cefepime) for 2 weeks, end date 05/15/22.    - Request PICC placement. - Continue probiotic 1 cap po daily. - Continue silodosin 8 mg po daily. - Continue phenazopyridine 200 mg po tid. - Discharge with Tre[facility]ino. - Plan for OP follow up with Dr. Jose Manuel. DM II  Hyperglycemia  - Last HgbA1C 9.2  - Continue basal (glargine) insulin 15 units sc daily. - Continue FSBS with SSI correction scale.      Dyslipidemia   - Continue asa 81 mg po daily. - Continue atorvastatin 10 mg po daily. Mild leucopenia   Chronic anemia   - No tx indicated. - Monitor.  - OP follow up. Mild azotemia  - Encourage po intake. - OP follow up. H/O osteomyelitis of right foot  - Patient received 6 weeks IV abx.       18.5 - 24.9 Normal weight / Body mass index is 21.56 kg/m². Estimated discharge date: May 5  Barriers:    Code status: Full  Prophylaxis: SCD's  Recommended Disposition: Home w/Family     Subjective:     Chief Complaint / Reason for Physician Visit  No complaints. Denies pain. Plan of care and pertinent events reviewed with bedside nurse. Review of Systems:  Symptom Y/N Comments  Symptom Y/N Comments   Fever/Chills N   Chest Pain N    Poor Appetite N   Edema N    Cough N   Abdominal Pain N    Sputum N   Joint Pain N    SOB/DEL CASTILLO N   Pruritis/Rash N    Nausea/vomit N   Tolerating PT/OT     Diarrhea N   Tolerating Diet Y    Constipation    Other       Could NOT obtain due to:      Objective:     VITALS:   Last 24hrs VS reviewed since prior progress note. Most recent are:  Patient Vitals for the past 24 hrs:   Temp Pulse Resp BP SpO2   05/04/22 0800 98.2 °F (36.8 °C) 83 16 (!) 152/69 100 %   05/04/22 0319 98.3 °F (36.8 °C) 81 18 (!) 134/56 97 %   05/03/22 2310 97.9 °F (36.6 °C) 82 18 (!) 124/56 100 %   05/03/22 2014 97.7 °F (36.5 °C) 79 18 (!) 121/54 98 %   05/03/22 1541 98.2 °F (36.8 °C) 90 18 (!) 138/53 99 %   05/03/22 1135 97.3 °F (36.3 °C) 95 18 139/64 98 %       Intake/Output Summary (Last 24 hours) at 5/4/2022 0831  Last data filed at [address] [place] [address] [id number]  Gross per 24 hour   Intake 850 ml   Output 2375 ml   Net -1525 ml        I had a face to face encounter and independently examined this patient on 5/4/2022, as outlined below:  PHYSICAL EXAM:  General:  A/A/O X 3. NAD. HEENT:  Normocephalic. Sclera anicteric. Mucous membranes moist.    Chest:  Resps even/unlabored with symmetrical CWE. Air entry full. Lungs CTA. No use of accessory muscles. CV:  RRR. Normal S1/S2. No M/C/R appreciated. No JVD. No peripheral edema. Cap refill < 3 sec. Peripheral pulses 2+. GI:  Abdomen soft/NT/ND. No organomegaly. No hernia. ABT X 4.    :  Trevino intact. Neurologic:  Nonfocal.  CN II-XII grossly intact. Face symmetrical.  Speech normal.     Psych:  Cooperative. No anxiety or agitation. No suicidal/homicidal ideation. Skin:  Warm and color appropriate. No rashes or jaundice. Turgor elastic. Reviewed most current lab test results and cultures  YES  Reviewed most current radiology test results   YES  Review and summation of old records today    NO  Reviewed patient's current orders and MAR    YES  PMH/SH reviewed - no change compared to H&P  ________________________________________________________________________  Care Plan discussed with:    Comments   Patient [address]     Consultant                        Multidiciplinary team rounds were held today with , nursing, pharmacist and clinical coordinator. Patient's plan of care was discussed; medications were reviewed and discharge planning was addressed. ________________________________________________________________________  Octavia Buckley NP     Procedures: see electronic medical records for all procedures/Xrays and details which were not copied into this note but were reviewed prior to creation of Plan. LABS:  I reviewed today's most current labs and imaging studies.   Pertinent labs include:  Recent Labs     05/03/22  0327 05/02/22  0403 05/01/22  1150   WBC 3.9* 3.7* 3.1*   HGB 9.9* 9.6* 9.6*   HCT 30.6* 29.5* 29.9*    236 234     Recent Labs     05/04/22  0410 05/03/22  0327 05/02/22  0403 05/01/22  1019 05/01/22  1019    138 139   < > 139   K 4.4 4.3 4.1   < > 4.4    106 107   < > 106   CO2 28 27 27   < > 31   * 120* 136*   < > 160*   BUN 21* 18 16   < > 12   CREA 0.93 0.87 0.92 < > 1.08   CA 9.0 9.2 9.2   < > 9.5   ALB  --   --   --   --  3.2*   TBILI  --   --   --   --  0.3   ALT  --   --   --   --  29    < > = values in this interval not displayed.        Signed: Jani Dominguez NP

## 2022-05-04 NOTE — PROGRESS NOTES
Infectious Disease progress    . Impression  -Acute Pseudomonas UTI  Urine culture- 4/28->100,000 Pseudomonas aeruginosa  (Quinolone resistant)  Urine culture - 5/1- 5000 GNR  Negative blood cultures  H/o recurrent UTI since 10/2021  Pt is s/p multiple courses of oral antibiotics. Recent h/o being on Cipro p.o.PTA.  CT abdomen pelvis-Tiny nonobstructing right renal calculus, unchanged since 2020. No other  urinary tract calculi. No hydronephrosis. 2. Central low attenuation within the prostate gland, most consistent with post  therapeutic change. Thickening of the wall of the urinary bladder    -H/o CA prostate  H/o EBRT 2016, cryo 2021    - H/o osteomyelitis R/foot  Foot cultures were + for Pseudomonas ( Quinolone S ) & E.faecalis 7/2021  Pt was treated with cefepime and daptomycin x 6 weeks    -Diabetes type 2, poor control  A1c 9.2    - H/o penile prosthesis  No other indwelling prosthetic devices    Plan   -Change to Zosyn 3.375 G  every 8 hourly x 2 weeks end date 5/15  ( Better susceptibility than Cefepime)   -Remove line at the end of therapy on 5/15  -Weekly CBC, CMP-fax reports to [phone], call with critical labs at [phone]  -Adequate fluid intake  -Daily probiotic/yogurt  -Blood sugar control  -Urology outpatient nucrck-xd-cabiqwfxao input. Possible adverse effects of long term antibiotics are inclusive of but not limited to following  BM suppression, neutropenia , cytopenias , aplastic anemia hemorrhage liver & renal dysfunction/ liver , renal failure  , GI dysfunction- N, V  Diarrhea,C.difficile disease, rash , allergy , anaphylaxis. toxic epidermal necrolysis  Neuro toxicity , seizure disorder  Side effects tend to be more pronounced in the elderly    Discussed urine culture report, VINNY for Pseudomonas with pharmacy. Zosyn referred over cefepime . Will change antibiotic to Zosyn IV .       Special Requests:   NO SPECIAL REQUESTS   Reflexed from [id number]     [place] Count   >100,000 COLONIES/mL     Culture result:   PSEUDOMONAS AERUGINOSA Abnormal         Pseudomonas aeruginosa     VINNY     Amikacin ($) Susceptible     Cefepime ($$) Susceptible     Ceftazidime ($) Susceptible     Ciprofloxacin ($) Intermediate 1     Gentamicin ($) Susceptible     Levofloxacin ($) Resistant     Meropenem ($$) Susceptible     Piperacillin/Tazobac ($) Susceptible 1     Tobramycin ($) Susceptible                      Jonah Chavez is a 58-year-old male with a history of prostate cancer, frequent UTIs, who presented with  with UTI resistant to oral medications. Per H&P Patient stated that he has been having urinary discomfort when he urinates for some time. He has been in and out of urgent care as   well as this ER and was told that he had a UTI and then that he did have a UTI and then told that he did have a UTI on the 28th and discharged   on Keflex. Patient was  called back as urine culture was growing out Pseudomonas and needed IV antibiotics. Patient states he has completed   a course of ciprofloxacin very recently. He has had multiple urinary tract infections since October 2021. He had a prostate operation in May 2021. He has been on antibiotics 7 times since October. Most recently, he was diagnosed with a urinary tract infection, and was treated with Cipro. He [facility] a [facility] from [facility] on April 12, stating that the culture indicated he needed IV antibiotics. He followed up with his PCP on April 18,   and was diagnosed with a UTI. On April 20, his urine culture resulted and it was negative for infection. He was advised to follow-up with urology. .  Urine culture- 4/28->100,000 Pseudomonas aeruginosa  (Quinolone resistant)    Negative blood cultures. Patient reports having some dysuria. Hesitancy . no pain or burning sensation.     Past Medical History:   Diagnosis Date    Arthritis     in shoulders    Diabetes [facility]     Foot ulcer ([facility]     Hepatitis     while in the [employer] in 1968    Hypertension     Leukopenia     benign due to being African American    Murmur     Other and unspecified hyperlipidemia     Prostate cancer ([facility] Fall 2015    hormone treatment and external beam radiation    Sickle cell trait ([facility] 7/3/2012    Thyroid disease     Type I (juvenile type) diabetes mellitus without mention of complication, uncontrolled since     Unspecified sleep apnea     has CPAP-BUT DOESN'T USE     psh  Allergies   Allergen Reactions    Lisinopril Cough    Novocain [Procaine] Palpitations    Tamsulosin Other (comments)     Higher blood sugars     Social History     Socioeconomic History    Marital status:      Spouse name: Not on file    Number of children: Not on file    Years of education: Not on file    Highest education level: Not on file   Occupational History    Not on file   Tobacco Use    Smoking status: Former Smoker     Packs/day: 1.00     Years: 35.00     Pack years: 35.00     Quit date: 2006     Years since quittin.2    Smokeless tobacco: Never Used   Vaping Use    Vaping Use: Never used   Substance and Sexual Activity    Alcohol use: Not Currently    Drug use: No    Sexual activity: Not Currently     Partners: Female     Birth control/protection: None   Other Topics Concern    Not on file   Social History Narrative    Lives in [facility] [place] [facility] alone. Has 1 daughter. Retired. Used to work at [employer] until [address]. Likes to play golf and work in the yard. Likes to read. 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 Social Determinants of Health     Financial Resource Strain:     Difficulty of Paying Living Expenses: Not on file   Food Insecurity:     Worried About Running Out of Food in the Last Year: Not on file    Delicia of Food in the Last Year: Not on file   Transportation Needs:     Lack of Transportation (Medical): Not on file    Lack of Transportation (Non-Medical):  Not on file   Physical Activity:     Days of Exercise per Week: Not on file    Minutes of Exercise per Session: Not on file   Stress:     Feeling of Stress : Not on file   Social Connections:     Frequency of Communication with Friends and Family: Not on file    Frequency of Social Gatherings with Friends and Family: Not on file    Attends Taoist Services: Not on file    Active Member of [address] [place] [address] [employer] or Organizations: Not on file    Attends Club or Organization Meetings: Not on file    Marital Status: Not on file   Intimate Partner Violence:     Fear of Current or Ex-Partner: Not on file    Emotionally Abused: Not on file    Physically Abused: Not on file    Sexually Abused: Not on file   Housing Stability:     Unable to Pay for Housing in the Last Year: Not on file    Number of [place] in the Last Year: Not on file    Unstable Housing in the Last Year: Not on file     Family Status   Relation Name Status    Mother     Duenas Father     Duenas Sister      Brother      Brother      Sister  Alive    Sister  Alive    Neg Hx  (Not Specified)     Medication Documentation Review Audit     Reviewed by Sarah Vicente RN (Registered Nurse) on 22 at [phone]    Medication Sig Documenting Provider Last Dose Status Taking? alcohol swabs (BD Single Use Swabs Regular) padm TEST UP TO 5 TIMES DAILY (INSULIN FLUCTUATING SUGARS). DX: E10.65 Nina Leiva MD  Active    aspirin 81 mg chewable tablet Take 81 mg by mouth daily. Other, MD Tyree  Active            Med Note Ilan Lever May 13, 2019  2:24 PM)     atorvastatin (Lipitor) 10 mg tablet Take 1 Tablet by mouth daily. Nina Leiva MD  Active    b complex vitamins (B COMPLEX 1) tablet Take 1 Tab by mouth daily. Provider, Historical  Active    BD Luer-Rafa Syringe 3 mL 18 x 1 1/2\" syrg USE TO DRAW UP TESTOSTERONE UTD Provider, Historical  Active    cephALEXin (Keflex) 500 mg capsule Take 1 Capsule by mouth three (3) times daily.  Linda Dupree MD  Active cholecalciferol (Vitamin D) (2,000 UNITS /50 MCG) cap capsule Take 3 Tablets by mouth daily. Provider, Historical  Active            Med Note Rik Aden May 13, 2019  2:24 PM)     Disposable Needles 22 gauge x 1 1/2\" ndle USE TO INJECT TESTOSTERONE UTD Provider, Historical  Active    FreeStyle Jc 2 [place] misc Use as directed with freestyle delroy 2 sensors Di Hughes MD  Active    FreeStyle Delroy 2 Sensor kit Use as directed to test blood sugars at least 4 times daily. Replace every 14 days Di Hughes MD  Active    insulin aspart U-100 (NovoLOG Flexpen U-100 Insulin) 100 unit/mL (3 mL) inpn Inject 1:8 for carbs for breakfast and 1:9 for lunch and dinner before 9pm and 1:10 after 9pm and 1:50 > 150 for correction during the day and 1:50 > 180 after 9pm.  MAX 35/D Di Hughes MD  Active    insulin glargine (Lantus Solostar U-100 Insulin) 100 unit/mL (3 mL) inpn INJECT 18 UNITS UNDER THE SKIN AT BEDTIME--Dose change 03/28/22--updated med list--did not send prescription to the pharmacy Di Hughes MD  Active    megestroL (MEGACE) 20 mg tablet Take 20 mg by mouth three (3) times daily as needed. Provider, Historical  Active    ONETOUCH ULTRA TEST strip TEST UP TO 5 TIMES DAILY   (INSULIN FLUCTUATING SUGARS). DX: E10.65 Di Hughes MD  Active    silodosin (RAPAFLO) 8 mg capsule  Provider, Historical  Active    therapeutic multivitamin (THERA) tablet Take 1 Tab by mouth daily. Provider, Historical  Active            Med Note (Robson Jimenez May 13, 2019  2:24 PM)     True Metrix Glucose Meter misc TEST UP TO 5 TIMES DAILY (INSULIN FLUCTUATING SUGARS). DX: V71.56 Di Hughes MD  Active    True Metrix Level 1 soln Use as directed Di Hughes MD  Active    TRUEplus Lancets 33 gauge misc TEST UP TO 5 TIMES DAILY (INSULIN FLUCTUATING SUGARS).  DX: P83.46 Di Hughes MD  Active               Marciano Beard MD FAC

## 2022-05-04 NOTE — PROGRESS NOTES
Problem: Falls - Risk of  Goal: *Absence of Falls  Description: Document Brain Jaquez Fall Risk and appropriate interventions in the flowsheet.   Outcome: Progressing Towards Goal  Note: Fall Risk Interventions:  Mobility Interventions: Bed/chair exit alarm,Communicate number of staff needed for ambulation/transfer,OT consult for ADLs,Patient to call before getting OOB,PT Consult for mobility concerns,PT Consult for assist device competence,Utilize walker, cane, or other assistive device         Medication Interventions: Bed/chair exit alarm,Patient to call before getting OOB,Teach patient to arise slowly         History of Falls Interventions: Bed/chair exit alarm,Door open when patient unattended,Room close to nurse's station         Problem: Patient Education: Go to Patient Education Activity  Goal: Patient/Family Education  Outcome: Progressing Towards Goal     Problem: General Medical Care Plan  Goal: *Vital signs within specified parameters  Outcome: Progressing Towards Goal  Goal: *Skin integrity maintained  Outcome: Progressing Towards Goal  Goal: *Fluid volume balance  Outcome: Progressing Towards Goal  Goal: *Anxiety reduced or absent  Outcome: Progressing Towards Goal

## 2022-05-04 NOTE — PROGRESS NOTES
Transition of Care Plan:    RUR: Obs   Disposition: Home with family support, Picc Line (placement pending), IV abx-[place] [facility]   Follow up appointments: Follow up with PCP and/or Specialist   DME needed:N/A  Transportation at Discharge: Family will assist with transport   [address] or means to access home:  Family will provide   IM Medicare Letter: 2nd IM Medicare Letter to be given   Is patient a BCPI-A Bundle:   CM will provide if required         If yes, was Bundle Letter given?:    Is patient a  and connected with the [place]? N/A           If yes, was [place] transfer form completed and [facility] notified? Caregiver Contact: Yaron Self (relative) [phone] and Benito Curtis (friend) [phone]  Discharge Caregiver contacted prior to discharge? Family to be contacted   Care Conference needed?:    Not at this time    UPDATE: 2:48PM    CM acknowledged order and sent updated clinical [facility] (infusion). HANS [place], [place]        UPDATE: 11:11A    CM informed that IR will have to place picc line. Pt will d/c within 24hr. CM informed by [address] [place] [address] (infusion agency), that pt IV abx will be fully covered at 100%. CM will inform pt of the following. [facility] agency still pending. HANS [place], [place]        INITIAL NOTE: CM acknowledged consult. CM spoke with clinical team and was informed that pt will require IV abx at the time of d/c. Pt has had IV abx in the past, and is able to completee IV abx by himself. CM spoke with pt's friend: Traci Zuleta, via telephone regarding [place] [id number] and IV abx recommendations. Traci Zuleta confirmed that pt has had IV abx in the past, and she and, pt's family member: Reino Opitz, have assisted in the past, and plans to assist pt at the time of d/c.  Traci Zuleta will receive teaching and education regarding infusion. CM will send clinicals to infusion agency: [facility] Catherine Limbo will occur for pricing).   CM will send referral to [facility] agency that will assist with infusion and pt/ot. CM will inform clinical team and pt of the following. CM will continue to follow.     Coco Alexandra MSW, [address]

## 2022-05-05 ENCOUNTER — HOSPITAL ENCOUNTER (INPATIENT)
Dept: INTERVENTIONAL RADIOLOGY/VASCULAR | Age: 77
Discharge: HOME | DRG: 690 | End: 2022-05-05
Attending: NURSE PRACTITIONER
Payer: MEDICARE

## 2022-05-05 VITALS
OXYGEN SATURATION: 96 % | HEART RATE: 101 BPM | DIASTOLIC BLOOD PRESSURE: 53 MMHG | SYSTOLIC BLOOD PRESSURE: 129 MMHG | RESPIRATION RATE: 16 BRPM

## 2022-05-05 LAB
ANION GAP SERPL CALC-SCNC: 7 MMOL/L (ref 5–15)
BASOPHILS # BLD: 0 K/UL (ref 0–0.1)
BASOPHILS NFR BLD: 0 % (ref 0–1)
BUN SERPL-MCNC: 18 MG/DL (ref 6–20)
BUN/CREAT SERPL: 18 (ref 12–20)
CALCIUM SERPL-MCNC: 9 MG/DL (ref 8.5–10.1)
CHLORIDE SERPL-SCNC: 105 MMOL/L (ref 97–108)
CO2 SERPL-SCNC: 24 MMOL/L (ref 21–32)
CREAT SERPL-MCNC: 1.01 MG/DL (ref 0.7–1.3)
DIFFERENTIAL METHOD BLD: ABNORMAL
EOSINOPHIL # BLD: 0.3 K/UL (ref 0–0.4)
EOSINOPHIL NFR BLD: 7 % (ref 0–7)
ERYTHROCYTE [DISTWIDTH] IN BLOOD BY AUTOMATED COUNT: 15.2 % (ref 11.5–14.5)
GLUCOSE BLD STRIP.AUTO-MCNC: 198 MG/DL (ref 65–117)
GLUCOSE BLD STRIP.AUTO-MCNC: 242 MG/DL (ref 65–117)
GLUCOSE BLD STRIP.AUTO-MCNC: 305 MG/DL (ref 65–117)
GLUCOSE BLD STRIP.AUTO-MCNC: 307 MG/DL (ref 65–117)
GLUCOSE SERPL-MCNC: 173 MG/DL (ref 65–100)
HCT VFR BLD AUTO: 31.6 % (ref 36.6–50.3)
HGB BLD-MCNC: 10.4 G/DL (ref 12.1–17)
IMM GRANULOCYTES # BLD AUTO: 0 K/UL (ref 0–0.04)
IMM GRANULOCYTES NFR BLD AUTO: 0 % (ref 0–0.5)
LYMPHOCYTES # BLD: 0.7 K/UL (ref 0.8–3.5)
LYMPHOCYTES NFR BLD: 14 % (ref 12–49)
MCH RBC QN AUTO: 27.5 PG (ref 26–34)
MCHC RBC AUTO-ENTMCNC: 32.9 G/DL (ref 30–36.5)
MCV RBC AUTO: 83.6 FL (ref 80–99)
MONOCYTES # BLD: 0.6 K/UL (ref 0–1)
MONOCYTES NFR BLD: 12 % (ref 5–13)
NEUTS SEG # BLD: 3.2 K/UL (ref 1.8–8)
NEUTS SEG NFR BLD: 67 % (ref 32–75)
NRBC # BLD: 0 K/UL (ref 0–0.01)
NRBC BLD-RTO: 0 PER 100 WBC
PLATELET # BLD AUTO: 264 K/UL (ref 150–400)
PMV BLD AUTO: 9.1 FL (ref 8.9–12.9)
POTASSIUM SERPL-SCNC: 4 MMOL/L (ref 3.5–5.1)
RBC # BLD AUTO: 3.78 M/UL (ref 4.1–5.7)
SERVICE CMNT-IMP: ABNORMAL
SODIUM SERPL-SCNC: 136 MMOL/L (ref 136–145)
WBC # BLD AUTO: 4.8 K/UL (ref 4.1–11.1)

## 2022-05-05 PROCEDURE — 74011250636 HC RX REV CODE- 250/636: Performed by: INTERNAL MEDICINE

## 2022-05-05 PROCEDURE — 87635 SARS-COV-2 COVID-19 AMP PRB: CPT

## 2022-05-05 PROCEDURE — 36415 COLL VENOUS BLD VENIPUNCTURE: CPT

## 2022-05-05 PROCEDURE — 65270000015 HC RM PRIVATE ONCOLOGY

## 2022-05-05 PROCEDURE — 74011250636 HC RX REV CODE- 250/636: Performed by: STUDENT IN AN ORGANIZED HEALTH CARE EDUCATION/TRAINING PROGRAM

## 2022-05-05 PROCEDURE — 82962 GLUCOSE BLOOD TEST: CPT

## 2022-05-05 PROCEDURE — 74011000250 HC RX REV CODE- 250: Performed by: STUDENT IN AN ORGANIZED HEALTH CARE EDUCATION/TRAINING PROGRAM

## 2022-05-05 PROCEDURE — 74011000258 HC RX REV CODE- 258: Performed by: INTERNAL MEDICINE

## 2022-05-05 PROCEDURE — C1751 CATH, INF, PER/CENT/MIDLINE: HCPCS

## 2022-05-05 PROCEDURE — 74011250637 HC RX REV CODE- 250/637: Performed by: INTERNAL MEDICINE

## 2022-05-05 PROCEDURE — 94760 N-INVAS EAR/PLS OXIMETRY 1: CPT

## 2022-05-05 PROCEDURE — 74011250637 HC RX REV CODE- 250/637: Performed by: NURSE PRACTITIONER

## 2022-05-05 PROCEDURE — C1769 GUIDE WIRE: HCPCS

## 2022-05-05 PROCEDURE — 74011636637 HC RX REV CODE- 636/637: Performed by: INTERNAL MEDICINE

## 2022-05-05 PROCEDURE — 2709999900 HC NON-CHARGEABLE SUPPLY

## 2022-05-05 PROCEDURE — 74011000250 HC RX REV CODE- 250: Performed by: INTERNAL MEDICINE

## 2022-05-05 PROCEDURE — 02HV33Z INSERTION OF INFUSION DEVICE INTO SUPERIOR VENA CAVA, PERCUTANEOUS APPROACH: ICD-10-PCS | Performed by: STUDENT IN AN ORGANIZED HEALTH CARE EDUCATION/TRAINING PROGRAM

## 2022-05-05 PROCEDURE — 85025 COMPLETE CBC W/AUTO DIFF WBC: CPT

## 2022-05-05 PROCEDURE — 80048 BASIC METABOLIC PNL TOTAL CA: CPT

## 2022-05-05 PROCEDURE — 74011000636 HC RX REV CODE- 636: Performed by: STUDENT IN AN ORGANIZED HEALTH CARE EDUCATION/TRAINING PROGRAM

## 2022-05-05 PROCEDURE — 36573 INSJ PICC RS&I 5 YR+: CPT

## 2022-05-05 PROCEDURE — 99233 SBSQ HOSP IP/OBS HIGH 50: CPT | Performed by: INTERNAL MEDICINE

## 2022-05-05 PROCEDURE — C1892 INTRO/SHEATH,FIXED,PEEL-AWAY: HCPCS

## 2022-05-05 RX ORDER — HEPARIN SODIUM 200 [USP'U]/100ML
400 INJECTION, SOLUTION INTRAVENOUS ONCE
Status: COMPLETED | OUTPATIENT
Start: 2022-05-05 | End: 2022-05-05

## 2022-05-05 RX ORDER — INSULIN GLARGINE 100 [IU]/ML
18 INJECTION, SOLUTION SUBCUTANEOUS DAILY
Status: DISCONTINUED | OUTPATIENT
Start: 2022-05-06 | End: 2022-05-06 | Stop reason: HOSPADM

## 2022-05-05 RX ORDER — LIDOCAINE HYDROCHLORIDE 20 MG/ML
18 INJECTION, SOLUTION INFILTRATION; PERINEURAL ONCE
Status: COMPLETED | OUTPATIENT
Start: 2022-05-05 | End: 2022-05-05

## 2022-05-05 RX ADMIN — PIPERACILLIN AND TAZOBACTAM 3.38 G: 3; .375 INJECTION, POWDER, LYOPHILIZED, FOR SOLUTION INTRAVENOUS at 05:10

## 2022-05-05 RX ADMIN — CHOLECALCIFEROL TAB 25 MCG (1000 UNIT) 6000 UNITS: 25 TAB at 09:12

## 2022-05-05 RX ADMIN — SODIUM CHLORIDE, PRESERVATIVE FREE 10 ML: 5 INJECTION INTRAVENOUS at 05:11

## 2022-05-05 RX ADMIN — SILODOSIN 8 MG: 4 CAPSULE ORAL at 09:14

## 2022-05-05 RX ADMIN — ASPIRIN 81 MG: 81 TABLET, CHEWABLE ORAL at 09:11

## 2022-05-05 RX ADMIN — Medication 1 CAPSULE: at 09:13

## 2022-05-05 RX ADMIN — NEPHROCAP 1 CAPSULE: 1 CAP ORAL at 09:12

## 2022-05-05 RX ADMIN — Medication 4 UNITS: at 09:15

## 2022-05-05 RX ADMIN — LIDOCAINE HYDROCHLORIDE 200 MG: 20 INJECTION, SOLUTION INFILTRATION; PERINEURAL at 14:50

## 2022-05-05 RX ADMIN — IOPAMIDOL 5 ML: 755 INJECTION, SOLUTION INTRAVENOUS at 14:50

## 2022-05-05 RX ADMIN — PHENAZOPYRIDINE HYDROCHLORIDE 200 MG: 100 TABLET ORAL at 13:37

## 2022-05-05 RX ADMIN — INSULIN GLARGINE 15 UNITS: 100 INJECTION, SOLUTION SUBCUTANEOUS at 09:14

## 2022-05-05 RX ADMIN — PHENAZOPYRIDINE HYDROCHLORIDE 200 MG: 100 TABLET ORAL at 09:13

## 2022-05-05 RX ADMIN — THERA TABS 1 TABLET: TAB at 09:14

## 2022-05-05 RX ADMIN — Medication 9 UNITS: at 18:01

## 2022-05-05 RX ADMIN — ATORVASTATIN CALCIUM 10 MG: 10 TABLET, FILM COATED ORAL at 09:12

## 2022-05-05 RX ADMIN — PHENAZOPYRIDINE HYDROCHLORIDE 200 MG: 100 TABLET ORAL at 18:02

## 2022-05-05 RX ADMIN — PIPERACILLIN AND TAZOBACTAM 3.38 G: 3; .375 INJECTION, POWDER, LYOPHILIZED, FOR SOLUTION INTRAVENOUS at 15:07

## 2022-05-05 RX ADMIN — Medication 9 UNITS: at 13:38

## 2022-05-05 RX ADMIN — HEPARIN SODIUM IN SODIUM CHLORIDE 800 UNITS: 200 INJECTION INTRAVENOUS at 14:57

## 2022-05-05 NOTE — PROGRESS NOTES
Attempted to call report to patient's primary nurse at ext [phone]. On hold x 6 minutes without answer. See progress note and order set.

## 2022-05-05 NOTE — PROGRESS NOTES
Infectious Disease progress    . Impression  -Acute Pseudomonas UTI  Urine culture- 4/28->100,000 Pseudomonas aeruginosa  (Quinolone resistant)  Urine culture - 5/1- 5000 GNR  Negative blood cultures  H/o recurrent UTI since 10/2021  Pt is s/p multiple courses of oral antibiotics. Recent h/o being on Cipro p.o.PTA.  CT abdomen pelvis-Tiny nonobstructing right renal calculus, unchanged since 2020. No other  urinary tract calculi. No hydronephrosis. 2. Central low attenuation within the prostate gland, most consistent with post  therapeutic change. Thickening of the wall of the urinary bladder    -H/o CA prostate  H/o EBRT 2016, cryo 2021    - H/o osteomyelitis R/foot  Foot cultures were + for Pseudomonas ( Quinolone S ) & E.faecalis 7/2021  Pt was treated with cefepime and daptomycin x 6 weeks    -Diabetes type 2, poor control  A1c 9.2    - H/o penile prosthesis  No other indwelling prosthetic devices    Plan   - Zosyn 3.375 G  every 8 hourly x 2 weeks end date 5/15  ( Better susceptibility than Cefepime)   -Remove line at the end of therapy on 5/15  -Weekly CBC, CMP-fax reports to [phone], call with critical labs at [phone]  -Adequate fluid intake  -Daily probiotic/yogurt  -Blood sugar control  -Urology outpatient hgiywf-rm-uyzikjysoa input. Possible adverse effects of long term antibiotics are inclusive of but not limited to following  BM suppression, neutropenia , cytopenias , aplastic anemia hemorrhage liver & renal dysfunction/ liver , renal failure  , GI dysfunction- N, V  Diarrhea,C.difficile disease, rash , allergy , anaphylaxis. toxic epidermal necrolysis  Neuro toxicity , seizure disorder  Side effects tend to be more pronounced in the elderly    Patient seen today. Sitting up in bed. Denies new complaints. No dysuria. Catheter present. Litzy Rankin is a 69-year-old male with a history of prostate cancer, frequent UTIs, who presented with  with UTI resistant to oral medications.   Per H&P Patient stated that he has been having urinary discomfort when he urinates for some time. He has been in and out of urgent care as   well as this ER and was told that he had a UTI and then that he did have a UTI and then told that he did have a UTI on the 28th and discharged   on Keflex. Patient was  called back as urine culture was growing out Pseudomonas and needed IV antibiotics. Patient states he has completed   a course of ciprofloxacin very recently. He has had multiple urinary tract infections since October 2021. He had a prostate operation in May 2021. He has been on antibiotics 7 times since October. Most recently, he was diagnosed with a urinary tract infection, and was treated with Cipro. He received a call from [facility] on April 12, stating that the culture indicated he needed IV antibiotics. He followed up with his PCP on April 18,   and was diagnosed with a UTI. On April 20, his urine culture resulted and it was negative for infection. He was advised to follow-up with urology. .  Urine culture- 4/28->100,000 Pseudomonas aeruginosa  (Quinolone resistant)    Negative blood cultures. Patient reports having some dysuria. Hesitancy . no pain or burning sensation.     Past Medical History:   Diagnosis Date    Arthritis     in shoulders    Diabetes ([address]     Foot ulcer ([address]     Hepatitis     while in the [employer] in 1968    Hypertension     Leukopenia     benign due to being African American    Murmur     Other and unspecified hyperlipidemia     Prostate cancer ([address] Fall of 2015    hormone treatment and external beam radiation    Sickle cell trait ([facility] 7/3/2012    Thyroid disease     Type I (juvenile type) diabetes mellitus without mention of complication, uncontrolled since 1979    Unspecified sleep apnea     has CPAP-BUT DOESN'T USE     psh  Allergies   Allergen Reactions    Lisinopril Cough    Novocain [Procaine] Palpitations    Tamsulosin Other (comments)     Higher blood sugars     Social History     Socioeconomic History    Marital status:      Spouse name: Not on file    Number of children: Not on file    Years of education: Not on file    Highest education level: Not on file   Occupational History    Not on file   Tobacco Use    Smoking status: Former Smoker     Packs/day: 1.00     Years: 35.00     Pack years: 35.00     Quit date: 2006     Years since quittin.2    Smokeless tobacco: Never Used   Vaping Use    Vaping Use: Never used   Substance and Sexual Activity    Alcohol use: Not Currently    Drug use: No    Sexual activity: Not Currently     Partners: Female     Birth control/protection: None   Other Topics Concern    Not on file   Social History Narrative    Lives in [address] [place] [address] alone. Has 1 daughter. Retired. Used to work at [employer] until 0. Likes to play golf and work in the yard. Likes to read. 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 Social Determinants of Health     Financial Resource Strain:     Difficulty of Paying Living Expenses: Not on file   Food Insecurity:     Worried About Running Out of Food in the Last Year: Not on file    Delicia of Food in the Last Year: Not on file   Transportation Needs:     Lack of Transportation (Medical): Not on file    Lack of Transportation (Non-Medical):  Not on file   Physical Activity:     Days of Exercise per Week: Not on file    Minutes of Exercise per Session: Not on file   Stress:     Feeling of Stress : Not on file   Social Connections:     Frequency of Communication with Friends and Family: Not on file    Frequency of Social Gatherings with Friends and Family: Not on file    Attends [employer] Services: Not on file    Active Member of Clubs or Organizations: Not on file    Attends Club or Organization Meetings: Not on file    Marital Status: Not on file   Intimate Partner Violence:     Fear of Current or Ex-Partner: Not on file    Emotionally Abused: Not on file   Henrique Physically Abused: Not on file    Sexually Abused: Not on file   Housing Stability:     Unable to Pay for Housing in the Last Year: Not on file    Number of Places Lived in the Last Year: Not on file    Unstable Housing in the Last Year: Not on file     Family Status   Relation Name Status    Mother      Father     [facility] Sister      Brother     [facility] Brother      Sister  Alive    Sister  Alive    Neg Hx  (Not Specified)     Medication Documentation Review Audit     Reviewed by Olga Fernando RN (Registered Nurse) on 22 at [phone]    Medication Sig Documenting Provider Last Dose Status Taking? alcohol swabs (BD Single Use Swabs Regular) padm TEST UP TO 5 TIMES DAILY (INSULIN FLUCTUATING SUGARS). DX: E10.65 Debra Lehman MD  Active    aspirin 81 mg chewable tablet Take 81 mg by mouth daily. Other, MD Tyree  Active            Med Note Ariadna Casey May 13, 2019  2:24 PM)     atorvastatin (Lipitor) 10 mg tablet Take 1 Tablet by mouth daily. Debra Lehman MD  Active    b complex vitamins (B COMPLEX 1) tablet Take 1 Tab by mouth daily. Provider, Historical  Active    BD Luer-Rafa Syringe 3 mL 18 x 1 1/2\" syrg USE TO DRAW UP TESTOSTERONE UTD Provider, Historical  Active    cephALEXin (Keflex) 500 mg capsule Take 1 Capsule by mouth three (3) times daily. Viviana Shearer MD  Active    cholecalciferol (Vitamin D) (2,000 UNITS /50 MCG) cap capsule Take 3 Tablets by mouth daily. Provider, Historical  Active            Med Note Ariadna Casey May 13, 2019  2:24 PM)     Disposable Needles 22 gauge x 1 1/2\" ndle USE TO INJECT TESTOSTERONE UTD Provider, Historical  Active    FreeStyle Jc 2 Mobile misc Use as directed with freestyle delroy 2 sensors Debra Lehman MD  Active    FreeStyle Delroy 2 Sensor kit Use as directed to test blood sugars at least 4 times daily.   Replace every 14 days Debra Lehman MD  Active    insulin aspart U-100 (NovoLOG Flexpen U-100 Insulin) 100 unit/mL (3 mL) inpn Inject 1:8 for carbs for breakfast and 1:9 for lunch and dinner before 9pm and 1:10 after 9pm and 1:50 > 150 for correction during the day and 1:50 > 180 after 9pm.  MAX 35/D Sepideh Hobbs MD  Active    insulin glargine (Lantus Solostar U-100 Insulin) 100 unit/mL (3 mL) inpn INJECT 18 UNITS UNDER THE SKIN AT BEDTIME--Dose change 03/28/22--updated med list--did not send prescription to the pharmacy Sepideh Hobbs MD  Active    megestroL (MEGACE) 20 mg tablet Take 20 mg by mouth three (3) times daily as needed. Provider, Historical  Active    ONETOUCH ULTRA TEST strip TEST UP TO 5 TIMES DAILY   (INSULIN FLUCTUATING SUGARS). DX: E10.65 Sepideh Hobbs MD  Active    silodosin (RAPAFLO) 8 mg capsule  Provider, Historical  Active    therapeutic multivitamin (THERA) tablet Take 1 Tab by mouth daily. Provider, Historical  Active            Med Note (Roxanne Camara   Mon May 13, 2019  2:24 PM)     True Metrix Glucose Meter misc TEST UP TO 5 TIMES DAILY (INSULIN FLUCTUATING SUGARS). DX: O70.41 Sepideh Hobbs MD  Active    True Metrix Level 1 soln Use as directed Sepideh Hobbs MD  Active    TRUEplus Lancets 33 gauge misc TEST UP TO 5 TIMES DAILY (INSULIN FLUCTUATING SUGARS). DX: E10.65 Sepideh Hobbs MD  Active               Review of Systems - Negative except for those mentioned in H&P  PHYSICAL EXAM:  General:          WD, WN. awake, cooperative, no acute distress    EENT:              EOMI. Anicteric sclerae. MMM  Resp:               CTA bilaterally, no wheezing or rales. No accessory muscle use  CV:                  Regular  rhythm,  No edema  GI:                   Soft, Non distended, Non tender. +Bowel sounds  Neurologic:      Alert and oriented X 3, normal speech,   Psych:             Good insight. Not anxious nor agitated  Skin:                No rashes. No jaundice. Jluis Bowen MD FACP.

## 2022-05-05 NOTE — DISCHARGE SUMMARY
Hospitalist Discharge Summary     Patient ID:  Hayden Prajapati  [id number]  49 y.o.  1945 5/1/2022    PCP on record: Mer Cabezas MD    Admit date: 5/1/2022  Discharge date and time: 5/6/2022    DISCHARGE DIAGNOSIS:  Pseudomonal UTI in setting of multiple prior UTIs  H/O Prostate cancer EBRT 2016 and Cryo 2021  Non-obstructing renal calculi, non-contributory   Urinary retention  DM II  Hyperglycemia  Dyslipidemia   Mild leucopenia   Chronic anemia   Mild azotemia  H/O osteomyelitis of right foot    CONSULTATIONS:  IP CONSULT TO INFECTIOUS DISEASES  IP CONSULT TO UROLOGY    Excerpted HPI from H&P of Severo Labella, MD:  [facility] reported that he has had recurrent UTI for last one year, since cryotherapy for his prostate.      He also complains of a nagging cough and occasional phlegm - s/p extensive cardiac, pulmonary workup.      He recently was diagnosed with UTI and he is s/p multiple courses of Abx. Most recently around 4/27/22 he developed increased urinary frequency, dysuria and discomfort in the lower abdomen.      He came to [facility] ER and here he was started on PO cephalosporin for suspected UTI. Today he was recalled to the hospital after urine Cx result came back +ve for Pseudomonas that is only sensitive to IV abx.      He reported his urinary symptoms have somewhat improved but not resolved. \"    ______________________________________________________________________  DISCHARGE SUMMARY/HOSPITAL COURSE:  for full details see H&P, daily progress notes, labs, consult notes. Pseudomonal UTI in setting of multiple prior UTIs  H/O Prostate cancer EBRT 2016 and Cryo 2021  Non-obstructing renal calculi, non-contributory   Urinary retention  CT abdomen/pelvis 05/02/22:  1. Tiny nonobstructing right renal calculus, unchanged since 2020. No other urinary tract calculi. No hydronephrosis. 2.  Central low attenuation within the prostate gland, most consistent with post therapeutic change. Thickening of the wall of the urinary bladder. Urine culture 04/28/22:  Pseudomonas Aeruginosa. Urine cultre 05/01/22:  GNR.  - Patient followed by ID while hospitalized. - Patient was followed by urology while hospitalized. - Patient had negative blood culture. - Abx changed on 05/04/22 based on sensitivities see below for ID recommendations. - PICC placed on  05/05/2022.  - Continue probiotic 1 cap po daily. - Continue silodosin 8 mg po daily.  - Plan for OP follow up with Dr. Evangelina Downs. - ID abx/lab recommendations:     - Change to Zosyn 3.375 G  every 8 hourly x 2 weeks end date 5/15     - Remove line at the end of therapy on 5/15     - Weekly CBC, CMP-fax reports to [phone], call with critical labs at [phone]     - Adequate fluid intake     - Daily probiotic/yogurt     - Blood sugar control     DM II  Hyperglycemia  - Last HgbA1C 9.2  - Continue basal (glargine) insulin 18 units sc daily.     - Monitor FSBS SSI correction scale per SNF protocol .  - OP follow up.       Dyslipidemia   - Continue asa 81 mg po daily. - Continue atorvastatin 10 mg po daily.       Mild leucopenia, resolved   Chronic anemia   - Patient did not require tx.   - OP follow up. Mild azotemia, resolved  - Encourage po intake. - OP follow up.     H/O osteomyelitis of right foot  - Patient received 6 weeks IV abx. All discharge instructions and discharge medications were reviewed with the patient verbalized understanding. At the time of this dictation patient's vital signs were as follows:  T 98.3, /67, , RR 16, SpO2 99% on room air.  _______________________________________________________________________  Patient seen and examined by me on discharge day. Pertinent Findings:    General:  Awake and alert. Intermittently disoriented, but oriented at time of exam.  NAD. HEENT:  Normocephalic. Sclera anicteric. Mucous membranes moist.    Chest:  Resps even/unlabored with symmetrical CWE.   Air entry full. Lungs CTA. No use of accessory muscles. CV:  RRR. Normal S1/S2. No M/C/R appreciated. No JVD. No peripheral edema. Cap refill < 3 sec. Peripheral pulses 2+. GI:  Abdomen soft/NT/ND. ABT X 4.    :  Trevino intact. Neurologic:  Nonfocal.  CN II-XII grossly intact. Face symmetrical.  Speech normal.     Psych:  Cooperative. No anxiety or agitation. No suicidal/homicidal ideation. Skin:  Warm and color appropriate. No rashes or jaundice. Turgor elastic.   _______________________________________________________________________  DISCHARGE MEDICATIONS:   Current Discharge Medication List      START taking these medications    Details   acetaminophen (TYLENOL) 325 mg tablet Take 2 Tablets by mouth every six (6) hours as needed for Pain. Qty: 50 Tablet, Refills: 0  Start date: 5/6/2022      bisacodyL (DULCOLAX) 5 mg EC tablet Take 1 Tablet by mouth daily as needed for Constipation. Qty: 10 Tablet, Refills: 0  Start date: 5/6/2022      L.acid,para-B. bifidum-S.therm (RISAQUAD) 8 billion cell cap cap Take 1 Capsule by mouth daily. Qty: 10 Capsule, Refills: 0  Start date: 5/7/2022      piperacillin-tazobactam 3.375 gram 3.375 g IVPB 3.375 g by IntraVENous route every eight (8) hours for 9 days. Qty: 28 Dose, Refills: 0  Start date: 5/6/2022, End date: 5/15/2022         CONTINUE these medications which have NOT CHANGED    Details   insulin glargine (Lantus Solostar U-100 Insulin) 100 unit/mL (3 mL) inpn INJECT 18 UNITS UNDER THE SKIN AT BEDTIME--Dose change 03/28/22--updated med list--did not send prescription to the pharmacy  Qty: 15 mL, Refills: 3      megestroL (MEGACE) 20 mg tablet Take 20 mg by mouth three (3) times daily as needed.       insulin aspart U-100 (NovoLOG Flexpen U-100 Insulin) 100 unit/mL (3 mL) inpn Inject 1:8 for carbs for breakfast and 1:9 for lunch and dinner before 9pm and 1:10 after 9pm and 1:50 > 150 for correction during the day and 1:50 > 180 after 9pm.  MAX 35/D  Qty: 30 mL, Refills: 3      atorvastatin (Lipitor) 10 mg tablet Take 1 Tablet by mouth daily. Qty: 90 Tablet, Refills: 3      True Metrix Glucose Meter misc TEST UP TO 5 TIMES DAILY (INSULIN FLUCTUATING SUGARS). DX: E10.65  Qty: 1 Each, Refills: 0      TRUEplus Lancets 33 gauge misc TEST UP TO 5 TIMES DAILY (INSULIN FLUCTUATING SUGARS). DX: E10.65  Qty: 500 Lancet, Refills: 3      True Metrix Level 1 soln Use as directed  Qty: 1 Each, Refills: 1      alcohol swabs (BD Single Use Swabs Regular) padm TEST UP TO 5 TIMES DAILY (INSULIN FLUCTUATING SUGARS). DX: E10.65  Qty: 500 Pad, Refills: 3      FreeStyle Delroy 2 [place] misc Use as directed with freestyle delroy 2 sensors  Qty: 1 Each, Refills: 0    Associated Diagnoses: Uncontrolled type 1 diabetes mellitus with hyperglycemia (HCC)      FreeStyle Delroy 2 Sensor kit Use as directed to test blood sugars at least 4 times daily. Replace every 14 days  Qty: 6 Kit, Refills: 3    Associated Diagnoses: Uncontrolled type 1 diabetes mellitus with hyperglycemia (HCC)      silodosin (RAPAFLO) 8 mg capsule       BD Luer-Rafa Syringe 3 mL 18 x 1 1/2\" syrg USE TO DRAW UP TESTOSTERONE UTD      Disposable Needles 22 gauge x 1 1/2\" ndle USE TO INJECT TESTOSTERONE UTD      b complex vitamins (B COMPLEX 1) tablet Take 1 Tab by mouth daily. aspirin 81 mg chewable tablet Take 81 mg by mouth daily. ONETOUCH ULTRA TEST strip TEST UP TO 5 TIMES DAILY   (INSULIN FLUCTUATING SUGARS). DX: E10.65  Qty: 500 Strip, Refills: 3      therapeutic multivitamin (THERA) tablet Take 1 Tab by mouth daily. cholecalciferol (Vitamin D) (2,000 UNITS /50 MCG) cap capsule Take 3 Tablets by mouth daily. Patient Follow Up Instructions:    Activity: Activity as tolerated  Diet: Diabetic Diet  Wound Care: PICC line care    Follow-up as noted below  Follow-up tests/labs: See ID recommendations above    Follow-up Information     Follow up With Specialties Details Why Contact Info [facility]  On 5/11/2022 follow up with Dr. Giovani Miller on this date at 8:00 A. M.at the  [facility] office [address] [place] [address]    Rajan Beasley MD Family Medicine On 5/12/2022 APPOINTMENT TIME: 1:00 P.M. [address]  [phone]      [address] [place] [address]  [phone]    Rajan Beasley MD Family Medicine   [address] [place] [address]          ________________________________________________________________    Risk of deterioration: Low    Condition at Discharge:  Stable  __________________________________________________________________    Disposition  Home with family and home health services    ____________________________________________________________________    Code Status: Full Code  ___________________________________________________________________      Total time in minutes spent coordinating this discharge (includes going over instructions, follow-up, prescriptions, and preparing report for sign off to her PCP) :  >30 minutes    Signed:  Gladis Doe NP

## 2022-05-05 NOTE — PROGRESS NOTES
End of Shift Note    Bedside shift change report given to Hoang (oncoming nurse) by [facility] (offgoing nurse). Report included the following information SBAR, Kardex and MAR    Shift worked:  7p-7a     Shift summary and any significant changes:     Scheduled medications were given, see MAR. Patient did not complain of any pain during my shift. IV has been flushed and is patent. Abraham care has been done. Patient teaching and routine rounding has been done. Concerns for physician to address:       Zone phone for oncoming shift:          Activity:  Activity Level: Up with Assistance  Number times ambulated in hallways past shift: 0  Number of times OOB to chair past shift: 0    Cardiac:   Cardiac Monitoring: No      Cardiac Rhythm: Sinus Rhythm    Access:   Current line(s): PIV     Genitourinary:   Urinary status: abraham    Respiratory:   O2 Device: None (Room air)  Chronic home O2 use?: NO  Incentive spirometer at bedside: NO       GI:  Last Bowel Movement Date: 05/03/22  Current diet:  ADULT DIET Regular; 4 carb choices (60 gm/meal)  Passing flatus: YES  Tolerating current diet: YES       Pain Management:   Patient states pain is manageable on current regimen: YES    Skin:  Ottoniel Score: 21  Interventions: internal/external urinary devices    Patient Safety:  Fall Score:  Total Score: 2  Interventions: bed/chair alarm  High Fall Risk: Yes    Length of Stay:  Expected LOS: 2d 21h  Actual LOS: 2      [facility]

## 2022-05-05 NOTE — PROGRESS NOTES
Name of procedure: PICC insertion - Dr. Irene Moctezuma    Vital Signs: stable    Any complications related to procedure: none    Post Procedure Care Needed/order sets placed in connect care: yes

## 2022-05-05 NOTE — PROGRESS NOTES
Spiritual Care Assessment/Progress Note  [facility]      NAME: Jake Hurst      MRN: [id number]  AGE: 65 y.o.  SEX: male  Gnosticist Affiliation: Gnosticism   Language: English     5/5/2022     Total Time (in minutes): 32     Spiritual Assessment begun in [facility] through conversation with:         [x]Patient        [] Family    [] Friend(s)        Reason for Consult: Initial/Spiritual assessment, patient floor     Spiritual beliefs: (Please include comment if needed)     [x] Identifies with a rito tradition:  Nondenominational        [] Supported by a rito community:            [] Claims no spiritual orientation:           [] Seeking spiritual identity:                [] Adheres to an individual form of spirituality:           [] Not able to assess:                           Identified resources for coping:      [x] Prayer                               [] Music                  [] Guided Imagery     [x] Family/friends                 [] Pet visits     [] Devotional reading                         [] Unknown     [] Other:                                             Interventions offered during this visit: (See comments for more details)    Patient Interventions: Affirmation of emotions/emotional suffering,Coping skills reviewed/reinforced,Initial/Spiritual assessment, patient floor,Normalization of emotional/spiritual concerns,Life review/legacy,Prayer (actual),Integration of medical assessment with existing values and beliefs,Affirmation of rito,Catharsis/review of pertinent events in supportive environment,Iconic (affirming the presence of God/Higher Power)           Plan of Care:     [] Support spiritual and/or cultural needs    [] Support AMD and/or advance care planning process      [] Support grieving process   [] Coordinate Rites and/or Rituals    [] Coordination with community clergy   [] No spiritual needs identified at this time   [] Detailed Plan of Care below (See Comments) [] Make referral to Music Therapy  [] Make referral to Pet Therapy     [] Make referral to Addiction services  [] Make referral to [facility]  [] Make referral to Spiritual Care Partner  [] No future visits requested        [x] Contact Spiritual Care for further referrals     Comments:  Visit was in  for initial spiritual assessment. Patient, Mr. Tevin Jacob was sitting in bed, awake, alert and comfortable. He welcomed  warmly and engaged in conversation and life review. He stated he was feeling a little stressed at this time and saw 's visit as timely and God sent. He shared issues on his mind that were causing distress, and he processed his emotions.  explored coping resources for support and learned that rito was important. He is Sherin Sixto and considers himself on a spiritual journey. He sees every day as an opportunity for growth. He also has a fiancee who is a major support. They dated in high school but went their separate ways for over 4 decades. They've now been together for 14 years. Luis Albertoe called whiles  was present in the room. When  asked how best to support patient, he requested for prayer, which was offered. Patient seem uplifted and was very appreciative for th visit and prayer. Visited by: Ludy Lcuio.    Paging Service: [phone]

## 2022-05-05 NOTE — PROGRESS NOTES
Hospitalist Progress Note    NAME: Marbin Fuentes   :  1945   MRN:  [id number]     HPI excerpted from admission H&P:  \"He reported that he has had recurrent UTI for last one year, since cryotherapy for his prostate. He also complains of a nagging cough and occasional phlegm - s/p extensive cardiac, pulmonary workup. He recently was diagnosed with UTI and he is s/p multiple courses of Abx. Most recently around 22 he developed increased urinary frequency, dysuria and discomfort in the lower abdomen. He came to [address] [facility] ER and here he was started on PO cephalosporin for suspected UTI. Today he was recalled to the hospital after urine Cx result came back +ve for Pseudomonas that is only sensitive to IV abx. He reported his urinary symptoms have somewhat improved but not resolved. \"    Assessment / Plan:  Pseudomonal UTI in setting of multiple prior UTIs  H/O Prostate cancer EBRT  and Cryo   Non-obstructing renal calculi, non-contributory   Urinary retention  CT abdomen/pelvis 22:  1. Tiny nonobstructing right renal calculus, unchanged since . No other urinary tract calculi. No hydronephrosis. 2.  Central low attenuation within the prostate gland, most consistent with post therapeutic change. Thickening of the wall of the urinary bladder. Urine culture 22:  Pseudomonas Aeruginosa. Urine cultre 22:  GNR.  - ID input appreciated. - Urology input appreciated. - Patient had negative blood culture. - Abx changed to Zosyn per ID, end date 05/15/22.    - Continue probiotic 1 cap po daily. - Continue silodosin 8 mg po daily. - Continue phenazopyridine 200 mg po tid. - Discharge with Trevino. - Plan for OP follow up with Dr. Alyssa Charles. - PICC placed today. DM II  Hyperglycemia  - Last HgbA1C 9.2  - Continue basal (glargine) insulin 18 units sc daily (PTA dose). - Continue FSBS with SSI correction scale.      Dyslipidemia   - Continue asa 81 mg po daily.    - Continue atorvastatin 10 mg po daily. Mild leucopenia, resolved   Chronic anemia   - No tx indicated. - Monitor.  - OP follow up. Mild azotemia, resolved  - Encourage po intake. - OP follow up. H/O osteomyelitis of right foot  - Patient received 6 weeks IV abx.       18.5 - 24.9 Normal weight / Body mass index is 21.56 kg/m². Estimated discharge date: May 5  Barriers:    Code status: Full  Prophylaxis: SCD's  Recommended Disposition: Home w/Family     Subjective:     Chief Complaint / Reason for Physician Visit  Patient intermittently disoriented. Slightly disoriented at time of exam.  Denies pain. Plan of care and pertinent events reviewed with bedside nurse. Review of Systems:  Symptom Y/N Comments  Symptom Y/N Comments   Fever/Chills N   Chest Pain N    Poor Appetite N   Edema N    Cough N   Abdominal Pain N    Sputum N   Joint Pain N    SOB/DEL CASTILLO N   Pruritis/Rash N    Nausea/vomit N   Tolerating PT/OT     Diarrhea N   Tolerating Diet Y    Constipation    Other       Could NOT obtain due to:      Objective:     VITALS:   Last 24hrs VS reviewed since prior progress note. Most recent are:  Patient Vitals for the past 24 hrs:   Temp Pulse Resp BP SpO2   05/05/22 1508 98.5 °F (36.9 °C) (!) 105 18 (!) 125/52 100 %   05/05/22 0710 98.2 °F (36.8 °C) 93 16 (!) 129/52 98 %   05/04/22 1919 98.3 °F (36.8 °C) 82 16 (!) 107/50 100 %   05/04/22 1614 97.8 °F (36.6 °C) 85 16 (!) 104/42 100 %       Intake/Output Summary (Last 24 hours) at 5/5/2022 1604  Last data filed at [address] [place] [address]  Gross per 24 hour   Intake --   Output 500 ml   Net -500 ml        I had a face to face encounter and independently examined this patient on 5/5/2022, as outlined below:  PHYSICAL EXAM:  General:  Awake and alert. Slightly disoriented. States that he has had his PICC for several months. NAD. HEENT:  Normocephalic. Sclera anicteric.   Mucous membranes moist.    Chest:  Resps even/unlabored with symmetrical CWE. Air entry full. Lungs CTA. No use of accessory muscles. CV:  RRR. Normal S1/S2. No M/C/R appreciated. No JVD. No peripheral edema. Cap refill < 3 sec. Peripheral pulses 2+. GI:  Abdomen soft/NT/ND. No organomegaly. No hernia. ABT X 4.    :  Trevino intact. Neurologic:  Nonfocal.  CN II-XII grossly intact. Face symmetrical.  Speech normal.     Psych:  Cooperative. No anxiety or agitation. No suicidal/homicidal ideation. Skin:  Warm and color appropriate. No rashes or jaundice. Turgor elastic. Reviewed most current lab test results and cultures  YES  Reviewed most current radiology test results   YES  Review and summation of old records today    NO  Reviewed patient's current orders and MAR    YES  PMH/SH reviewed - no change compared to H&P  ________________________________________________________________________  Care Plan discussed with:    Comments   Patient [address]     Consultant                        Multidiciplinary team rounds were held today with , nursing, pharmacist and clinical coordinator. Patient's plan of care was discussed; medications were reviewed and discharge planning was addressed. ________________________________________________________________________  Lisa Solis NP     Procedures: see electronic medical records for all procedures/Xrays and details which were not copied into this note but were reviewed prior to creation of Plan. LABS:  I reviewed today's most current labs and imaging studies.   Pertinent labs include:  Recent Labs     05/05/22 0522 05/03/22 0327   WBC 4.8 3.9*   HGB 10.4* 9.9*   HCT 31.6* 30.6*    277     Recent Labs     05/05/22 0522 05/04/22  0410 05/03/22  0327    137 138   K 4.0 4.4 4.3    105 106   CO2 24 28 27   * 285* 120*   BUN 18 21* 18   CREA 1.01 0.93 0.87   CA 9.0 9.0 9.2       Signed: Lisa Solis, NP

## 2022-05-05 NOTE — PROGRESS NOTES
Transition of Care Plan:    RUR: Obs   Disposition: SNF Placement-[facility] (accepted-insurance auth pending) depending on bed availability   Follow up appointments: Follow up with PCP and/or Specialist   DME needed:N/A  Transportation at Discharge: Family will assist with transport   Alma Fryer or means to access home:  Family will provide   IM Medicare Letter: 2nd IM Medicare Letter to be given   Is patient a BCPI-A Bundle:   CM will provide if required         If yes, was Bundle Letter given?:    Is patient a [place] and connected with the [place]? N/A           If yes, was [employer] transfer form completed and VA notified? Caregiver Contact: Taylor Fink (relative) [phone] and Андрей Goldberg (friend) [phone]  Discharge Caregiver contacted prior to discharge? Family to be contacted   Care Conference needed?:    Not at this time    UPDATE: 3:37PM      CM informed that snf: [facility], is able to accept. CM will send clinicals to [facility] ([address]: [phone]) for insurance. [facility] informed CM that if beds are not available, upon d/c pt can d/c to another [address] [place] [address]. Admin Coordinator will inform CM of the following. Pt will require rapid covid test at the time of d/c.  Clinical team aware. HANS Padilla, [address]      UPDATE: 2:19PM      CM informed by clinical staff that pt is currently waiting for IR to place picc line. However, clinical team reported that pt has currently been experiencing moments of confusion. Clinical team have concerns that d/c wouldn't be safe allowing pt to return home with IV abx and confusion. CM will contact pt's family to discuss the following. CM spoke with pt's friend: Ace Marvin, via telephone regarding pt's recommendations to snf. Ace Marvin currently agreeable to clinical d/c plans to snf vs [facility]. Ace Marvin reported that she also notice a change in pt, when she visited this morning.   Ace Marvin reported that she would like a referral to be sent to [facility] (completed). Once accepted pt will require insurance auth [facility]. INSURANCE AUTH CAN TAKE 24-48HRS, TO APPROVE. CM was provided with pt's daughter contact information: Keyla Slot: [phone] or [phone]    Pt will require covid test at the time of d/c. CM will continue to follow. HANS Rodarte, [place]          INITIAL NOTE: CM informed by clinical staff that pt did not receive picc line on 5/4/22. CM received call from [address] [place] [address] will report to [facility] [facility] to complete teaching and education for pt. CM currently attempting to find [facility], for pt at the time of d/c.  CM informed that [facility] and [facility] couldn't accept due to staffing. CM currently waiting for [facility] to determine if pt can be accept. CM informed by pt's PCP that they will not follow pt for IV abx, that urologist and ID will have to follow pt. [facility] currently pending acceptance. CM will continue to follow.     HANS Rodarte, [address] [place] [address]

## 2022-05-05 NOTE — PROGRESS NOTES
End of Shift Note    Bedside shift change report given to Elizabeth (oncoming nurse) by Jose Godfrey (offgoing nurse). Report included the following information SBAR, Kardex and MAR    Shift worked:  7am to 7pm     Shift summary and any significant changes:     Patient tolerated care fairly throughout shift. Hourly rounding completed. Medications given and education provided regarding all meds. Patient family present at bedside. Trevino care completed. PICC line placed and CHG completed.           Jose Godfrey

## 2022-05-05 NOTE — PROCEDURES
Interventional Radiology  Procedure Note        5/5/2022 2:48 PM    Patient: Maite Dias     Informed consent obtained    Diagnosis: UTI    Procedure(s): Right basilic PICC placement, with central venogram showing patent right subclavian, brachiocephalic, and SVC    Specimens removed:  none    Complications: None    Primary Physician: Gael Rahman MD    Recommendations: N/A    Discharge Disposition: return to floor    Full dictated report to follow    Gael Rahman MD  Interventional Radiology  [facility], [facility].  2:48 PM, 5/5/2022

## 2022-05-06 ENCOUNTER — HOME HEALTH ADMISSION (OUTPATIENT)
Dept: HOME HEALTH SERVICES | Facility: HOME HEALTH | Age: 77
End: 2022-05-06

## 2022-05-06 VITALS
SYSTOLIC BLOOD PRESSURE: 120 MMHG | DIASTOLIC BLOOD PRESSURE: 57 MMHG | OXYGEN SATURATION: 98 % | WEIGHT: 170.19 LBS | RESPIRATION RATE: 18 BRPM | HEART RATE: 104 BPM | BODY MASS INDEX: 21.16 KG/M2 | TEMPERATURE: 98 F | HEIGHT: 75 IN

## 2022-05-06 LAB
COVID-19 RAPID TEST, COVR: NOT DETECTED
GLUCOSE BLD STRIP.AUTO-MCNC: 275 MG/DL (ref 65–117)
GLUCOSE BLD STRIP.AUTO-MCNC: 330 MG/DL (ref 65–117)
SERVICE CMNT-IMP: ABNORMAL
SERVICE CMNT-IMP: ABNORMAL
SOURCE, COVRS: NORMAL

## 2022-05-06 PROCEDURE — 74011636637 HC RX REV CODE- 636/637: Performed by: INTERNAL MEDICINE

## 2022-05-06 PROCEDURE — 77030012865 HC BG URIN LEG MDII -A

## 2022-05-06 PROCEDURE — 82962 GLUCOSE BLOOD TEST: CPT

## 2022-05-06 PROCEDURE — 74011250637 HC RX REV CODE- 250/637: Performed by: INTERNAL MEDICINE

## 2022-05-06 PROCEDURE — 74011636637 HC RX REV CODE- 636/637: Performed by: NURSE PRACTITIONER

## 2022-05-06 PROCEDURE — 74011000258 HC RX REV CODE- 258: Performed by: INTERNAL MEDICINE

## 2022-05-06 PROCEDURE — 74011250637 HC RX REV CODE- 250/637: Performed by: NURSE PRACTITIONER

## 2022-05-06 PROCEDURE — 77030040831 HC BAG URINE DRNG MDII -A

## 2022-05-06 PROCEDURE — 74011250636 HC RX REV CODE- 250/636: Performed by: INTERNAL MEDICINE

## 2022-05-06 RX ORDER — ACETAMINOPHEN 325 MG/1
650 TABLET ORAL
Qty: 50 TABLET | Refills: 0 | Status: SHIPPED
Start: 2022-05-06 | End: 2022-09-30

## 2022-05-06 RX ORDER — BISACODYL 5 MG
5 TABLET, DELAYED RELEASE (ENTERIC COATED) ORAL
Qty: 10 TABLET | Refills: 0 | Status: SHIPPED
Start: 2022-05-06 | End: 2022-08-03

## 2022-05-06 RX ADMIN — CHOLECALCIFEROL TAB 25 MCG (1000 UNIT) 6000 UNITS: 25 TAB at 09:02

## 2022-05-06 RX ADMIN — ASPIRIN 81 MG: 81 TABLET, CHEWABLE ORAL at 09:02

## 2022-05-06 RX ADMIN — INSULIN GLARGINE 18 UNITS: 100 INJECTION, SOLUTION SUBCUTANEOUS at 09:03

## 2022-05-06 RX ADMIN — NEPHROCAP 1 CAPSULE: 1 CAP ORAL at 09:02

## 2022-05-06 RX ADMIN — PIPERACILLIN AND TAZOBACTAM 3.38 G: 3; .375 INJECTION, POWDER, LYOPHILIZED, FOR SOLUTION INTRAVENOUS at 08:48

## 2022-05-06 RX ADMIN — PHENAZOPYRIDINE HYDROCHLORIDE 200 MG: 100 TABLET ORAL at 12:23

## 2022-05-06 RX ADMIN — SILODOSIN 8 MG: 4 CAPSULE ORAL at 09:04

## 2022-05-06 RX ADMIN — THERA TABS 1 TABLET: TAB at 09:02

## 2022-05-06 RX ADMIN — Medication 1 CAPSULE: at 09:02

## 2022-05-06 RX ADMIN — Medication 6 UNITS: at 09:03

## 2022-05-06 RX ADMIN — Medication 9 UNITS: at 12:23

## 2022-05-06 RX ADMIN — PIPERACILLIN AND TAZOBACTAM 3.38 G: 3; .375 INJECTION, POWDER, LYOPHILIZED, FOR SOLUTION INTRAVENOUS at 00:32

## 2022-05-06 RX ADMIN — ATORVASTATIN CALCIUM 10 MG: 10 TABLET, FILM COATED ORAL at 09:02

## 2022-05-06 RX ADMIN — PHENAZOPYRIDINE HYDROCHLORIDE 200 MG: 100 TABLET ORAL at 09:02

## 2022-05-06 NOTE — PROGRESS NOTES
End of Shift Note    Bedside shift change report given to [facility] (oncoming nurse) by Doug Mobley (offgoing nurse). Report included the following information SBAR, Kardex and MAR    Shift worked:  7p-7a     Shift summary and any significant changes:     Humalog was held due to the patient's blood glucose level, see MAR. Patient did not complain of any pain during my shift. Scheduled medications were given, see MAR. Covid test was done. IV and PICC line have been flushed and are patent. Abraham care has been done. Patient teaching and routine rounding has been done. Concerns for physician to address:       Zone phone for oncoming shift:          Activity:  Activity Level: Bed Rest  Number times ambulated in hallways past shift: 0  Number of times OOB to chair past shift: 0    Cardiac:   Cardiac Monitoring: No      Cardiac Rhythm: Sinus Rhythm    Access:   Current line(s): PIV and PICC     Genitourinary:   Urinary status: abraham    Respiratory:   O2 Device: None (Room air)  Chronic home O2 use?: NO  Incentive spirometer at bedside: NO       GI:  Last Bowel Movement Date: 05/03/22  Current diet:  ADULT DIET Regular; 4 carb choices (60 gm/meal)  Passing flatus: YES  Tolerating current diet: YES       Pain Management:   Patient states pain is manageable on current regimen: YES    Skin:  Ottoniel Score: 17  Interventions: internal/external urinary devices    Patient Safety:  Fall Score:  Total Score: 2  Interventions: bed/chair alarm  High Fall Risk: Yes    Length of Stay:  Expected LOS: 2d 21h  Actual LOS: [address]                          '

## 2022-05-06 NOTE — DISCHARGE INSTRUCTIONS
Urinary Tract Infections (UTI) in Men: Care Instructions  Overview     A urinary tract infection, or UTI, is a term for an infection anywhere between the kidneys and the urethra. (The urethra is the tube that carries urine from the bladder to outside the body.) Most UTIs are bladder infections. They often cause pain or burning when you urinate. UTIs are caused by bacteria. This means they can be cured with antibiotics. Be sure to complete your treatment so that the infection does not get worse. Follow-up care is a key part of your treatment and safety. Be sure to make and go to all appointments, and call your doctor if you are having problems. It's also a good idea to know your test results and keep a list of the medicines you take. How can you care for yourself at home? · Take your antibiotics as prescribed. Do not stop taking them just because you feel better. You need to take the full course of antibiotics. · Take your medicines exactly as prescribed. Your doctor may have prescribed a medicine, such as phenazopyridine (Pyridium), to help relieve pain when you urinate. This turns your urine orange. You may stop taking it when your symptoms get better. But be sure to take all of your antibiotics, which treat the infection. · Drink extra water for the next day or two. This will help make the urine less concentrated and help wash out the bacteria causing the infection. (If you have kidney, heart, or liver disease and have to limit your fluids, talk with your doctor before you increase your fluid intake.)  · Avoid drinks that are carbonated or have caffeine. They can irritate the bladder. · Urinate often. Try to empty your bladder each time. · To relieve pain, take a hot bath or lay a heating pad (set on low) over your lower belly or genital area. Never go to sleep with a heating pad in place. To help prevent UTIs  · Drink plenty of fluids.  If you have kidney, heart, or liver disease and have to limit fluids, talk with your doctor before you increase the amount of fluids you drink. · Urinate when you have the urge. Do not hold your urine for a long time. Urinate before you go to sleep. · Keep your penis clean. Catheter care  If you have a drainage tube (catheter) in place, the following steps will help you care for it. · Always wash your hands before and after touching your catheter. · Check the area around the urethra for inflammation or signs of infection. Signs of infection include irritated, swollen, red, or tender skin, or pus around the catheter. · Clean the area around the catheter with soap and water two times a day. Dry with a clean towel afterward. · Do not apply powder or lotion to the skin around the catheter. To empty the urine collection bag   · Wash your hands with soap and water. · Without touching the drain spout, remove the spout from its sleeve at the bottom of the collection bag. Open the valve on the spout. · Let the urine flow out of the bag and into the toilet or a container. Do not let the tubing or drain spout touch anything. · After you empty the bag, clean the end of the drain spout with tissue and water. Close the valve and put the drain spout back into its sleeve at the bottom of the collection bag. · Wash your hands with soap and water. When should you call for help? Call your doctor now or seek immediate medical care if:    · Symptoms such as a fever, chills, nausea, or vomiting get worse or happen for the first time.     · You have new pain in your back just below your rib cage. This is called flank pain.     · There is new blood or pus in your urine.     · You are not able to take or keep down your antibiotics. Watch closely for changes in your health, and be sure to contact your doctor if:    · You are not getting better after taking an antibiotic for 2 days.     · Your symptoms go away but then come back. Where can you learn more?   Go to http://www.gray.com/  Enter D362 in the search box to learn more about \"Urinary Tract Infections (UTI) in Men: Care Instructions. \"  Current as of: 2021               Content Version: 13.2  © 2078-7649 [employer]. Care instructions adapted under license by [employer] (which disclaims liability or warranty for this information). If you have questions about a medical condition or this instruction, always ask your healthcare professional. [place] [address] any warranty or liability for your use of this information. HOSPITALIST DISCHARGE INSTRUCTIONS    NAME: Litzy Rankin   :  1945   MRN:  [id number]     Date/Time:  2022 6:14 AM    ADMIT DATE: 2022   DISCHARGE DATE: 2022     Attending Physician: Octavia Buckley NP    DISCHARGE DIAGNOSIS:  Urinary tract infection  History of prostate cancer  Nonobstructing renal calculi (kidney stone)  Urinary retention (inability to pass urine)  Diabetes  Dyslipidemia (abnormal blood lipids/fats)  Chronic anemia (chronically low blood count)      Medications: Per above medication reconciliation. Pain Management: per above medications    Recommended diet: Diabetic Diet    Recommended activity: Activity as tolerated    Wound care: PICC line insertion site care    Indwelling devices:  PICC line placed 22    Supplemental Oxygen: None    Required Lab work: See below    Glucose management:  Glucose monitoring with sliding scale insulin per SNF protocol    Code status: Full     Take all discharge paperwork with you to all of your follow up doctor appointments. Take your medications exactly as outlined in your discharge paperwork. Do not take any other medications unless specifically prescribed after your hospital stay. If your symptoms return/worsen seek medical attention immediately.     - Infectious disease antibiotic and lab recommendations:     - Zosyn 3.375 G every 8 hourly x 2 weeks end date 05/15/22     - Remove line at the end of therapy on 05/15/22     - Weekly CBC, CMP- fax reports to [phone], call with critical labs at [phone]     - Adequate fluid intake     - Daily probiotic/yogurt     - Blood sugar control        Outside physician follow up: Follow-up Information     Follow up With Specialties Details Why [facility]  On 5/11/2022 follow up with Dr. Odalys Shukla on this date at 8:00 A. M.at the  [facility] [facility] office [address] [place] [address]  [phone]    Sergio Lacey MD Family Medicine In 3 days Call to schedule hospital follow-up appointment to be seen within 3 days of hospital discharge [place] [phone]            Information obtained by :  I understand that if any problems occur once I am at home I am to contact my physician. I understand and acknowledge receipt of the instructions indicated above.                                                                                                                                            Physician's or R.N.'s Signature                                                                  Date/Time                                                                                                                                              Patient or Representative

## 2022-06-01 LAB
ALBUMIN SERPL-MCNC: 3.4 G/DL (ref 3.5–5)
ALBUMIN/GLOB SERPL: 0.7 {RATIO} (ref 1.1–2.2)
ALP SERPL-CCNC: 89 U/L (ref 45–117)
ALT SERPL-CCNC: 26 U/L (ref 12–78)
ANION GAP SERPL CALC-SCNC: 4 MMOL/L (ref 5–15)
APPEARANCE UR: ABNORMAL
AST SERPL-CCNC: 33 U/L (ref 15–37)
BACTERIA URNS QL MICRO: NEGATIVE /HPF
BASOPHILS # BLD: 0 K/UL (ref 0–0.1)
BASOPHILS NFR BLD: 0 % (ref 0–1)
BILIRUB SERPL-MCNC: 0.2 MG/DL (ref 0.2–1)
BILIRUB UR QL: NEGATIVE
BUN SERPL-MCNC: 15 MG/DL (ref 6–20)
BUN/CREAT SERPL: 14 (ref 12–20)
CALCIUM SERPL-MCNC: 9.6 MG/DL (ref 8.5–10.1)
CHLORIDE SERPL-SCNC: 103 MMOL/L (ref 97–108)
CO2 SERPL-SCNC: 28 MMOL/L (ref 21–32)
COLOR UR: ABNORMAL
CREAT SERPL-MCNC: 1.11 MG/DL (ref 0.7–1.3)
DIFFERENTIAL METHOD BLD: ABNORMAL
EOSINOPHIL # BLD: 0.1 K/UL (ref 0–0.4)
EOSINOPHIL NFR BLD: 2 % (ref 0–7)
EPITH CASTS URNS QL MICRO: ABNORMAL /LPF
ERYTHROCYTE [DISTWIDTH] IN BLOOD BY AUTOMATED COUNT: 15.2 % (ref 11.5–14.5)
GLOBULIN SER CALC-MCNC: 5 G/DL (ref 2–4)
GLUCOSE SERPL-MCNC: 320 MG/DL (ref 65–100)
GLUCOSE UR STRIP.AUTO-MCNC: >1000 MG/DL
HCT VFR BLD AUTO: 33.2 % (ref 36.6–50.3)
HGB BLD-MCNC: 10.5 G/DL (ref 12.1–17)
HGB UR QL STRIP: ABNORMAL
IMM GRANULOCYTES # BLD AUTO: 0 K/UL (ref 0–0.04)
IMM GRANULOCYTES NFR BLD AUTO: 0 % (ref 0–0.5)
KETONES UR QL STRIP.AUTO: NEGATIVE MG/DL
LEUKOCYTE ESTERASE UR QL STRIP.AUTO: ABNORMAL
LYMPHOCYTES # BLD: 1.2 K/UL (ref 0.8–3.5)
LYMPHOCYTES NFR BLD: 23 % (ref 12–49)
MCH RBC QN AUTO: 27 PG (ref 26–34)
MCHC RBC AUTO-ENTMCNC: 31.6 G/DL (ref 30–36.5)
MCV RBC AUTO: 85.3 FL (ref 80–99)
MONOCYTES # BLD: 0.5 K/UL (ref 0–1)
MONOCYTES NFR BLD: 10 % (ref 5–13)
NEUTS SEG # BLD: 3.5 K/UL (ref 1.8–8)
NEUTS SEG NFR BLD: 65 % (ref 32–75)
NITRITE UR QL STRIP.AUTO: POSITIVE
NRBC # BLD: 0 K/UL (ref 0–0.01)
NRBC BLD-RTO: 0 PER 100 WBC
PH UR STRIP: 8 [PH] (ref 5–8)
PLATELET # BLD AUTO: 270 K/UL (ref 150–400)
PMV BLD AUTO: 9 FL (ref 8.9–12.9)
POTASSIUM SERPL-SCNC: 4.4 MMOL/L (ref 3.5–5.1)
PROT SERPL-MCNC: 8.4 G/DL (ref 6.4–8.2)
PROT UR STRIP-MCNC: >300 MG/DL
RBC # BLD AUTO: 3.89 M/UL (ref 4.1–5.7)
RBC #/AREA URNS HPF: >100 /HPF (ref 0–5)
SODIUM SERPL-SCNC: 135 MMOL/L (ref 136–145)
SP GR UR REFRACTOMETRY: 1.02 (ref 1–1.03)
UA: UC IF INDICATED,UAUC: ABNORMAL
UROBILINOGEN UR QL STRIP.AUTO: 0.2 EU/DL (ref 0.2–1)
WBC # BLD AUTO: 5.4 K/UL (ref 4.1–11.1)
WBC URNS QL MICRO: ABNORMAL /HPF (ref 0–4)

## 2022-06-01 PROCEDURE — 80053 COMPREHEN METABOLIC PANEL: CPT

## 2022-06-01 PROCEDURE — 51702 INSERT TEMP BLADDER CATH: CPT

## 2022-06-01 PROCEDURE — 36415 COLL VENOUS BLD VENIPUNCTURE: CPT

## 2022-06-01 PROCEDURE — 81001 URINALYSIS AUTO W/SCOPE: CPT

## 2022-06-01 PROCEDURE — 99284 EMERGENCY DEPT VISIT MOD MDM: CPT

## 2022-06-01 PROCEDURE — 85025 COMPLETE CBC W/AUTO DIFF WBC: CPT

## 2022-06-01 PROCEDURE — 96365 THER/PROPH/DIAG IV INF INIT: CPT

## 2022-06-02 ENCOUNTER — HOSPITAL ENCOUNTER (EMERGENCY)
Age: 77
Discharge: HOME | End: 2022-06-02
Attending: EMERGENCY MEDICINE
Payer: MEDICARE

## 2022-06-02 VITALS
SYSTOLIC BLOOD PRESSURE: 145 MMHG | WEIGHT: 174 LBS | TEMPERATURE: 98.3 F | HEIGHT: 74 IN | OXYGEN SATURATION: 97 % | RESPIRATION RATE: 18 BRPM | BODY MASS INDEX: 22.33 KG/M2 | HEART RATE: 97 BPM | DIASTOLIC BLOOD PRESSURE: 75 MMHG

## 2022-06-02 DIAGNOSIS — T83.511A URINARY TRACT INFECTION ASSOCIATED WITH INDWELLING URETHRAL CATHETER, INITIAL ENCOUNTER (HCC): ICD-10-CM

## 2022-06-02 DIAGNOSIS — R31.0 GROSS HEMATURIA: Primary | ICD-10-CM

## 2022-06-02 DIAGNOSIS — N39.0 URINARY TRACT INFECTION ASSOCIATED WITH INDWELLING URETHRAL CATHETER, INITIAL ENCOUNTER (HCC): ICD-10-CM

## 2022-06-02 LAB — LACTATE BLD-SCNC: 0.91 MMOL/L (ref 0.4–2)

## 2022-06-02 PROCEDURE — 74011000250 HC RX REV CODE- 250: Performed by: EMERGENCY MEDICINE

## 2022-06-02 PROCEDURE — 51702 INSERT TEMP BLADDER CATH: CPT

## 2022-06-02 PROCEDURE — 83605 ASSAY OF LACTIC ACID: CPT

## 2022-06-02 PROCEDURE — 74011250636 HC RX REV CODE- 250/636: Performed by: EMERGENCY MEDICINE

## 2022-06-02 PROCEDURE — 96365 THER/PROPH/DIAG IV INF INIT: CPT

## 2022-06-02 PROCEDURE — 74011000258 HC RX REV CODE- 258: Performed by: EMERGENCY MEDICINE

## 2022-06-02 RX ORDER — CEFDINIR 300 MG/1
300 CAPSULE ORAL 2 TIMES DAILY
Qty: 14 CAPSULE | Refills: 0 | Status: SHIPPED | OUTPATIENT
Start: 2022-06-02 | End: 2022-06-09

## 2022-06-02 RX ORDER — LIDOCAINE HYDROCHLORIDE 20 MG/ML
JELLY TOPICAL
Status: COMPLETED | OUTPATIENT
Start: 2022-06-02 | End: 2022-06-02

## 2022-06-02 RX ADMIN — CEFTRIAXONE 1 G: 1 INJECTION, POWDER, FOR SOLUTION INTRAMUSCULAR; INTRAVENOUS at 08:40

## 2022-06-02 RX ADMIN — LIDOCAINE HYDROCHLORIDE 6 ML: 20 JELLY TOPICAL at 04:30

## 2022-06-02 NOTE — CONSULTS
Urology Consult    Patient: Hayden Prajapati MRN: [id number]  SSN: [id number]    YOB: 1945  Age: 65 y.o. Sex: male          Date of Consultation:  June 2, 2022  Requesting Physician: Osiris Torres MD  Reason for Consultation: gross hematuria           Assessment/Plan:  Gross hematuria in setting of possibly infected urine with chronic retention (hx of IC as of a few weeks ago) with radiation cystitis hx and recent pseudomonas UTI    -okay to NM home with abraham as urine cleared to yellow/pink today, home with abx, await results of cxs to finalize. Pt educated about irrigating as needed at home for blood clots or decrease in urine output. Have arranged close follow up with his primary urologist next week.   -pharmacy in agreement with [facility] choice for abx with hx of pseudomonas uti susceptible to ceftazidime. Supervising MD, Dr. Murphy Hernandez     History of Present Illness:  Patient is a 65 y.o. male admitted 6/2/2022 to the hospital for No admission diagnoses are documented for this encounter. .  He has a significant pmhx of Type 1 DM, hepatitis, HTN, enlarged prostate who presents via private vehicle to the ED with concern for UTI. Pt reports that he developed grossly bloody urine with progressive darkness in color and associated painful urination in the last 24 hours. Report having been recently admitted at the beginning of may for UTI. Noted he was not able to properly empty his bladder and that was determined to be the culprit for his UTI. Pt notes having followed with Caitlyn [facility] and [facility] [place] [facility] in the past. [facility] [place] to rehab after his last admission for 1 week and was educated on how to self cath at home with removal of previously placed indwelling abraham. Pt denies recent fever, chills, n/v/d/abd pain. Pt also specifically denies any recent CP, SOB, changes in BM, or headache. Urology consulted for gross hematuria.  He is well known to [place] [facility], followed by  [facility]. Hx of cryoablation with radiation cystitis, frequent UTIs and chronic urinary retention managed in intermittent catheterization at home. Gross hematuria as stated above started yesterday, noted during IC with abd discomfort. Pt denies visualizing clots at home. In ED nursing placed large bore 22 french abraham and irrigated catheter to clear pink/yellow UA. Nursing reported some david clots, mostly thick darkened hematuria that quickly resolved with  Irrigating. Upon my exam urine draining clear yellow with some pink streaks, small sediment noted in tubing. UA +nitrites, abx initiated by ED doctor, urine is likely chronically colonized. Pt denying fevers at home, hgb stable at 10.5, other HD stable. Creat wnl. He denies us of blood thinners.        Past Medical History: Allergies   Allergen Reactions    Lisinopril Cough    Novocain [Procaine] Palpitations    Tamsulosin Other (comments)     Higher blood sugars      Prior to Admission medications    Medication Sig Start Date End Date Taking? Authorizing Provider   cefdinir (OMNICEF) 300 mg capsule Take 1 Capsule by mouth two (2) times a day for 7 days. 6/2/22 6/9/22 Yes Henri Odonnell MD   acetaminophen (TYLENOL) 325 mg tablet Take 2 Tablets by mouth every six (6) hours as needed for Pain. 5/6/22   Kamla Manzo NP   bisacodyL (DULCOLAX) 5 mg EC tablet Take 1 Tablet by mouth daily as needed for Constipation. 5/6/22   Kamla Manzo NP   L.acid,para-B. bifidum-S.therm (RISAQUAD) 8 billion cell cap cap Take 1 Capsule by mouth daily. 5/7/22   Kamla Manzo NP   insulin glargine (Lantus Solostar U-100 Insulin) 100 unit/mL (3 mL) inpn INJECT 18 UNITS UNDER THE SKIN AT BEDTIME--Dose change 03/28/22--updated med list--did not send prescription to the pharmacy 3/28/22   Levon Johnson MD   megestroL (MEGACE) 20 mg tablet Take 20 mg by mouth three (3) times daily as needed.  1/5/22   Provider, Historical   insulin aspart U-100 (NovoLOG Flexpen U-100 Insulin) 100 unit/mL (3 mL) inpn Inject 1:8 for carbs for breakfast and 1:9 for lunch and dinner before 9pm and 1:10 after 9pm and 1:50 > 150 for correction during the day and 1:50 > 180 after 9pm.  MAX 35/D 3/3/22   Shay Navas MD   atorvastatin (Lipitor) 10 mg tablet Take 1 Tablet by mouth daily. 3/3/22   Shay Navas MD   True Metrix Glucose Meter misc TEST UP TO 5 TIMES DAILY (INSULIN FLUCTUATING SUGARS). DX: E10.65 3/3/22   Shay Navas MD   TRUEplus Lancets 33 gauge misc TEST UP TO 5 TIMES DAILY (INSULIN FLUCTUATING SUGARS). DX: E10.65 3/3/22   Shay Navas MD   True Metrix Level 1 soln Use as directed 3/3/22   Shay Navas MD   alcohol swabs (BD Single Use Swabs Regular) padm TEST UP TO 5 TIMES DAILY (INSULIN FLUCTUATING SUGARS). DX: E10.65 3/3/22   Shay Navas MD   FreeStyle Delroy 2 [place] misc Use as directed with freestyle delroy 2 sensors 10/14/21   Shay Navas MD   FreeStyle Delroy 2 Sensor kit Use as directed to test blood sugars at least 4 times daily. Replace every 14 days 10/14/21   Shay Navas MD   silodosin (RAPAFLO) 8 mg capsule  7/1/21   Provider, Historical   BD Luer-Rafa Syringe 3 mL 18 x 1 1/2\" syrg USE TO DRAW UP TESTOSTERONE UTD 8/13/20   Provider, Historical   Disposable Needles 22 gauge x 1 1/2\" ndle USE TO INJECT TESTOSTERONE UTD 8/13/20   Provider, Historical   b complex vitamins (B COMPLEX 1) tablet Take 1 Tab by mouth daily. Provider, Historical   aspirin 81 mg chewable tablet Take 81 mg by mouth daily. Other, MD Tyree   ONETOUCH ULTRA TEST strip TEST UP TO 5 TIMES DAILY   (INSULIN FLUCTUATING SUGARS). DX: E10.65 2/2/16   Shay Navas MD   therapeutic multivitamin (THERA) tablet Take 1 Tab by mouth daily. Provider, Historical   cholecalciferol (Vitamin D) (2,000 UNITS /50 MCG) cap capsule Take 3 Tablets by mouth daily.     Provider, Historical      PMHx:  has a past medical history of Arthritis, Diabetes ([facility] [address] Foot ulcer ([address] Hepatitis, Hypertension, Leukopenia, Murmur, Other and unspecified hyperlipidemia, Prostate cancer ([address] (Fall of 2015), Sickle cell trait ([address] (7/3/2012), Thyroid disease, Type I (juvenile type) diabetes mellitus without mention of complication, uncontrolled (since 1979), and Unspecified sleep apnea. He has no past medical history of Asthma, Atrial fibrillation ([address] CAD (coronary artery disease), Carotid artery disease ([address] Chronic kidney disease, Chronic obstructive pulmonary disease ([address] Clotting disorder ([address] Congestive heart failure ([address] Glaucoma, HIV (human immunodeficiency virus infection) ([address] ICD (implantable cardioverter-defibrillator) in place, Long term current use of anticoagulant therapy, Myocardial infarction [facility]), Pacemaker, Pulmonary embolism ([facility] [address] Rheumatic fever, Seizures ([address] Stroke ([address] Syncope, or Valvular heart disease. PSurgHx:  has a past surgical history that includes hx urological (1991); hx heent (1969); hx heent (2010); hx orthopaedic (2002, 2006); hx orthopaedic; hx orthopaedic (Jan 2013); hx orthopaedic (Left, 2014); hx orthopaedic (Right); and hx cataract removal (Bilateral). PSocHx:  reports that he quit smoking about 16 years ago. He has a 35.00 pack-year smoking history. He has never used smokeless tobacco. He reports previous alcohol use. He reports that he does not use drugs. ROS:  Admission ROS by No admitting provider for patient encounter. from 6/2/2022 were reviewed with the patient and changes (other than per HPI) include: none.     Physical Exam    General Appearance: NAD, awake  HENT: atraumatic, normal ears  Cardiovascular: not tachycardic, no LE edema  Respiratory: no distress, room air  Abdomen: soft, no suprapubic fullness or tenderness  : no CVA tenderness, abraham draining clear pink UA  Extremities: moves all  Musculoskeletal: normal alignment of neck and head  Neuro: Appropriate, no focal neurological deficits  Mood/Affect: appropriate, A&O x 3      Lab Results   Component Value Date/Time    WBC 5.4 06/01/2022 10:31 PM    HCT 33.2 (L) 06/01/2022 10:31 PM    PLATELET 037 82/61/3472 10:31 PM    Sodium 135 (L) 06/01/2022 10:31 PM    Potassium 4.4 06/01/2022 10:31 PM    Chloride 103 06/01/2022 10:31 PM    CO2 28 06/01/2022 10:31 PM    BUN 15 06/01/2022 10:31 PM    Creatinine 1.11 06/01/2022 10:31 PM    Glucose 320 (H) 06/01/2022 10:31 PM    Calcium 9.6 06/01/2022 10:31 PM    Magnesium 2.2 07/10/2021 05:13 AM    INR 1.0 12/13/2016 12:26 PM       UA:   Lab Results   Component Value Date/Time    Color RED 06/01/2022 10:17 PM    Appearance TURBID (A) 06/01/2022 10:17 PM    Specific gravity 1.020 06/01/2022 10:17 PM    Specific gravity 1.013 05/01/2022 10:25 AM    pH (UA) 8.0 06/01/2022 10:17 PM    Protein >300 (A) 06/01/2022 10:17 PM    Glucose >1,000 (A) 06/01/2022 10:17 PM    Ketone Negative 06/01/2022 10:17 PM    Bilirubin Negative 06/01/2022 10:17 PM    Urobilinogen 0.2 06/01/2022 10:17 PM    Nitrites Positive (A) 06/01/2022 10:17 PM    Leukocyte Esterase TRACE (A) 06/01/2022 10:17 PM    Epithelial cells FEW 06/01/2022 10:17 PM    Bacteria Negative 06/01/2022 10:17 PM    WBC 0-4 06/01/2022 10:17 PM    RBC >100 (H) 06/01/2022 10:17 PM            Signed By: Gwyn Tinoco NP  - June 2, 2022

## 2022-06-02 NOTE — ED NOTES
22Fr 3-Way Abraham catheter placed by this RN with ALEAH Mallory as second individual, using sterile technique. Patient tolerated well. Irrigated with 1L sterile saline with the full amount returned, sanguinous in color. MD notified and will place order for urojet. To remove current catheter and insert regular abraham of same [place] size and irrigate with additional 1L sterile saline.

## 2022-06-02 NOTE — ED NOTES
Spoke with urology r/t pt leaking urine around abraham. Urology recommends a good irrigation of the abraham, and then bladder scan at this time.

## 2022-06-02 NOTE — ED NOTES
Trevino irrigation of about 120 mL, pt did not tolerate more than 20 mL at a time. Output mostly clear but mildly blood tinged. Bladder scan showed 0 mL. MD notified. Trevino changed to leg bag and pt discharged per Dr Leonela Shin. Education provided and pt and wife given opportunity to ask questions.

## 2022-06-02 NOTE — ED NOTES
End of shift report and handover of care to [facility]. Patient, wife, and all belongings moved to room 10.

## 2022-06-02 NOTE — ED NOTES
TRANSFER - IN REPORT:    Verbal report [address] (name[facility] on [facility]. Report consisted of patients Situation, Background, Assessment and   Recommendations(SBAR). Information from the following report(s) SBAR and ED Summary was reviewed with the receiving nurse. Opportunity for questions and clarification was provided.

## 2022-06-02 NOTE — ED NOTES
New Trevino placed by ROCÍO Marcus, with this RN as second individual using sterile technique. Patient tolerated well. Irrigated with additional 1L sterile saline, serosanguinous in color. MD Colt Reeder updated on progress and arrived at bedside to chat with patient.
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
## 2022-06-02 NOTE — ED NOTES
Urology at bedside     0935 MD notified that pt is leaking large amounts of urine around abraham, urology re-consulted

## 2022-08-03 ENCOUNTER — HOSPITAL ENCOUNTER (OUTPATIENT)
Dept: PREADMISSION TESTING | Age: 77
Discharge: HOME | End: 2022-08-03
Payer: MEDICARE

## 2022-08-03 VITALS
HEIGHT: 75 IN | TEMPERATURE: 98.4 F | BODY MASS INDEX: 22.23 KG/M2 | WEIGHT: 178.79 LBS | SYSTOLIC BLOOD PRESSURE: 158 MMHG | DIASTOLIC BLOOD PRESSURE: 64 MMHG | HEART RATE: 80 BPM | OXYGEN SATURATION: 99 %

## 2022-08-03 LAB
ALBUMIN SERPL-MCNC: 3.2 G/DL (ref 3.5–5)
ALBUMIN/GLOB SERPL: 0.9 {RATIO} (ref 1.1–2.2)
ALP SERPL-CCNC: 77 U/L (ref 45–117)
ALT SERPL-CCNC: 26 U/L (ref 12–78)
ANION GAP SERPL CALC-SCNC: 6 MMOL/L (ref 5–15)
AST SERPL-CCNC: 22 U/L (ref 15–37)
ATRIAL RATE: 80 BPM
BASOPHILS # BLD: 0 K/UL (ref 0–0.1)
BASOPHILS NFR BLD: 0 % (ref 0–1)
BILIRUB SERPL-MCNC: 0.2 MG/DL (ref 0.2–1)
BUN SERPL-MCNC: 15 MG/DL (ref 6–20)
BUN/CREAT SERPL: 15 (ref 12–20)
CALCIUM SERPL-MCNC: 9.1 MG/DL (ref 8.5–10.1)
CALCULATED P AXIS, ECG09: 75 DEGREES
CALCULATED R AXIS, ECG10: -59 DEGREES
CALCULATED T AXIS, ECG11: 60 DEGREES
CHLORIDE SERPL-SCNC: 108 MMOL/L (ref 97–108)
CO2 SERPL-SCNC: 27 MMOL/L (ref 21–32)
CREAT SERPL-MCNC: 0.99 MG/DL (ref 0.7–1.3)
DIAGNOSIS, 93000: NORMAL
DIFFERENTIAL METHOD BLD: ABNORMAL
EOSINOPHIL # BLD: 0.1 K/UL (ref 0–0.4)
EOSINOPHIL NFR BLD: 4 % (ref 0–7)
ERYTHROCYTE [DISTWIDTH] IN BLOOD BY AUTOMATED COUNT: 14.2 % (ref 11.5–14.5)
EST. AVERAGE GLUCOSE BLD GHB EST-MCNC: 212 MG/DL
GLOBULIN SER CALC-MCNC: 3.7 G/DL (ref 2–4)
GLUCOSE SERPL-MCNC: 265 MG/DL (ref 65–100)
HBA1C MFR BLD: 9 % (ref 4–5.6)
HCT VFR BLD AUTO: 31.2 % (ref 36.6–50.3)
HGB BLD-MCNC: 10.2 G/DL (ref 12.1–17)
IMM GRANULOCYTES # BLD AUTO: 0 K/UL (ref 0–0.04)
IMM GRANULOCYTES NFR BLD AUTO: 0 % (ref 0–0.5)
LYMPHOCYTES # BLD: 0.7 K/UL (ref 0.8–3.5)
LYMPHOCYTES NFR BLD: 22 % (ref 12–49)
MCH RBC QN AUTO: 27.3 PG (ref 26–34)
MCHC RBC AUTO-ENTMCNC: 32.7 G/DL (ref 30–36.5)
MCV RBC AUTO: 83.6 FL (ref 80–99)
MONOCYTES # BLD: 0.4 K/UL (ref 0–1)
MONOCYTES NFR BLD: 11 % (ref 5–13)
NEUTS SEG # BLD: 2 K/UL (ref 1.8–8)
NEUTS SEG NFR BLD: 63 % (ref 32–75)
NRBC # BLD: 0 K/UL (ref 0–0.01)
NRBC BLD-RTO: 0 PER 100 WBC
P-R INTERVAL, ECG05: 192 MS
PLATELET # BLD AUTO: 235 K/UL (ref 150–400)
PMV BLD AUTO: 9.9 FL (ref 8.9–12.9)
POTASSIUM SERPL-SCNC: 4.7 MMOL/L (ref 3.5–5.1)
PROT SERPL-MCNC: 6.9 G/DL (ref 6.4–8.2)
Q-T INTERVAL, ECG07: 390 MS
QRS DURATION, ECG06: 102 MS
QTC CALCULATION (BEZET), ECG08: 449 MS
RBC # BLD AUTO: 3.73 M/UL (ref 4.1–5.7)
RBC MORPH BLD: ABNORMAL
SODIUM SERPL-SCNC: 141 MMOL/L (ref 136–145)
VENTRICULAR RATE, ECG03: 80 BPM
WBC # BLD AUTO: 3.2 K/UL (ref 4.1–11.1)

## 2022-08-03 PROCEDURE — 93005 ELECTROCARDIOGRAM TRACING: CPT

## 2022-08-03 PROCEDURE — 80053 COMPREHEN METABOLIC PANEL: CPT

## 2022-08-03 PROCEDURE — 85025 COMPLETE CBC W/AUTO DIFF WBC: CPT

## 2022-08-03 PROCEDURE — 83036 HEMOGLOBIN GLYCOSYLATED A1C: CPT

## 2022-08-03 PROCEDURE — 36415 COLL VENOUS BLD VENIPUNCTURE: CPT

## 2022-08-03 RX ORDER — LANOLIN ALCOHOL/MO/W.PET/CERES
500 CREAM (GRAM) TOPICAL DAILY
COMMUNITY

## 2022-08-03 RX ORDER — ASCORBIC ACID 500 MG
1000 TABLET ORAL DAILY
COMMUNITY

## 2022-08-03 RX ORDER — BISMUTH SUBSALICYLATE 262 MG
1 TABLET,CHEWABLE ORAL DAILY
COMMUNITY

## 2022-08-03 NOTE — PERIOP NOTES
PAT instructions reviewed with patient and family; and given the opportunity to ask questions. Patient given surgical site information FAQs handout and reviewed. Patient given two bottles CHG soap and instruction sheet, instructions for use reviewed with patient. Patient instructed re: check-in procedure for day of surgery. Patient has questions  for surgeon about surgical procedure, declined to sign consent form at PAT appointment.

## 2022-08-03 NOTE — PERIOP NOTES
[address] INSTRUCTIONS    Surgery Date:   8-17-22    Your surgeon's office or [facility] staff will call you between 4 PM- 8 PM the day before surgery with your arrival time. If your surgery is on a Monday, you will receive a call the preceding Friday. Please report to [facility] Patient Access/Admitting on the 1st floor. Bring your insurance card, photo identification, and any copayment ( if applicable). If you are going home the same day of your surgery, you must have a responsible adult to drive you home. You need to have a responsible adult to stay with you the first 24 hours after surgery and you should not drive a car for 24 hours following your surgery. Nothing to eat or drink after midnight the night before surgery. This includes no water, gum, mints, coffee, juice, etc.  Please note special instructions, if applicable, below for medications. Do NOT drink alcohol or smoke 24 hours before surgery. STOP smoking for 14 days prior as it helps with breathing and healing after surgery. If you are being admitted to the hospital, please leave personal belongings/luggage in your car until you have an assigned hospital room number. Please wear comfortable clothes. Wear your glasses instead of contacts. We ask that all money, jewelry and valuables be left at home. Wear no make up, particularly mascara, the day of surgery. All body piercings, rings, and jewelry need to be removed and left at home. Please remove any nail polish or artificial nails from your fingernails. Please wear your hair loose or down. Please no pony-tails, buns, or any metal hair accessories. If you shower the morning of surgery, please do not apply any lotions or powders afterwards. You may wear deodorant, unless having breast surgery. Do not shave any body area within 24 hours of your surgery. Please follow all instructions to avoid any potential surgical cancellation.   Should your physical condition change, (i.e. fever, cold, flu, etc.) please notify your surgeon as soon as possible. It is important to be on time. If a situation occurs where you may be delayed, please call:  [phone] / [phone] on the day of surgery. The Preadmission Testing staff can be reached at [phone]. Special instructions: NONE      Current Outpatient Medications   Medication Sig    multivitamin (ONE A DAY) tablet Take 1 Tablet by mouth in the morning. ascorbic acid, vitamin C, (Vitamin C) 500 mg tablet Take 1,000 mg by mouth in the morning. cyanocobalamin (Vitamin B-12) 500 mcg tablet Take 500 mcg by mouth in the morning. acetaminophen (TYLENOL) 325 mg tablet Take 2 Tablets by mouth every six (6) hours as needed for Pain. insulin glargine (Lantus Solostar U-100 Insulin) 100 unit/mL (3 mL) inpn INJECT 18 UNITS UNDER THE SKIN AT BEDTIME--Dose change 03/28/22--updated med list--did not send prescription to the pharmacy (Patient taking differently: 16 Units by SubCUTAneous route nightly. INJECT 18 UNITS UNDER THE SKIN AT BEDTIME--Dose change 03/28/22--updated med list--did not send prescription to the pharmacy)    megestroL (MEGACE) 20 mg tablet Take 20 mg by mouth three (3) times daily as needed. insulin aspart U-100 (NovoLOG Flexpen U-100 Insulin) 100 unit/mL (3 mL) inpn Inject 1:8 for carbs for breakfast and 1:9 for lunch and dinner before 9pm and 1:10 after 9pm and 1:50 > 150 for correction during the day and 1:50 > 180 after 9pm.  MAX 35/D    atorvastatin (Lipitor) 10 mg tablet Take 1 Tablet by mouth daily. alcohol swabs (BD Single Use Swabs Regular) padm TEST UP TO 5 TIMES DAILY (INSULIN FLUCTUATING SUGARS). DX: E10.65    FreeStyle Delroy 2 [place] misc Use as directed with freestyle delroy 2 sensors    silodosin (RAPAFLO) 8 mg capsule Take 8 mg by mouth daily (with breakfast).     BD Luer-Rafa Syringe 3 mL 18 x 1 1/2\" syrg USE TO DRAW UP TESTOSTERONE UTD    Disposable Needles 22 gauge x 1 1/2\" ndle USE TO INJECT TESTOSTERONE UTD    aspirin 81 mg chewable tablet Take 81 mg by mouth daily. cholecalciferol (Vitamin D) (2,000 UNITS /50 MCG) cap capsule Take 3 Tablets by mouth daily. True Metrix Glucose Meter misc TEST UP TO 5 TIMES DAILY (INSULIN FLUCTUATING SUGARS). DX: E10.65    TRUEplus Lancets 33 gauge misc TEST UP TO 5 TIMES DAILY (INSULIN FLUCTUATING SUGARS). DX: E10.65    True Metrix Level 1 soln Use as directed    FreeStyle Delroy 2 Sensor kit Use as directed to test blood sugars at least 4 times daily. Replace every 14 days    ONETOUCH ULTRA TEST strip TEST UP TO 5 TIMES DAILY   (INSULIN FLUCTUATING SUGARS). DX: E10.65     No current facility-administered medications for this encounter. YOU MUST ONLY TAKE THESE MEDICATIONS THE MORNING OF SURGERY WITH A SIP OF WATER: ATORVASTATIN  MEDICATIONS TO TAKE THE MORNING OF SURGERY ONLY IF NEEDED: NONE  HOLD these prescription medications DAY OF Surgery: INSULIN, SILODOSIN  STOP ASPIRIN, ALL VITAMINS ON 8/10/22  Ask your surgeon/prescribing physician about when/if to STOP taking these medications: N/A  Stop all vitamins, herbal medicines and Aspirin containing products 7 days prior to surgery. Stop any non-steroidal anti-inflammatory drugs (i.e. Ibuprofen, Naproxen, Advil, Aleve) 3 days before surgery. You may take Tylenol. If you are currently taking Plavix, Coumadin, or any other blood-thinning/anticoagulant medication contact your prescribing physician for instructions. Preventing Infections Before and After - Your Surgery    IMPORTANT INSTRUCTIONS      You play an important role in your health and preparation for surgery. To reduce the germs on your skin you will need to shower with CHG soap (Chorhexidine gluconate 4%) two times before surgery. CHG soap (Hibiclens, Hex-A-Clens or store brand)  CHG soap will be provided at your Preadmission Testing (PAT) appointment.   If you do not have a PAT appointment before surgery, you may arrange to  CHG soap from our office or purchase CHG soap at a pharmacy, grocery or department store. You need to purchase TWO 4 ounce bottles to use for your 2 showers. Steps to follow:  Charangelique Anant your hair with your normal shampoo and your body with regular soap and rinse well to remove shampoo and soap from your skin. Wet a clean washcloth and turn off the shower. Put CHG soap on washcloth and apply to your entire body from the neck down. Do not use on your head, face or private parts(genitals). Do not use CHG soap on open sores, wounds or areas of skin irritation. Wash you body gently for 5 minutes. Do not wash your skin too hard. This soap does not create lather. Pay special attention to your underarms and from your belly button to your feet. Turn the shower back on and rinse well to get CHG soap off your body. Pat your skin dry with a clean, dry towel. Do not apply lotions or moisturizer. Put on clean clothes and sleep on fresh bed sheets and do not allow pets to sleep with you. Shower with CHG soap 2 times before your surgery  The evening before your surgery  The morning of your surgery      Tips to help prevent infections after your surgery:  Protect your surgical wound from germs:  Hand washing is the most important thing you and your caregivers can do to prevent infections. Keep your bandage clean and dry! Do not touch your surgical wound. Use clean, freshly washed towels and washcloths every time you shower; do not share bath linens with others. Until your surgical wound is healed, wear clothing and sleep on bed linens each day that are clean and freshly washed. Do not allow pets to sleep in your bed with you or touch your surgical wound. Do not smoke - smoking delays wound healing. This may be a good time to stop smoking. If you have diabetes, it is important for you to manage your blood sugar levels properly before your surgery as well as after your surgery.  Poorly managed blood sugar levels slow down wound healing and prevent you from healing completely. Patient Information Regarding COVID Restrictions    Day of Procedure    Please park in the parking deck or any designated visitor parking lot. Enter the facility through the Main Entrance of the hospital.  On the day of surgery, please provide the cell phone number of the person who will be waiting for you to the Patient Access representative at the time of registration. Please wear a mask on the day of your procedure. We are now allowing two designated visitors per stay. Pediatric patients may have 2 designated visitors. These two people may come in with you on the day of your procedure. The designated visitor must also wear a mask. Once your procedure and the immediate recovery period is completed, a nurse in the recovery area will contact your designated visitor to inform them of your room number or to otherwise review other pertinent information regarding your care. Social distancing practices are to be adhered to in waiting areas and the cafeteria. The patient was contacted  in person. He verbalized understanding of all instructions does not  need reinforcement.

## 2022-08-04 NOTE — PERIOP NOTES
NOTIFIED DR WEINER'S OFFICE OF GLUCOSE 265 AND HGBA1C 9.0. SHE WILL NOTIFY NURSE. LABS FAXED TO DR WEINER'S OFFICE AND SENT TO PCP VIA CC FAX. RECEIVED RETURN PHONE CALL FROM DR WEINER'S NURSE STATING THEY HAVE RECEIVED LABS AND THEY ARE ON DR Rachel Hurst DESK FOR REVIEW.

## 2022-08-10 ENCOUNTER — HOSPITAL ENCOUNTER (EMERGENCY)
Age: 77
Discharge: HOME | End: 2022-08-10
Attending: EMERGENCY MEDICINE
Payer: MEDICARE

## 2022-08-10 VITALS
RESPIRATION RATE: 19 BRPM | HEART RATE: 83 BPM | WEIGHT: 176.81 LBS | TEMPERATURE: 98 F | SYSTOLIC BLOOD PRESSURE: 168 MMHG | OXYGEN SATURATION: 95 % | HEIGHT: 75 IN | DIASTOLIC BLOOD PRESSURE: 77 MMHG | BODY MASS INDEX: 21.98 KG/M2

## 2022-08-10 DIAGNOSIS — T83.018A URINARY CATHETER DYSFUNCTION, INITIAL ENCOUNTER (HCC): Primary | ICD-10-CM

## 2022-08-10 PROCEDURE — 51702 INSERT TEMP BLADDER CATH: CPT

## 2022-08-10 PROCEDURE — 87086 URINE CULTURE/COLONY COUNT: CPT

## 2022-08-10 PROCEDURE — 99283 EMERGENCY DEPT VISIT LOW MDM: CPT

## 2022-08-10 NOTE — ED NOTES
Trevino was irrigated with sterile water, no complications. Trevino now draining, leg bag and adhesive were replaced. Urine is britni, malodorous.  MD pruett

## 2022-08-11 ENCOUNTER — TELEPHONE (OUTPATIENT)
Dept: ENDOCRINOLOGY | Age: 77
End: 2022-08-11

## 2022-08-11 LAB
BACTERIA SPEC CULT: NORMAL
CC UR VC: NORMAL
SERVICE CMNT-IMP: NORMAL

## 2022-08-11 NOTE — TELEPHONE ENCOUNTER
Informed Mr Pedro Luis Zhou that Dr Pedro Luis Zhou received an order form for diabetes testing supplies from [facility]. Pt states he would like for Dr Pedro Luis Zhou to fill it out and fax it back as he could use the supplies.
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
## 2022-08-12 ENCOUNTER — TELEPHONE (OUTPATIENT)
Dept: CARDIOLOGY CLINIC | Age: 77
End: 2022-08-12

## 2022-08-12 NOTE — TELEPHONE ENCOUNTER
Pre-Procedure Cardiac Clearance Request:    Facility:[facility]    Procedure Date:08/17/2022    Procedure:Cystoscopy with possible tuip    Surgeon: Dr. Amaris Ortiz. Anesthesia : General Anesthesia    Is patient cleared from a cardiac standpoint to proceed with upcoming procedure. Please advise. Patient last time seen on 11-, recommended to come back in 6 months. This nurse called patient. Message left to call CAV back.

## 2022-08-13 ENCOUNTER — HOSPITAL ENCOUNTER (EMERGENCY)
Age: 77
Discharge: HOME | End: 2022-08-14
Attending: EMERGENCY MEDICINE
Payer: MEDICARE

## 2022-08-13 VITALS
TEMPERATURE: 98.1 F | RESPIRATION RATE: 17 BRPM | WEIGHT: 170 LBS | OXYGEN SATURATION: 100 % | DIASTOLIC BLOOD PRESSURE: 78 MMHG | BODY MASS INDEX: 21.82 KG/M2 | HEART RATE: 86 BPM | HEIGHT: 74 IN | SYSTOLIC BLOOD PRESSURE: 184 MMHG

## 2022-08-13 DIAGNOSIS — Z97.8 INDWELLING FOLEY CATHETER PRESENT: ICD-10-CM

## 2022-08-13 DIAGNOSIS — R31.9 HEMATURIA, UNSPECIFIED TYPE: Primary | ICD-10-CM

## 2022-08-13 LAB
APPEARANCE UR: CLEAR
BACTERIA URNS QL MICRO: NEGATIVE /HPF
BASOPHILS # BLD: 0 K/UL (ref 0–0.1)
BASOPHILS NFR BLD: 0 % (ref 0–1)
BILIRUB UR QL: NEGATIVE
COLOR UR: ABNORMAL
DIFFERENTIAL METHOD BLD: ABNORMAL
EOSINOPHIL # BLD: 0.2 K/UL (ref 0–0.4)
EOSINOPHIL NFR BLD: 5 % (ref 0–7)
EPITH CASTS URNS QL MICRO: ABNORMAL /LPF
ERYTHROCYTE [DISTWIDTH] IN BLOOD BY AUTOMATED COUNT: 14.6 % (ref 11.5–14.5)
GLUCOSE UR STRIP.AUTO-MCNC: >1000 MG/DL
HCT VFR BLD AUTO: 33.1 % (ref 36.6–50.3)
HGB BLD-MCNC: 10.6 G/DL (ref 12.1–17)
HGB UR QL STRIP: ABNORMAL
IMM GRANULOCYTES # BLD AUTO: 0 K/UL (ref 0–0.04)
IMM GRANULOCYTES NFR BLD AUTO: 0 % (ref 0–0.5)
KETONES UR QL STRIP.AUTO: NEGATIVE MG/DL
LEUKOCYTE ESTERASE UR QL STRIP.AUTO: NEGATIVE
LYMPHOCYTES # BLD: 1.2 K/UL (ref 0.8–3.5)
LYMPHOCYTES NFR BLD: 28 % (ref 12–49)
MCH RBC QN AUTO: 26.8 PG (ref 26–34)
MCHC RBC AUTO-ENTMCNC: 32 G/DL (ref 30–36.5)
MCV RBC AUTO: 83.6 FL (ref 80–99)
MONOCYTES # BLD: 0.4 K/UL (ref 0–1)
MONOCYTES NFR BLD: 10 % (ref 5–13)
NEUTS SEG # BLD: 2.6 K/UL (ref 1.8–8)
NEUTS SEG NFR BLD: 57 % (ref 32–75)
NITRITE UR QL STRIP.AUTO: NEGATIVE
NRBC # BLD: 0 K/UL (ref 0–0.01)
NRBC BLD-RTO: 0 PER 100 WBC
PH UR STRIP: 6.5 [PH] (ref 5–8)
PLATELET # BLD AUTO: 248 K/UL (ref 150–400)
PMV BLD AUTO: 9.4 FL (ref 8.9–12.9)
PROT UR STRIP-MCNC: 100 MG/DL
RBC # BLD AUTO: 3.96 M/UL (ref 4.1–5.7)
RBC #/AREA URNS HPF: ABNORMAL /HPF (ref 0–5)
SP GR UR REFRACTOMETRY: <1.005 (ref 1–1.03)
UA: UC IF INDICATED,UAUC: ABNORMAL
UROBILINOGEN UR QL STRIP.AUTO: 1 EU/DL (ref 0.2–1)
WBC # BLD AUTO: 4.5 K/UL (ref 4.1–11.1)
WBC URNS QL MICRO: ABNORMAL /HPF (ref 0–4)

## 2022-08-13 PROCEDURE — 99284 EMERGENCY DEPT VISIT MOD MDM: CPT

## 2022-08-13 PROCEDURE — 74011000258 HC RX REV CODE- 258: Performed by: EMERGENCY MEDICINE

## 2022-08-13 PROCEDURE — 80053 COMPREHEN METABOLIC PANEL: CPT

## 2022-08-13 PROCEDURE — 74011250636 HC RX REV CODE- 250/636: Performed by: EMERGENCY MEDICINE

## 2022-08-13 PROCEDURE — 81001 URINALYSIS AUTO W/SCOPE: CPT

## 2022-08-13 PROCEDURE — 85610 PROTHROMBIN TIME: CPT

## 2022-08-13 PROCEDURE — 85025 COMPLETE CBC W/AUTO DIFF WBC: CPT

## 2022-08-13 PROCEDURE — 51702 INSERT TEMP BLADDER CATH: CPT

## 2022-08-13 PROCEDURE — 96365 THER/PROPH/DIAG IV INF INIT: CPT

## 2022-08-13 PROCEDURE — 36415 COLL VENOUS BLD VENIPUNCTURE: CPT

## 2022-08-13 RX ADMIN — SODIUM CHLORIDE 1 G: 900 INJECTION INTRAVENOUS at 23:57

## 2022-08-14 LAB
ALBUMIN SERPL-MCNC: 3.3 G/DL (ref 3.5–5)
ALBUMIN/GLOB SERPL: 0.8 {RATIO} (ref 1.1–2.2)
ALP SERPL-CCNC: 93 U/L (ref 45–117)
ALT SERPL-CCNC: 26 U/L (ref 12–78)
ANION GAP SERPL CALC-SCNC: 4 MMOL/L (ref 5–15)
AST SERPL-CCNC: 26 U/L (ref 15–37)
BILIRUB SERPL-MCNC: 0.2 MG/DL (ref 0.2–1)
BUN SERPL-MCNC: 14 MG/DL (ref 6–20)
BUN/CREAT SERPL: 14 (ref 12–20)
CALCIUM SERPL-MCNC: 9 MG/DL (ref 8.5–10.1)
CHLORIDE SERPL-SCNC: 106 MMOL/L (ref 97–108)
CO2 SERPL-SCNC: 29 MMOL/L (ref 21–32)
CREAT SERPL-MCNC: 1.02 MG/DL (ref 0.7–1.3)
GLOBULIN SER CALC-MCNC: 4.3 G/DL (ref 2–4)
GLUCOSE SERPL-MCNC: 231 MG/DL (ref 65–100)
INR PPP: 1 (ref 0.9–1.1)
POTASSIUM SERPL-SCNC: 4.5 MMOL/L (ref 3.5–5.1)
PROT SERPL-MCNC: 7.6 G/DL (ref 6.4–8.2)
PROTHROMBIN TIME: 10.2 SEC (ref 9–11.1)
SODIUM SERPL-SCNC: 139 MMOL/L (ref 136–145)

## 2022-08-14 PROCEDURE — 74011000250 HC RX REV CODE- 250: Performed by: EMERGENCY MEDICINE

## 2022-08-14 RX ORDER — LIDOCAINE HYDROCHLORIDE 20 MG/ML
JELLY TOPICAL
Status: COMPLETED | OUTPATIENT
Start: 2022-08-14 | End: 2022-08-14

## 2022-08-14 RX ORDER — CEPHALEXIN 500 MG/1
500 CAPSULE ORAL 3 TIMES DAILY
Qty: 15 CAPSULE | Refills: 0 | Status: SHIPPED | OUTPATIENT
Start: 2022-08-14 | End: 2022-09-30

## 2022-08-14 RX ORDER — WATER FOR INJECTION,STERILE
VIAL (ML) INJECTION
Status: DISCONTINUED
Start: 2022-08-14 | End: 2022-08-14 | Stop reason: HOSPADM

## 2022-08-14 RX ADMIN — LIDOCAINE HYDROCHLORIDE: 20 JELLY TOPICAL at 01:01

## 2022-08-14 NOTE — ED NOTES
Discontinued abraham in place for greater than 3 weeks, attempted to place a 20f catheter without success, attempted 18f coude catheter with no success, pt tolerated well, physician notified
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
## 2022-08-16 NOTE — TELEPHONE ENCOUNTER
Per Shazia Tierney. Last seen 3/2022---No cardiac complaints then.    Cardiac work up 1/2022 includes--Nuclear stress test was equivocal, and coronary CT angiogram January 2022 reveals nonobstructive CAD, and only questionably significant stenosis is of a diagonal ostium-due to location, will medically manage   unless he has chest pain-he is doing well with a home exercise program in front of the television   Echo showed LVEF 60%, mild aortic stenosis in January 2022     If doing well,  low cardiac risk to proceed with cystoscopy,  may hold ASA 5-7 days prior to procedure, resume when felt safe from surgical standpoint

## 2022-08-17 ENCOUNTER — HOSPITAL ENCOUNTER (OUTPATIENT)
Age: 77
Setting detail: OUTPATIENT SURGERY
Discharge: HOME | End: 2022-08-17
Attending: UROLOGY | Admitting: UROLOGY
Payer: MEDICARE

## 2022-08-17 ENCOUNTER — ANESTHESIA EVENT (OUTPATIENT)
Dept: SURGERY | Age: 77
End: 2022-08-17
Payer: MEDICARE

## 2022-08-17 ENCOUNTER — ANESTHESIA (OUTPATIENT)
Dept: SURGERY | Age: 77
End: 2022-08-17
Payer: MEDICARE

## 2022-08-17 VITALS
BODY MASS INDEX: 22.23 KG/M2 | HEIGHT: 75 IN | OXYGEN SATURATION: 93 % | SYSTOLIC BLOOD PRESSURE: 141 MMHG | TEMPERATURE: 97.9 F | WEIGHT: 178.79 LBS | HEART RATE: 95 BPM | RESPIRATION RATE: 14 BRPM | DIASTOLIC BLOOD PRESSURE: 77 MMHG

## 2022-08-17 LAB
GLUCOSE BLD STRIP.AUTO-MCNC: 168 MG/DL (ref 65–117)
GLUCOSE BLD STRIP.AUTO-MCNC: 191 MG/DL (ref 65–117)
SERVICE CMNT-IMP: ABNORMAL
SERVICE CMNT-IMP: ABNORMAL

## 2022-08-17 PROCEDURE — 76210000021 HC REC RM PH II 0.5 TO 1 HR: Performed by: UROLOGY

## 2022-08-17 PROCEDURE — 74011250636 HC RX REV CODE- 250/636: Performed by: NURSE ANESTHETIST, CERTIFIED REGISTERED

## 2022-08-17 PROCEDURE — 74011250636 HC RX REV CODE- 250/636: Performed by: UROLOGY

## 2022-08-17 PROCEDURE — 74011000250 HC RX REV CODE- 250: Performed by: UROLOGY

## 2022-08-17 PROCEDURE — 2709999900 HC NON-CHARGEABLE SUPPLY: Performed by: UROLOGY

## 2022-08-17 PROCEDURE — 74011000250 HC RX REV CODE- 250: Performed by: NURSE ANESTHETIST, CERTIFIED REGISTERED

## 2022-08-17 PROCEDURE — 76060000033 HC ANESTHESIA 1 TO 1.5 HR: Performed by: UROLOGY

## 2022-08-17 PROCEDURE — 77030026438 HC STYL ET INTUB CARD -A: Performed by: ANESTHESIOLOGY

## 2022-08-17 PROCEDURE — 74011250636 HC RX REV CODE- 250/636: Performed by: ANESTHESIOLOGY

## 2022-08-17 PROCEDURE — 77030040831 HC BAG URINE DRNG MDII -A: Performed by: UROLOGY

## 2022-08-17 PROCEDURE — 77030008684 HC TU ET CUF COVD -B: Performed by: ANESTHESIOLOGY

## 2022-08-17 PROCEDURE — 77030040922 HC BLNKT HYPOTHRM STRY -A

## 2022-08-17 PROCEDURE — 76010000149 HC OR TIME 1 TO 1.5 HR: Performed by: UROLOGY

## 2022-08-17 PROCEDURE — 82962 GLUCOSE BLOOD TEST: CPT

## 2022-08-17 PROCEDURE — 76210000006 HC OR PH I REC 0.5 TO 1 HR: Performed by: UROLOGY

## 2022-08-17 PROCEDURE — 77030021162 HC TU IRR ENDOSC BAXT -A: Performed by: UROLOGY

## 2022-08-17 PROCEDURE — 77030002996 HC SUT SLK J&J -A: Performed by: UROLOGY

## 2022-08-17 RX ORDER — PROPOFOL 10 MG/ML
INJECTION, EMULSION INTRAVENOUS AS NEEDED
Status: DISCONTINUED | OUTPATIENT
Start: 2022-08-17 | End: 2022-08-17 | Stop reason: HOSPADM

## 2022-08-17 RX ORDER — SODIUM CHLORIDE, SODIUM LACTATE, POTASSIUM CHLORIDE, CALCIUM CHLORIDE 600; 310; 30; 20 MG/100ML; MG/100ML; MG/100ML; MG/100ML
INJECTION, SOLUTION INTRAVENOUS
Status: DISCONTINUED | OUTPATIENT
Start: 2022-08-17 | End: 2022-08-17 | Stop reason: HOSPADM

## 2022-08-17 RX ORDER — ROCURONIUM BROMIDE 10 MG/ML
INJECTION, SOLUTION INTRAVENOUS AS NEEDED
Status: DISCONTINUED | OUTPATIENT
Start: 2022-08-17 | End: 2022-08-17 | Stop reason: HOSPADM

## 2022-08-17 RX ORDER — SODIUM CHLORIDE, SODIUM LACTATE, POTASSIUM CHLORIDE, CALCIUM CHLORIDE 600; 310; 30; 20 MG/100ML; MG/100ML; MG/100ML; MG/100ML
25 INJECTION, SOLUTION INTRAVENOUS CONTINUOUS
Status: DISCONTINUED | OUTPATIENT
Start: 2022-08-17 | End: 2022-08-17 | Stop reason: HOSPADM

## 2022-08-17 RX ORDER — BUPIVACAINE HYDROCHLORIDE AND EPINEPHRINE 5; 5 MG/ML; UG/ML
INJECTION, SOLUTION EPIDURAL; INTRACAUDAL; PERINEURAL AS NEEDED
Status: DISCONTINUED | OUTPATIENT
Start: 2022-08-17 | End: 2022-08-17 | Stop reason: HOSPADM

## 2022-08-17 RX ORDER — GLYCOPYRROLATE 0.2 MG/ML
INJECTION INTRAMUSCULAR; INTRAVENOUS AS NEEDED
Status: DISCONTINUED | OUTPATIENT
Start: 2022-08-17 | End: 2022-08-17 | Stop reason: HOSPADM

## 2022-08-17 RX ORDER — SUCCINYLCHOLINE CHLORIDE 20 MG/ML
INJECTION INTRAMUSCULAR; INTRAVENOUS AS NEEDED
Status: DISCONTINUED | OUTPATIENT
Start: 2022-08-17 | End: 2022-08-17 | Stop reason: HOSPADM

## 2022-08-17 RX ORDER — NEOSTIGMINE METHYLSULFATE 1 MG/ML
INJECTION, SOLUTION INTRAVENOUS AS NEEDED
Status: DISCONTINUED | OUTPATIENT
Start: 2022-08-17 | End: 2022-08-17 | Stop reason: HOSPADM

## 2022-08-17 RX ORDER — LIDOCAINE HYDROCHLORIDE 20 MG/ML
INJECTION, SOLUTION EPIDURAL; INFILTRATION; INTRACAUDAL; PERINEURAL AS NEEDED
Status: DISCONTINUED | OUTPATIENT
Start: 2022-08-17 | End: 2022-08-17 | Stop reason: HOSPADM

## 2022-08-17 RX ORDER — CEPHALEXIN 250 MG/1
250 CAPSULE ORAL 3 TIMES DAILY
Qty: 21 CAPSULE | Refills: 0 | Status: SHIPPED | OUTPATIENT
Start: 2022-08-17 | End: 2022-08-24

## 2022-08-17 RX ORDER — FENTANYL CITRATE 50 UG/ML
INJECTION, SOLUTION INTRAMUSCULAR; INTRAVENOUS AS NEEDED
Status: DISCONTINUED | OUTPATIENT
Start: 2022-08-17 | End: 2022-08-17 | Stop reason: HOSPADM

## 2022-08-17 RX ORDER — DEXAMETHASONE SODIUM PHOSPHATE 4 MG/ML
INJECTION, SOLUTION INTRA-ARTICULAR; INTRALESIONAL; INTRAMUSCULAR; INTRAVENOUS; SOFT TISSUE AS NEEDED
Status: DISCONTINUED | OUTPATIENT
Start: 2022-08-17 | End: 2022-08-17 | Stop reason: HOSPADM

## 2022-08-17 RX ORDER — PHENYLEPHRINE HCL IN 0.9% NACL 0.4MG/10ML
SYRINGE (ML) INTRAVENOUS AS NEEDED
Status: DISCONTINUED | OUTPATIENT
Start: 2022-08-17 | End: 2022-08-17 | Stop reason: HOSPADM

## 2022-08-17 RX ORDER — ONDANSETRON 2 MG/ML
INJECTION INTRAMUSCULAR; INTRAVENOUS AS NEEDED
Status: DISCONTINUED | OUTPATIENT
Start: 2022-08-17 | End: 2022-08-17 | Stop reason: HOSPADM

## 2022-08-17 RX ORDER — HYDROMORPHONE HYDROCHLORIDE 2 MG/ML
INJECTION, SOLUTION INTRAMUSCULAR; INTRAVENOUS; SUBCUTANEOUS AS NEEDED
Status: DISCONTINUED | OUTPATIENT
Start: 2022-08-17 | End: 2022-08-17 | Stop reason: HOSPADM

## 2022-08-17 RX ADMIN — Medication 80 MCG: at 13:34

## 2022-08-17 RX ADMIN — FENTANYL CITRATE 25 MCG: 50 INJECTION, SOLUTION INTRAMUSCULAR; INTRAVENOUS at 13:10

## 2022-08-17 RX ADMIN — ONDANSETRON HYDROCHLORIDE 4 MG: 2 INJECTION, SOLUTION INTRAMUSCULAR; INTRAVENOUS at 13:17

## 2022-08-17 RX ADMIN — PROPOFOL 120 MG: 10 INJECTION, EMULSION INTRAVENOUS at 13:10

## 2022-08-17 RX ADMIN — ROCURONIUM BROMIDE 25 MG: 10 SOLUTION INTRAVENOUS at 13:26

## 2022-08-17 RX ADMIN — LIDOCAINE HYDROCHLORIDE 60 MG: 20 INJECTION, SOLUTION EPIDURAL; INFILTRATION; INTRACAUDAL; PERINEURAL at 13:10

## 2022-08-17 RX ADMIN — SUCCINYLCHOLINE CHLORIDE 120 MG: 20 INJECTION, SOLUTION INTRAMUSCULAR; INTRAVENOUS at 13:10

## 2022-08-17 RX ADMIN — HYDROMORPHONE HYDROCHLORIDE 0.2 MG: 2 INJECTION, SOLUTION INTRAMUSCULAR; INTRAVENOUS; SUBCUTANEOUS at 13:29

## 2022-08-17 RX ADMIN — NEOSTIGMINE METHYLSULFATE 3 MG: 1 INJECTION, SOLUTION INTRAVENOUS at 13:55

## 2022-08-17 RX ADMIN — PHENYLEPHRINE HYDROCHLORIDE 50 MCG/MIN: 10 INJECTION INTRAVENOUS at 13:10

## 2022-08-17 RX ADMIN — HYDROMORPHONE HYDROCHLORIDE 0.2 MG: 2 INJECTION, SOLUTION INTRAMUSCULAR; INTRAVENOUS; SUBCUTANEOUS at 14:06

## 2022-08-17 RX ADMIN — SODIUM CHLORIDE, POTASSIUM CHLORIDE, SODIUM LACTATE AND CALCIUM CHLORIDE 25 ML/HR: 600; 310; 30; 20 INJECTION, SOLUTION INTRAVENOUS at 11:55

## 2022-08-17 RX ADMIN — GLYCOPYRROLATE 0.4 MG: 0.2 INJECTION, SOLUTION INTRAMUSCULAR; INTRAVENOUS at 13:55

## 2022-08-17 RX ADMIN — FENTANYL CITRATE 75 MCG: 50 INJECTION, SOLUTION INTRAMUSCULAR; INTRAVENOUS at 13:24

## 2022-08-17 RX ADMIN — SODIUM CHLORIDE, POTASSIUM CHLORIDE, SODIUM LACTATE AND CALCIUM CHLORIDE: 600; 310; 30; 20 INJECTION, SOLUTION INTRAVENOUS at 12:45

## 2022-08-17 RX ADMIN — Medication 120 MCG: at 13:10

## 2022-08-17 RX ADMIN — ROCURONIUM BROMIDE 5 MG: 10 SOLUTION INTRAVENOUS at 13:10

## 2022-08-17 RX ADMIN — WATER 2 G: 1 INJECTION INTRAMUSCULAR; INTRAVENOUS; SUBCUTANEOUS at 13:21

## 2022-08-17 RX ADMIN — DEXAMETHASONE SODIUM PHOSPHATE 2 MG: 4 INJECTION, SOLUTION INTRAMUSCULAR; INTRAVENOUS at 13:17

## 2022-08-17 NOTE — OP NOTES
Operative Note    Patient: Ishmael Sales  YOB: 1945  MRN: [id number]    Date of Procedure: 8/17/2022     Pre-Op Diagnosis: HEMATURIA, BLADDER NECK OBSTRUCTION    Post-Op Diagnosis: Same as preoperative diagnosis. Procedure(s):  CYSTOSCOPY, SUPRAPUBIC TUBE PLACEMENT    Surgeon(s):  Shola Vegas MD    Surgical Assistant: None    Anesthesia: General     Estimated Blood Loss (mL):  Minimal    Complications: None    Specimens: * No specimens in log *     Implants: * No implants in log *    Drains: * No LDAs found *    Findings: Abnormal prostate from prior procedures without suggestion of cancer. SP tube placement. Detailed Description of Procedure:   Patient was brought to the operating room after signing operative consent. We had a full discussion given his prior radiation treatment and subsequent cryoablation for recurrent prostate cancer. Abnormal prostate anatomy was noted but the potential for resection with increased risk for healing problems as well as fistula. Patient was also counseled for SP tube placement as improved measure over Trevino catheter. He has had numerous difficulties with the indwelling Trevino catheter. Patient was placed in supine position with anesthesia induction. Patient was prepped and draped in standard fashion including the suprapubic region. Complete timeout was performed verifying patient procedure and planned operative course. 24 Kiswahili rigid cystoscope was advanced the urethra demonstrating a normal pendulous urethra. The membranous urethra was widely patent. Prostate fossa was hollowed out within the expected region of prostate. It was difficult to find the bladder at which point at the 10 o'clock position an opening was noted which was consistent with prostatic urethra and bladder. This is consistent with a fairly significant bladder neck contracture.   Thankfully the opening to the bladder at that time o'clock position was open and readily admitted the scope. I would imagine with bladder distention that it may pinch off the opening causing retention. Clearly it is causing difficulty with his catheters including placement of catheter, exchange of catheter and other. Visualization of the bladder demonstrated mucosa consistent with prior radiation treatment. Thankfully there were no significant mucosal lesions including visualization of the trigone posterior wall dome bladder walls and bladder neck. 12 o'clock position of bladder was without any identifiable pathology. I opted at this point to place an SP tube as a better long-term treatment option. A Lowsley retractor was then placed into the bladder with careful manipulation given the underlying anatomy. Once I was confident that this was positioned in place I then planned the incision site 2 fingerbreadths above the pubic symphysis. Patient does have an indwelling penile prosthesis and the left reservoir and tubing was palpated to be several centimeters away from the area we plan for incision site. At this point the skin was incised with a knife in the midline away from the penile prosthetic hardware. Lowsley retractor was brought up into position and a cautery was then utilized to incise the subcutaneous layers muscle and fascia overlying the retractor. At this point the retractor was brought through the skin incision. A 2-0 silk was utilized to attach a 16 Western Shakira catheter. This was retracted back out the meatus. Cystoscope was then utilized to follow the catheter back into the bladder. Balloon was inflated. Hemostasis was checked at 12 o'clock position. The balloon was well seated. No other identifiable incidental injuries or pathology. Catheter was irrigated without difficulty with fairly clear urine. 2-0 silk stitch x2 to secure the suprapubic catheter in position. Drainage bag was placed. Hemostasis was good.   Patient was awoken and brought to PACU in stable condition. Disposition-we will need to see the patient in 1 month for initial SP tube exchange.     Electronically Signed by Enriqueta House MD on 8/17/2022 at 2:02 PM

## 2022-08-17 NOTE — DISCHARGE INSTRUCTIONS
>>Discharge instructions per Dr. Janet Ontiveros:   --Call the office if catheter stops draining consistently or if significant blood clots are present  --Bloody urine normal for next few days  --Remove dressing prior to first shower. May shower tomorrow  --Return to office in 1 month for catheter exchange   --Good hygiene around entry site (warm soap and water)       ______________________________________________________________________    Anesthesia Discharge Instructions    After general anesthesia or intervenous sedation, for 24 hours or while taking prescription Narcotics:  Limit your activities  Do not drive or operate hazardous machinery  If you have not urinated within 8 hours after discharge, please contact your surgeon on call. Do not make important personal or business decisions  Do not drink alcoholic beverages    Report the following to your surgeon:  Excessive pain, swelling, redness or odor of or around the surgical area  Temperature over 100.5 degrees  Nausea and vomiting lasting longer than 4 hours or if unable to take medication  Any signs of decreased circulation or nerve impairment to extremity:  Change in color, persistent numbness, tingling, coldness or increased pain.   Any questions

## 2022-08-17 NOTE — ROUTINE PROCESS
Patient: Navya Hicks MRN: [id number]  SSN: [id number]   YOB: 1945  Age: 65 y.o. Sex: male     Patient is status post Procedure(s):  CYSTOSCOPY, SUPRAPUBIC TUBE PLACEMENT.     Surgeon(s) and Role:     * Merline Kirsten, MD - Primary    Local/Dose/Irrigation:  0.5% marcaine with epi 10ml injected                  Peripheral IV 08/17/22 Left;Posterior Forearm (Active)   Site Assessment Clean, dry, & intact 08/17/22 1154   Phlebitis Assessment 0 08/17/22 1154   Infiltration Assessment 0 08/17/22 1154   Dressing Status Clean, dry, & intact 08/17/22 1154   Dressing Type Transparent 08/17/22 1154   Hub Color/Line Status Infusing 08/17/22 1154            Airway - Endotracheal Tube 08/17/22 Oral (Active)                   Dressing/Packing:  telfa, silk tape

## 2022-08-17 NOTE — ANESTHESIA PREPROCEDURE EVALUATION
Relevant Problems   NEUROLOGY   (+) Episodic cluster headache, not intractable   (+) Tension vascular headache      ENDOCRINE   (+) Hypothyroidism due to acquired atrophy of thyroid   (+) Septic arthritis (HCC)   (+) Septic arthritis of ankle or foot, left   (+) Type I diabetes mellitus, uncontrolled       Anesthetic History   No history of anesthetic complications            Review of Systems / Medical History  Patient summary reviewed, nursing notes reviewed and pertinent labs reviewed    Pulmonary  Within defined limits      Sleep apnea           Neuro/Psych   Within defined limits           Cardiovascular  Within defined limits  Hypertension              Exercise tolerance: >4 METS  Comments: Nuclear Findings: LVEF measures 64%. LV perfusion is probably abnormal. There is no evidence of inducible ischemia.  Nuclear Findings: There is a moderate severity global left ventricular stress perfusion defect present in the inferior segment(s) that is predominantly fixed. The defect is consistent with abnormal perfusion in the RCA territory. Viability in the area is good. There is abnormal wall motion in the defect area.  Nuclear Findings: Normal left ventricular systolic function post-stress. LVEF measures 64%. There is a global left ventricular stress perfusion defect present in the inferior segment(s).  Nuclear Findings: The study is most consistent with myocardial infarction. Findings suggest a moderate risk of myocardial ischemia.  ECG: Resting ECG demonstrates normal sinus rhythm.  Stress Test: A pharmacological stress test was performed using regadenoson. Hemodynamics are non-diagnostic due to medication. Blood pressure demonstrated a hypertensive response and heart rate demonstrated a normal response to stress. Patient has resting hypertension.      GI/Hepatic/Renal  Within defined limits         Liver disease     Endo/Other  Within defined limits  Diabetes: poorly controlled  Hypothyroidism  Arthritis     Other Findings            Physical Exam    Airway  Mallampati: II  TM Distance: > 6 cm  Neck ROM: normal range of motion   Mouth opening: Normal     Cardiovascular  Regular rate and rhythm,  S1 and S2 normal,  no murmur, click, rub, or gallop             Dental  No notable dental hx       Pulmonary  Breath sounds clear to auscultation               Abdominal  GI exam deferred       Other Findings            Anesthetic Plan    ASA: 3  Anesthesia type: general

## 2022-08-17 NOTE — PERIOP NOTES
I have reviewed discharge instructions in person with the patient, wife, and daughter using teach back method. The patient, wife, and daughter verbalized understanding. Medications, signs, symptoms, and side effects reviewed. Opportunity for questions and clarification allowed. Patient discharging home via private vehicle. Hard-copy of discharge instructions given to patient. Copy of discharge instructions signed and placed on patient's chart.

## 2022-08-17 NOTE — ANESTHESIA POSTPROCEDURE EVALUATION
Post-Anesthesia Evaluation and Assessment    Patient: Dontrell Rogers MRN: [id number]  SSN: [id number]    YOB: 1945  Age: 65 y.o. Sex: male      I have evaluated the patient and they are stable and ready for discharge from the PACU. Cardiovascular Function/Vital Signs  Visit Vitals  BP (!) 149/69   Pulse 82   Temp 36.6 °C (97.9 °F)   Resp 14   Ht 6' 2.5\" (1.892 m)   Wt 81.1 kg (178 lb 12.7 oz)   SpO2 94%   BMI 22.65 kg/m²       Patient is status post General anesthesia for Procedure(s):  CYSTOSCOPY, SUPRAPUBIC TUBE PLACEMENT. Nausea/Vomiting: None    Postoperative hydration reviewed and adequate. Pain:  Pain Scale 1: Numeric (0 - 10) (08/17/22 1450)  Pain Intensity 1: 0 (08/17/22 1450)   Managed    Neurological Status:   Neuro (WDL): Within Defined Limits (08/17/22 1450)   At baseline    Mental Status, Level of Consciousness: Alert and  oriented to person, place, and time    Pulmonary Status:   O2 Device: None (Room air) (08/17/22 1450)   Adequate oxygenation and airway patent    Complications related to anesthesia: None    Post-anesthesia assessment completed.  No concerns    Signed By: Michelle Tucker MD     August 17, 2022
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
## 2022-09-05 ENCOUNTER — HOSPITAL ENCOUNTER (EMERGENCY)
Age: 77
Discharge: HOME | End: 2022-09-05
Attending: EMERGENCY MEDICINE
Payer: MEDICARE

## 2022-09-05 VITALS
TEMPERATURE: 97.9 F | WEIGHT: 174.82 LBS | BODY MASS INDEX: 21.74 KG/M2 | HEART RATE: 100 BPM | RESPIRATION RATE: 16 BRPM | OXYGEN SATURATION: 99 % | HEIGHT: 75 IN | DIASTOLIC BLOOD PRESSURE: 69 MMHG | SYSTOLIC BLOOD PRESSURE: 144 MMHG

## 2022-09-05 DIAGNOSIS — R33.9 URINARY RETENTION: Primary | ICD-10-CM

## 2022-09-05 PROCEDURE — 99282 EMERGENCY DEPT VISIT SF MDM: CPT

## 2022-09-05 NOTE — DISCHARGE INSTRUCTIONS
Please make a followup with your primary care physician. If you have any new or worsening concerning medical symptoms please return to the emergency department.
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
## 2022-09-14 DIAGNOSIS — E78.5 HYPERLIPIDEMIA LDL GOAL <100: ICD-10-CM

## 2022-09-14 DIAGNOSIS — R03.0 ELEVATED BLOOD PRESSURE READING WITHOUT DIAGNOSIS OF HYPERTENSION: ICD-10-CM

## 2022-09-14 DIAGNOSIS — E10.65 UNCONTROLLED TYPE 1 DIABETES MELLITUS WITH HYPERGLYCEMIA (HCC): ICD-10-CM

## 2022-09-21 LAB
ALBUMIN/CREAT UR: 247 MG/G CREAT (ref 0–29)
BUN SERPL-MCNC: 14 MG/DL (ref 8–27)
BUN/CREAT SERPL: 15 (ref 10–24)
CALCIUM SERPL-MCNC: 9.1 MG/DL (ref 8.6–10.2)
CHLORIDE SERPL-SCNC: 106 MMOL/L (ref 96–106)
CO2 SERPL-SCNC: 21 MMOL/L (ref 20–29)
CREAT SERPL-MCNC: 0.91 MG/DL (ref 0.76–1.27)
CREAT UR-MCNC: 124.4 MG/DL
EGFR: 86 ML/MIN/1.73
EST. AVERAGE GLUCOSE BLD GHB EST-MCNC: 223 MG/DL
GLUCOSE SERPL-MCNC: 97 MG/DL (ref 65–99)
HBA1C MFR BLD: 9.4 % (ref 4.8–5.6)
MICROALBUMIN UR-MCNC: 307.4 UG/ML
POTASSIUM SERPL-SCNC: 4.4 MMOL/L (ref 3.5–5.2)
SODIUM SERPL-SCNC: 143 MMOL/L (ref 134–144)

## 2022-09-23 VITALS
HEIGHT: 75 IN | HEART RATE: 99 BPM | WEIGHT: 181.88 LBS | DIASTOLIC BLOOD PRESSURE: 71 MMHG | OXYGEN SATURATION: 100 % | SYSTOLIC BLOOD PRESSURE: 165 MMHG | RESPIRATION RATE: 20 BRPM | TEMPERATURE: 97.9 F | BODY MASS INDEX: 22.61 KG/M2

## 2022-09-23 PROCEDURE — 99282 EMERGENCY DEPT VISIT SF MDM: CPT

## 2022-09-24 ENCOUNTER — HOSPITAL ENCOUNTER (EMERGENCY)
Age: 77
Discharge: HOME | End: 2022-09-24
Attending: EMERGENCY MEDICINE
Payer: MEDICARE

## 2022-09-24 DIAGNOSIS — R31.0 GROSS HEMATURIA: Primary | ICD-10-CM
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
## 2022-09-26 ENCOUNTER — HOSPITAL ENCOUNTER (EMERGENCY)
Age: 77
Discharge: HOME | End: 2022-09-26
Attending: EMERGENCY MEDICINE
Payer: MEDICARE

## 2022-09-26 VITALS
OXYGEN SATURATION: 100 % | DIASTOLIC BLOOD PRESSURE: 78 MMHG | HEIGHT: 74 IN | RESPIRATION RATE: 18 BRPM | TEMPERATURE: 98 F | HEART RATE: 93 BPM | BODY MASS INDEX: 23.35 KG/M2 | SYSTOLIC BLOOD PRESSURE: 166 MMHG

## 2022-09-26 DIAGNOSIS — R31.0 GROSS HEMATURIA: Primary | ICD-10-CM

## 2022-09-26 PROCEDURE — 99282 EMERGENCY DEPT VISIT SF MDM: CPT
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
## 2022-09-26 NOTE — ED NOTES
This rn at bedside for dc. Went over all [facility] with pt prior to leaving. Pt verbalized understanding. Pt requested that he had some guaze around the opening of the suprapubic cathter.

## 2022-09-26 NOTE — ED NOTES
Was able to flush 40cc via bladder irrigation, drained out multiple blockages of blood clots,   110ccs was the total drainage

## 2022-09-30 ENCOUNTER — OFFICE VISIT (OUTPATIENT)
Dept: ENDOCRINOLOGY | Age: 77
End: 2022-09-30
Payer: MEDICARE

## 2022-09-30 VITALS
DIASTOLIC BLOOD PRESSURE: 53 MMHG | HEART RATE: 104 BPM | SYSTOLIC BLOOD PRESSURE: 114 MMHG | HEIGHT: 74 IN | WEIGHT: 175.4 LBS | BODY MASS INDEX: 22.51 KG/M2

## 2022-09-30 DIAGNOSIS — E10.65 UNCONTROLLED TYPE 1 DIABETES MELLITUS WITH HYPERGLYCEMIA (HCC): Primary | ICD-10-CM

## 2022-09-30 DIAGNOSIS — E78.5 HYPERLIPIDEMIA LDL GOAL <100: ICD-10-CM

## 2022-09-30 DIAGNOSIS — R03.0 ELEVATED BLOOD PRESSURE READING WITHOUT DIAGNOSIS OF HYPERTENSION: ICD-10-CM

## 2022-09-30 PROCEDURE — G8420 CALC BMI NORM PARAMETERS: HCPCS | Performed by: INTERNAL MEDICINE

## 2022-09-30 PROCEDURE — G8427 DOCREV CUR MEDS BY ELIG CLIN: HCPCS | Performed by: INTERNAL MEDICINE

## 2022-09-30 PROCEDURE — 1123F ACP DISCUSS/DSCN MKR DOCD: CPT | Performed by: INTERNAL MEDICINE

## 2022-09-30 PROCEDURE — G8752 SYS BP LESS 140: HCPCS | Performed by: INTERNAL MEDICINE

## 2022-09-30 PROCEDURE — 1101F PT FALLS ASSESS-DOCD LE1/YR: CPT | Performed by: INTERNAL MEDICINE

## 2022-09-30 PROCEDURE — 3046F HEMOGLOBIN A1C LEVEL >9.0%: CPT | Performed by: INTERNAL MEDICINE

## 2022-09-30 PROCEDURE — G8754 DIAS BP LESS 90: HCPCS | Performed by: INTERNAL MEDICINE

## 2022-09-30 PROCEDURE — 99214 OFFICE O/P EST MOD 30 MIN: CPT | Performed by: INTERNAL MEDICINE

## 2022-09-30 PROCEDURE — G8536 NO DOC ELDER MAL SCRN: HCPCS | Performed by: INTERNAL MEDICINE

## 2022-09-30 PROCEDURE — G8432 DEP SCR NOT DOC, RNG: HCPCS | Performed by: INTERNAL MEDICINE

## 2022-09-30 RX ORDER — INSULIN GLARGINE 100 [IU]/ML
17 INJECTION, SOLUTION SUBCUTANEOUS
Qty: 15 ML | Refills: 3
Start: 2022-09-30 | End: 2022-09-30

## 2022-09-30 RX ORDER — INSULIN GLARGINE 100 [IU]/ML
INJECTION, SOLUTION SUBCUTANEOUS
Qty: 15 ML | Refills: 3
Start: 2022-09-30

## 2022-09-30 NOTE — PROGRESS NOTES
Chief Complaint   Patient presents with    Diabetes     PCP and pharmacy confirmed      History of Present Illness: Tiffany Plaza is a 65 y.o. male here for follow up of diabetes. Weight stable since last visit in 3/22. Has continued to have issues with UTIs and just had a suprapubic catheter placed about a month ago. He had been taking 17 units of lantus until about a week ago and went back to 18 units as his sugars were staying over 200 and today they are down in the  range. Compliant with lipitor. Current Outpatient Medications   Medication Sig    insulin glargine (Lantus Solostar U-100 Insulin) 100 unit/mL (3 mL) inpn INJECT 18 UNITS UNDER THE SKIN AT BEDTIME--Dose change 09/30/22--updated med list--did not send prescription to the pharmacy    multivitamin (ONE A DAY) tablet Take 1 Tablet by mouth in the morning. ascorbic acid, vitamin C, (VITAMIN C) 500 mg tablet Take 1,000 mg by mouth in the morning. cyanocobalamin (VITAMIN B12) 500 mcg tablet Take 500 mcg by mouth in the morning. megestroL (MEGACE) 20 mg tablet Take 20 mg by mouth three (3) times daily as needed. insulin aspart U-100 (NovoLOG Flexpen U-100 Insulin) 100 unit/mL (3 mL) inpn Inject 1:8 for carbs for breakfast and 1:9 for lunch and dinner before 9pm and 1:10 after 9pm and 1:50 > 150 for correction during the day and 1:50 > 180 after 9pm.  MAX 35/D    atorvastatin (Lipitor) 10 mg tablet Take 1 Tablet by mouth daily. True Metrix Glucose Meter misc TEST UP TO 5 TIMES DAILY (INSULIN FLUCTUATING SUGARS). DX: E10.65    TRUEplus Lancets 33 gauge misc TEST UP TO 5 TIMES DAILY (INSULIN FLUCTUATING SUGARS). DX: E10.65    True Metrix Level 1 soln Use as directed    alcohol swabs (BD Single Use Swabs Regular) padm TEST UP TO 5 TIMES DAILY (INSULIN FLUCTUATING SUGARS). DX: E10.65    silodosin (RAPAFLO) 8 mg capsule Take 8 mg by mouth daily (with breakfast).     BD Luer-Rafa Syringe 3 mL 18 x 1 1/2\" syrg USE TO DRAW UP TESTOSTERONE UTD    Disposable Needles 22 gauge x 1 1/2\" ndle USE TO INJECT TESTOSTERONE UTD    aspirin 81 mg chewable tablet Take 81 mg by mouth daily. cholecalciferol (Vitamin D) (2,000 UNITS /50 MCG) cap capsule Take 3 Tablets by mouth daily. No current facility-administered medications for this visit. Allergies   Allergen Reactions    Lisinopril Cough    Novocain [Procaine] Palpitations    Tamsulosin Other (comments)     Higher blood sugars     Review of Systems: PER HPI    Physical Examination:  Blood pressure (!) 114/53, pulse (!) 104, height 6' 2\" (1.88 m), weight 175 lb 6.4 oz (79.6 kg). General: pleasant, no distress, good eye contact   Neck: no carotid bruits  Cardiovascular: regular, normal rate, nl s1 and s2, no m/r/g,   Respiratory: clear bilaterally  Integumentary: no edema,   Psychiatric: normal mood and affect    Diabetic foot exam:     Left Foot:   Visual Exam: amputation- of 2nd toe, mild callus   Pulse DP: 2+ (normal)   Filament test: absent sensation    Vibratory sensation: absent      Right Foot:   Visual Exam: amputation- transmetatarsal   Pulse DP: 1+ (weak)   Filament test: absent sensation    Vibratory sensation: absent      Data Reviewed:   Component      Latest Ref Rng & Units 9/20/2022 9/20/2022 9/20/2022           2:53 PM  2:53 PM  2:53 PM   Glucose      65 - 99 mg/dL   97   BUN      8 - 27 mg/dL   14   Creatinine      0.76 - 1.27 mg/dL   0.91   eGFR      >59 mL/min/1.73   86   BUN/Creatinine ratio      10 - 24   15   Sodium      134 - 144 mmol/L   143   Potassium      3.5 - 5.2 mmol/L   4.4   Chloride      96 - 106 mmol/L   106   CO2      20 - 29 mmol/L   21   Calcium      8.6 - 10.2 mg/dL   9.1   Creatinine, urine      Not Estab. mg/dL 124.4     Microalbumin, urine      Not Estab. ug/mL 307.4     Microalbumin/Creat. Ratio      0 - 29 mg/g creat 247 (H)     Hemoglobin A1c, (calculated)      4.8 - 5.6 %  9.4 (H)    Estimated average glucose      mg/dL  223        Assessment/Plan:   1. Type I diabetes mellitus, uncontrolled: his most recent Hgb A1c was 9.4% in 9/22 up from 9.2% in 3/22 up from 8.3% in 9/21 down from 8.7% in 3/21 stable from 9/20 up from 7.9% in 3/20 down from 8.4% in 10/19 up from 8.1% in 5/19 up from 8% in 12/18 down from 8.1% in 7/18 down from 8.3% in 1/18 stable from 8/17 up from 7.9% in 3/17 down from 8.5% in 10/16 stable from 5/16 down from 9.5% in 12/15 up from 7.8% in 8/15 down from 7.9% in 4/15 up from 7.7% in 12/14 down from 8.3% in 7/14 down from 9.3% in 3/14 up from 8.5% in 11/13 up from 7.9% in 8/13 down from 8.4% 3/13 up from 7.4% in 12/12. I don't know how accurate this test will be because of his sickle cell trait so have been drawing both a Hgb A1c and a fructosamine. His last fructosamine was 371 in 12/14 up from 366 in 7/14 down form 428 in 3/14 up from 400 in 11/13 up from 338 in 8/13 down from 373 in 3/13 up from 345 in 12/12 and 330 in 6/12. His A1c is above goal < 7.5-8% due to recurrent UTIs but currently 18 units looks good so kept dose the same. - cont Lantus 18 units at bedtime  - cont novolog 1:8 for carbs for breakfast and cont 1:9 for lunch and dinner before 9pm and 1:10 after 9pm and 1:50 > 150 for correction during the day and 1:50 > 180 after 9pm  - check bs 5 times daily due to fluctuating sugars and risk of hypoglycemia  - foot exam done 9/22  - optho UTD 6/17  - microalbumin nl 3/21, up to 339 in 3/22, down to 247 in 9/22  - check A1c and cmp prior to next visit        2. Other and unspecified hyperlipidemia: Given DM, Goal LDL < 100, non-HDL < 130, and TG < 150. LDL 79 3/12, 78 12/12 and 84 3/13 and 75 in 11/13 and 70 in 7/14 and 63 in 4/15. Up to 99 in 12/15 and 100 in 5/16 so went up to a while tab daily and LDL down to 74 in 10/16 and 73 in 8/17 and 69 in 7/18 and 64 in 12/18, up to 96 in 5/19, down to 71 in 3/20 and in 3/21 and 54 in 3/22  - cont lipitor 10 mg daily  - check lipids prior to next visit        3.  Elevated blood pressure (not hypertension): his BP was above goal < 140/90 in 12/14 and prior to that visit was at goal so monitor home readings and they are at goal off any meds. Up in 8/17 and in 1/18 but home readings are at goal  - monitor home readings off meds for now                Patient Instructions   1) Stay on lantus 18 units at bedtime. If your sugars are going under 90 3 or more times in a week, then cut back to 17 units. 2) BUN and creatinine are markers of kidney function. Your values are normal.    3) Your blood pressure is controlled. 4) Your urine protein has improved since last time. 5) I hope your next A1c will be  to goal of 8% or less if we can keep your infections under control. If you are dealing with another infection and your sugars are staying consistently over 200, then increase your lantus by 1 units as needed. Follow-up and Dispositions    Return in about 6 months (around 3/30/2023).                Copy sent to:  Dr. Lai Carter

## 2022-09-30 NOTE — PATIENT INSTRUCTIONS
1) Stay on lantus 18 units at bedtime. If your sugars are going under 90 3 or more times in a week, then cut back to 17 units. 2) BUN and creatinine are markers of kidney function. Your values are normal.    3) Your blood pressure is controlled. 4) Your urine protein has improved since last time. 5) I hope your next A1c will be  to goal of 8% or less if we can keep your infections under control. If you are dealing with another infection and your sugars are staying consistently over 200, then increase your lantus by 1 units as needed.

## 2022-10-09 VITALS
WEIGHT: 176.15 LBS | HEIGHT: 75 IN | BODY MASS INDEX: 21.9 KG/M2 | RESPIRATION RATE: 18 BRPM | OXYGEN SATURATION: 100 % | TEMPERATURE: 97.8 F | SYSTOLIC BLOOD PRESSURE: 129 MMHG | HEART RATE: 99 BPM | DIASTOLIC BLOOD PRESSURE: 71 MMHG

## 2022-10-09 PROCEDURE — 99283 EMERGENCY DEPT VISIT LOW MDM: CPT

## 2022-10-10 ENCOUNTER — HOSPITAL ENCOUNTER (EMERGENCY)
Age: 77
Discharge: HOME | End: 2022-10-10
Attending: EMERGENCY MEDICINE
Payer: MEDICARE

## 2022-10-10 DIAGNOSIS — N39.0 URINARY TRACT INFECTION WITH HEMATURIA, SITE UNSPECIFIED: Primary | ICD-10-CM

## 2022-10-10 DIAGNOSIS — Z43.5 ENCOUNTER FOR SUPRAPUBIC CATHETER CARE (HCC): ICD-10-CM

## 2022-10-10 DIAGNOSIS — R31.9 URINARY TRACT INFECTION WITH HEMATURIA, SITE UNSPECIFIED: Primary | ICD-10-CM

## 2022-10-10 LAB
APPEARANCE UR: ABNORMAL
BACTERIA URNS QL MICRO: ABNORMAL /HPF
BILIRUB UR QL: NEGATIVE
COLOR UR: ABNORMAL
EPITH CASTS URNS QL MICRO: ABNORMAL /LPF
GLUCOSE UR STRIP.AUTO-MCNC: 250 MG/DL
HGB UR QL STRIP: ABNORMAL
KETONES UR QL STRIP.AUTO: ABNORMAL MG/DL
LEUKOCYTE ESTERASE UR QL STRIP.AUTO: ABNORMAL
NITRITE UR QL STRIP.AUTO: POSITIVE
PH UR STRIP: 7.5 [PH] (ref 5–8)
PROT UR STRIP-MCNC: 100 MG/DL
RBC #/AREA URNS HPF: ABNORMAL /HPF (ref 0–5)
SP GR UR REFRACTOMETRY: 1.02
UROBILINOGEN UR QL STRIP.AUTO: 1 EU/DL (ref 0.2–1)
WBC URNS QL MICRO: >100 /HPF (ref 0–4)

## 2022-10-10 PROCEDURE — 87086 URINE CULTURE/COLONY COUNT: CPT

## 2022-10-10 PROCEDURE — 74011250637 HC RX REV CODE- 250/637: Performed by: EMERGENCY MEDICINE

## 2022-10-10 PROCEDURE — 87186 SC STD MICRODIL/AGAR DIL: CPT

## 2022-10-10 PROCEDURE — 87077 CULTURE AEROBIC IDENTIFY: CPT

## 2022-10-10 PROCEDURE — 81001 URINALYSIS AUTO W/SCOPE: CPT

## 2022-10-10 RX ORDER — CEFUROXIME AXETIL 250 MG/1
500 TABLET ORAL
Status: COMPLETED | OUTPATIENT
Start: 2022-10-10 | End: 2022-10-10

## 2022-10-10 RX ORDER — WATER FOR INJECTION,STERILE
VIAL (ML) INJECTION
Status: DISCONTINUED
Start: 2022-10-10 | End: 2022-10-10 | Stop reason: HOSPADM

## 2022-10-10 RX ORDER — CEFUROXIME AXETIL 500 MG/1
500 TABLET ORAL 2 TIMES DAILY
Qty: 14 TABLET | Refills: 0 | Status: SHIPPED | OUTPATIENT
Start: 2022-10-10 | End: 2022-10-17

## 2022-10-10 RX ADMIN — CEFUROXIME AXETIL 500 MG: 250 TABLET ORAL at 04:57
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
## 2022-10-13 LAB
BACTERIA SPEC CULT: ABNORMAL
BACTERIA SPEC CULT: ABNORMAL
CC UR VC: ABNORMAL
SERVICE CMNT-IMP: ABNORMAL

## 2022-11-18 ENCOUNTER — TELEPHONE (OUTPATIENT)
Dept: CARDIOLOGY CLINIC | Age: 77
End: 2022-11-18

## 2022-11-22 NOTE — TELEPHONE ENCOUNTER
Based on last office visit, the patient was doing well, without any active chest pain and no evidence of major epicardial CAD based on testing. He is at acceptable risk to interrupt aspirin for 5 to 7 days prior to prostate procedure, restart when felt to be safe thereafter.
Clearance note, recent progress note and EKG faxed to [facility], office of Dr. Nick Hernandez at [phone]. Left secure message to patient with cardiac clearance order to hold Aspiring 5-7 days prior to prostate surgery.
ROCÍO Wallace from [facility] Pre-admission testing called about a request for a fax for cardiac clearance and last office note from , for pt upcoming surgery on 11/30/22.      She stated pt is having Pro-tech laser prostate precedure W/ Dr. Jang [facility]   [phone]      Pt #   [phone]
This nurse left message for patient to call [facility] and make a FU appointment with Dr. Maria Luisa Santillan. Patient last seen on March and needed a 6 months FU appointment then. Pre-Procedure Cardiac Clearance Request:    Facility: [facility]    Procedure CUNX:62-    Procedure: Pro-tech laser prostate procedure    Surgeon:Dr. Casper Lopez    Anesthesia : general    Request for Interruption of Anticoagulants:     Aspirin 81 mg  May stop anticoagulant how many days prior and when to resume    Is patient cleared from a cardiac standpoint to proceed with upcoming procedure. Please advise.
97

## 2022-12-10 ENCOUNTER — HOSPITAL ENCOUNTER (EMERGENCY)
Age: 77
Discharge: HOME | End: 2022-12-11
Attending: EMERGENCY MEDICINE
Payer: MEDICARE

## 2022-12-10 VITALS
BODY MASS INDEX: 21.88 KG/M2 | HEART RATE: 79 BPM | RESPIRATION RATE: 17 BRPM | TEMPERATURE: 98.8 F | WEIGHT: 176 LBS | DIASTOLIC BLOOD PRESSURE: 101 MMHG | HEIGHT: 75 IN | OXYGEN SATURATION: 100 % | SYSTOLIC BLOOD PRESSURE: 146 MMHG

## 2022-12-10 DIAGNOSIS — Z93.59 SUPRAPUBIC CATHETER (HCC): ICD-10-CM

## 2022-12-10 DIAGNOSIS — R31.0 GROSS HEMATURIA: Primary | ICD-10-CM

## 2022-12-10 LAB
ALBUMIN SERPL-MCNC: 3.3 G/DL (ref 3.5–5)
ALBUMIN/GLOB SERPL: 0.8 {RATIO} (ref 1.1–2.2)
ALP SERPL-CCNC: 62 U/L (ref 45–117)
ALT SERPL-CCNC: 26 U/L (ref 12–78)
ANION GAP SERPL CALC-SCNC: 3 MMOL/L (ref 5–15)
AST SERPL-CCNC: 24 U/L (ref 15–37)
BASOPHILS # BLD: 0 K/UL (ref 0–0.1)
BASOPHILS NFR BLD: 0 % (ref 0–1)
BILIRUB SERPL-MCNC: 0.2 MG/DL (ref 0.2–1)
BUN SERPL-MCNC: 13 MG/DL (ref 6–20)
BUN/CREAT SERPL: 13 (ref 12–20)
CALCIUM SERPL-MCNC: 9.1 MG/DL (ref 8.5–10.1)
CHLORIDE SERPL-SCNC: 108 MMOL/L (ref 97–108)
CO2 SERPL-SCNC: 30 MMOL/L (ref 21–32)
CREAT SERPL-MCNC: 1 MG/DL (ref 0.7–1.3)
DIFFERENTIAL METHOD BLD: ABNORMAL
EOSINOPHIL # BLD: 0.1 K/UL (ref 0–0.4)
EOSINOPHIL NFR BLD: 4 % (ref 0–7)
ERYTHROCYTE [DISTWIDTH] IN BLOOD BY AUTOMATED COUNT: 13.7 % (ref 11.5–14.5)
GLOBULIN SER CALC-MCNC: 3.9 G/DL (ref 2–4)
GLUCOSE SERPL-MCNC: 139 MG/DL (ref 65–100)
HCT VFR BLD AUTO: 30.1 % (ref 36.6–50.3)
HGB BLD-MCNC: 9.8 G/DL (ref 12.1–17)
IMM GRANULOCYTES # BLD AUTO: 0 K/UL (ref 0–0.04)
IMM GRANULOCYTES NFR BLD AUTO: 0 % (ref 0–0.5)
LYMPHOCYTES # BLD: 1 K/UL (ref 0.8–3.5)
LYMPHOCYTES NFR BLD: 34 % (ref 12–49)
MCH RBC QN AUTO: 28 PG (ref 26–34)
MCHC RBC AUTO-ENTMCNC: 32.6 G/DL (ref 30–36.5)
MCV RBC AUTO: 86 FL (ref 80–99)
MONOCYTES # BLD: 0.4 K/UL (ref 0–1)
MONOCYTES NFR BLD: 13 % (ref 5–13)
NEUTS SEG # BLD: 1.4 K/UL (ref 1.8–8)
NEUTS SEG NFR BLD: 49 % (ref 32–75)
NRBC # BLD: 0 K/UL (ref 0–0.01)
NRBC BLD-RTO: 0 PER 100 WBC
PLATELET # BLD AUTO: 245 K/UL (ref 150–400)
PMV BLD AUTO: 9.2 FL (ref 8.9–12.9)
POTASSIUM SERPL-SCNC: 4.3 MMOL/L (ref 3.5–5.1)
PROT SERPL-MCNC: 7.2 G/DL (ref 6.4–8.2)
RBC # BLD AUTO: 3.5 M/UL (ref 4.1–5.7)
SODIUM SERPL-SCNC: 141 MMOL/L (ref 136–145)
WBC # BLD AUTO: 2.8 K/UL (ref 4.1–11.1)

## 2022-12-10 PROCEDURE — 80053 COMPREHEN METABOLIC PANEL: CPT

## 2022-12-10 PROCEDURE — 85025 COMPLETE CBC W/AUTO DIFF WBC: CPT

## 2022-12-10 PROCEDURE — 99283 EMERGENCY DEPT VISIT LOW MDM: CPT

## 2022-12-10 PROCEDURE — 36415 COLL VENOUS BLD VENIPUNCTURE: CPT

## 2022-12-11 LAB
APPEARANCE UR: ABNORMAL
BACTERIA URNS QL MICRO: ABNORMAL /HPF
BILIRUB UR QL CFM: NEGATIVE
COLOR UR: ABNORMAL
EPITH CASTS URNS QL MICRO: ABNORMAL /LPF
GLUCOSE UR STRIP.AUTO-MCNC: 100 MG/DL
HGB UR QL STRIP: ABNORMAL
KETONES UR QL STRIP.AUTO: ABNORMAL MG/DL
LEUKOCYTE ESTERASE UR QL STRIP.AUTO: ABNORMAL
NITRITE UR QL STRIP.AUTO: POSITIVE
PH UR STRIP: 8 [PH] (ref 5–8)
PROT UR STRIP-MCNC: >300 MG/DL
RBC #/AREA URNS HPF: >100 /HPF (ref 0–5)
SP GR UR REFRACTOMETRY: 1.01 (ref 1–1.03)
UA: UC IF INDICATED,UAUC: ABNORMAL
UROBILINOGEN UR QL STRIP.AUTO: 1 EU/DL (ref 0.2–1)
WBC URNS QL MICRO: ABNORMAL /HPF (ref 0–4)

## 2022-12-11 PROCEDURE — 81001 URINALYSIS AUTO W/SCOPE: CPT

## 2022-12-11 PROCEDURE — 87077 CULTURE AEROBIC IDENTIFY: CPT

## 2022-12-11 PROCEDURE — 74011250637 HC RX REV CODE- 250/637: Performed by: EMERGENCY MEDICINE

## 2022-12-11 PROCEDURE — 87186 SC STD MICRODIL/AGAR DIL: CPT

## 2022-12-11 PROCEDURE — 87086 URINE CULTURE/COLONY COUNT: CPT

## 2022-12-11 RX ORDER — GRANULES FOR ORAL 3 G/1
3 POWDER ORAL
Status: COMPLETED | OUTPATIENT
Start: 2022-12-11 | End: 2022-12-11

## 2022-12-11 RX ADMIN — FOSFOMYCIN TROMETHAMINE 3 G: 3 GRANULE, FOR SOLUTION ORAL at 01:29
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
## 2022-12-11 NOTE — ED NOTES
Patient and wife both demonstrated ability to flush catheter. Both put on gloves-  Filled syringe  Pinched tube --flushed it    Did pulled back to feel how that felt as well--  Again--reviewed how to manage flushing tube to keep it clear and flushing it if it does not seem to be draining--  Both demonstrated ability to flush and irrigate tube--    Educated to follow-up with Urology first thing Monday morning. Went over discharge instructions at this time. Patient verbalized understanding with all instructions at this time. Patient ambulated out independently, gait steady in no distress.

## 2022-12-11 NOTE — ED NOTES
Irrigated suprapubic catheter  Started with bloody urine--  Many clots removed--  Irrigated with 6000 ml of sterile water--  Down to pink lemonade looking return--  Demonstrated to wife and patient how to irrigate  Given irrigation kit and sterile water  Encouraged to irrigate morning noon and night at least --one syringe 30-50 ml three times a day--to try and keep the tube clear and draining well--  If the tube does not seem to be draining--  They can irrigate with 30-50 ml sterile water. If they can not irrigate easily, with rosalee pressure--he should come to the emergency room for evaluation or call Urology if their office is open for directions. They have been educated that they should never ever need to use forcible pressure to irrigate the tube. They both nod understand this.

## 2022-12-13 NOTE — PROGRESS NOTES
RE UC: Discussed case with clinical pharmacist. Patient give single dose of fosfomycin in the ED. No change necessary.

## 2022-12-17 LAB
ALBUMIN SERPL-MCNC: 3.4 G/DL (ref 3.5–5)
ALBUMIN/GLOB SERPL: 0.9 {RATIO} (ref 1.1–2.2)
ALP SERPL-CCNC: 59 U/L (ref 45–117)
ALT SERPL-CCNC: 25 U/L (ref 12–78)
ANION GAP SERPL CALC-SCNC: 5 MMOL/L (ref 5–15)
AST SERPL-CCNC: 23 U/L (ref 15–37)
BASOPHILS # BLD: 0 K/UL (ref 0–0.1)
BASOPHILS NFR BLD: 0 % (ref 0–1)
BILIRUB SERPL-MCNC: 0.2 MG/DL (ref 0.2–1)
BUN SERPL-MCNC: 10 MG/DL (ref 6–20)
BUN/CREAT SERPL: 10 (ref 12–20)
CALCIUM SERPL-MCNC: 8.8 MG/DL (ref 8.5–10.1)
CHLORIDE SERPL-SCNC: 107 MMOL/L (ref 97–108)
CO2 SERPL-SCNC: 27 MMOL/L (ref 21–32)
CREAT SERPL-MCNC: 1.04 MG/DL (ref 0.7–1.3)
DIFFERENTIAL METHOD BLD: ABNORMAL
EOSINOPHIL # BLD: 0.1 K/UL (ref 0–0.4)
EOSINOPHIL NFR BLD: 2 % (ref 0–7)
ERYTHROCYTE [DISTWIDTH] IN BLOOD BY AUTOMATED COUNT: 13.7 % (ref 11.5–14.5)
GLOBULIN SER CALC-MCNC: 3.9 G/DL (ref 2–4)
GLUCOSE SERPL-MCNC: 114 MG/DL (ref 65–100)
HCT VFR BLD AUTO: 29.8 % (ref 36.6–50.3)
HGB BLD-MCNC: 9.6 G/DL (ref 12.1–17)
IMM GRANULOCYTES # BLD AUTO: 0 K/UL (ref 0–0.04)
IMM GRANULOCYTES NFR BLD AUTO: 1 % (ref 0–0.5)
LYMPHOCYTES # BLD: 0.8 K/UL (ref 0.8–3.5)
LYMPHOCYTES NFR BLD: 21 % (ref 12–49)
MCH RBC QN AUTO: 27.1 PG (ref 26–34)
MCHC RBC AUTO-ENTMCNC: 32.2 G/DL (ref 30–36.5)
MCV RBC AUTO: 84.2 FL (ref 80–99)
MONOCYTES # BLD: 0.5 K/UL (ref 0–1)
MONOCYTES NFR BLD: 12 % (ref 5–13)
NEUTS SEG # BLD: 2.6 K/UL (ref 1.8–8)
NEUTS SEG NFR BLD: 64 % (ref 32–75)
NRBC # BLD: 0 K/UL (ref 0–0.01)
NRBC BLD-RTO: 0 PER 100 WBC
PLATELET # BLD AUTO: 251 K/UL (ref 150–400)
PMV BLD AUTO: 8.7 FL (ref 8.9–12.9)
POTASSIUM SERPL-SCNC: 4.3 MMOL/L (ref 3.5–5.1)
PROT SERPL-MCNC: 7.3 G/DL (ref 6.4–8.2)
RBC # BLD AUTO: 3.54 M/UL (ref 4.1–5.7)
SODIUM SERPL-SCNC: 139 MMOL/L (ref 136–145)
WBC # BLD AUTO: 3.9 K/UL (ref 4.1–11.1)

## 2022-12-17 PROCEDURE — 99283 EMERGENCY DEPT VISIT LOW MDM: CPT

## 2022-12-17 PROCEDURE — 36415 COLL VENOUS BLD VENIPUNCTURE: CPT

## 2022-12-17 PROCEDURE — 85025 COMPLETE CBC W/AUTO DIFF WBC: CPT

## 2022-12-17 PROCEDURE — 80053 COMPREHEN METABOLIC PANEL: CPT

## 2022-12-18 ENCOUNTER — HOSPITAL ENCOUNTER (INPATIENT)
Age: 77
LOS: 5 days | Discharge: HOME | DRG: 871 | End: 2022-12-23
Attending: STUDENT IN AN ORGANIZED HEALTH CARE EDUCATION/TRAINING PROGRAM | Admitting: STUDENT IN AN ORGANIZED HEALTH CARE EDUCATION/TRAINING PROGRAM
Payer: MEDICARE

## 2022-12-18 ENCOUNTER — HOSPITAL ENCOUNTER (EMERGENCY)
Age: 77
Discharge: HOME | DRG: 871 | End: 2022-12-18
Attending: EMERGENCY MEDICINE
Payer: MEDICARE

## 2022-12-18 ENCOUNTER — APPOINTMENT (OUTPATIENT)
Dept: CT IMAGING | Age: 77
DRG: 871 | End: 2022-12-18
Attending: STUDENT IN AN ORGANIZED HEALTH CARE EDUCATION/TRAINING PROGRAM
Payer: MEDICARE

## 2022-12-18 VITALS
TEMPERATURE: 98.2 F | HEART RATE: 97 BPM | DIASTOLIC BLOOD PRESSURE: 87 MMHG | BODY MASS INDEX: 23.1 KG/M2 | WEIGHT: 182.32 LBS | OXYGEN SATURATION: 100 % | RESPIRATION RATE: 17 BRPM | SYSTOLIC BLOOD PRESSURE: 149 MMHG

## 2022-12-18 DIAGNOSIS — R73.9 HYPERGLYCEMIA: ICD-10-CM

## 2022-12-18 DIAGNOSIS — E87.20 LACTIC ACIDOSIS: ICD-10-CM

## 2022-12-18 DIAGNOSIS — R06.02 SOB (SHORTNESS OF BREATH): ICD-10-CM

## 2022-12-18 DIAGNOSIS — T83.511A URINARY TRACT INFECTION ASSOCIATED WITH INDWELLING URETHRAL CATHETER, INITIAL ENCOUNTER (HCC): ICD-10-CM

## 2022-12-18 DIAGNOSIS — N39.0 URINARY TRACT INFECTION ASSOCIATED WITH INDWELLING URETHRAL CATHETER, INITIAL ENCOUNTER (HCC): ICD-10-CM

## 2022-12-18 DIAGNOSIS — I21.4 NSTEMI (NON-ST ELEVATED MYOCARDIAL INFARCTION) (HCC): Primary | ICD-10-CM

## 2022-12-18 DIAGNOSIS — R31.0 GROSS HEMATURIA: Primary | ICD-10-CM

## 2022-12-18 DIAGNOSIS — A41.9 SEPSIS WITHOUT ACUTE ORGAN DYSFUNCTION, DUE TO UNSPECIFIED ORGANISM (HCC): ICD-10-CM

## 2022-12-18 LAB
ALBUMIN SERPL-MCNC: 3.1 G/DL (ref 3.5–5)
ALBUMIN SERPL-MCNC: 3.5 G/DL (ref 3.5–5)
ALBUMIN/GLOB SERPL: 0.9 {RATIO} (ref 1.1–2.2)
ALBUMIN/GLOB SERPL: 1 {RATIO} (ref 1.1–2.2)
ALP SERPL-CCNC: 57 U/L (ref 45–117)
ALP SERPL-CCNC: 63 U/L (ref 45–117)
ALT SERPL-CCNC: 22 U/L (ref 12–78)
ALT SERPL-CCNC: 26 U/L (ref 12–78)
ANION GAP SERPL CALC-SCNC: 17 MMOL/L (ref 5–15)
ANION GAP SERPL CALC-SCNC: 17 MMOL/L (ref 5–15)
APPEARANCE UR: ABNORMAL
APTT PPP: 21.1 SEC (ref 22.1–31)
AST SERPL-CCNC: 27 U/L (ref 15–37)
AST SERPL-CCNC: 30 U/L (ref 15–37)
BACTERIA URNS QL MICRO: ABNORMAL /HPF
BASE DEFICIT BLD-SCNC: 14.6 MMOL/L
BASOPHILS # BLD: 0 K/UL (ref 0–0.1)
BASOPHILS NFR BLD: 0 % (ref 0–1)
BILIRUB SERPL-MCNC: 0.7 MG/DL (ref 0.2–1)
BILIRUB SERPL-MCNC: 0.8 MG/DL (ref 0.2–1)
BILIRUB UR QL CFM: ABNORMAL
BUN SERPL-MCNC: 24 MG/DL (ref 6–20)
BUN SERPL-MCNC: 24 MG/DL (ref 6–20)
BUN/CREAT SERPL: 18 (ref 12–20)
BUN/CREAT SERPL: 18 (ref 12–20)
CA-I BLD-MCNC: 1.13 MMOL/L (ref 1.12–1.32)
CALCIUM SERPL-MCNC: 8.7 MG/DL (ref 8.5–10.1)
CALCIUM SERPL-MCNC: 9.1 MG/DL (ref 8.5–10.1)
CHLORIDE BLD-SCNC: 106 MMOL/L (ref 100–108)
CHLORIDE SERPL-SCNC: 96 MMOL/L (ref 97–108)
CHLORIDE SERPL-SCNC: 98 MMOL/L (ref 97–108)
CO2 BLD-SCNC: 11 MMOL/L (ref 19–24)
CO2 SERPL-SCNC: 13 MMOL/L (ref 21–32)
CO2 SERPL-SCNC: 15 MMOL/L (ref 21–32)
COLOR UR: ABNORMAL
COMMENT, HOLDF: NORMAL
CREAT SERPL-MCNC: 1.36 MG/DL (ref 0.7–1.3)
CREAT SERPL-MCNC: 1.37 MG/DL (ref 0.7–1.3)
CREAT UR-MCNC: 1 MG/DL (ref 0.6–1.3)
DIFFERENTIAL METHOD BLD: ABNORMAL
EOSINOPHIL # BLD: 0 K/UL (ref 0–0.4)
EOSINOPHIL NFR BLD: 0 % (ref 0–7)
EPITH CASTS URNS QL MICRO: ABNORMAL /LPF
ERYTHROCYTE [DISTWIDTH] IN BLOOD BY AUTOMATED COUNT: 13.7 % (ref 11.5–14.5)
GLOBULIN SER CALC-MCNC: 3.4 G/DL (ref 2–4)
GLOBULIN SER CALC-MCNC: 3.6 G/DL (ref 2–4)
GLUCOSE BLD STRIP.AUTO-MCNC: 568 MG/DL (ref 74–106)
GLUCOSE SERPL-MCNC: 606 MG/DL (ref 65–100)
GLUCOSE SERPL-MCNC: 627 MG/DL (ref 65–100)
GLUCOSE UR STRIP.AUTO-MCNC: 100 MG/DL
HCO3 BLDA-SCNC: 12 MMOL/L
HCT VFR BLD AUTO: 25.7 % (ref 36.6–50.3)
HGB BLD-MCNC: 8.3 G/DL (ref 12.1–17)
HGB UR QL STRIP: ABNORMAL
IMM GRANULOCYTES # BLD AUTO: 0 K/UL (ref 0–0.04)
IMM GRANULOCYTES NFR BLD AUTO: 0 % (ref 0–0.5)
KETONES UR QL STRIP.AUTO: 15 MG/DL
LACTATE BLD-SCNC: 4.61 MMOL/L (ref 0.4–2)
LACTATE BLD-SCNC: 5.84 MMOL/L (ref 0.4–2)
LACTATE BLD-SCNC: 6.16 MMOL/L (ref 0.4–2)
LEUKOCYTE ESTERASE UR QL STRIP.AUTO: ABNORMAL
LYMPHOCYTES # BLD: 0.4 K/UL (ref 0.8–3.5)
LYMPHOCYTES NFR BLD: 8 % (ref 12–49)
MCH RBC QN AUTO: 27.6 PG (ref 26–34)
MCHC RBC AUTO-ENTMCNC: 32.3 G/DL (ref 30–36.5)
MCV RBC AUTO: 85.4 FL (ref 80–99)
MONOCYTES # BLD: 0.1 K/UL (ref 0–1)
MONOCYTES NFR BLD: 3 % (ref 5–13)
NEUTS SEG # BLD: 4.1 K/UL (ref 1.8–8)
NEUTS SEG NFR BLD: 89 % (ref 32–75)
NITRITE UR QL STRIP.AUTO: POSITIVE
NRBC # BLD: 0 K/UL (ref 0–0.01)
NRBC BLD-RTO: 0 PER 100 WBC
PCO2 BLDV: 29 MMHG (ref 41–51)
PH BLDV: 7.22 [PH] (ref 7.32–7.42)
PH UR STRIP: 8 [PH] (ref 5–8)
PLATELET # BLD AUTO: 252 K/UL (ref 150–400)
PMV BLD AUTO: 9.3 FL (ref 8.9–12.9)
PO2 BLDV: 38 MMHG (ref 25–40)
POTASSIUM BLD-SCNC: 5 MMOL/L (ref 3.5–5.5)
POTASSIUM SERPL-SCNC: 5.1 MMOL/L (ref 3.5–5.1)
POTASSIUM SERPL-SCNC: 5.2 MMOL/L (ref 3.5–5.1)
PROT SERPL-MCNC: 6.5 G/DL (ref 6.4–8.2)
PROT SERPL-MCNC: 7.1 G/DL (ref 6.4–8.2)
PROT UR STRIP-MCNC: >300 MG/DL
RBC # BLD AUTO: 3.01 M/UL (ref 4.1–5.7)
RBC #/AREA URNS HPF: >100 /HPF (ref 0–5)
RBC MORPH BLD: ABNORMAL
SAMPLES BEING HELD,HOLD: NORMAL
SODIUM BLD-SCNC: 134 MMOL/L (ref 136–145)
SODIUM SERPL-SCNC: 128 MMOL/L (ref 136–145)
SODIUM SERPL-SCNC: 128 MMOL/L (ref 136–145)
SP GR UR REFRACTOMETRY: 1.01 (ref 1–1.03)
SPECIMEN SITE: ABNORMAL
THERAPEUTIC RANGE,PTTT: ABNORMAL SECS (ref 58–77)
TROPONIN-HIGH SENSITIVITY: 239 NG/L (ref 0–76)
TROPONIN-HIGH SENSITIVITY: 441 NG/L (ref 0–76)
UA: UC IF INDICATED,UAUC: ABNORMAL
UFH PPP CHRO-ACNC: <0.1 IU/ML
UROBILINOGEN UR QL STRIP.AUTO: 2 EU/DL (ref 0.2–1)
WBC # BLD AUTO: 4.6 K/UL (ref 4.1–11.1)
WBC MORPH BLD: ABNORMAL
WBC URNS QL MICRO: ABNORMAL /HPF (ref 0–4)

## 2022-12-18 PROCEDURE — 96365 THER/PROPH/DIAG IV INF INIT: CPT

## 2022-12-18 PROCEDURE — 87077 CULTURE AEROBIC IDENTIFY: CPT

## 2022-12-18 PROCEDURE — 96361 HYDRATE IV INFUSION ADD-ON: CPT

## 2022-12-18 PROCEDURE — 83605 ASSAY OF LACTIC ACID: CPT

## 2022-12-18 PROCEDURE — 99285 EMERGENCY DEPT VISIT HI MDM: CPT

## 2022-12-18 PROCEDURE — 96375 TX/PRO/DX INJ NEW DRUG ADDON: CPT

## 2022-12-18 PROCEDURE — 81001 URINALYSIS AUTO W/SCOPE: CPT

## 2022-12-18 PROCEDURE — 71275 CT ANGIOGRAPHY CHEST: CPT

## 2022-12-18 PROCEDURE — 87040 BLOOD CULTURE FOR BACTERIA: CPT

## 2022-12-18 PROCEDURE — 86923 COMPATIBILITY TEST ELECTRIC: CPT

## 2022-12-18 PROCEDURE — 84484 ASSAY OF TROPONIN QUANT: CPT

## 2022-12-18 PROCEDURE — 82947 ASSAY GLUCOSE BLOOD QUANT: CPT

## 2022-12-18 PROCEDURE — 74011000636 HC RX REV CODE- 636: Performed by: STUDENT IN AN ORGANIZED HEALTH CARE EDUCATION/TRAINING PROGRAM

## 2022-12-18 PROCEDURE — 87086 URINE CULTURE/COLONY COUNT: CPT

## 2022-12-18 PROCEDURE — 85025 COMPLETE CBC W/AUTO DIFF WBC: CPT

## 2022-12-18 PROCEDURE — 96366 THER/PROPH/DIAG IV INF ADDON: CPT

## 2022-12-18 PROCEDURE — 93005 ELECTROCARDIOGRAM TRACING: CPT

## 2022-12-18 PROCEDURE — 74011250636 HC RX REV CODE- 250/636: Performed by: STUDENT IN AN ORGANIZED HEALTH CARE EDUCATION/TRAINING PROGRAM

## 2022-12-18 PROCEDURE — 80053 COMPREHEN METABOLIC PANEL: CPT

## 2022-12-18 PROCEDURE — 74011000258 HC RX REV CODE- 258: Performed by: STUDENT IN AN ORGANIZED HEALTH CARE EDUCATION/TRAINING PROGRAM

## 2022-12-18 PROCEDURE — 85520 HEPARIN ASSAY: CPT

## 2022-12-18 PROCEDURE — 87186 SC STD MICRODIL/AGAR DIL: CPT

## 2022-12-18 PROCEDURE — 74011250636 HC RX REV CODE- 250/636

## 2022-12-18 PROCEDURE — 74011250637 HC RX REV CODE- 250/637: Performed by: STUDENT IN AN ORGANIZED HEALTH CARE EDUCATION/TRAINING PROGRAM

## 2022-12-18 PROCEDURE — 65270000046 HC RM TELEMETRY

## 2022-12-18 PROCEDURE — 36415 COLL VENOUS BLD VENIPUNCTURE: CPT

## 2022-12-18 PROCEDURE — 85730 THROMBOPLASTIN TIME PARTIAL: CPT

## 2022-12-18 PROCEDURE — 65270000029 HC RM PRIVATE

## 2022-12-18 PROCEDURE — 86900 BLOOD TYPING SEROLOGIC ABO: CPT

## 2022-12-18 RX ORDER — ONDANSETRON 2 MG/ML
INJECTION INTRAMUSCULAR; INTRAVENOUS
Status: COMPLETED
Start: 2022-12-18 | End: 2022-12-18

## 2022-12-18 RX ORDER — INSULIN LISPRO 100 [IU]/ML
INJECTION, SOLUTION INTRAVENOUS; SUBCUTANEOUS
Status: DISCONTINUED | OUTPATIENT
Start: 2022-12-19 | End: 2022-12-19

## 2022-12-18 RX ORDER — SODIUM CHLORIDE 0.9 % (FLUSH) 0.9 %
5-10 SYRINGE (ML) INJECTION AS NEEDED
Status: DISCONTINUED | OUTPATIENT
Start: 2022-12-18 | End: 2022-12-18

## 2022-12-18 RX ORDER — SODIUM CHLORIDE, SODIUM LACTATE, POTASSIUM CHLORIDE, CALCIUM CHLORIDE 600; 310; 30; 20 MG/100ML; MG/100ML; MG/100ML; MG/100ML
100 INJECTION, SOLUTION INTRAVENOUS CONTINUOUS
Status: DISCONTINUED | OUTPATIENT
Start: 2022-12-18 | End: 2022-12-19

## 2022-12-18 RX ORDER — ONDANSETRON 2 MG/ML
4 INJECTION INTRAMUSCULAR; INTRAVENOUS
Status: COMPLETED | OUTPATIENT
Start: 2022-12-18 | End: 2022-12-18

## 2022-12-18 RX ORDER — ATORVASTATIN CALCIUM 10 MG/1
10 TABLET, FILM COATED ORAL DAILY
Status: DISCONTINUED | OUTPATIENT
Start: 2022-12-19 | End: 2022-12-23 | Stop reason: HOSPADM

## 2022-12-18 RX ORDER — HEPARIN SODIUM 1000 [USP'U]/ML
4000 INJECTION, SOLUTION INTRAVENOUS; SUBCUTANEOUS AS NEEDED
Status: DISCONTINUED | OUTPATIENT
Start: 2022-12-18 | End: 2022-12-18

## 2022-12-18 RX ORDER — ONDANSETRON 4 MG/1
4 TABLET, ORALLY DISINTEGRATING ORAL
Status: DISCONTINUED | OUTPATIENT
Start: 2022-12-18 | End: 2022-12-23 | Stop reason: HOSPADM

## 2022-12-18 RX ORDER — HEPARIN SODIUM 10000 [USP'U]/100ML
12-25 INJECTION, SOLUTION INTRAVENOUS
Status: DISCONTINUED | OUTPATIENT
Start: 2022-12-18 | End: 2022-12-18

## 2022-12-18 RX ORDER — HEPARIN SODIUM 1000 [USP'U]/ML
2000 INJECTION, SOLUTION INTRAVENOUS; SUBCUTANEOUS AS NEEDED
Status: DISCONTINUED | OUTPATIENT
Start: 2022-12-18 | End: 2022-12-18

## 2022-12-18 RX ORDER — POLYETHYLENE GLYCOL 3350 17 G/17G
17 POWDER, FOR SOLUTION ORAL DAILY PRN
Status: DISCONTINUED | OUTPATIENT
Start: 2022-12-18 | End: 2022-12-23 | Stop reason: HOSPADM

## 2022-12-18 RX ORDER — ACETAMINOPHEN 650 MG/1
650 SUPPOSITORY RECTAL
Status: DISCONTINUED | OUTPATIENT
Start: 2022-12-18 | End: 2022-12-23 | Stop reason: HOSPADM

## 2022-12-18 RX ORDER — ACETAMINOPHEN 325 MG/1
650 TABLET ORAL
Status: DISCONTINUED | OUTPATIENT
Start: 2022-12-18 | End: 2022-12-23 | Stop reason: HOSPADM

## 2022-12-18 RX ORDER — SODIUM CHLORIDE 0.9 % (FLUSH) 0.9 %
5-40 SYRINGE (ML) INJECTION EVERY 8 HOURS
Status: DISCONTINUED | OUTPATIENT
Start: 2022-12-18 | End: 2022-12-23 | Stop reason: HOSPADM

## 2022-12-18 RX ORDER — GUAIFENESIN 100 MG/5ML
81 LIQUID (ML) ORAL DAILY
Status: DISCONTINUED | OUTPATIENT
Start: 2022-12-19 | End: 2022-12-23 | Stop reason: HOSPADM

## 2022-12-18 RX ORDER — MAGNESIUM SULFATE 100 %
4 CRYSTALS MISCELLANEOUS AS NEEDED
Status: DISCONTINUED | OUTPATIENT
Start: 2022-12-18 | End: 2022-12-20

## 2022-12-18 RX ORDER — VANCOMYCIN 1.75 GRAM/500 ML IN 0.9 % SODIUM CHLORIDE INTRAVENOUS
1750 ONCE
Status: COMPLETED | OUTPATIENT
Start: 2022-12-18 | End: 2022-12-19

## 2022-12-18 RX ORDER — SODIUM CHLORIDE 0.9 % (FLUSH) 0.9 %
5-40 SYRINGE (ML) INJECTION AS NEEDED
Status: DISCONTINUED | OUTPATIENT
Start: 2022-12-18 | End: 2022-12-23 | Stop reason: HOSPADM

## 2022-12-18 RX ORDER — ASPIRIN 325 MG
325 TABLET ORAL
Status: COMPLETED | OUTPATIENT
Start: 2022-12-18 | End: 2022-12-18

## 2022-12-18 RX ORDER — ONDANSETRON 2 MG/ML
4 INJECTION INTRAMUSCULAR; INTRAVENOUS
Status: DISCONTINUED | OUTPATIENT
Start: 2022-12-18 | End: 2022-12-23 | Stop reason: HOSPADM

## 2022-12-18 RX ADMIN — ASPIRIN 325 MG ORAL TABLET 325 MG: 325 PILL ORAL at 21:41

## 2022-12-18 RX ADMIN — ONDANSETRON 4 MG: 2 INJECTION INTRAMUSCULAR; INTRAVENOUS at 22:07

## 2022-12-18 RX ADMIN — SODIUM CHLORIDE 1000 ML: 9 INJECTION, SOLUTION INTRAVENOUS at 22:04

## 2022-12-18 RX ADMIN — VANCOMYCIN HYDROCHLORIDE 1750 MG: 10 INJECTION, POWDER, LYOPHILIZED, FOR SOLUTION INTRAVENOUS at 22:09

## 2022-12-18 RX ADMIN — PIPERACILLIN AND TAZOBACTAM 3.38 G: 3; .375 INJECTION, POWDER, FOR SOLUTION INTRAVENOUS at 21:38

## 2022-12-18 RX ADMIN — SODIUM CHLORIDE 1000 ML: 9 INJECTION, SOLUTION INTRAVENOUS at 21:36

## 2022-12-18 RX ADMIN — IOPAMIDOL 100 ML: 755 INJECTION, SOLUTION INTRAVENOUS at 19:53

## 2022-12-19 ENCOUNTER — APPOINTMENT (OUTPATIENT)
Dept: CT IMAGING | Age: 77
DRG: 871 | End: 2022-12-19
Attending: STUDENT IN AN ORGANIZED HEALTH CARE EDUCATION/TRAINING PROGRAM
Payer: MEDICARE

## 2022-12-19 LAB
ADMINISTERED INITIALS, ADMINIT: NORMAL
ANION GAP SERPL CALC-SCNC: 12 MMOL/L (ref 5–15)
ANION GAP SERPL CALC-SCNC: 15 MMOL/L (ref 5–15)
ANION GAP SERPL CALC-SCNC: 21 MMOL/L (ref 5–15)
ATRIAL RATE: 101 BPM
ATRIAL RATE: 107 BPM
B-OH-BUTYR SERPL-SCNC: 3.06 MMOL/L
BASE DEFICIT BLD-SCNC: 13.7 MMOL/L
BASOPHILS # BLD: 0 K/UL (ref 0–0.1)
BASOPHILS NFR BLD: 0 % (ref 0–1)
BDY SITE: ABNORMAL
BUN SERPL-MCNC: 20 MG/DL (ref 6–20)
BUN SERPL-MCNC: 27 MG/DL (ref 6–20)
BUN SERPL-MCNC: 27 MG/DL (ref 6–20)
BUN/CREAT SERPL: 18 (ref 12–20)
BUN/CREAT SERPL: 19 (ref 12–20)
BUN/CREAT SERPL: 25 (ref 12–20)
CALCIUM SERPL-MCNC: 7.4 MG/DL (ref 8.5–10.1)
CALCIUM SERPL-MCNC: 7.4 MG/DL (ref 8.5–10.1)
CALCIUM SERPL-MCNC: 9 MG/DL (ref 8.5–10.1)
CALCULATED P AXIS, ECG09: 67 DEGREES
CALCULATED P AXIS, ECG09: 72 DEGREES
CALCULATED R AXIS, ECG10: -66 DEGREES
CALCULATED R AXIS, ECG10: -69 DEGREES
CALCULATED T AXIS, ECG11: 75 DEGREES
CALCULATED T AXIS, ECG11: 76 DEGREES
CHLORIDE SERPL-SCNC: 106 MMOL/L (ref 97–108)
CHLORIDE SERPL-SCNC: 113 MMOL/L (ref 97–108)
CHLORIDE SERPL-SCNC: 113 MMOL/L (ref 97–108)
CO2 SERPL-SCNC: 14 MMOL/L (ref 21–32)
CO2 SERPL-SCNC: 18 MMOL/L (ref 21–32)
CO2 SERPL-SCNC: 7 MMOL/L (ref 21–32)
CREAT SERPL-MCNC: 0.79 MG/DL (ref 0.7–1.3)
CREAT SERPL-MCNC: 1.41 MG/DL (ref 0.7–1.3)
CREAT SERPL-MCNC: 1.48 MG/DL (ref 0.7–1.3)
D50 ADMINISTERED, D50ADM: 0 ML
D50 ORDER, D50ORD: 0 ML
DIAGNOSIS, 93000: NORMAL
DIAGNOSIS, 93000: NORMAL
DIFFERENTIAL METHOD BLD: ABNORMAL
EOSINOPHIL # BLD: 0 K/UL (ref 0–0.4)
EOSINOPHIL NFR BLD: 0 % (ref 0–7)
ERYTHROCYTE [DISTWIDTH] IN BLOOD BY AUTOMATED COUNT: 13.2 % (ref 11.5–14.5)
ERYTHROCYTE [DISTWIDTH] IN BLOOD BY AUTOMATED COUNT: 13.8 % (ref 11.5–14.5)
EST. AVERAGE GLUCOSE BLD GHB EST-MCNC: 166 MG/DL
GAS FLOW.O2 O2 DELIVERY SYS: ABNORMAL L/MIN
GLUCOSE BLD STRIP.AUTO-MCNC: 105 MG/DL (ref 65–117)
GLUCOSE BLD STRIP.AUTO-MCNC: 209 MG/DL (ref 65–117)
GLUCOSE BLD STRIP.AUTO-MCNC: 210 MG/DL (ref 65–117)
GLUCOSE BLD STRIP.AUTO-MCNC: 234 MG/DL (ref 65–117)
GLUCOSE BLD STRIP.AUTO-MCNC: 313 MG/DL (ref 65–117)
GLUCOSE BLD STRIP.AUTO-MCNC: 325 MG/DL (ref 65–117)
GLUCOSE BLD STRIP.AUTO-MCNC: 392 MG/DL (ref 65–117)
GLUCOSE BLD STRIP.AUTO-MCNC: 412 MG/DL (ref 65–117)
GLUCOSE BLD STRIP.AUTO-MCNC: 481 MG/DL (ref 65–117)
GLUCOSE BLD STRIP.AUTO-MCNC: 520 MG/DL (ref 65–117)
GLUCOSE BLD STRIP.AUTO-MCNC: 571 MG/DL (ref 65–117)
GLUCOSE BLD STRIP.AUTO-MCNC: 577 MG/DL (ref 65–117)
GLUCOSE BLD STRIP.AUTO-MCNC: 79 MG/DL (ref 65–117)
GLUCOSE BLD STRIP.AUTO-MCNC: 82 MG/DL (ref 65–117)
GLUCOSE BLD STRIP.AUTO-MCNC: >600 MG/DL (ref 65–117)
GLUCOSE SERPL-MCNC: 229 MG/DL (ref 65–100)
GLUCOSE SERPL-MCNC: 392 MG/DL (ref 65–100)
GLUCOSE SERPL-MCNC: 439 MG/DL (ref 65–100)
GLUCOSE, GLC: 105 MG/DL
GLUCOSE, GLC: 209 MG/DL
GLUCOSE, GLC: 210 MG/DL
GLUCOSE, GLC: 325 MG/DL
GLUCOSE, GLC: 392 MG/DL
GLUCOSE, GLC: 412 MG/DL
GLUCOSE, GLC: 481 MG/DL
GLUCOSE, GLC: 571 MG/DL
GLUCOSE, GLC: 577 MG/DL
GLUCOSE, GLC: 79 MG/DL
GLUCOSE, GLC: 82 MG/DL
HBA1C MFR BLD: 7.4 % (ref 4–5.6)
HCO3 BLD-SCNC: 11.1 MMOL/L (ref 22–26)
HCT VFR BLD AUTO: 17.3 % (ref 36.6–50.3)
HCT VFR BLD AUTO: 30.6 % (ref 36.6–50.3)
HGB BLD-MCNC: 10.1 G/DL (ref 12.1–17)
HGB BLD-MCNC: 5.6 G/DL (ref 12.1–17)
HIGH TARGET, HITG: 300 MG/DL
HISTORY CHECKED?,CKHIST: NORMAL
IMM GRANULOCYTES # BLD AUTO: 0 K/UL (ref 0–0.04)
IMM GRANULOCYTES NFR BLD AUTO: 0 % (ref 0–0.5)
INSULIN ADMINSTERED, INSADM: 0.8 UNITS/HOUR
INSULIN ADMINSTERED, INSADM: 0.9 UNITS/HOUR
INSULIN ADMINSTERED, INSADM: 10.2 UNITS/HOUR
INSULIN ADMINSTERED, INSADM: 10.4 UNITS/HOUR
INSULIN ADMINSTERED, INSADM: 10.5 UNITS/HOUR
INSULIN ADMINSTERED, INSADM: 15.5 UNITS/HOUR
INSULIN ADMINSTERED, INSADM: 16.6 UNITS/HOUR
INSULIN ADMINSTERED, INSADM: 16.8 UNITS/HOUR
INSULIN ADMINSTERED, INSADM: 18.6 UNITS/HOUR
INSULIN ADMINSTERED, INSADM: 2.5 UNITS/HOUR
INSULIN ADMINSTERED, INSADM: 21.1 UNITS/HOUR
INSULIN ORDER, INSORD: 0.8 UNITS/HOUR
INSULIN ORDER, INSORD: 0.9 UNITS/HOUR
INSULIN ORDER, INSORD: 10.2 UNITS/HOUR
INSULIN ORDER, INSORD: 10.4 UNITS/HOUR
INSULIN ORDER, INSORD: 10.5 UNITS/HOUR
INSULIN ORDER, INSORD: 15.5 UNITS/HOUR
INSULIN ORDER, INSORD: 16.6 UNITS/HOUR
INSULIN ORDER, INSORD: 16.8 UNITS/HOUR
INSULIN ORDER, INSORD: 18.6 UNITS/HOUR
INSULIN ORDER, INSORD: 2.5 UNITS/HOUR
INSULIN ORDER, INSORD: 21.1 UNITS/HOUR
LACTATE SERPL-SCNC: 14.6 MMOL/L (ref 0.4–2)
LACTATE SERPL-SCNC: 2.6 MMOL/L (ref 0.4–2)
LACTATE SERPL-SCNC: 4 MMOL/L (ref 0.4–2)
LOW TARGET, LOT: 200 MG/DL
LYMPHOCYTES # BLD: 0.5 K/UL (ref 0.8–3.5)
LYMPHOCYTES NFR BLD: 8 % (ref 12–49)
MAGNESIUM SERPL-MCNC: 1.2 MG/DL (ref 1.6–2.4)
MAGNESIUM SERPL-MCNC: 3.1 MG/DL (ref 1.6–2.4)
MCH RBC QN AUTO: 27.7 PG (ref 26–34)
MCH RBC QN AUTO: 27.7 PG (ref 26–34)
MCHC RBC AUTO-ENTMCNC: 32.4 G/DL (ref 30–36.5)
MCHC RBC AUTO-ENTMCNC: 33 G/DL (ref 30–36.5)
MCV RBC AUTO: 83.8 FL (ref 80–99)
MCV RBC AUTO: 85.6 FL (ref 80–99)
MINUTES UNTIL NEXT BG, NBG: 60 MIN
MONOCYTES # BLD: 0.1 K/UL (ref 0–1)
MONOCYTES NFR BLD: 2 % (ref 5–13)
MULTIPLIER, MUL: 0.02
MULTIPLIER, MUL: 0.03
MULTIPLIER, MUL: 0.04
MULTIPLIER, MUL: 0.05
MULTIPLIER, MUL: 0.06
MULTIPLIER, MUL: 0.06
MULTIPLIER, MUL: 0.07
NEUTS SEG # BLD: 6 K/UL (ref 1.8–8)
NEUTS SEG NFR BLD: 90 % (ref 32–75)
NRBC # BLD: 0 K/UL (ref 0–0.01)
NRBC # BLD: 0 K/UL (ref 0–0.01)
NRBC BLD-RTO: 0 PER 100 WBC
NRBC BLD-RTO: 0 PER 100 WBC
O2/TOTAL GAS SETTING VFR VENT: 21 %
ORDER INITIALS, ORDINIT: NORMAL
P-R INTERVAL, ECG05: 182 MS
P-R INTERVAL, ECG05: 184 MS
PCO2 BLD: 21.8 MMHG (ref 35–45)
PH BLD: 7.32 [PH] (ref 7.35–7.45)
PHOSPHATE SERPL-MCNC: 3.1 MG/DL (ref 2.6–4.7)
PLATELET # BLD AUTO: 184 K/UL (ref 150–400)
PLATELET # BLD AUTO: 218 K/UL (ref 150–400)
PMV BLD AUTO: 9 FL (ref 8.9–12.9)
PMV BLD AUTO: 9.2 FL (ref 8.9–12.9)
PO2 BLD: 75 MMHG (ref 80–100)
POTASSIUM SERPL-SCNC: 4.2 MMOL/L (ref 3.5–5.1)
POTASSIUM SERPL-SCNC: 4.3 MMOL/L (ref 3.5–5.1)
POTASSIUM SERPL-SCNC: 4.8 MMOL/L (ref 3.5–5.1)
Q-T INTERVAL, ECG07: 358 MS
Q-T INTERVAL, ECG07: 374 MS
QRS DURATION, ECG06: 108 MS
QRS DURATION, ECG06: 116 MS
QTC CALCULATION (BEZET), ECG08: 477 MS
QTC CALCULATION (BEZET), ECG08: 484 MS
RBC # BLD AUTO: 2.02 M/UL (ref 4.1–5.7)
RBC # BLD AUTO: 3.65 M/UL (ref 4.1–5.7)
RBC MORPH BLD: ABNORMAL
SAO2 % BLD: 94.2 % (ref 92–97)
SERVICE CMNT-IMP: ABNORMAL
SERVICE CMNT-IMP: NORMAL
SODIUM SERPL-SCNC: 134 MMOL/L (ref 136–145)
SODIUM SERPL-SCNC: 142 MMOL/L (ref 136–145)
SODIUM SERPL-SCNC: 143 MMOL/L (ref 136–145)
SPECIMEN TYPE: ABNORMAL
TROPONIN-HIGH SENSITIVITY: 1207 NG/L (ref 0–76)
TROPONIN-HIGH SENSITIVITY: ABNORMAL NG/L (ref 0–76)
VENTRICULAR RATE, ECG03: 101 BPM
VENTRICULAR RATE, ECG03: 107 BPM
WBC # BLD AUTO: 6.6 K/UL (ref 4.1–11.1)
WBC # BLD AUTO: 8 K/UL (ref 4.1–11.1)

## 2022-12-19 PROCEDURE — 74011250636 HC RX REV CODE- 250/636: Performed by: STUDENT IN AN ORGANIZED HEALTH CARE EDUCATION/TRAINING PROGRAM

## 2022-12-19 PROCEDURE — 93005 ELECTROCARDIOGRAM TRACING: CPT

## 2022-12-19 PROCEDURE — 82803 BLOOD GASES ANY COMBINATION: CPT

## 2022-12-19 PROCEDURE — 82010 KETONE BODYS QUAN: CPT

## 2022-12-19 PROCEDURE — 83735 ASSAY OF MAGNESIUM: CPT

## 2022-12-19 PROCEDURE — 74011000636 HC RX REV CODE- 636: Performed by: STUDENT IN AN ORGANIZED HEALTH CARE EDUCATION/TRAINING PROGRAM

## 2022-12-19 PROCEDURE — 74174 CTA ABD&PLVS W/CONTRAST: CPT

## 2022-12-19 PROCEDURE — 83605 ASSAY OF LACTIC ACID: CPT

## 2022-12-19 PROCEDURE — 65270000046 HC RM TELEMETRY

## 2022-12-19 PROCEDURE — 74011000258 HC RX REV CODE- 258: Performed by: STUDENT IN AN ORGANIZED HEALTH CARE EDUCATION/TRAINING PROGRAM

## 2022-12-19 PROCEDURE — P9040 RBC LEUKOREDUCED IRRADIATED: HCPCS

## 2022-12-19 PROCEDURE — 74011636637 HC RX REV CODE- 636/637: Performed by: INTERNAL MEDICINE

## 2022-12-19 PROCEDURE — C9113 INJ PANTOPRAZOLE SODIUM, VIA: HCPCS | Performed by: NURSE PRACTITIONER

## 2022-12-19 PROCEDURE — 80048 BASIC METABOLIC PNL TOTAL CA: CPT

## 2022-12-19 PROCEDURE — 74011250637 HC RX REV CODE- 250/637: Performed by: STUDENT IN AN ORGANIZED HEALTH CARE EDUCATION/TRAINING PROGRAM

## 2022-12-19 PROCEDURE — 74011636637 HC RX REV CODE- 636/637: Performed by: STUDENT IN AN ORGANIZED HEALTH CARE EDUCATION/TRAINING PROGRAM

## 2022-12-19 PROCEDURE — 84484 ASSAY OF TROPONIN QUANT: CPT

## 2022-12-19 PROCEDURE — 74011250636 HC RX REV CODE- 250/636: Performed by: INTERNAL MEDICINE

## 2022-12-19 PROCEDURE — 36430 TRANSFUSION BLD/BLD COMPNT: CPT

## 2022-12-19 PROCEDURE — 82962 GLUCOSE BLOOD TEST: CPT

## 2022-12-19 PROCEDURE — 36600 WITHDRAWAL OF ARTERIAL BLOOD: CPT

## 2022-12-19 PROCEDURE — 30233N1 TRANSFUSION OF NONAUTOLOGOUS RED BLOOD CELLS INTO PERIPHERAL VEIN, PERCUTANEOUS APPROACH: ICD-10-PCS | Performed by: STUDENT IN AN ORGANIZED HEALTH CARE EDUCATION/TRAINING PROGRAM

## 2022-12-19 PROCEDURE — 74011000250 HC RX REV CODE- 250: Performed by: STUDENT IN AN ORGANIZED HEALTH CARE EDUCATION/TRAINING PROGRAM

## 2022-12-19 PROCEDURE — 85027 COMPLETE CBC AUTOMATED: CPT

## 2022-12-19 PROCEDURE — P9016 RBC LEUKOCYTES REDUCED: HCPCS

## 2022-12-19 PROCEDURE — 85025 COMPLETE CBC W/AUTO DIFF WBC: CPT

## 2022-12-19 PROCEDURE — 74011250636 HC RX REV CODE- 250/636: Performed by: NURSE PRACTITIONER

## 2022-12-19 PROCEDURE — 84100 ASSAY OF PHOSPHORUS: CPT

## 2022-12-19 PROCEDURE — 36415 COLL VENOUS BLD VENIPUNCTURE: CPT

## 2022-12-19 PROCEDURE — 83036 HEMOGLOBIN GLYCOSYLATED A1C: CPT

## 2022-12-19 PROCEDURE — 74011000250 HC RX REV CODE- 250: Performed by: NURSE PRACTITIONER

## 2022-12-19 RX ORDER — MAGNESIUM SULFATE HEPTAHYDRATE 40 MG/ML
2 INJECTION, SOLUTION INTRAVENOUS
Status: COMPLETED | OUTPATIENT
Start: 2022-12-19 | End: 2022-12-19

## 2022-12-19 RX ORDER — SODIUM CHLORIDE 9 MG/ML
250 INJECTION, SOLUTION INTRAVENOUS AS NEEDED
Status: DISCONTINUED | OUTPATIENT
Start: 2022-12-19 | End: 2022-12-23 | Stop reason: HOSPADM

## 2022-12-19 RX ORDER — HEPARIN SODIUM 1000 [USP'U]/ML
4000 INJECTION, SOLUTION INTRAVENOUS; SUBCUTANEOUS AS NEEDED
Status: DISCONTINUED | OUTPATIENT
Start: 2022-12-19 | End: 2022-12-19

## 2022-12-19 RX ORDER — HEPARIN SODIUM 1000 [USP'U]/ML
4000 INJECTION, SOLUTION INTRAVENOUS; SUBCUTANEOUS ONCE
Status: DISCONTINUED | OUTPATIENT
Start: 2022-12-19 | End: 2022-12-19

## 2022-12-19 RX ORDER — SODIUM CHLORIDE 9 MG/ML
75 INJECTION, SOLUTION INTRAVENOUS CONTINUOUS
Status: DISCONTINUED | OUTPATIENT
Start: 2022-12-19 | End: 2022-12-23 | Stop reason: HOSPADM

## 2022-12-19 RX ORDER — INSULIN LISPRO 100 [IU]/ML
INJECTION, SOLUTION INTRAVENOUS; SUBCUTANEOUS
Status: DISCONTINUED | OUTPATIENT
Start: 2022-12-19 | End: 2022-12-19

## 2022-12-19 RX ORDER — HEPARIN SODIUM 10000 [USP'U]/100ML
12-25 INJECTION, SOLUTION INTRAVENOUS
Status: DISCONTINUED | OUTPATIENT
Start: 2022-12-19 | End: 2022-12-19

## 2022-12-19 RX ORDER — INSULIN LISPRO 100 [IU]/ML
INJECTION, SOLUTION INTRAVENOUS; SUBCUTANEOUS
Status: DISCONTINUED | OUTPATIENT
Start: 2022-12-19 | End: 2022-12-23 | Stop reason: HOSPADM

## 2022-12-19 RX ORDER — HEPARIN SODIUM 1000 [USP'U]/ML
2000 INJECTION, SOLUTION INTRAVENOUS; SUBCUTANEOUS AS NEEDED
Status: DISCONTINUED | OUTPATIENT
Start: 2022-12-19 | End: 2022-12-19

## 2022-12-19 RX ORDER — INSULIN GLARGINE 100 [IU]/ML
18 INJECTION, SOLUTION SUBCUTANEOUS DAILY
Status: DISCONTINUED | OUTPATIENT
Start: 2022-12-19 | End: 2022-12-20

## 2022-12-19 RX ADMIN — SODIUM CHLORIDE 15.5 UNITS/HR: 9 INJECTION, SOLUTION INTRAVENOUS at 02:55

## 2022-12-19 RX ADMIN — SODIUM CHLORIDE 16.8 UNITS/HR: 9 INJECTION, SOLUTION INTRAVENOUS at 03:57

## 2022-12-19 RX ADMIN — MAGNESIUM SULFATE HEPTAHYDRATE 2 G: 40 INJECTION, SOLUTION INTRAVENOUS at 06:27

## 2022-12-19 RX ADMIN — IOPAMIDOL 100 ML: 755 INJECTION, SOLUTION INTRAVENOUS at 03:44

## 2022-12-19 RX ADMIN — MAGNESIUM SULFATE HEPTAHYDRATE 2 G: 40 INJECTION, SOLUTION INTRAVENOUS at 04:19

## 2022-12-19 RX ADMIN — PIPERACILLIN AND TAZOBACTAM 3.38 G: 3; .375 INJECTION, POWDER, FOR SOLUTION INTRAVENOUS at 04:22

## 2022-12-19 RX ADMIN — SODIUM CHLORIDE, PRESERVATIVE FREE 40 MG: 5 INJECTION INTRAVENOUS at 08:18

## 2022-12-19 RX ADMIN — SODIUM CHLORIDE, PRESERVATIVE FREE 5 ML: 5 INJECTION INTRAVENOUS at 01:35

## 2022-12-19 RX ADMIN — Medication 3 UNITS: at 17:26

## 2022-12-19 RX ADMIN — SODIUM CHLORIDE 10.2 UNITS/HR: 9 INJECTION, SOLUTION INTRAVENOUS at 01:18

## 2022-12-19 RX ADMIN — SODIUM CHLORIDE 10.5 UNITS/HR: 9 INJECTION, SOLUTION INTRAVENOUS at 09:18

## 2022-12-19 RX ADMIN — SODIUM CHLORIDE 75 ML/HR: 9 INJECTION, SOLUTION INTRAVENOUS at 17:27

## 2022-12-19 RX ADMIN — Medication 4 UNITS: at 21:56

## 2022-12-19 RX ADMIN — ASPIRIN 81 MG: 81 TABLET, CHEWABLE ORAL at 08:18

## 2022-12-19 RX ADMIN — PIPERACILLIN AND TAZOBACTAM 3.38 G: 3; .375 INJECTION, POWDER, FOR SOLUTION INTRAVENOUS at 12:14

## 2022-12-19 RX ADMIN — SODIUM CHLORIDE 18.6 UNITS/HR: 9 INJECTION, SOLUTION INTRAVENOUS at 06:59

## 2022-12-19 RX ADMIN — ATORVASTATIN CALCIUM 10 MG: 10 TABLET, FILM COATED ORAL at 08:18

## 2022-12-19 RX ADMIN — MAGNESIUM SULFATE HEPTAHYDRATE 2 G: 40 INJECTION, SOLUTION INTRAVENOUS at 05:04

## 2022-12-19 RX ADMIN — SODIUM CHLORIDE, POTASSIUM CHLORIDE, SODIUM LACTATE AND CALCIUM CHLORIDE 100 ML/HR: 600; 310; 30; 20 INJECTION, SOLUTION INTRAVENOUS at 01:16

## 2022-12-19 RX ADMIN — SODIUM CHLORIDE, PRESERVATIVE FREE 10 ML: 5 INJECTION INTRAVENOUS at 06:44

## 2022-12-19 RX ADMIN — PIPERACILLIN AND TAZOBACTAM 3.38 G: 3; .375 INJECTION, POWDER, FOR SOLUTION INTRAVENOUS at 21:56

## 2022-12-19 RX ADMIN — INSULIN GLARGINE 18 UNITS: 100 INJECTION, SOLUTION SUBCUTANEOUS at 11:34

## 2022-12-19 RX ADMIN — SODIUM CHLORIDE, PRESERVATIVE FREE 10 ML: 5 INJECTION INTRAVENOUS at 21:57

## 2022-12-19 RX ADMIN — SODIUM CHLORIDE 1000 ML: 9 INJECTION, SOLUTION INTRAVENOUS at 03:12

## 2022-12-19 RX ADMIN — SODIUM CHLORIDE, PRESERVATIVE FREE 10 ML: 5 INJECTION INTRAVENOUS at 13:33

## 2022-12-19 NOTE — PROGRESS NOTES
Hospitalist Progress Note    NAME: Ange Cruz   :  1945   MRN:  [id number]            Subjective:     Chief Complaint / Reason for Physician Visit  Patient seen and treated at bedside, overnight events reviewed, patient currently still has complaints of generalized weakness, no new complaints. Discussed with RN events overnight. Review of Systems:  Symptom Y/N Comments  Symptom Y/N Comments   Fever/Chills N   Chest Pain N    Poor Appetite Y   Edema N    Cough N   Abdominal Pain N    Sputum N   Joint Pain N    SOB/DEL CASTILLO N   Pruritis/Rash N    Nausea/vomit N   Tolerating PT/OT NA    Diarrhea N   Tolerating Diet Y    Constipation N   Other       Could NOT obtain due to:      Objective:     VITALS:   Last 24hrs VS reviewed since prior progress note.  Most recent are:  Patient Vitals for the past 24 hrs:   Temp Pulse Resp BP SpO2   22 1200 -- 91 16 (!) 113/59 95 %   22 0715 98.6 °F (37 °C) 97 11 123/63 100 %   22 0700 98.5 °F (36.9 °C) 97 16 (!) 110/50 98 %   22 0645 98.6 °F (37 °C) 98 17 (!) 110/52 98 %   22 0618 98.4 °F (36.9 °C) 96 18 (!) 118/56 99 %   22 0600 98.4 °F (36.9 °C) (!) 101 19 (!) 117/55 99 %   22 0545 99.6 °F (37.6 °C) (!) 105 22 (!) 111/47 (!) 84 %   22 0534 99 °F (37.2 °C) (!) 104 20 (!) 118/48 97 %   22 0500 -- (!) 109 16 (!) 107/50 91 %   22 0430 -- (!) 110 23 (!) 126/54 98 %   22 0419 -- (!) 110 -- (!) 116/57 --   22 0415 -- (!) 110 24 (!) 116/57 98 %   22 0300 98.8 °F (37.1 °C) (!) 110 15 (!) 128/47 --   22 0205 -- (!) 114 22 (!) 121/46 100 %   22 0145 -- (!) 106 22 (!) 107/43 98 %   22 0101 -- (!) 106 (!) 5 (!) 90/43 98 %   22 0045 -- (!) 110 22 (!) 104/41 100 %   22 2330 -- (!) 110 19 (!) 107/45 100 %   22 2300 -- (!) 109 21 (!) 116/44 97 %   22 1923 98 °F (36.7 °C) (!) 107 20 (!) 121/50 100 %       Intake/Output Summary (Last 24 hours) at 2022 1529  Last data filed at 12/19/2022 1415  Gross per 24 hour   Intake 2322.25 ml   Output 3475 ml   Net -1152.75 ml        PHYSICAL EXAM:  General: Patient appears comfortable   EENT:  EOMI. Anicteric sclerae. MMM  Resp:  CTA bilaterally, no wheezing or rales. No accessory muscle use  CV:  Regular  rhythm, s1/s2 no m/r/g  No edema  GI:  Soft, Non distended, Non tender. +Bowel sounds  Neurologic:  Alert and oriented X 3, normal speech,   Psych:   Good insight. Not anxious nor agitated  Skin:  No rashes. No jaundice    Procedures: see electronic medical records for all procedures/Xrays and details which were not copied into this note but were reviewed prior to creation of Plan. LABS:  I reviewed today's most current labs and imaging studies. Pertinent labs include:  Recent Labs     12/19/22  0822 12/19/22 0318 12/18/22 1955   WBC 8.0 6.6 4.6   HGB 10.1* 5.6* 8.3*   HCT 30.6* 17.3* 25.7*    184 252     Recent Labs     12/19/22  0822 12/19/22  0318 12/19/22  0201 12/18/22  2107 12/18/22 1955 12/17/22  2140    142 134* 128* 128* 139   K 4.2 4.3 4.8 5.1 5.2* 4.3   * 113* 106 98 96* 107   CO2 18* 14* 7* 13* 15* 27   * 439* 392* 606* 627* 114*   BUN 27* 27* 20 24* 24* 10   CREA 1.48* 1.41* 0.79 1.36* 1.37* 1.04   CA 9.0 7.4* 7.4* 8.7 9.1 8.8   MG 3.1*  --  1.2*  --   --   --    PHOS  --   --  3.1  --   --   --    ALB  --   --   --  3.1* 3.5 3.4*   TBILI  --   --   --  0.7 0.8 0.2   ALT  --   --   --  22 26 25       Signed: Burak Queen MD    CTA abdomen/pelvis:IMPRESSION  No acute process identified    CTA chest:IMPRESSION  There is no pulmonary embolism. There is no aortic aneurysm or dissection. Mild centrilobular emphysema. No acute intrathoracic process is identified. Incidental findings are as  described above.         Reviewed most current lab test results and cultures  YES  Reviewed most current radiology test results   YES  Review and summation of old records today NO  Reviewed patient's current orders and MAR    YES  PMH/SH reviewed - no change compared to H&P      Assessment / Plan:  Sepsis secondary to complicated urinary tract infection-of note patient presented meeting sepsis criteria secondary to complicated urinary tract infection, preliminary urine cultures consistent with Pseudomonas, patient remains hemodynamically stable at this time  Follow-up blood cultures  Follow-up urine cultures for sensitivity  Discontinue vancomycin  Continue Zosyn for antibiotic coverage  Maintenance IV fluids  Continue to monitor patient's clinical status    Acute blood loss anemia/hematuria-patient found to have significant anemia, 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, status posttransfusion of packed RBC with appropriate response, patient has been evaluated by urology, hematuria has improved  Maintain active type and screen  Continue to trend hemoglobin and hematocrit  Urology consult appreciated, continue to follow recommendations    Elevated serum troponin/type II non-ST elevation MI-of note patient found to have significant troponin elevation, thought to be secondary to demand in the setting of acute blood loss anemia as well as sepsis, patient rodo hemodynamically stable  Continuous telemetry monitoring  Continue aspirin once daily  Continue Lipitor once daily  Follow-up transthoracic echo  Cardiology consult appreciated, continue to follow recommendations    Diabetic ketoacidosis-currently resolved    Hyperglycemia type 2 diabetes-currently under much better control  Continue current Lantus  Continue insulin sliding scale    Prophylaxis-SCDs  FEN-cardiac/diabetic diet, maintenance IV fluids, replete potassium and magnesium  Full code, will clarify about surrogate decision-maker  Disposition-patient accepted to stepdown unit for further management      18.5 - 24.9 Normal weight / Body mass index is 23.07 kg/m².     Code status: Full  Prophylaxis: SCD's  Recommended Disposition: HH PT, OT, RN     ________________________________________________________________________  Care Plan discussed with:    Comments   Patient x    Family  x    RN x    Care Manager x    Consultant  x                     x Multidiciplinary team rounds were held today with , nursing, pharmacist and clinical coordinator. Patient's plan of care was discussed; medications were reviewed and discharge planning was addressed.      ________________________________________________________________________  Total NON critical care TIME:  35   Minutes      Comments   >50% of visit spent in counseling and coordination of care x    ________________________________________________________________________  Ragena Skill, MD

## 2022-12-19 NOTE — PROGRESS NOTES
[facility]  [address], [address] [facility]  [phone]  [email or web address]      Received call re trop 39K, no additional changes in management from Dr Bernie Mckeon consult, H/H improved with transfusion spoke with ER RN Pipe Gregorio, no CP. Will continue to follow.      Nikita Remy DNP, ANP-BC
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
## 2022-12-19 NOTE — PROGRESS NOTES
Pharmacy Automatic Renal Dosing Protocol - Antimicrobials    Indication for Antimicrobials: Diabetic Foot Infection    Current Regimen of Each Antimicrobial:  Vancomycin  1750 mg x 1 then 1000 mg Q18H (Start Date ; Day # 2)  Piperacillin tazobactam 3.375 g Q8H (Start Date , Day 2)    Previous Antimicrobial Therapy:    Vancomycin Goal Level: 400-600 mg*hour/liter per 24 hours    Vancomycin Levels  Date Dose & Interval Measured (mcg/mL) Steady State (mcg/mL)                       Date & time of next level:  prior to 11am dose  Significant Cultures:   : pseudamonas: pending    Radiology / Imaging results: (X-ray, CT scan or MRI):     Labs:  Recent Labs     22  0822 22  0318 22  0201 22  2107 22  2140   CREA 1.48* 1.41* 0.79 1.36* 1.37* 1.04   BUN 27* 27* 20 24* 24* 10   WBC 8.0 6.6  --   --  4.6 3.9*     Temp (24hrs), Av.7 °F (37.1 °C), Min:98 °F (36.7 °C), Max:99.6 °F (37.6 °C)      Paralysis, amputations, malnutrition  Creatinine Clearance (mL/min) or Dialysis: 47    Impression/Plan:   Antibiotics as above  Vancomycin level on  prior to 11am  BMP daily     Pharmacy will follow daily and adjust medications as appropriate for renal function and/or serum levels.     Thank you,  CARLOS EDUARDO Hawthorne

## 2022-12-19 NOTE — CONSULTS
[facility] INITIAL ASSESSMENT. Name: Tyson Cantrell   : 1945   MRN: [id number]   Date: 2022        No chief complaint on file. HPI:      65 y.o.  male with pertinent past medical history of insulin-dependent diabetes mellitus, prostate cancer status post suprapubic catheter placement who returns to the emergency department today after leaving yesterday while being evaluated for hematuria. In the ED, patient afebrile, borderline tachycardic 90s-100s, normotensive, saturating upper 90s on room air. ECG demonstrates sinus tachycardia with LVH criteria and known interfascicular block. CTA chest demonstrates no PE or acute process. Suprapubic catheter draining overtly bloody urine without visible clot and UA concerning for UTI (protein, blood, ketone, nitrites, leukoesterase, 1+ bacteria)-chart review demonstrates history of pseudomonal UTIs, WBC 4.6, hemoglobin 8.3 (9.6 yesterday), platelets 407, lactic acid 4.61 at presentation increased to 5.84 2 hours later, sodium 128, potassium 5.1, bicarb 13, anion gap 17, glucose 606, BUN 24, creatinine 1.36 (baseline around 1.0), high-sensitivity troponin elevated at 239 increased to 441 on recheck. Admitted to step down unit for sepsis due to UTI, NSTEMI, and hyperglycemia. Repeat labs post admission resulted in a lactic acid of 14.6, Trop 1207, and Hg of 5.6. Due to c/f decompensation, ICU consulted for upgrade. Upon my assessment, pt alert and oriented, BP stable, ST to 110's, mucous membranes and conjunctiva pale. Pt will be admitted to the ICU for further management. Active Problems Being Managed:     Hemorraghic shock  Acute blood loss anemia  Complicated UTI  Hyperglycemia  NSTEMI    Assessment/Plan:   Acute blood loss anemia with hemorraghic shock-source likely urinary tract.   Pt has a Hx of prostate ca s/p suprapubic catheter placement.  -since arrival to the ED tonight pt has had 1.2 L of eduardo blood out of suprapubic catheter  -Hg 9.6-->8.3-->5.6  -give 2 units of PRBC then recheck CBC  -CBC Q 4 hr  -stat urology consult  -lactic acid 5.84-->14.6  -repeat lactic acid after blood transfusion  -CTA abd pelvis done- showed no acute process    Septic shock due to complicated UTI  -zosyn/vanc  -UA significant for infection, follow up urine culture and blood cultures  -s/p 3 L IVF in ED  -currently HDS    NSTEMI -likely demand ischemia in the setting of acute anemia  -EKG w/o ST elevations  -Cardiology consulted by hospitalist  -TTE in am   -trend trop until down trending  -s/p aspirin in ED  -can not give Heparin due to active bleed    Hyperglycemia  -, AG 17 but likely due to lactic acidosis  -beta-hydroxyburate pending  -cont insulin gtt  -BMP Q 6 hr while on insulin gtt    DVT prophylaxis: SCDs  SUP: protonix  Code status: Full    Next of kin: Michael Courtney (daughter)    I personally spent 60 minutes of critical care time. This is time spent at this critically ill patient's bedside actively involved in patient care as well as the coordination of care and discussions with the patient's family. This does not include any procedural time which has been billed separately. Review of systems:     Review of Systems   Constitutional:  Positive for malaise/fatigue. Negative for chills and fever. HENT:  Negative for hearing loss. Eyes:  Negative for double vision. Respiratory:  Negative for cough, hemoptysis, sputum production, shortness of breath and wheezing. Cardiovascular:  Negative for chest pain and palpitations. Gastrointestinal:  Negative for abdominal pain, blood in stool, diarrhea, heartburn, melena, nausea and vomiting. Genitourinary:  Positive for hematuria. Musculoskeletal:  Negative for myalgias. Neurological:  Negative for dizziness and headaches.         Objective:     Vital Signs:  Visit Vitals  BP (!) 116/57   Pulse (!) 110   Temp 98.8 °F (37.1 °C)   Resp 15   Ht 6' 2\" (1.88 m)   Wt 81.5 kg (179 lb 10.8 oz)   SpO2 100%   BMI 23.07 kg/m²      O2 Device: None (Room air) Temp (24hrs), Av.4 °F (36.9 °C), Min:98 °F (36.7 °C), Max:98.8 °F (37.1 °C)           Intake/Output:     Intake/Output Summary (Last 24 hours) at 2022 0501  Last data filed at 2022 0316  Gross per 24 hour   Intake --   Output 1225 ml   Net -1225 ml       Physical Exam:  Physical Exam  Constitutional:       General: He is not in acute distress. HENT:      Head: Normocephalic. Nose: Nose normal.      Mouth/Throat:      Comments: Pale mucous membranes  Eyes:      Comments: Conjunctivae pale   Cardiovascular:      Rate and Rhythm: Regular rhythm. Tachycardia present. Pulses: Normal pulses. Heart sounds: Normal heart sounds. Pulmonary:      Effort: Pulmonary effort is normal.      Breath sounds: Normal breath sounds. Abdominal:      General: There is no distension. Palpations: Abdomen is soft. Tenderness: There is no abdominal tenderness. Musculoskeletal:         General: Normal range of motion. Skin:     General: Skin is dry. Capillary Refill: Capillary refill takes less than 2 seconds. Neurological:      General: No focal deficit present. Past Medical History:        has a past medical history of Arthritis, Chronic pain, Diabetes ([facility] [address] Foot ulcer ([facility] [address] Hematuria (2022), Hepatitis, Hypertension, Leukopenia, Murmur, Other and unspecified hyperlipidemia, Prostate CA ([facility] [address] (2022), Prostate cancer ([facility] [address] (), Sickle cell trait ([facility] [address] (2012), Type I (juvenile type) diabetes mellitus without mention of complication, uncontrolled (since ), and Unspecified sleep apnea.     He has no past medical history of Asthma, Atrial fibrillation ([facility] [address] CAD (coronary artery disease), Carotid artery disease ([facility] [address] Chronic kidney disease, Chronic obstructive pulmonary disease ([facility] [address] Clotting disorder ([facility] [address] [facility] Glaucoma, HIV (human immunodeficiency virus infection) ([facility] [address] ICD (implantable cardioverter-defibrillator) in place, Long term current use of anticoagulant therapy, Myocardial infarction [facility]), Pacemaker, Pulmonary embolism ([facility] [address] Rheumatic fever, [facility] Stroke [facility]), Syncope, Thyroid disease, or Valvular heart disease. Past Surgical History:      has a past surgical history that includes hx urological (1991); hx heent (1969); hx heent (2010); hx orthopaedic (2002, 2006); hx orthopaedic; hx orthopaedic (01/2013); hx orthopaedic (Left, 2014); hx orthopaedic (Right, 2021); hx cataract removal (Bilateral); hx urological (2015); and hx urological (05/2022). Home Medications:     Prior to Admission medications    Medication Sig Start Date End Date Taking? Authorizing Provider   ascorbic acid, vitamin C, (VITAMIN C) 500 mg tablet Take 1,000 mg by mouth in the morning. Yes Provider, Historical   megestroL (MEGACE) 20 mg tablet Take 20 mg by mouth three (3) times daily as needed. 1/5/22  Yes Provider, Historical   atorvastatin (Lipitor) 10 mg tablet Take 1 Tablet by mouth daily. 3/3/22  Yes Fei Almanza MD   TRUEplus Lancets 33 gauge misc TEST UP TO 5 TIMES DAILY (INSULIN FLUCTUATING SUGARS). DX: E10.65 3/3/22  Yes Fei Almanza MD   True Metrix Level 1 soln Use as directed 3/3/22  Yes Fei Almanza MD   alcohol swabs (BD Single Use Swabs Regular) padm TEST UP TO 5 TIMES DAILY (INSULIN FLUCTUATING SUGARS). DX: E10.65 3/3/22  Yes Fei Almanza MD   aspirin 81 mg chewable tablet Take 81 mg by mouth daily. Yes Other, MD Tyree   insulin glargine (Lantus Solostar U-100 Insulin) 100 unit/mL (3 mL) inpn INJECT 18 UNITS UNDER THE SKIN AT BEDTIME--Dose change 09/30/22--updated med list--did not send prescription to the pharmacy 9/30/22   Fei Almanza MD   multivitamin (ONE A DAY) tablet Take 1 Tablet by mouth in the morning.     Provider, Historical   cyanocobalamin (VITAMIN B12) 500 mcg tablet Take 500 mcg by mouth in the morning. Provider, Historical   insulin aspart U-100 (NovoLOG Flexpen U-100 Insulin) 100 unit/mL (3 mL) inpn Inject 1:8 for carbs for breakfast and 1:9 for lunch and dinner before 9pm and 1:10 after 9pm and 1:50 > 150 for correction during the day and 1:50 > 180 after 9pm.  MAX 35/D 3/3/22   Santana Cowart MD   True Metrix Glucose Meter misc TEST UP TO 5 TIMES DAILY (INSULIN FLUCTUATING SUGARS). DX: E10.65 3/3/22   Santana Cowart MD   BD Luer-Rafa Syringe 3 mL 18 x 1 1/2\" syrg USE TO DRAW UP TESTOSTERONE UTD 20   Provider, Historical   Disposable Needles 22 gauge x 1 1/2\" ndle USE TO INJECT TESTOSTERONE UTD 20   Provider, Historical   cholecalciferol (Vitamin D) (2,000 UNITS /50 MCG) cap capsule Take 3 Tablets by mouth daily. Provider, Historical         Allergies/Social/Family History: Allergies   Allergen Reactions    Lisinopril Cough    Novocain [Procaine] Palpitations    Tamsulosin Other (comments)     Higher blood sugars      Social History     Tobacco Use    Smoking status: Former     Packs/day: 1.00     Years: 35.00     Pack years: 35.00     Types: Cigarettes     Quit date: 2006     Years since quittin.8    Smokeless tobacco: Never   Substance Use Topics    Alcohol use: Not Currently      Family History   Problem Relation Age of Onset    Other Mother         ABDOMINAL ANEURYSM    Hypertension Mother     Cancer Father         LUNG    Cancer Sister         BREAST    Diabetes Sister     Cancer Brother         LIVER    Lung Disease Brother         PULMONARY FIBROSIS    Hypertension Sister     No Known Problems Sister     Anesth Problems Neg Hx          LABS AND  DATA:   Reviewed      Peak airway pressure:      Minute ventilation:        CRITICAL CARE CONSULTANT NOTE  I had a face to face encounter with the patient, reviewed and interpreted patient data including clinical events, labs, images, vital signs, I/O's, and examined patient.   I have discussed the case and the plan and management of the patient's care with the consulting services, the bedside nurses and the respiratory therapist.      NOTE OF PERSONAL INVOLVEMENT IN CARE   This patient has a high probability of imminent, clinically significant deterioration, which requires the highest level of preparedness to intervene urgently. I participated in the decision-making and personally managed or directed the management of the following life and organ supporting interventions that required my frequent assessment to treat or prevent imminent deterioration.         Cabrera Osorio NP   Pulmonary/CCM  [phone]  [phone]  12/19/2022
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
## 2022-12-19 NOTE — PROGRESS NOTES
INTERVAL:    Called with critical lab results: Troponin increasing to 239--> 441--> 1207  Patient continues to have heavy hematuria, rechecking CBC if hemoglobin stable will initiate heparin drip. If hemoglobin significantly decreased will transfuse, consent already obtained. Repeated ECG-demonstrates marked ischemic change with lateral depressions. Called cardiologist on-call-Dr. Steph Awan, discussed case given patient's lactic acidosis (see below) he would not be candidate for acute catheterization at this time. He recommends initiation of heparin if medically feasible. Critical lactic acid increased from 5.84 --> 14.6  Patient remains hemodynamically stable and without complaints of pain or discomfort at this time  CTA chest performed earlier demonstrating no acute pathology. Will obtain CTA abdomen pelvis to evaluate for ischemia    Obtained AB.32/21.8/75.4/11.1/94.2% on room air    Vitals remain heart rate 100-110s, blood pressures 90s/40s-120s/40s with most recent reading 128/47    Will discuss case with ICU for escalation of care. ICU to come and evaluate out of concern for further decompensation.

## 2022-12-19 NOTE — ED NOTES
Pt's heart rate got up to 180, went to obtain EKG and notified Dr Duane Mae, when entering room pt's rate had gone back down to low 100's, pt denies feeling anything new s/s, remains AOX4.
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
## 2022-12-19 NOTE — PROGRESS NOTES
Pharmacy Automatic Renal Dosing Protocol - Antimicrobials    Indication for Antimicrobials: Diabetic Foot Infection      Current Regimen of Each Antimicrobial:  Vancomycin  1750 mg x 1 then 1000 mg Q18H (Start Date ; Day # 1)  Piperacillin tazobactam 3.375 g Q8H (Start Date , Day 1)    Previous Antimicrobial Therapy:    Vancomycin Goal Level: 400-600 mg*hour/liter per 24 hours    Vancomycin Levels  Date Dose & Interval Measured (mcg/mL) Steady State (mcg/mL)                       Date & time of next level:     Significant Cultures:     Radiology / Imaging results: (X-ray, CT scan or MRI):       Labs:  Recent Labs     22  2140   CREA 1.37* 1.04   BUN 24* 10   WBC 4.6 3.9*     Temp (24hrs), Av.1 °F (36.7 °C), Min:98 °F (36.7 °C), Max:98.2 °F (36.8 °C)      Paralysis, amputations, malnutrition:   Creatinine Clearance (mL/min) or Dialysis: 51    Impression/Plan:   Antibiotics as above  BMP daily     Pharmacy will follow daily and adjust medications as appropriate for renal function and/or serum levels. Thank you,  Hansel Blanchard, [facility]      Recommended duration of therapy  http://[facility]/[place]/[facility]/Pharmacy/Clinical%20Companion/Duration%20of%20ABX%20therapy. docx    Renal Dosing  http://[facility]/[place]/[facility]/Pharmacy/Clinical%20Companion/Renal%[email or web address]. pdf

## 2022-12-19 NOTE — ED NOTES
Repeat lactic back at 14.6. Dr Jamee Polanco notified also updated that pt continues to have blood from abraham catheter along with large amount of blood on pad-draining from penis. CT abdomen ordered at this time.

## 2022-12-19 NOTE — ED NOTES
Spoke with Dr Adalberto Rodrigues regarding heparin order, pt's urine in abraham bag continues to be blood along with blood coming from tip of penis and hgb on arrival was 8.6. Per Dr Adalberto Rodrigues hold heparin at this time and will re-evaluate with next Troponin level.

## 2022-12-19 NOTE — ED NOTES
New order for Heparin was ordered due to recent Troponin level, after waiting for CBC to result with new hgb which came back at 5.6-heparin held at this time due to needing blood transfusion.
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
## 2022-12-19 NOTE — ED NOTES
Assumed care of pt. Pt reports mid to right sided chest pain, 7/10 heaviness worse when up walking along with being short of air, s/s started at 1100, pt left AMA 12/18 approx 0600 after being here for blood in urine- pt reports doctor wanted to admit then and refused due to having urology appointment 12/19. Pt arrives with suprapubic catheter in place, output blood along with blood coming from tip of penis-depends saturated-pt cleaned and new depends placed on at this time. Pt AOX4, PWD, VSS-sinus tach on monitor.

## 2022-12-19 NOTE — PROGRESS NOTES
Blood Transfusion Consent    I have discussed with the patient the rationale for blood component transfusion; its benefits in treating or preventing fatigue, organ damage, or death; and its risk which includes mild transfusion reactions, rare risk of blood borne infection, or more serious but rare reactions. I have discussed the alternatives to transfusion, including the risk and consequences of not receiving transfusion. The patient had an opportunity to ask questions and had agreed to proceed with transfusion of blood components.      Betty Torres NP  [facility]  12/19/22

## 2022-12-19 NOTE — PROGRESS NOTES
0500: Bedside report received from Cheikh Hernandez, [address]: Assessment completed. See MAR and flowsheet for more details. 0600: Spoke with NP Kareem Mejia. Per NP run PRBC over 1 hour after initial 15 minutes. 0700: Bedside report given to Wendi Cano RN. Physical Therapy   Daily Treatment     Patient Name: Sheyla Garcia  Age:  69 y.o., Sex:  female  Medical Record #: [id number]  Today's Date: 12/5/2022     Precautions  Precautions: Fall Risk    Assessment    Rec'd pt alert, in bed, agreeable to work w/ PT.  She needs mod assist to sit eob, despite HOB elevated and use of side rail.  Once sitting she is only able to maintain her balance for several seconds.  Posterior lean/loss of balance.  Max assist to stand w/ significant posterior lean.  Despite max assist and verbal cues, unable to correct posterior lean.  Unable to ambulate    Plan    Continue current treatment plan.    DC Equipment Recommendations: Unable to determine at this time  Discharge Recommendations: Recommend post-acute placement for additional physical therapy services prior to discharge home        Objective       12/05/22 0826   Balance   Sitting Balance (Static) Fair -   Sitting Balance (Dynamic) Poor +   Standing Balance (Static) Trace +   Standing Balance (Dynamic) Trace +   Weight Shift Sitting Poor   Weight Shift Standing Absent   Skilled Intervention Verbal Cuing;Tactile Cuing;Sequencing;Facilitation   Comments posterior lean   Gait Analysis   Gait Level Of Assist Unable to Participate   Bed Mobility    Supine to Sit Moderate Assist   Sit to Supine Moderate Assist   Scooting Moderate Assist   Skilled Intervention Verbal Cuing;Tactile Cuing;Sequencing;Facilitation   Functional Mobility   Sit to Stand Maximal Assist   Bed, Chair, Wheelchair Transfer Unable to Participate   Skilled Intervention Verbal Cuing;Tactile Cuing;Sequencing;Facilitation   Short Term Goals    Short Term Goal # 1 Patient will be able to move supine<>sitting EOB with bed features and min A within 6tx in order to get out of bed and decrease CG burden   Goal Outcome # 1 goal not met   Short Term Goal # 2 Patient will be able to perform transfer with min A within 6tx in order to progress independence with mobility   Goal  Outcome # 2 Goal not met   Short Term Goal # 3 Patient will be able to ambulate 5ft with min A and FWW within 6tx in order to take steps adequate for home mobility with primary use of WC for mobility   Goal Outcome # 3 Goal not met   Short Term Goal # 4 Patient will be able to ascend/descend 2 steps with min A within 6tx in order to enter/exit home   Goal Outcome # 4 Goal not met   Anticipated Discharge Equipment and Recommendations   DC Equipment Recommendations Unable to determine at this time   Discharge Recommendations Recommend post-acute placement for additional physical therapy services prior to discharge home

## 2022-12-20 LAB
ABO + RH BLD: NORMAL
ANION GAP SERPL CALC-SCNC: 7 MMOL/L (ref 5–15)
ATRIAL RATE: 110 BPM
BLD PROD TYP BPU: NORMAL
BLD PROD TYP BPU: NORMAL
BLOOD GROUP ANTIBODIES SERPL: NORMAL
BPU ID: NORMAL
BPU ID: NORMAL
BUN SERPL-MCNC: 26 MG/DL (ref 6–20)
BUN/CREAT SERPL: 22 (ref 12–20)
CALCIUM SERPL-MCNC: 8.4 MG/DL (ref 8.5–10.1)
CALCULATED P AXIS, ECG09: 71 DEGREES
CALCULATED R AXIS, ECG10: -62 DEGREES
CALCULATED T AXIS, ECG11: 75 DEGREES
CHLORIDE SERPL-SCNC: 111 MMOL/L (ref 97–108)
CO2 SERPL-SCNC: 22 MMOL/L (ref 21–32)
CREAT SERPL-MCNC: 1.19 MG/DL (ref 0.7–1.3)
CROSSMATCH RESULT,%XM: NORMAL
CROSSMATCH RESULT,%XM: NORMAL
DIAGNOSIS, 93000: NORMAL
ERYTHROCYTE [DISTWIDTH] IN BLOOD BY AUTOMATED COUNT: 14.1 % (ref 11.5–14.5)
GLUCOSE BLD STRIP.AUTO-MCNC: 156 MG/DL (ref 65–117)
GLUCOSE BLD STRIP.AUTO-MCNC: 213 MG/DL (ref 65–117)
GLUCOSE BLD STRIP.AUTO-MCNC: 303 MG/DL (ref 65–117)
GLUCOSE BLD STRIP.AUTO-MCNC: 395 MG/DL (ref 65–117)
GLUCOSE SERPL-MCNC: 342 MG/DL (ref 65–100)
HCT VFR BLD AUTO: 27.8 % (ref 36.6–50.3)
HGB BLD-MCNC: 9.1 G/DL (ref 12.1–17)
MAGNESIUM SERPL-MCNC: 2.4 MG/DL (ref 1.6–2.4)
MCH RBC QN AUTO: 27.3 PG (ref 26–34)
MCHC RBC AUTO-ENTMCNC: 32.7 G/DL (ref 30–36.5)
MCV RBC AUTO: 83.5 FL (ref 80–99)
NRBC # BLD: 0 K/UL (ref 0–0.01)
NRBC BLD-RTO: 0 PER 100 WBC
P-R INTERVAL, ECG05: 180 MS
PLATELET # BLD AUTO: 191 K/UL (ref 150–400)
PMV BLD AUTO: 9.3 FL (ref 8.9–12.9)
POTASSIUM SERPL-SCNC: 4.5 MMOL/L (ref 3.5–5.1)
Q-T INTERVAL, ECG07: 362 MS
QRS DURATION, ECG06: 98 MS
QTC CALCULATION (BEZET), ECG08: 489 MS
RBC # BLD AUTO: 3.33 M/UL (ref 4.1–5.7)
SERVICE CMNT-IMP: ABNORMAL
SODIUM SERPL-SCNC: 140 MMOL/L (ref 136–145)
SPECIMEN EXP DATE BLD: NORMAL
STATUS OF UNIT,%ST: NORMAL
STATUS OF UNIT,%ST: NORMAL
UNIT DIVISION, %UDIV: 0
UNIT DIVISION, %UDIV: 0
VENTRICULAR RATE, ECG03: 110 BPM
WBC # BLD AUTO: 9.6 K/UL (ref 4.1–11.1)

## 2022-12-20 PROCEDURE — 83735 ASSAY OF MAGNESIUM: CPT

## 2022-12-20 PROCEDURE — 74011250637 HC RX REV CODE- 250/637: Performed by: STUDENT IN AN ORGANIZED HEALTH CARE EDUCATION/TRAINING PROGRAM

## 2022-12-20 PROCEDURE — 65270000046 HC RM TELEMETRY

## 2022-12-20 PROCEDURE — 85027 COMPLETE CBC AUTOMATED: CPT

## 2022-12-20 PROCEDURE — 74011000258 HC RX REV CODE- 258: Performed by: STUDENT IN AN ORGANIZED HEALTH CARE EDUCATION/TRAINING PROGRAM

## 2022-12-20 PROCEDURE — C9113 INJ PANTOPRAZOLE SODIUM, VIA: HCPCS | Performed by: NURSE PRACTITIONER

## 2022-12-20 PROCEDURE — 74011250636 HC RX REV CODE- 250/636: Performed by: NURSE PRACTITIONER

## 2022-12-20 PROCEDURE — 74011636637 HC RX REV CODE- 636/637: Performed by: INTERNAL MEDICINE

## 2022-12-20 PROCEDURE — 36415 COLL VENOUS BLD VENIPUNCTURE: CPT

## 2022-12-20 PROCEDURE — 82962 GLUCOSE BLOOD TEST: CPT

## 2022-12-20 PROCEDURE — 74011000250 HC RX REV CODE- 250: Performed by: NURSE PRACTITIONER

## 2022-12-20 PROCEDURE — 74011250636 HC RX REV CODE- 250/636: Performed by: INTERNAL MEDICINE

## 2022-12-20 PROCEDURE — 74011000250 HC RX REV CODE- 250: Performed by: STUDENT IN AN ORGANIZED HEALTH CARE EDUCATION/TRAINING PROGRAM

## 2022-12-20 PROCEDURE — 80048 BASIC METABOLIC PNL TOTAL CA: CPT

## 2022-12-20 PROCEDURE — 74011250636 HC RX REV CODE- 250/636: Performed by: STUDENT IN AN ORGANIZED HEALTH CARE EDUCATION/TRAINING PROGRAM

## 2022-12-20 PROCEDURE — 74011250637 HC RX REV CODE- 250/637: Performed by: NURSE PRACTITIONER

## 2022-12-20 RX ORDER — METOPROLOL SUCCINATE 25 MG/1
25 TABLET, EXTENDED RELEASE ORAL DAILY
Status: DISCONTINUED | OUTPATIENT
Start: 2022-12-20 | End: 2022-12-23 | Stop reason: HOSPADM

## 2022-12-20 RX ORDER — INSULIN GLARGINE 100 [IU]/ML
28 INJECTION, SOLUTION SUBCUTANEOUS DAILY
Status: DISCONTINUED | OUTPATIENT
Start: 2022-12-21 | End: 2022-12-23 | Stop reason: HOSPADM

## 2022-12-20 RX ADMIN — INSULIN GLARGINE 18 UNITS: 100 INJECTION, SOLUTION SUBCUTANEOUS at 09:04

## 2022-12-20 RX ADMIN — PIPERACILLIN AND TAZOBACTAM 3.38 G: 3; .375 INJECTION, POWDER, FOR SOLUTION INTRAVENOUS at 05:29

## 2022-12-20 RX ADMIN — SODIUM CHLORIDE, PRESERVATIVE FREE 10 ML: 5 INJECTION INTRAVENOUS at 05:45

## 2022-12-20 RX ADMIN — SODIUM CHLORIDE, PRESERVATIVE FREE 10 ML: 5 INJECTION INTRAVENOUS at 17:22

## 2022-12-20 RX ADMIN — PIPERACILLIN AND TAZOBACTAM 3.38 G: 3; .375 INJECTION, POWDER, FOR SOLUTION INTRAVENOUS at 20:12

## 2022-12-20 RX ADMIN — ATORVASTATIN CALCIUM 10 MG: 10 TABLET, FILM COATED ORAL at 09:04

## 2022-12-20 RX ADMIN — SODIUM CHLORIDE, PRESERVATIVE FREE 10 ML: 5 INJECTION INTRAVENOUS at 21:22

## 2022-12-20 RX ADMIN — Medication 7 UNITS: at 09:04

## 2022-12-20 RX ADMIN — ASPIRIN 81 MG: 81 TABLET, CHEWABLE ORAL at 09:04

## 2022-12-20 RX ADMIN — METOPROLOL SUCCINATE 25 MG: 25 TABLET, EXTENDED RELEASE ORAL at 10:35

## 2022-12-20 RX ADMIN — Medication 14 UNITS: at 12:22

## 2022-12-20 RX ADMIN — Medication 2 UNITS: at 17:22

## 2022-12-20 RX ADMIN — SODIUM CHLORIDE 75 ML/HR: 9 INJECTION, SOLUTION INTRAVENOUS at 23:14

## 2022-12-20 RX ADMIN — Medication 2 UNITS: at 21:22

## 2022-12-20 RX ADMIN — PIPERACILLIN AND TAZOBACTAM 3.38 G: 3; .375 INJECTION, POWDER, FOR SOLUTION INTRAVENOUS at 12:22

## 2022-12-20 RX ADMIN — Medication 10 UNITS: at 21:22

## 2022-12-20 RX ADMIN — SODIUM CHLORIDE, PRESERVATIVE FREE 40 MG: 5 INJECTION INTRAVENOUS at 09:04

## 2022-12-20 NOTE — PROGRESS NOTES
Problem: Pressure Injury - Risk of  Goal: *Prevention of pressure injury  Description: Document Ottoniel Scale and appropriate interventions in the flowsheet. Outcome: Progressing Towards Goal  Note: Pressure Injury Interventions:       Moisture Interventions: Absorbent underpads    Activity Interventions: Increase time out of bed, Pressure redistribution bed/mattress(bed type), PT/OT evaluation    Mobility Interventions: HOB 30 degrees or less, Pressure redistribution bed/mattress (bed type), PT/OT evaluation    Nutrition Interventions: Document food/fluid/supplement intake    Friction and Shear Interventions: Apply protective barrier, creams and emollients, HOB 30 degrees or less, Lift sheet, Minimize layers                Problem: Falls - Risk of  Goal: *Absence of Falls  Description: Document Nereida Fall Risk and appropriate interventions in the flowsheet.   Outcome: Progressing Towards Goal  Note: Fall Risk Interventions:  Mobility Interventions: Bed/chair exit alarm, Communicate number of staff needed for ambulation/transfer, Patient to call before getting OOB         Medication Interventions: Bed/chair exit alarm, Evaluate medications/consider consulting pharmacy, Patient to call before getting OOB    Elimination Interventions: Bed/chair exit alarm, Call light in reach, Patient to call for help with toileting needs, Stay With Me (per policy)

## 2022-12-20 NOTE — PROGRESS NOTES
Patient: Ana Richardson MRN: [id number]  SSN: [id number]    YOB: 1945  Age: 65 y.o. Sex: male        ADMITTED: 2022 to [facility] by Saúl Rodrigues DO for Sepsis [facility]) [A41.9]  POD# * No surgery found *     Ana Richardson is a 65 y.o. male with pmh significant for prostate cancer, radiation cystitis  with gross hematuria. S/P CYSTOSCOPY;PROTOUCH LASER OF BLADDER WALL AND PROSTATE; SUPRA PUBIC CATHETER CHANGE 22. Patient to ED with gross hematuria and elevated troponin level. Urology has been consulted for evaluation of gross hematuria. Patient was scheduled for OP FU today. SPT in place. Drainage bag with dark brown colored UA, tubing with britni/pink UA. Manually irrigated with 300cc sterile water. Minimal clots and debris noted. Cleared to light pink. NAD noted, abd soft. Nondistended. Denies chest pain or abd pain. Stated abraham had not been irrigated since urology assessed yesterday. On daily ASA     Afebrile, VSS  WBC: wnl  Hgb: 9.1 from 5.6, transfused x2  Cr: 1.19  Troponin: 39,953, cards following  UA: >100 RBCs, 10-20 WBCs, 1+ bacteria  Ucx: pseudomonas, GNR  +zosyn    22  CTA ABD/PELV  KIDNEYS: No mass, calculus, or hydronephrosis. URINARY BLADDER: No mass or calculus. Superpubic catheter. Vitals: Temp (24hrs), Av.1 °F (37.3 °C), Min:98.8 °F (37.1 °C), Max:99.7 °F (37.6 °C)    Blood pressure 123/62, pulse 78, temperature 98.8 °F (37.1 °C), resp. rate 11, height 6' 2\" (1.88 m), weight 81.5 kg (179 lb 10.8 oz), SpO2 98 %. Intake and Output:   1901 -  0700  In: 2332.3 [I.V.:1636.4]  Out: 9395 [Urine:3825]  No intake/output data recorded. JOEY Output lats 24 hrs: No data found. JOEY Output last 8 hrs: No data found. BM over last 24 hrs: No data found.     Labs:  CBC:   Lab Results   Component Value Date/Time    WBC 9.6 2022 05:39 AM    HCT 27.8 (L) 2022 05:39 AM    PLATELET 435  05:39 AM     BMP:   Lab Results   Component Value Date/Time    Glucose 342 (H) 12/20/2022 05:39 AM    Sodium 140 12/20/2022 05:39 AM    Potassium 4.5 12/20/2022 05:39 AM    Chloride 111 (H) 12/20/2022 05:39 AM    CO2 22 12/20/2022 05:39 AM    BUN 26 (H) 12/20/2022 05:39 AM    Creatinine 1.19 12/20/2022 05:39 AM    Calcium 8.4 (L) 12/20/2022 05:39 AM       Assessment/Plan:     Gross hematuria  Ucx: pseudomonas, GNR  - multifactorial, radiation cystitis, UTI. - continue to manually irrigate SPT q6h  - Monitor for worsening hematuria,  nursing staff to manually irrigate q 6 hours x 24 hours and prn for s/s clot retention and decreased UOP.   - Monitor H&H, transfuse per protocol  - Culture driven ABX per primary team  - Outpatient FU with [facility] scheduled 01/03/22 at 8:30 with Dr. Dorcas Mar at the [facility] location     Supervising Dr. Samuel VICTORIA By: Janie Zelaya, FRANKY - December 20, 2022

## 2022-12-20 NOTE — DIABETES MGMT
[facility]    CLINICAL NURSE SPECIALIST CONSULT     Initial Presentation   Yoanna Williamson is a 65 y.o. male admitted 12/18/2022 after experiencing hematuria. The patient has a hx of prostate cancer . He is S/P CYSTOSCOPY;PROTOUCH LASER OF BLADDER WALL AND PROSTATE; SUPRA PUBIC CATHETER CHANGE 11/30/22. The patient has had frequent hematuria and is followed by urology. The source of the bleeding is unknown. LAB:Glucose 627. Creatine 24. GFR 53. A1c 7.4%. Troponin 239 (12/18/2022) 75,974 ( 12/19/2022). Anion gap 17. Lactic acid 2.6 (12/19/2022)  CXR:  CT/MRI:Clinical history: Dyspnea  INDICATION:   Dyspnea  COMPARISON: 5/2/2022        CONTRAST: 100 ml Isovue 370  TECHNIQUE: CT of the chest with  IV contrast , Isovue-370 is performed. Axial  images from the thoracic inlet to the level of the upper abdomen are obtained. Manual post-processing of the images and coronal reformatting is also performed. CT dose reduction was achieved through use of a standardized protocol tailored  for this examination and automatic exposure control for dose modulation. Multiplanar reformatted imaging was performed. Sagittal and coronal reformatting. 3-D Postprocessing of imaging was performed. 3-D MIP reconstructed images were obtained in the coronal plane. FINDINGS:   There is no pulmonary embolism. There is no aortic aneurysm or dissection. There is centrilobular emphysematous change which is apical predominant. There  is mild aortic atherosclerotic change. There is minimal coronary vascular  calcification at the LAD. There is mild cardiomegaly. There is hepatic  steatosis. There is no pleural or pericardial effusion. There is no mediastinal,  axillary or hilar lymphadenopathy. The aorta is normal in course and caliber. The proximal pulmonary arteries are without evidence of filling defects. No  lytic or blastic lesions are identified.  The remainder of the upper abdomen  visualized is unremarkable. Schmorl's along superior endplate of L2. IMPRESSION  There is no pulmonary embolism. There is no aortic aneurysm or dissection. Mild centrilobular emphysema. No acute intrathoracic process is identified. Incidental findings are as  described above.         EKG, 12 LEAD, INITIAL  Order: [id number]  Status: Final result    Visible to patient: No (inaccessible in MyChart)    Next appt: 03/28/2023 at 03:30 PM in Endocrinology Ligia Felix MD)    0 Result Notes  Component Ref Range & Units 2 d ago 2 d ago   Ventricular Rate   101    Atrial Rate   101    P-R Interval ms 184  182    QRS Duration ms 116  108    Q-T Interval ms 358  374    QTC Calculation (Bezet) ms 477  484    Calculated P Axis degrees 72  67    Calculated R Axis degrees -69  -66    Calculated T Axis degrees 76  75    Diagnosis  Sinus tachycardia   Left anterior fascicular block   Left ventricular hypertrophy with QRS widening   Nonspecific ST abnormality   When compared with ECG of 18-DEC-2022 19:08,   MANUAL COMPARISON REQUIRED, DATA IS UNCONFIRMED   Confirmed by Alena Hanson P.VNathaniel ([id number]) on 12/19/2022 1:19:55 PM  Sinus tachycardia   Incomplete right bundle branch block   Left anterior fascicular block   Minimal voltage criteria for LVH, may be normal variant   Cannot rule out Anterior infarct , age undetermined   When compared with ECG of 03-AUG-2022 14:38,   Incomplete right bundle branch block is now present   Confirmed by Alena Hanson P.V. ([id number]) on 12/19/2022 1:18:15 PM          HX:   Past Medical History:   Diagnosis Date    Arthritis     in shoulders    Chronic pain     CLUSTER HEADACHES    Diabetes ([address]     Foot ulcer ([address]     Hematuria 08/2022    Hepatitis     while in the [facility] in [address] [place] [address] [address]    Hypertension     NO MEDS 8/3/22    Leukopenia     benign due to being African American    Murmur     Other and unspecified hyperlipidemia     Prostate CA ([address] 05/2022    Prostate cancer ([address] Fall of 2015 hormone treatment and external beam radiation    Sickle cell trait ([facility] 07/03/2012    Type I (juvenile type) diabetes mellitus without mention of complication, uncontrolled since 1979    Unspecified sleep apnea     has CPAP-BUT DOESN'T USE        INITIAL DX:   Sepsis ([facility] [A41.9]     Current Treatment     TX: ASA. Insulin. BP management. Abx. Consulted by Provider for advanced diabetes nursing assessment and care for:   [] Transitioning off [facility] [place] 2]   [x] Inpatient management strategy  [x] Home management assessment  [] Survival skill education    Hospital Course   Clinical progress has been complicated by hematuria and elevated troponin level. .     Diabetes History   The patient reports a 30 year hx of diabetes. He reportedly takes 18 units Lantus nightly and Humalog on a sliding scale ( 1 unit for every 9 gm carbs). The patient follows with endocrinologist Adam Rubio M.D. He resides a lone and was independent with ADL's prior to admission. The patient has a fiance and she is supportive. The patient reports problem with memory that has been progressive and struggled to find words at time. The patient does reports that his stress is high due to ongoing health issues.      Diabetes-related Medical History  Acute complications  DKA  Neurological complications  Peripheral neuropathy  Macrovascular disease  Myocardial infarction, Peripheral vascular disease, and Foot wounds  Other associated conditions     Depression    Diabetes Medication History  Key Antihyperglycemic Medications               insulin glargine (Lantus Solostar U-100 Insulin) 100 unit/mL (3 mL) inpn (Taking) INJECT 18 UNITS UNDER THE SKIN AT BEDTIME--Dose change 09/30/22--updated med list--did not send prescription to the pharmacy    insulin aspart U-100 (NovoLOG Flexpen U-100 Insulin) 100 unit/mL (3 mL) inpn (Taking) Inject 1:8 for carbs for breakfast and 1:9 for lunch and dinner before 9pm and 1:10 after 9pm and 1:50 > 150 for correction during the day and 1:50 > 180 after 9pm.  MAX 35/D             Diabetes self-management practices:   Eating pattern   [x] Eating a carbohydrate-controlled mealplan  [x] Breakfast Banana and blueberries  [x] Lunch  [place] and fries  [x] Dinner  Similar to  lunch  [x] Snacks              Very seldom  [x] Beverages Unsweetened tea  Monitoring pattern   [x] Testing BGs sufficiently to inform self-management adjustments    Taking medications pattern  [x] Consistent administration  [x] Affordable  Social determinants of health impacting diabetes self-management practices   Struggling with anxiety and/or depression ( high stress)  Overall evaluation:    [x] Achieving A1c target with drug therapy & self-care practices    Subjective   It seems that I've had trouble every since my surgery in 2021     Objective   Physical exam  General Normal weight male; ill-appearing. Conversant and cooperative  Neuro  Alert, oriented   Vital Signs Visit Vitals  /61   Pulse 79   Temp 98.1 °F (36.7 °C)   Resp 20   Ht 6' 2\" (1.88 m)   Wt 81.5 kg (179 lb 10.8 oz)   SpO2 97%   BMI 23.07 kg/m²     Skin  Warm and dry. Acanthosis noted along neckline. No lipohypertrophy or lipoatrophy noted at injection sites   Heart   Regular rate and rhythm. No murmurs, rubs or gallops  Lungs  Clear to auscultation without rales or rhonchi  Extremities No foot wounds    Diabetic foot exam:    Left Foot     2nd toe amputated   Right Foot   All toes amputated  Laboratory  Recent Labs     12/20/22  0539 12/19/22  0822 12/19/22  0318 12/19/22  0201 12/18/22  2107 12/18/22  1955 12/17/22  2140   * 229* 439*   < > 606* 627* 114*   AGAP 7 12 15   < > 17* 17* 5   WBC 9.6 8.0 6.6  --   --  4.6 3.9*   CREA 1.19 1.48* 1.41*   < > 1.36* 1.37* 1.04   AST  --   --   --   --  30 27 23   ALT  --   --   --   --  22 26 25    < > = values in this interval not displayed.        Factors impacting BG management  Factor Dose Comments   Nutrition:  Standard meals     60 grams/meal      Drugs:    Blood transfusion(s)       12/19/22 RBC's        Blood administered after A1c was drawn     Pain PRN acetaminophen suppository    Infection Zosyn Q 8 hours IV UTI   Other:   Kidney function  Liver function     Normalized  Normal      Blood glucose pattern      Significant diabetes-related events over the past 24-72 hours  Transitioned off glucostabilizer yesterday with 18 units Lantus yesterday. Assessment and Plan   Nursing Diagnosis Risk for unstable blood glucose pattern   Nursing Intervention Domain 7432 Decision-making Support   Nursing Interventions Examined current inpatient diabetes/blood glucose control   Explored factors facilitating and impeding inpatient management  Explored corrective strategies with patient and responsible inpatient provider   Informed patient of rational for insulin strategy while hospitalized       Evaluation   This 65year old male patient with Type 2 diabetes achieved BG control prior to admission as evidenced by an A1c of 7.4%. He reportedly takes 18 units Lantus nightly and Humalog on a sliding scale ( 1 unit for every 9 gm carbs). BG's remain elevated today on 18 units Lantus. I recommend increasing insulin dosing for now. Consider ( 0.35xkg) 28 units Lantus daily to start tomorrow. Consider giving a nightly dose of NPH 10 units as one time dose. The patient may benefit from reinforcement on care of urostomy site. Recommendations   Increase Lantus dose to 28 units Lantus daily. Consider a one-time dose of 10 units NPH this evening      Billing Code(s)      [x] 35530 IP subsequent hospital care - 35 minutes       Before making these care recommendations, I personally reviewed the hospitalization record, including notes, laboratory & diagnostic data and current medications, and examined the patient at the bedside (circumstances permitting) before making care recommendations.  More than fifty (50) percent of the time was spent in patient counseling and/or care coordination.   Total minutes: 35 minutes    Laila Hawk CNS  Diabetes Clinical Nurse Specialist  [facility]  Access via [employer]

## 2022-12-20 NOTE — PROGRESS NOTES
Hospitalist Progress Note    NAME: Abiola Llanes   :  1945   MRN:  [id number]            Subjective:     Chief Complaint / Reason for Physician Visit  Patient seen and treated at bedside, overnight events reviewed, patient currently still has complaints of generalized weakness, no new complaints. Discussed with RN events overnight. Review of Systems:  Symptom Y/N Comments  Symptom Y/N Comments   Fever/Chills N   Chest Pain N    Poor Appetite Y   Edema N    Cough N   Abdominal Pain N    Sputum N   Joint Pain N    SOB/DEL CASTILLO N   Pruritis/Rash N    Nausea/vomit N   Tolerating PT/OT NA    Diarrhea N   Tolerating Diet Y    Constipation N   Other       Could NOT obtain due to:      Objective:     VITALS:   Last 24hrs VS reviewed since prior progress note. Most recent are:  Patient Vitals for the past 24 hrs:   Temp Pulse Resp BP SpO2   22 1124 98.1 °F (36.7 °C) 79 20 122/61 97 %   22 1035 -- 88 23 123/61 98 %   22 0803 98.8 °F (37.1 °C) 78 11 123/62 98 %   22 0245 99 °F (37.2 °C) 92 19 (!) 142/72 99 %   22 0013 99.3 °F (37.4 °C) 85 16 136/77 97 %   22 2112 99.7 °F (37.6 °C) 84 20 123/64 97 %   22 1930 -- 87 15 (!) 118/59 97 %   22 1915 -- 87 15 (!) 121/59 96 %   22 1900 -- 87 15 (!) 102/57 95 %   22 1845 -- 87 16 (!) 102/53 95 %   22 1830 -- 85 16 (!) 99/48 95 %   22 1815 -- 89 13 (!) 118/51 96 %   22 1800 -- 90 18 (!) 122/58 96 %   22 1730 -- 90 23 124/75 96 %   22 1715 -- 88 10 116/62 95 %   22 1700 -- 84 (!) 0 (!) 99/55 96 %   22 1630 -- 87 (!) 0 (!) 110/58 96 %   22 1600 98.8 °F (37.1 °C) 89 18 (!) 100/50 95 %   22 1500 -- 88 18 130/63 95 %         Intake/Output Summary (Last 24 hours) at 2022 1449  Last data filed at 2022 1238  Gross per 24 hour   Intake 10 ml   Output 1250 ml   Net -1240 ml          PHYSICAL EXAM:  General: Patient appears comfortable   EENT:  EOMI.  Anicteric sclerae. MMM  Resp:  CTA bilaterally, no wheezing or rales. No accessory muscle use  CV:  Regular  rhythm, s1/s2 no m/r/g  No edema  GI:  Soft, Non distended, Non tender. +Bowel sounds  Neurologic:  Alert and oriented X 3, normal speech,   Psych:   Good insight. Not anxious nor agitated  Skin:  No rashes. No jaundice    Procedures: see electronic medical records for all procedures/Xrays and details which were not copied into this note but were reviewed prior to creation of Plan. LABS:  I reviewed today's most current labs and imaging studies. Pertinent labs include:  Recent Labs     12/20/22  0539 12/19/22  0822 12/19/22  0318   WBC 9.6 8.0 6.6   HGB 9.1* 10.1* 5.6*   HCT 27.8* 30.6* 17.3*    218 184       Recent Labs     12/20/22  0539 12/19/22  0822 12/19/22  0318 12/19/22  0201 12/18/22  2107 12/18/22  1955 12/17/22  2140    143 142 134* 128* 128* 139   K 4.5 4.2 4.3 4.8 5.1 5.2* 4.3   * 113* 113* 106 98 96* 107   CO2 22 18* 14* 7* 13* 15* 27   * 229* 439* 392* 606* 627* 114*   BUN 26* 27* 27* 20 24* 24* 10   CREA 1.19 1.48* 1.41* 0.79 1.36* 1.37* 1.04   CA 8.4* 9.0 7.4* 7.4* 8.7 9.1 8.8   MG 2.4 3.1*  --  1.2*  --   --   --    PHOS  --   --   --  3.1  --   --   --    ALB  --   --   --   --  3.1* 3.5 3.4*   TBILI  --   --   --   --  0.7 0.8 0.2   ALT  --   --   --   --  22 26 25         Signed: Jake Conti MD    CTA abdomen/pelvis:IMPRESSION  No acute process identified    CTA chest:IMPRESSION  There is no pulmonary embolism. There is no aortic aneurysm or dissection. Mild centrilobular emphysema. No acute intrathoracic process is identified. Incidental findings are as  described above.         Reviewed most current lab test results and cultures  YES  Reviewed most current radiology test results   YES  Review and summation of old records today    NO  Reviewed patient's current orders and MAR    YES  PMH/SH reviewed - no change compared to H&P      Assessment / Plan:  Sepsis secondary to complicated urinary tract infection-of note patient presented meeting sepsis criteria secondary to complicated urinary tract infection, preliminary urine cultures consistent with Pseudomonas, patient remains hemodynamically stable at this time  Urine senitivities consistent with pan sensitive pseudomonas  Discontinue vancomycin  Continue Zosyn for antibiotic coverage  Maintenance IV fluids  Continue to monitor patient's clinical status    Acute blood loss anemia/hematuria-patient found to have significant anemia, 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, status posttransfusion of packed RBC with appropriate response, patient has been evaluated by urology, hematuria has improved  Maintain active type and screen  Continue to trend hemoglobin and hematocrit  Urology consult appreciated, continue to follow recommendations    Elevated serum troponin/type II non-ST elevation MI-of note patient found to have significant troponin elevation, thought to be secondary to demand in the setting of acute blood loss anemia as well as sepsis, patient rodo hemodynamically stable  Continuous telemetry monitoring  Continue aspirin once daily  Continue Lipitor once daily  Follow-up transthoracic echo  Cardiology consult appreciated, continue to follow recommendations    Diabetic ketoacidosis-currently resolved    Hyperglycemia type 2 diabetes-currently under much better control  Increase lantus to 28 units daily  Continue insulin sliding scale    Prophylaxis-SCDs  FEN-cardiac/diabetic diet, maintenance IV fluids, replete potassium and magnesium  Full code, will clarify about surrogate decision-maker  Disposition-pending PT/OT, Urology/Cardiology clearance - pt will need ischemic work up, 48 hours      18.5 - 24.9 Normal weight / Body mass index is 23.07 kg/m².     Code status: Full  Prophylaxis: SCD's  Recommended Disposition:  PT, OT, RN ________________________________________________________________________  Care Plan discussed with:    Comments   Patient x    Family  x    RN x    Care Manager x    Consultant  x                     x Multidiciplinary team rounds were held today with , nursing, pharmacist and clinical coordinator. Patient's plan of care was discussed; medications were reviewed and discharge planning was addressed.      ________________________________________________________________________  Total NON critical care TIME:  35   Minutes      Comments   >50% of visit spent in counseling and coordination of care x    ________________________________________________________________________  [facility] MD

## 2022-12-20 NOTE — PROGRESS NOTES
Transition of Care Plan  RUR: 19%  DISPOSITION: The disposition plan is home with family assistance  F/U with PCP/Specialist    Transport: family         Reason for Admission:   sepsis                     RUR Score:     19%             PCP: First and Last name:   Ruben Vinson MD     Name of Practice:    Are you a current patient: Yes/No:    Approximate date of last visit:    Can you participate in a virtual visit if needed:     Do you (patient/family) have any concerns for transition/discharge?     none              Plan for utilizing home health:   not recommended     Current Advanced Directive/Advance Care Plan:  Full Code      Healthcare Decision Maker:   Click here to complete [address] including selection of the Healthcare Decision Maker Relationship (ie \"Primary\")              Transition of Care Plan:          CM spoke with patient who stated he lives alone and is independent in caring for himself. Patient has a walker and uses [address]. CM attempted to speak with patient's daughter and a voice message was left. Care Management Interventions  PCP Verified by CM: Yes  Palliative Care Criteria Met (RRAT>21 & CHF Dx)?: No  Mode of Transport at Discharge:  Other (see comment) (family)  Transition of Care Consult (CM Consult): Discharge Planning  MyChart Signup: No  Discharge Durable Medical Equipment: No  Health Maintenance Reviewed: Yes  Physical Therapy Consult: No  Occupational Therapy Consult: No  Speech Therapy Consult: No  Support Systems: Child(janie), Other Family Member(s)  Confirm Follow Up Transport: Family  [employer] Information Provided?: No  Discharge Location  Patient Expects to be Discharged to[de-identified] Home with family assistance    2:47 PM  RICKY Briseno

## 2022-12-20 NOTE — PROGRESS NOTES
0700: End of Shift Note    Bedside shift change report given to Yessica Arellano RN (oncoming nurse) by Deepthi Thomas (offgoing nurse). Report included the following information SBAR, Kardex, Intake/Output, Recent Results, and Cardiac Rhythm SR    Shift worked:  5145-2845     Shift summary and any significant changes:     Received from ED, no events overnight     Concerns for physician to address:       Zone phone for oncoming shift:          Activity:  Activity Level: Bed Rest  Number times ambulated in hallways past shift: 0  Number of times OOB to chair past shift: 0    Cardiac:   Cardiac Monitoring: Yes      Cardiac Rhythm: Sinus Rhythm    Access:  Current line(s): PIV     Genitourinary:   Urinary status: voiding suprapubic cath    Respiratory:   O2 Device: None (Room air)  Chronic home O2 use?: NO  Incentive spirometer at bedside: NO       GI:  Last Bowel Movement Date: 12/18/22  Current diet:  ADULT DIET Easy to Chew; 4 carb choices (60 gm/meal)  Passing flatus: YES  Tolerating current diet: YES       Pain Management:   Patient states pain is manageable on current regimen: YES    Skin:  Ottoniel Score: 17  Interventions: increase time out of bed and internal/external urinary devices    Patient Safety:  Fall Score:  Total Score: 3  Interventions: bed/chair alarm, assistive device (walker, cane, etc), gripper socks, and pt to call before getting OOB  High Fall Risk: Yes    Length of Stay:  Expected LOS: - - -  Actual LOS: [facility]
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
## 2022-12-20 NOTE — PROGRESS NOTES
[facility]  [address] [place] [address], [address] [facility]  [phone]  [address]. [place]  CARDIOLOGY PROGRESS NOTE    12/20/2022 8:20 AM    Admit Date: 12/18/2022    Admit Diagnosis:   Sepsis ([address] [A41.9]    Subjective:     Luis Enrique Anaya denies chest pain or dyspnea. Visit Vitals  /62   Pulse 78   Temp 98.8 °F (37.1 °C)   Resp 11   Ht 6' 2\" (1.88 m)   Wt 81.5 kg (179 lb 10.8 oz)   SpO2 98%   BMI 23.07 kg/m²       Current Facility-Administered Medications   Medication Dose Route Frequency    0.9% sodium chloride infusion 250 mL  250 mL IntraVENous PRN    pantoprazole (PROTONIX) 40 mg in 0.9% sodium chloride 10 mL injection  40 mg IntraVENous DAILY    insulin glargine (LANTUS) injection 18 Units  18 Units SubCUTAneous DAILY    dextrose 10 % infusion 0-250 mL  0-250 mL IntraVENous PRN    0.9% sodium chloride infusion  75 mL/hr IntraVENous CONTINUOUS    insulin lispro (HUMALOG) injection   SubCUTAneous AC&HS    piperacillin-tazobactam (ZOSYN) 3.375 g in 0.9% sodium chloride (MBP/ADV) 100 mL MBP  3.375 g IntraVENous Q8H    aspirin chewable tablet 81 mg  81 mg Oral DAILY    atorvastatin (LIPITOR) tablet 10 mg  10 mg Oral DAILY    glucose chewable tablet 16 g  4 Tablet Oral PRN    glucagon (GLUCAGEN) injection 1 mg  1 mg IntraMUSCular PRN    sodium chloride (NS) flush 5-40 mL  5-40 mL IntraVENous Q8H    sodium chloride (NS) flush 5-40 mL  5-40 mL IntraVENous PRN    acetaminophen (TYLENOL) tablet 650 mg  650 mg Oral Q6H PRN    Or    acetaminophen (TYLENOL) suppository 650 mg  650 mg Rectal Q6H PRN    polyethylene glycol (MIRALAX) packet 17 g  17 g Oral DAILY PRN    ondansetron (ZOFRAN ODT) tablet 4 mg  4 mg Oral Q8H PRN    Or    ondansetron (ZOFRAN) injection 4 mg  4 mg IntraVENous Q6H PRN         Objective:      Physical Exam     Physical Exam  Vitals reviewed. Constitutional:       Appearance: Normal appearance. HENT:      Head: Normocephalic and atraumatic. Neck:      Vascular: No carotid bruit. Cardiovascular:      Rate and Rhythm: Normal rate and regular rhythm. Pulmonary:      Effort: Pulmonary effort is normal.      Breath sounds: Normal breath sounds. Skin:     General: Skin is warm and dry. Neurological:      Mental Status: He is alert and oriented to person, place, and time. Data Review:   Recent Labs     12/20/22  0539 12/19/22  0822 12/19/22  0318   WBC 9.6 8.0 6.6   HGB 9.1* 10.1* 5.6*   HCT 27.8* 30.6* 17.3*    218 184     Recent Labs     12/20/22  0539 12/19/22  0822 12/19/22  0318 12/19/22  0201 12/18/22 2107 12/18/22 1955 12/17/22  2140    143 142 134* 128* 128* 139   K 4.5 4.2 4.3 4.8 5.1 5.2* 4.3   * 113* 113* 106 98 96* 107   CO2 22 18* 14* 7* 13* 15* 27   * 229* 439* 392* 606* 627* 114*   BUN 26* 27* 27* 20 24* 24* 10   CREA 1.19 1.48* 1.41* 0.79 1.36* 1.37* 1.04   CA 8.4* 9.0 7.4* 7.4* 8.7 9.1 8.8   MG 2.4 3.1*  --  1.2*  --   --   --    PHOS  --   --   --  3.1  --   --   --    ALB  --   --   --   --  3.1* 3.5 3.4*   TBILI  --   --   --   --  0.7 0.8 0.2   ALT  --   --   --   --  22 26 25       No results for input(s): TROIQ, CPK, CKMB in the last 72 hours. Intake/Output Summary (Last 24 hours) at 12/20/2022 0820  Last data filed at 12/19/2022 2156  Gross per 24 hour   Intake 10 ml   Output 1100 ml   Net -1090 ml        Telemetry: Sinus   EKG:  Cxray:    Assessment:     Active Problems:    Sepsis ([address] (12/18/2022)        Plan:     NSTEMI: Likely Type 2 in setting of profound anemia, ECHO pending BP has been stable post transfusion,  will start low dose BB continue statin, ASA. He is chest pain free. [facility]             Patient seen, examined by me personally. Plan discussed as detailed. Agree with note as outlined by  NP with modifications as noted. My independent physical exam reveals : Physical Exam  Vitals and nursing note reviewed. Constitutional:       General: He is not in acute distress. Cardiovascular:      Rate and Rhythm: Normal rate and regular rhythm. Heart sounds: Normal heart sounds. Pulmonary:      Effort: Pulmonary effort is normal.   Musculoskeletal:      Right lower leg: No edema. Left lower leg: No edema. Neurological:      Mental Status: He is alert and oriented to person, place, and time. Psychiatric:         Mood and Affect: Mood normal.        No additional findings noted. Agree with plan as outlined above with modifications as noted. Pain free. Ischemic evaluation when bleeding resolved and can be anticoagulated safely.     Nii Mays MD

## 2022-12-21 ENCOUNTER — APPOINTMENT (OUTPATIENT)
Dept: NON INVASIVE DIAGNOSTICS | Age: 77
DRG: 871 | End: 2022-12-21
Attending: STUDENT IN AN ORGANIZED HEALTH CARE EDUCATION/TRAINING PROGRAM
Payer: MEDICARE

## 2022-12-21 LAB
ANION GAP SERPL CALC-SCNC: 5 MMOL/L (ref 5–15)
BACTERIA SPEC CULT: ABNORMAL
BACTERIA SPEC CULT: ABNORMAL
BUN SERPL-MCNC: 15 MG/DL (ref 6–20)
BUN/CREAT SERPL: 17 (ref 12–20)
CALCIUM SERPL-MCNC: 8.2 MG/DL (ref 8.5–10.1)
CC UR VC: ABNORMAL
CHLORIDE SERPL-SCNC: 112 MMOL/L (ref 97–108)
CO2 SERPL-SCNC: 24 MMOL/L (ref 21–32)
CREAT SERPL-MCNC: 0.9 MG/DL (ref 0.7–1.3)
ECHO AR MAX VEL PISA: 4.2 M/S
ECHO AV AREA PEAK VELOCITY: 0.8 CM2
ECHO AV AREA VTI: 1 CM2
ECHO AV AREA/BSA PEAK VELOCITY: 0.4 CM2/M2
ECHO AV AREA/BSA VTI: 0.5 CM2/M2
ECHO AV MEAN GRADIENT: 15 MMHG
ECHO AV MEAN VELOCITY: 1.8 M/S
ECHO AV PEAK GRADIENT: 29 MMHG
ECHO AV PEAK VELOCITY: 2.7 M/S
ECHO AV REGURGITANT PHT: 341 MILLISECOND
ECHO AV VELOCITY RATIO: 0.33
ECHO AV VTI: 51.5 CM
ECHO LA DIAMETER INDEX: 1.69 CM/M2
ECHO LA DIAMETER: 3.5 CM
ECHO LV EDV A4C: 101 ML
ECHO LV EDV INDEX A4C: 49 ML/M2
ECHO LV EJECTION FRACTION A4C: 10 %
ECHO LV ESV A4C: 91 ML
ECHO LV ESV INDEX A4C: 44 ML/M2
ECHO LV FRACTIONAL SHORTENING: 25 % (ref 28–44)
ECHO LV INTERNAL DIMENSION DIASTOLE INDEX: 2.13 CM/M2
ECHO LV INTERNAL DIMENSION DIASTOLIC: 4.4 CM (ref 4.2–5.9)
ECHO LV INTERNAL DIMENSION SYSTOLIC INDEX: 1.59 CM/M2
ECHO LV INTERNAL DIMENSION SYSTOLIC: 3.3 CM
ECHO LV IVSD: 1.1 CM (ref 0.6–1)
ECHO LV MASS 2D: 168.9 G (ref 88–224)
ECHO LV MASS INDEX 2D: 81.6 G/M2 (ref 49–115)
ECHO LV POSTERIOR WALL DIASTOLIC: 1.1 CM (ref 0.6–1)
ECHO LV RELATIVE WALL THICKNESS RATIO: 0.5
ECHO LVOT AREA: 2.5 CM2
ECHO LVOT AV VTI INDEX: 0.41
ECHO LVOT DIAM: 1.8 CM
ECHO LVOT MEAN GRADIENT: 2 MMHG
ECHO LVOT PEAK GRADIENT: 3 MMHG
ECHO LVOT PEAK VELOCITY: 0.9 M/S
ECHO LVOT STROKE VOLUME INDEX: 25.7 ML/M2
ECHO LVOT SV: 53.2 ML
ECHO LVOT VTI: 20.9 CM
ECHO MV AREA VTI: 1.5 CM2
ECHO MV LVOT VTI INDEX: 1.68
ECHO MV MAX VELOCITY: 1.3 M/S
ECHO MV MEAN GRADIENT: 4 MMHG
ECHO MV MEAN VELOCITY: 0.9 M/S
ECHO MV PEAK GRADIENT: 7 MMHG
ECHO MV REGURGITANT PEAK GRADIENT: 100 MMHG
ECHO MV REGURGITANT PEAK VELOCITY: 5 M/S
ECHO MV VTI: 35.1 CM
ECHO TV REGURGITANT MAX VELOCITY: 3 M/S
ECHO TV REGURGITANT PEAK GRADIENT: 36 MMHG
ERYTHROCYTE [DISTWIDTH] IN BLOOD BY AUTOMATED COUNT: 14.1 % (ref 11.5–14.5)
GLUCOSE BLD STRIP.AUTO-MCNC: 139 MG/DL (ref 65–117)
GLUCOSE BLD STRIP.AUTO-MCNC: 195 MG/DL (ref 65–117)
GLUCOSE BLD STRIP.AUTO-MCNC: 250 MG/DL (ref 65–117)
GLUCOSE BLD STRIP.AUTO-MCNC: 253 MG/DL (ref 65–117)
GLUCOSE SERPL-MCNC: 152 MG/DL (ref 65–100)
HCT VFR BLD AUTO: 25.8 % (ref 36.6–50.3)
HGB BLD-MCNC: 8.6 G/DL (ref 12.1–17)
MCH RBC QN AUTO: 28 PG (ref 26–34)
MCHC RBC AUTO-ENTMCNC: 33.3 G/DL (ref 30–36.5)
MCV RBC AUTO: 84 FL (ref 80–99)
NRBC # BLD: 0 K/UL (ref 0–0.01)
NRBC BLD-RTO: 0 PER 100 WBC
PLATELET # BLD AUTO: 169 K/UL (ref 150–400)
PMV BLD AUTO: 9.4 FL (ref 8.9–12.9)
POTASSIUM SERPL-SCNC: 3.8 MMOL/L (ref 3.5–5.1)
RBC # BLD AUTO: 3.07 M/UL (ref 4.1–5.7)
SERVICE CMNT-IMP: ABNORMAL
SODIUM SERPL-SCNC: 141 MMOL/L (ref 136–145)
WBC # BLD AUTO: 6.9 K/UL (ref 4.1–11.1)

## 2022-12-21 PROCEDURE — 97161 PT EVAL LOW COMPLEX 20 MIN: CPT

## 2022-12-21 PROCEDURE — 74011636637 HC RX REV CODE- 636/637: Performed by: INTERNAL MEDICINE

## 2022-12-21 PROCEDURE — 97116 GAIT TRAINING THERAPY: CPT

## 2022-12-21 PROCEDURE — 36415 COLL VENOUS BLD VENIPUNCTURE: CPT

## 2022-12-21 PROCEDURE — 74011250637 HC RX REV CODE- 250/637: Performed by: STUDENT IN AN ORGANIZED HEALTH CARE EDUCATION/TRAINING PROGRAM

## 2022-12-21 PROCEDURE — 82962 GLUCOSE BLOOD TEST: CPT

## 2022-12-21 PROCEDURE — 97535 SELF CARE MNGMENT TRAINING: CPT

## 2022-12-21 PROCEDURE — 80048 BASIC METABOLIC PNL TOTAL CA: CPT

## 2022-12-21 PROCEDURE — 99231 SBSQ HOSP IP/OBS SF/LOW 25: CPT

## 2022-12-21 PROCEDURE — 74011250636 HC RX REV CODE- 250/636: Performed by: STUDENT IN AN ORGANIZED HEALTH CARE EDUCATION/TRAINING PROGRAM

## 2022-12-21 PROCEDURE — 74011000250 HC RX REV CODE- 250: Performed by: NURSE PRACTITIONER

## 2022-12-21 PROCEDURE — 74011250637 HC RX REV CODE- 250/637: Performed by: NURSE PRACTITIONER

## 2022-12-21 PROCEDURE — C9113 INJ PANTOPRAZOLE SODIUM, VIA: HCPCS | Performed by: NURSE PRACTITIONER

## 2022-12-21 PROCEDURE — 97165 OT EVAL LOW COMPLEX 30 MIN: CPT

## 2022-12-21 PROCEDURE — 74011000250 HC RX REV CODE- 250: Performed by: STUDENT IN AN ORGANIZED HEALTH CARE EDUCATION/TRAINING PROGRAM

## 2022-12-21 PROCEDURE — 74011250636 HC RX REV CODE- 250/636: Performed by: INTERNAL MEDICINE

## 2022-12-21 PROCEDURE — 85027 COMPLETE CBC AUTOMATED: CPT

## 2022-12-21 PROCEDURE — 74011000258 HC RX REV CODE- 258: Performed by: STUDENT IN AN ORGANIZED HEALTH CARE EDUCATION/TRAINING PROGRAM

## 2022-12-21 PROCEDURE — 93308 TTE F-UP OR LMTD: CPT

## 2022-12-21 PROCEDURE — 65270000046 HC RM TELEMETRY

## 2022-12-21 PROCEDURE — 74011250636 HC RX REV CODE- 250/636: Performed by: NURSE PRACTITIONER

## 2022-12-21 RX ORDER — INSULIN LISPRO 100 [IU]/ML
6 INJECTION, SOLUTION INTRAVENOUS; SUBCUTANEOUS
Status: DISCONTINUED | OUTPATIENT
Start: 2022-12-22 | End: 2022-12-23 | Stop reason: HOSPADM

## 2022-12-21 RX ORDER — MAGNESIUM SULFATE 100 %
4 CRYSTALS MISCELLANEOUS AS NEEDED
Status: DISCONTINUED | OUTPATIENT
Start: 2022-12-21 | End: 2022-12-23 | Stop reason: HOSPADM

## 2022-12-21 RX ADMIN — SODIUM CHLORIDE, PRESERVATIVE FREE 40 MG: 5 INJECTION INTRAVENOUS at 08:42

## 2022-12-21 RX ADMIN — METOPROLOL SUCCINATE 25 MG: 25 TABLET, EXTENDED RELEASE ORAL at 08:42

## 2022-12-21 RX ADMIN — PIPERACILLIN AND TAZOBACTAM 3.38 G: 3; .375 INJECTION, POWDER, FOR SOLUTION INTRAVENOUS at 12:18

## 2022-12-21 RX ADMIN — SODIUM CHLORIDE, PRESERVATIVE FREE 10 ML: 5 INJECTION INTRAVENOUS at 17:27

## 2022-12-21 RX ADMIN — Medication 5 UNITS: at 12:18

## 2022-12-21 RX ADMIN — PIPERACILLIN AND TAZOBACTAM 3.38 G: 3; .375 INJECTION, POWDER, FOR SOLUTION INTRAVENOUS at 04:18

## 2022-12-21 RX ADMIN — INSULIN GLARGINE 28 UNITS: 100 INJECTION, SOLUTION SUBCUTANEOUS at 08:42

## 2022-12-21 RX ADMIN — SODIUM CHLORIDE 75 ML/HR: 9 INJECTION, SOLUTION INTRAVENOUS at 12:36

## 2022-12-21 RX ADMIN — SODIUM CHLORIDE, PRESERVATIVE FREE 10 ML: 5 INJECTION INTRAVENOUS at 21:25

## 2022-12-21 RX ADMIN — Medication 2 UNITS: at 08:42

## 2022-12-21 RX ADMIN — ATORVASTATIN CALCIUM 10 MG: 10 TABLET, FILM COATED ORAL at 08:42

## 2022-12-21 RX ADMIN — Medication 5 UNITS: at 17:24

## 2022-12-21 RX ADMIN — ASPIRIN 81 MG: 81 TABLET, CHEWABLE ORAL at 08:42

## 2022-12-21 RX ADMIN — SODIUM CHLORIDE, PRESERVATIVE FREE 10 ML: 5 INJECTION INTRAVENOUS at 06:14

## 2022-12-21 RX ADMIN — PIPERACILLIN AND TAZOBACTAM 3.38 G: 3; .375 INJECTION, POWDER, FOR SOLUTION INTRAVENOUS at 20:21

## 2022-12-21 NOTE — PROGRESS NOTES
0700: End of Shift Note    Bedside shift change report given to Terrence Kelly RN (oncoming nurse) by Tasia Villa (offgoing nurse). Report included the following information SBAR, Kardex, Intake/Output, MAR, Recent Results, and Cardiac Rhythm SR    Shift worked:  5140-1550     Shift summary and any significant changes:     No events overnight     Concerns for physician to address:       Zone phone for oncoming shift:          Activity:  Activity Level: Bed Rest  Number times ambulated in hallways past shift: 0  Number of times OOB to chair past shift: 0    Cardiac:   Cardiac Monitoring: Yes      Cardiac Rhythm: Sinus Rhythm    Access:  Current line(s): PIV     Genitourinary:   Urinary status: suprapubic cath    Respiratory:   O2 Device: None (Room air)  Chronic home O2 use?: NO  Incentive spirometer at bedside: NO       GI:  Last Bowel Movement Date: 12/18/22  Current diet:  ADULT DIET Easy to Chew; 4 carb choices (60 gm/meal)  Passing flatus: YES  Tolerating current diet: YES       Pain Management:   Patient states pain is manageable on current regimen: YES    Skin:  Ottoniel Score: 19  Interventions: increase time out of bed and internal/external urinary devices    Patient Safety:  Fall Score:  Total Score: 3  Interventions: bed/chair alarm, assistive device (walker, cane, etc), gripper socks, and pt to call before getting OOB  High Fall Risk: Yes    Length of Stay:  Expected LOS: 5d 0h  Actual LOS: [facility] 52
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
## 2022-12-21 NOTE — PROGRESS NOTES
[facility]  [address] [facility] [place] [address] [facility]  [phone]  www. [employer]. [employer]      ECHO shows normal EF%, urine is clearing since this Am, will proceed with CHIQUIS NST tomorrow.      Yodit Wang DNP, ANP-BC

## 2022-12-21 NOTE — PROGRESS NOTES
[facility]  [address] [facility] [place] [address] [place] [address]  [phone]  [email or web address]  CARDIOLOGY PROGRESS NOTE    12/21/2022 8:20 AM    Admit Date: 12/18/2022    Admit Diagnosis:   Sepsis ([address] [A41.9]    Subjective:     Riley Bouche denies chest pain or dyspnea. Visit Vitals  /71   Pulse 77   Temp 98.4 °F (36.9 °C)   Resp 15   Ht 6' 2\" (1.88 m)   Wt 81.5 kg (179 lb 10.8 oz)   SpO2 97%   BMI 23.07 kg/m²       Current Facility-Administered Medications   Medication Dose Route Frequency    metoprolol succinate (TOPROL-XL) XL tablet 25 mg  25 mg Oral DAILY    insulin glargine (LANTUS) injection 28 Units  28 Units SubCUTAneous DAILY    0.9% sodium chloride infusion 250 mL  250 mL IntraVENous PRN    pantoprazole (PROTONIX) 40 mg in 0.9% sodium chloride 10 mL injection  40 mg IntraVENous DAILY    dextrose 10 % infusion 0-250 mL  0-250 mL IntraVENous PRN    0.9% sodium chloride infusion  75 mL/hr IntraVENous CONTINUOUS    insulin lispro (HUMALOG) injection   SubCUTAneous AC&HS    piperacillin-tazobactam (ZOSYN) 3.375 g in 0.9% sodium chloride (MBP/ADV) 100 mL MBP  3.375 g IntraVENous Q8H    aspirin chewable tablet 81 mg  81 mg Oral DAILY    atorvastatin (LIPITOR) tablet 10 mg  10 mg Oral DAILY    sodium chloride (NS) flush 5-40 mL  5-40 mL IntraVENous Q8H    sodium chloride (NS) flush 5-40 mL  5-40 mL IntraVENous PRN    acetaminophen (TYLENOL) tablet 650 mg  650 mg Oral Q6H PRN    Or    acetaminophen (TYLENOL) suppository 650 mg  650 mg Rectal Q6H PRN    polyethylene glycol (MIRALAX) packet 17 g  17 g Oral DAILY PRN    ondansetron (ZOFRAN ODT) tablet 4 mg  4 mg Oral Q8H PRN    Or    ondansetron (ZOFRAN) injection 4 mg  4 mg IntraVENous Q6H PRN         Objective:      Physical Exam     Physical Exam  Vitals reviewed. Constitutional:       Appearance: Normal appearance. HENT:      Head: Normocephalic and atraumatic. Neck:      Vascular: No carotid bruit. Cardiovascular:      Rate and Rhythm: Normal rate and regular rhythm. Pulmonary:      Effort: Pulmonary effort is normal.      Breath sounds: Normal breath sounds. Skin:     General: Skin is warm and dry. Neurological:      Mental Status: He is alert and oriented to person, place, and time. Data Review:   Recent Labs     12/21/22  0421 12/20/22  0539 12/19/22  0822   WBC 6.9 9.6 8.0   HGB 8.6* 9.1* 10.1*   HCT 25.8* 27.8* 30.6*    191 218       Recent Labs     12/21/22  0421 12/20/22  0539 12/19/22  0822 12/19/22  0318 12/19/22  0201 12/18/22  2107 12/18/22 1955    140 143   < > 134* 128* 128*   K 3.8 4.5 4.2   < > 4.8 5.1 5.2*   * 111* 113*   < > 106 98 96*   CO2 24 22 18*   < > 7* 13* 15*   * 342* 229*   < > 392* 606* 627*   BUN 15 26* 27*   < > 20 24* 24*   CREA 0.90 1.19 1.48*   < > 0.79 1.36* 1.37*   CA 8.2* 8.4* 9.0   < > 7.4* 8.7 9.1   MG  --  2.4 3.1*  --  1.2*  --   --    PHOS  --   --   --   --  3.1  --   --    ALB  --   --   --   --   --  3.1* 3.5   TBILI  --   --   --   --   --  0.7 0.8   ALT  --   --   --   --   --  22 26    < > = values in this interval not displayed. No results for input(s): TROIQ, CPK, CKMB in the last 72 hours. Intake/Output Summary (Last 24 hours) at 12/21/2022 0843  Last data filed at 12/21/2022 2704  Gross per 24 hour   Intake 300 ml   Output 2000 ml   Net -1700 ml          Telemetry: Sinus   EKG:  Cxray:    Assessment:     Active Problems:    Sepsis ([address] (12/18/2022)      Plan:     NSTEMI: Likely Type 2 in setting of profound anemia, ECHO pending BP has been stable, continues to have gross hematuria . Medically treating with ASA/BB/Statin no need for nitrates at this time. Once hematuria resolved and safe for potential need for DAPT will consider ischemic work up. Hemoglobin down to 8.6 from 9.1  - 10.1 in 2 days. ECHO pending     [facility]     Patient seen, examined by me personally.  Plan discussed as detailed. Agree with note as outlined by NP with modifications as noted.      Sudheer Burrows MD

## 2022-12-21 NOTE — PROGRESS NOTES
Patient: Darrick Bowman MRN: [id number]  SSN: [id number]    YOB: 1945  Age: 65 y.o. Sex: male        ADMITTED: 2022 to Mendel, Heywood Em, MD by Jp Lauren DO for Sepsis [facility]) [A41.9]  POD# * No surgery found *     Darrick Bowman is a 65 y.o. male with pmh significant for prostate cancer, radiation cystitis  with gross hematuria. S/P CYSTOSCOPY;PROTOUCH LASER OF BLADDER WALL AND PROSTATE; SUPRA PUBIC CATHETER CHANGE 22. Patient to ED with gross hematuria and elevated troponin level. Urology has been consulted for evaluation of gross hematuria. SPT in place. Drainage bag with brown colored UA, tubing with britni/pink UA- clearing from yesterday. Manually irrigated with 300cc sterile water. Improvement to clear yellow/very light pink UA. No debris or clots noted. NAD noted, abd soft. Nondistended. Denies chest pain or abd pain. Reported staff now irrigating. On daily ASA    Per cardiology, \"Once hematuria resolved and safe for potential need for DAPT will consider ischemic work up\"     Afebrile, VSS  WBC: wnl  Hgb: 8.6, transfused x2  Cr: 0.90  Troponin: 39,953, cards following  UA: >100 RBCs, 10-20 WBCs, 1+ bacteria  Ucx: pseudomonas, GNR  +zosyn    22  CTA ABD/PELV  KIDNEYS: No mass, calculus, or hydronephrosis. URINARY BLADDER: No mass or calculus. Superpubic catheter. Vitals: Temp (24hrs), Av.3 °F (36.8 °C), Min:98.1 °F (36.7 °C), Max:98.6 °F (37 °C)    Blood pressure 136/71, pulse 77, temperature 98.4 °F (36.9 °C), resp. rate 15, height 6' 2\" (1.88 m), weight 81.5 kg (179 lb 10.8 oz), SpO2 97 %. Intake and Output:   1901 -  0700  In: 310   Out:  [Urine:]  No intake/output data recorded. JOEY Output lats 24 hrs: No data found. JOEY Output last 8 hrs: No data found. BM over last 24 hrs: No data found.     Labs:  CBC:   Lab Results   Component Value Date/Time    WBC 6.9 2022 04:21 AM    HCT 25.8 (L) 2022 04:21 AM    PLATELET 169 12/21/2022 04:21 AM     BMP:   Lab Results   Component Value Date/Time    Glucose 152 (H) 12/21/2022 04:21 AM    Sodium 141 12/21/2022 04:21 AM    Potassium 3.8 12/21/2022 04:21 AM    Chloride 112 (H) 12/21/2022 04:21 AM    CO2 24 12/21/2022 04:21 AM    BUN 15 12/21/2022 04:21 AM    Creatinine 0.90 12/21/2022 04:21 AM    Calcium 8.2 (L) 12/21/2022 04:21 AM       Assessment/Plan:     Gross hematuria  Ucx: pseudomonas, GNR  - multifactorial, radiation cystitis, UTI.   - Monitor for worsening hematuria,  nursing staff to manually irrigate q 6 hours x 24 hours and prn for s/s clot retention and decreased UOP.   - Monitor H&H, transfuse per protocol  - Culture driven ABX per primary team  - Outpatient FU with [facility] scheduled 01/03/22 at 8:30 with Dr. Ang Villar at the [facility] location     Supervising Dr. Aguila VICTORIA By: Bassam Dejesus, FRANKY - December 21, 2022

## 2022-12-21 NOTE — PROGRESS NOTES
Problem: Pressure Injury - Risk of  Goal: *Prevention of pressure injury  Description: Document Ottoniel Scale and appropriate interventions in the flowsheet. Outcome: Progressing Towards Goal  Note: Pressure Injury Interventions:       Moisture Interventions: Internal/External urinary devices, Absorbent underpads    Activity Interventions: Increase time out of bed, Pressure redistribution bed/mattress(bed type), PT/OT evaluation    Mobility Interventions: HOB 30 degrees or less, Pressure redistribution bed/mattress (bed type), PT/OT evaluation    Nutrition Interventions: Document food/fluid/supplement intake    Friction and Shear Interventions: HOB 30 degrees or less, Lift sheet, Minimize layers                Problem: Falls - Risk of  Goal: *Absence of Falls  Description: Document Nereida Fall Risk and appropriate interventions in the flowsheet.   Outcome: Progressing Towards Goal  Note: Fall Risk Interventions:  Mobility Interventions: Bed/chair exit alarm, Communicate number of staff needed for ambulation/transfer, OT consult for ADLs, Patient to call before getting OOB, PT Consult for mobility concerns, PT Consult for assist device competence, Strengthening exercises (ROM-active/passive), Utilize walker, cane, or other assistive device         Medication Interventions: Bed/chair exit alarm, Evaluate medications/consider consulting pharmacy, Patient to call before getting OOB, Teach patient to arise slowly    Elimination Interventions: Bed/chair exit alarm, Call light in reach, Patient to call for help with toileting needs, Toileting schedule/hourly rounds

## 2022-12-21 NOTE — DIABETES MGMT
[id number]    CLINICAL NURSE SPECIALIST CONSULT     Initial Presentation   Mirela Ospina is a 65 y.o. male admitted 12/18/2022 after experiencing hematuria. The patient has a hx of prostate cancer . He is S/P CYSTOSCOPY;PROTOUCH LASER OF BLADDER WALL AND PROSTATE; SUPRA PUBIC CATHETER CHANGE 11/30/22. The patient has had frequent hematuria and is followed by urology. The source of the bleeding is unknown. LAB:Glucose 627. Creatine 24. GFR 53. A1c 7.4%. Troponin 239 (12/18/2022) 87,485 ( 12/19/2022). Anion gap 17. Lactic acid 2.6 (12/19/2022)  CXR:  CT/MRI:Clinical history: Dyspnea  INDICATION:   Dyspnea  COMPARISON: 5/2/2022        CONTRAST: 100 ml Isovue 370  TECHNIQUE: CT of the chest with  IV contrast , Isovue-370 is performed. Axial  images from the thoracic inlet to the level of the upper abdomen are obtained. Manual post-processing of the images and coronal reformatting is also performed. CT dose reduction was achieved through use of a standardized protocol tailored  for this examination and automatic exposure control for dose modulation. Multiplanar reformatted imaging was performed. Sagittal and coronal reformatting. 3-D Postprocessing of imaging was performed. 3-D MIP reconstructed images were obtained in the coronal plane. FINDINGS:   There is no pulmonary embolism. There is no aortic aneurysm or dissection. There is centrilobular emphysematous change which is apical predominant. There  is mild aortic atherosclerotic change. There is minimal coronary vascular  calcification at the LAD. There is mild cardiomegaly. There is hepatic  steatosis. There is no pleural or pericardial effusion. There is no mediastinal,  axillary or hilar lymphadenopathy. The aorta is normal in course and caliber. The proximal pulmonary arteries are without evidence of filling defects. No  lytic or blastic lesions are identified.  The remainder of the upper abdomen  visualized is unremarkable. Schmorl's along superior endplate of L2. IMPRESSION  There is no pulmonary embolism. There is no aortic aneurysm or dissection. Mild centrilobular emphysema. No acute intrathoracic process is identified. Incidental findings are as  described above.         EKG, 12 LEAD, INITIAL  Order: [id number]  Status: Final result    Visible to patient: No (inaccessible in MyChart)    Next appt: 03/28/2023 at 03:30 PM in Endocrinology Toy MD Chris)    0 Result Notes  Component Ref Range & Units 2 d ago 2 d ago   Ventricular Rate   101    Atrial Rate   101    P-R Interval ms 184  182    QRS Duration ms 116  108    Q-T Interval ms 358  374    QTC Calculation (Bezet) ms 477  484    Calculated P Axis degrees 72  67    Calculated R Axis degrees -69  -66    Calculated T Axis degrees 76  75    Diagnosis  Sinus tachycardia   Left anterior fascicular block   Left ventricular hypertrophy with QRS widening   Nonspecific ST abnormality   When compared with ECG of 18-DEC-2022 19:08,   MANUAL COMPARISON REQUIRED, DATA IS UNCONFIRMED   Confirmed by Padmini Coates, P.V. ([id number]) on 12/19/2022 1:19:55 PM  Sinus tachycardia   Incomplete right bundle branch block   Left anterior fascicular block   Minimal voltage criteria for LVH, may be normal variant   Cannot rule out Anterior infarct , age undetermined   When compared with ECG of 03-AUG-2022 14:38,   Incomplete right bundle branch block is now present   Confirmed by Padmini Coates, P.V. ([id number]) on 12/19/2022 1:18:15 PM          HX:   Past Medical History:   Diagnosis Date    Arthritis     in shoulders    Chronic pain     CLUSTER HEADACHES    Diabetes ([address]     Foot ulcer ([address]     Hematuria 08/2022    Hepatitis     while in the [facility] in [address] [place] [address] [address]    Hypertension     NO MEDS 8/3/22    Leukopenia     benign due to being African American    Murmur     Other and unspecified hyperlipidemia     Prostate CA ([address] 05/2022    Prostate cancer ([address] Fall of 2015 hormone treatment and external beam radiation    Sickle cell trait ([facility] 07/03/2012    Type I (juvenile type) diabetes mellitus without mention of complication, uncontrolled since 1979    Unspecified sleep apnea     has CPAP-BUT DOESN'T USE        INITIAL DX:   Sepsis ([facility] [A41.9]     Current Treatment     TX: ASA. Insulin. BP management. Abx. Consulted by Provider for advanced diabetes nursing assessment and care for:   [] Transitioning off Loralie Cumins   [x] Inpatient management strategy  [x] Home management assessment  [] Survival skill education    Hospital Course   Clinical progress has been complicated by hematuria and elevated troponin level. .     Diabetes History   The patient reports a 30 year hx of diabetes. He reportedly takes 18 units Lantus nightly and Humalog on a sliding scale ( 1 unit for every 9 gm carbs). The patient follows with endocrinologist Valeriano Trimble M.D. He resides a lone and was independent with ADL's prior to admission. The patient has a fiance and she is supportive. The patient reports problem with memory that has been progressive and struggled to find words at time. The patient does reports that his stress is high due to ongoing health issues.      Diabetes-related Medical History  Acute complications  DKA  Neurological complications  Peripheral neuropathy  Macrovascular disease  Myocardial infarction, Peripheral vascular disease, and Foot wounds  Other associated conditions     Depression    Diabetes Medication History  Key Antihyperglycemic Medications               insulin glargine (Lantus Solostar U-100 Insulin) 100 unit/mL (3 mL) inpn (Taking) INJECT 18 UNITS UNDER THE SKIN AT BEDTIME--Dose change 09/30/22--updated med list--did not send prescription to the pharmacy    insulin aspart U-100 (NovoLOG Flexpen U-100 Insulin) 100 unit/mL (3 mL) inpn (Taking) Inject 1:8 for carbs for breakfast and 1:9 for lunch and dinner before 9pm and 1:10 after 9pm and 1:50 > 150 for correction during the day and 1:50 > 180 after 9pm.  MAX 35/D             Diabetes self-management practices:   Eating pattern   [x] Eating a carbohydrate-controlled mealplan  [x] Breakfast Banana and blueberries  [x] Lunch  Holloway and fries  [x] Dinner  Similar to  lunch  [x] Snacks              Very seldom  [x] Beverages Unsweetened tea  Monitoring pattern   [x] Testing BGs sufficiently to inform self-management adjustments    Taking medications pattern  [x] Consistent administration  [x] Affordable  Social determinants of health impacting diabetes self-management practices   Struggling with anxiety and/or depression ( high stress)  Overall evaluation:    [x] Achieving A1c target with drug therapy & self-care practices    Subjective   It seems that I've had trouble every since my surgery in 2021     Objective   Physical exam  General Normal weight male; ill-appearing. Conversant and cooperative  Neuro  Alert, oriented   Vital Signs Visit Vitals  BP (!) 150/66   Pulse 75   Temp 98.5 °F (36.9 °C)   Resp 17   Ht 6' 2\" (1.88 m)   Wt 81.2 kg (179 lb)   SpO2 98%   BMI 22.98 kg/m²           Left Foot     2nd toe amputated   Right Foot   All toes amputated  Laboratory  Recent Labs     12/21/22  0421 12/20/22  0539 12/19/22  0822 12/19/22  0201 12/18/22  2107 12/18/22  1955   * 342* 229*   < > 606* 627*   AGAP 5 7 12   < > 17* 17*   WBC 6.9 9.6 8.0   < >  --  4.6   CREA 0.90 1.19 1.48*   < > 1.36* 1.37*   AST  --   --   --   --  30 27   ALT  --   --   --   --  22 26    < > = values in this interval not displayed.          Factors impacting BG management  Factor Dose Comments   Nutrition:  Standard meals     60 grams/meal   Eating well   Drugs:    Blood transfusion(s)       12/19/22 RBC's        Blood administered after A1c was drawn     Pain PRN acetaminophen suppository    Infection Zosyn Q 8 hours IV UTI   Other:   Kidney function  Liver function     Normalized  Normal      Blood glucose pattern      Significant diabetes-related events over the past 24-72 hours  Transitioned off glucostabilizer yesterday with 18 units Lantus yesterday. Assessment and Plan   Nursing Diagnosis Risk for unstable blood glucose pattern   Nursing Intervention Domain 5259 Decision-making Support   Nursing Interventions Examined current inpatient diabetes/blood glucose control   Explored factors facilitating and impeding inpatient management  Explored corrective strategies with patient and responsible inpatient provider   Informed patient of rational for insulin strategy while hospitalized       Evaluation   This 65year old male patient with Type 2 diabetes achieved BG control prior to admission as evidenced by an A1c of 7.4%. He reportedly takes 18 units Lantus nightly and Humalog on a sliding scale ( 1 unit for every 9 gm carbs). BG's remain elevated today on 18 units Lantus. I recommend increasing insulin dosing for now. Consider ( 0.35xkg) 28 units Lantus daily to start tomorrow. Consider giving a nightly dose of NPH 10 units as one time dose. The patient may benefit from reinforcement on care of urostomy site. BG's improving. Now that patient is eating, please consider the addition of scheduled mealtime insulin. The patient uses 1 unit insulin for every 9 grams of carbs at home. Consider using 6 units Humalog per consumed meal while hospitalized. Recommendations   Continue 28 units Lantus daily. Consider 6 units Humalog with each consumed meal      Billing Code(s)      [x] 90606 IP subsequent hospital care - 15 minutes       Before making these care recommendations, I personally reviewed the hospitalization record, including notes, laboratory & diagnostic data and current medications, and examined the patient at the bedside (circumstances permitting) before making care recommendations. More than fifty (50) percent of the time was spent in patient counseling and/or care coordination.   Total minutes: 15 minutes    Curly Recinos, CNS  Diabetes Clinical Nurse Specialist  [facility]  Access via [address] [place] [address]
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
## 2022-12-21 NOTE — PROGRESS NOTES
0730 Bedside shift change report given to [address] (oncoming nurse) by Glenna Olguin (offgoing nurse). Report included the following information SBAR, Kardex, and ED Summary. End of Shift Note    Bedside shift change report given to Barbara ALFORD (oncoming nurse) by Blanca Perez (offgoing nurse). Report included the following information SBAR and Kardex    Shift worked:  [id number]     Shift summary and any significant changes:     Lanrus increased to 28 units   PT/OT consulted. Metoprolol ordered   Suprapubic catheter irrigated twice      Concerns for physician to address:  None      Zone phone for oncoming shift:   [phone]       Activity:  Activity Level: Bed Rest  Number times ambulated in hallways past shift: 0  Number of times OOB to chair past shift: 0    Cardiac:   Cardiac Monitoring: Yes      Cardiac Rhythm: Sinus Rhythm    Access:  Current line(s): PIV     Genitourinary:   Urinary status: abraham Suprapubic Catheter    Respiratory:   O2 Device: None (Room air)  Chronic home O2 use?: NO  Incentive spirometer at bedside: NO       GI:  Last Bowel Movement Date: 12/18/22  Current diet:  ADULT DIET Easy to Chew; 4 carb choices (60 gm/meal)  Passing flatus: YES  Tolerating current diet: YES       Pain Management:   Patient states pain is manageable on current regimen: N/A    Skin:  Ottoniel Score: 18  Interventions: float heels, increase time out of bed, PT/OT consult, internal/external urinary devices, and nutritional support     Patient Safety:  Fall Score:  Total Score: 3  Interventions: bed/chair alarm, assistive device (walker, cane, etc), gripper socks, pt to call before getting OOB, and stay with me (per policy)  High Fall Risk: Yes    Length of Stay:  Expected LOS: 5d 0h  Actual LOS: 2      Blanca Perez

## 2022-12-22 ENCOUNTER — APPOINTMENT (OUTPATIENT)
Dept: NUCLEAR MEDICINE | Age: 77
DRG: 871 | End: 2022-12-22
Attending: NURSE PRACTITIONER
Payer: MEDICARE

## 2022-12-22 ENCOUNTER — APPOINTMENT (OUTPATIENT)
Dept: NON INVASIVE DIAGNOSTICS | Age: 77
DRG: 871 | End: 2022-12-22
Attending: NURSE PRACTITIONER
Payer: MEDICARE

## 2022-12-22 LAB
ANION GAP SERPL CALC-SCNC: 5 MMOL/L (ref 5–15)
BASOPHILS # BLD: 0 K/UL (ref 0–0.1)
BASOPHILS NFR BLD: 0 % (ref 0–1)
BUN SERPL-MCNC: 11 MG/DL (ref 6–20)
BUN/CREAT SERPL: 13 (ref 12–20)
CALCIUM SERPL-MCNC: 8.6 MG/DL (ref 8.5–10.1)
CHLORIDE SERPL-SCNC: 111 MMOL/L (ref 97–108)
CO2 SERPL-SCNC: 28 MMOL/L (ref 21–32)
COMMENT, HOLDF: NORMAL
CREAT SERPL-MCNC: 0.82 MG/DL (ref 0.7–1.3)
DIFFERENTIAL METHOD BLD: ABNORMAL
EOSINOPHIL # BLD: 0.2 K/UL (ref 0–0.4)
EOSINOPHIL NFR BLD: 4 % (ref 0–7)
ERYTHROCYTE [DISTWIDTH] IN BLOOD BY AUTOMATED COUNT: 13.9 % (ref 11.5–14.5)
GLUCOSE BLD STRIP.AUTO-MCNC: 151 MG/DL (ref 65–117)
GLUCOSE BLD STRIP.AUTO-MCNC: 155 MG/DL (ref 65–117)
GLUCOSE BLD STRIP.AUTO-MCNC: 167 MG/DL (ref 65–117)
GLUCOSE BLD STRIP.AUTO-MCNC: 78 MG/DL (ref 65–117)
GLUCOSE SERPL-MCNC: 95 MG/DL (ref 65–100)
HCT VFR BLD AUTO: 26 % (ref 36.6–50.3)
HCT VFR BLD AUTO: 28.2 % (ref 36.6–50.3)
HGB BLD-MCNC: 8.7 G/DL (ref 12.1–17)
HGB BLD-MCNC: 9.4 G/DL (ref 12.1–17)
IMM GRANULOCYTES # BLD AUTO: 0 K/UL (ref 0–0.04)
IMM GRANULOCYTES NFR BLD AUTO: 0 % (ref 0–0.5)
LYMPHOCYTES # BLD: 0.7 K/UL (ref 0.8–3.5)
LYMPHOCYTES NFR BLD: 14 % (ref 12–49)
MAGNESIUM SERPL-MCNC: 1.8 MG/DL (ref 1.6–2.4)
MCH RBC QN AUTO: 27.7 PG (ref 26–34)
MCHC RBC AUTO-ENTMCNC: 33.3 G/DL (ref 30–36.5)
MCV RBC AUTO: 83.2 FL (ref 80–99)
MONOCYTES # BLD: 0.5 K/UL (ref 0–1)
MONOCYTES NFR BLD: 10 % (ref 5–13)
NEUTS SEG # BLD: 3.4 K/UL (ref 1.8–8)
NEUTS SEG NFR BLD: 72 % (ref 32–75)
NRBC # BLD: 0 K/UL (ref 0–0.01)
NRBC BLD-RTO: 0 PER 100 WBC
PHOSPHATE SERPL-MCNC: 3.2 MG/DL (ref 2.6–4.7)
PLATELET # BLD AUTO: 182 K/UL (ref 150–400)
PMV BLD AUTO: 9.5 FL (ref 8.9–12.9)
POTASSIUM SERPL-SCNC: 4.2 MMOL/L (ref 3.5–5.1)
RBC # BLD AUTO: 3.39 M/UL (ref 4.1–5.7)
RBC MORPH BLD: ABNORMAL
SAMPLES BEING HELD,HOLD: NORMAL
SERVICE CMNT-IMP: ABNORMAL
SERVICE CMNT-IMP: NORMAL
SODIUM SERPL-SCNC: 144 MMOL/L (ref 136–145)
STRESS BASELINE DIAS BP: 73 MMHG
STRESS BASELINE HR: 75 BPM
STRESS BASELINE ST DEPRESSION: 0 MM
STRESS BASELINE SYS BP: 147 MMHG
STRESS ESTIMATED WORKLOAD: 1 METS
STRESS EXERCISE DUR MIN: 1 MIN
STRESS EXERCISE DUR SEC: 41 SEC
STRESS O2 SAT PEAK: 100 %
STRESS O2 SAT REST: 100 %
STRESS PEAK DIAS BP: 73 MMHG
STRESS PEAK SYS BP: 147 MMHG
STRESS PERCENT HR ACHIEVED: 67 %
STRESS POST PEAK HR: 95 BPM
STRESS RATE PRESSURE PRODUCT: NORMAL BPM*MMHG
STRESS ST DEPRESSION: 0 MM
STRESS TARGET HR: 142 BPM
WBC # BLD AUTO: 4.8 K/UL (ref 4.1–11.1)

## 2022-12-22 PROCEDURE — 74011000258 HC RX REV CODE- 258: Performed by: STUDENT IN AN ORGANIZED HEALTH CARE EDUCATION/TRAINING PROGRAM

## 2022-12-22 PROCEDURE — 93017 CV STRESS TEST TRACING ONLY: CPT

## 2022-12-22 PROCEDURE — 74011250637 HC RX REV CODE- 250/637: Performed by: STUDENT IN AN ORGANIZED HEALTH CARE EDUCATION/TRAINING PROGRAM

## 2022-12-22 PROCEDURE — 74011250636 HC RX REV CODE- 250/636: Performed by: INTERNAL MEDICINE

## 2022-12-22 PROCEDURE — 74011250636 HC RX REV CODE- 250/636: Performed by: NURSE PRACTITIONER

## 2022-12-22 PROCEDURE — 82962 GLUCOSE BLOOD TEST: CPT

## 2022-12-22 PROCEDURE — 65270000046 HC RM TELEMETRY

## 2022-12-22 PROCEDURE — 74011250637 HC RX REV CODE- 250/637: Performed by: NURSE PRACTITIONER

## 2022-12-22 PROCEDURE — 74011000250 HC RX REV CODE- 250: Performed by: STUDENT IN AN ORGANIZED HEALTH CARE EDUCATION/TRAINING PROGRAM

## 2022-12-22 PROCEDURE — 85018 HEMOGLOBIN: CPT

## 2022-12-22 PROCEDURE — 74011250636 HC RX REV CODE- 250/636: Performed by: STUDENT IN AN ORGANIZED HEALTH CARE EDUCATION/TRAINING PROGRAM

## 2022-12-22 PROCEDURE — 83735 ASSAY OF MAGNESIUM: CPT

## 2022-12-22 PROCEDURE — C9113 INJ PANTOPRAZOLE SODIUM, VIA: HCPCS | Performed by: NURSE PRACTITIONER

## 2022-12-22 PROCEDURE — 74011636637 HC RX REV CODE- 636/637: Performed by: STUDENT IN AN ORGANIZED HEALTH CARE EDUCATION/TRAINING PROGRAM

## 2022-12-22 PROCEDURE — 85025 COMPLETE CBC W/AUTO DIFF WBC: CPT

## 2022-12-22 PROCEDURE — 74011000250 HC RX REV CODE- 250: Performed by: NURSE PRACTITIONER

## 2022-12-22 PROCEDURE — 80048 BASIC METABOLIC PNL TOTAL CA: CPT

## 2022-12-22 PROCEDURE — 74011636637 HC RX REV CODE- 636/637: Performed by: INTERNAL MEDICINE

## 2022-12-22 PROCEDURE — 51798 US URINE CAPACITY MEASURE: CPT

## 2022-12-22 PROCEDURE — 84100 ASSAY OF PHOSPHORUS: CPT

## 2022-12-22 PROCEDURE — 36415 COLL VENOUS BLD VENIPUNCTURE: CPT

## 2022-12-22 PROCEDURE — A9500 TC99M SESTAMIBI: HCPCS

## 2022-12-22 RX ORDER — SODIUM CHLORIDE 0.9 % (FLUSH) 0.9 %
10 SYRINGE (ML) INJECTION AS NEEDED
Status: DISCONTINUED | OUTPATIENT
Start: 2022-12-22 | End: 2022-12-23 | Stop reason: HOSPADM

## 2022-12-22 RX ORDER — TETRAKIS(2-METHOXYISOBUTYLISOCYANIDE)COPPER(I) TETRAFLUOROBORATE 1 MG/ML
10.4 INJECTION, POWDER, LYOPHILIZED, FOR SOLUTION INTRAVENOUS
Status: COMPLETED | OUTPATIENT
Start: 2022-12-22 | End: 2022-12-22

## 2022-12-22 RX ORDER — TETRAKIS(2-METHOXYISOBUTYLISOCYANIDE)COPPER(I) TETRAFLUOROBORATE 1 MG/ML
31.4 INJECTION, POWDER, LYOPHILIZED, FOR SOLUTION INTRAVENOUS
Status: COMPLETED | OUTPATIENT
Start: 2022-12-22 | End: 2022-12-22

## 2022-12-22 RX ADMIN — SODIUM CHLORIDE, PRESERVATIVE FREE 10 ML: 5 INJECTION INTRAVENOUS at 18:23

## 2022-12-22 RX ADMIN — SODIUM CHLORIDE 75 ML/HR: 9 INJECTION, SOLUTION INTRAVENOUS at 00:15

## 2022-12-22 RX ADMIN — ATORVASTATIN CALCIUM 10 MG: 10 TABLET, FILM COATED ORAL at 12:13

## 2022-12-22 RX ADMIN — Medication 10 ML: at 10:46

## 2022-12-22 RX ADMIN — Medication 6 UNITS: at 18:24

## 2022-12-22 RX ADMIN — METOPROLOL SUCCINATE 25 MG: 25 TABLET, EXTENDED RELEASE ORAL at 12:13

## 2022-12-22 RX ADMIN — REGADENOSON 0.4 MG: 0.08 INJECTION, SOLUTION INTRAVENOUS at 10:46

## 2022-12-22 RX ADMIN — TETRAKIS(2-METHOXYISOBUTYLISOCYANIDE)COPPER(I) TETRAFLUOROBORATE 10.4 MILLICURIE: 1 INJECTION, POWDER, LYOPHILIZED, FOR SOLUTION INTRAVENOUS at 08:30

## 2022-12-22 RX ADMIN — INSULIN GLARGINE 28 UNITS: 100 INJECTION, SOLUTION SUBCUTANEOUS at 12:14

## 2022-12-22 RX ADMIN — SODIUM CHLORIDE, PRESERVATIVE FREE 40 MG: 5 INJECTION INTRAVENOUS at 12:14

## 2022-12-22 RX ADMIN — SODIUM CHLORIDE, PRESERVATIVE FREE 10 ML: 5 INJECTION INTRAVENOUS at 05:40

## 2022-12-22 RX ADMIN — SODIUM CHLORIDE, PRESERVATIVE FREE 10 ML: 5 INJECTION INTRAVENOUS at 22:14

## 2022-12-22 RX ADMIN — TETRAKIS(2-METHOXYISOBUTYLISOCYANIDE)COPPER(I) TETRAFLUOROBORATE 31.4 MILLICURIE: 1 INJECTION, POWDER, LYOPHILIZED, FOR SOLUTION INTRAVENOUS at 12:00

## 2022-12-22 RX ADMIN — ASPIRIN 81 MG: 81 TABLET, CHEWABLE ORAL at 12:13

## 2022-12-22 RX ADMIN — PIPERACILLIN AND TAZOBACTAM 3.38 G: 3; .375 INJECTION, POWDER, FOR SOLUTION INTRAVENOUS at 04:29

## 2022-12-22 RX ADMIN — PIPERACILLIN AND TAZOBACTAM 3.38 G: 3; .375 INJECTION, POWDER, FOR SOLUTION INTRAVENOUS at 20:51

## 2022-12-22 RX ADMIN — PIPERACILLIN AND TAZOBACTAM 3.38 G: 3; .375 INJECTION, POWDER, FOR SOLUTION INTRAVENOUS at 14:08

## 2022-12-22 RX ADMIN — Medication 2 UNITS: at 18:24

## 2022-12-22 NOTE — PROGRESS NOTES
Patient: Perry Resendez MRN: [id number]  SSN: [id number]    YOB: 1945  Age: 65 y.o. Sex: male        ADMITTED: 2022 to [facility], Danie Pressman, MD by Roberta Castillo DO for Sepsis [facility]) [A41.9]  POD# * No surgery found *     Perry Resendez is a 65 y.o. male with pmh significant for prostate cancer, radiation cystitis  with gross hematuria. S/P CYSTOSCOPY;PROTOUCH LASER OF BLADDER WALL AND PROSTATE; SUPRA PUBIC CATHETER CHANGE 22. Patient to ED with gross hematuria and elevated troponin level. Urology has been consulted for evaluation of gross hematuria. SPT in place. Drainage bag with clear pink colored UA, tubing with clear yellow UA. Manually irrigated with 500cc sterile water. Improvement to clear yellow/very light pink UA. No debris or clots noted. NAD noted, abd soft. Nondistended. Denies chest pain or abd pain. Reported staff now irrigating. On daily ASA. Hgb stable from yesterday. Per cardiology, \"Once hematuria resolved and safe for potential need for DAPT will consider ischemic work up\"     Afebrile, VSS  Hgb: 8.7, transfused x2  Cr: 0.90 ()  Troponin: 39,953, cards following  UA: >100 RBCs, 10-20 WBCs, 1+ bacteria  Ucx: pseudomonas, PROVIDENCIA STUARTII   +zosyn    22  CTA ABD/PELV  KIDNEYS: No mass, calculus, or hydronephrosis. URINARY BLADDER: No mass or calculus. Superpubic catheter. Vitals: Temp (24hrs), Av.4 °F (36.9 °C), Min:98.1 °F (36.7 °C), Max:98.6 °F (37 °C)    Blood pressure 128/72, pulse 75, temperature 98.5 °F (36.9 °C), resp. rate 16, height 6' 2\" (1.88 m), weight 81.2 kg (179 lb), SpO2 98 %. Intake and Output:   1901 -  0700  In: 1787.5 [I.V.:967.5]  Out: 9393 [Urine:6300]  No intake/output data recorded. JOEY Output lats 24 hrs: No data found. JOEY Output last 8 hrs: No data found. BM over last 24 hrs: No data found.     Labs:  CBC:   Lab Results   Component Value Date/Time    WBC 6.9 2022 04:21 AM    HCT 26.0 (L) 12/22/2022 02:24 AM    PLATELET 851 69/74/0992 04:21 AM     BMP:   Lab Results   Component Value Date/Time    Glucose 152 (H) 12/21/2022 04:21 AM    Sodium 141 12/21/2022 04:21 AM    Potassium 3.8 12/21/2022 04:21 AM    Chloride 112 (H) 12/21/2022 04:21 AM    CO2 24 12/21/2022 04:21 AM    BUN 15 12/21/2022 04:21 AM    Creatinine 0.90 12/21/2022 04:21 AM    Calcium 8.2 (L) 12/21/2022 04:21 AM       Assessment/Plan:     Gross hematuria  Ucx: pseudomonas, PROVIDENCIA STUARTII   - multifactorial, radiation cystitis, UTI. - no acute urological intervention at this time  - Monitor for worsening hematuria,  nursing staff to manually irrigate q 6 hours x 24 hours and prn for s/s clot retention and decreased UOP.   - Monitor H&H, transfuse per protocol  - Culture driven ABX per primary team  - Outpatient FU with [facility] scheduled 01/03/22 at 8:30 with Dr. Tracey Felix at the [place] location     Supervising Dr. Elyse VICTORIA By: Katerin Wick NP - December 22, 2022

## 2022-12-22 NOTE — PROGRESS NOTES
[facility]  [address]  [phone]  [email or web address]  CARDIOLOGY PROGRESS NOTE    12/22/2022 8:20 AM    Admit Date: 12/18/2022    Admit Diagnosis:   Sepsis ([address] [A41.9]    Subjective:     Iwona Gordon denies chest pain or dyspnea.      Visit Vitals  /67   Pulse 72   Temp 98.1 °F (36.7 °C)   Resp 16   Ht 6' 2\" (1.88 m)   Wt 81.2 kg (179 lb)   SpO2 98%   BMI 22.98 kg/m²       Current Facility-Administered Medications   Medication Dose Route Frequency    glucose chewable tablet 16 g  4 Tablet Oral PRN    glucagon (GLUCAGEN) injection 1 mg  1 mg IntraMUSCular PRN    dextrose 10 % infusion 0-250 mL  0-250 mL IntraVENous PRN    insulin lispro (HUMALOG) injection 6 Units  6 Units SubCUTAneous TIDAC    metoprolol succinate (TOPROL-XL) XL tablet 25 mg  25 mg Oral DAILY    insulin glargine (LANTUS) injection 28 Units  28 Units SubCUTAneous DAILY    0.9% sodium chloride infusion 250 mL  250 mL IntraVENous PRN    pantoprazole (PROTONIX) 40 mg in 0.9% sodium chloride 10 mL injection  40 mg IntraVENous DAILY    dextrose 10 % infusion 0-250 mL  0-250 mL IntraVENous PRN    0.9% sodium chloride infusion  75 mL/hr IntraVENous CONTINUOUS    insulin lispro (HUMALOG) injection   SubCUTAneous AC&HS    piperacillin-tazobactam (ZOSYN) 3.375 g in 0.9% sodium chloride (MBP/ADV) 100 mL MBP  3.375 g IntraVENous Q8H    aspirin chewable tablet 81 mg  81 mg Oral DAILY    atorvastatin (LIPITOR) tablet 10 mg  10 mg Oral DAILY    sodium chloride (NS) flush 5-40 mL  5-40 mL IntraVENous Q8H    sodium chloride (NS) flush 5-40 mL  5-40 mL IntraVENous PRN    acetaminophen (TYLENOL) tablet 650 mg  650 mg Oral Q6H PRN    Or    acetaminophen (TYLENOL) suppository 650 mg  650 mg Rectal Q6H PRN    polyethylene glycol (MIRALAX) packet 17 g  17 g Oral DAILY PRN    ondansetron (ZOFRAN ODT) tablet 4 mg  4 mg Oral Q8H PRN    Or    ondansetron (ZOFRAN) injection 4 mg  4 mg IntraVENous Q6H PRN         Objective:      Physical Exam     Physical Exam  Vitals reviewed. Constitutional:       Appearance: Normal appearance. HENT:      Head: Normocephalic and atraumatic. Neck:      Vascular: No carotid bruit. Cardiovascular:      Rate and Rhythm: Normal rate and regular rhythm. Pulmonary:      Effort: Pulmonary effort is normal.      Breath sounds: Normal breath sounds. Skin:     General: Skin is warm and dry. Neurological:      Mental Status: He is alert and oriented to person, place, and time. Data Review:   Recent Labs     12/22/22  0224 12/21/22  0421 12/20/22  0539   WBC  --  6.9 9.6   HGB 8.7* 8.6* 9.1*   HCT 26.0* 25.8* 27.8*   PLT  --  169 191     Recent Labs     12/21/22  0421 12/20/22  0539    140   K 3.8 4.5   * 111*   CO2 24 22   * 342*   BUN 15 26*   CREA 0.90 1.19   CA 8.2* 8.4*   MG  --  2.4       No results for input(s): TROIQ, CPK, CKMB in the last 72 hours. Intake/Output Summary (Last 24 hours) at 12/22/2022 0837  Last data filed at 12/22/2022 0435  Gross per 24 hour   Intake 1487.5 ml   Output 5200 ml   Net -3712.5 ml        Telemetry: Sinus   EKG:  Cxray:    ECHO    Left Ventricle: Normal left ventricular systolic function with a visually estimated EF of 55 - 60%. Normal wall motion. Mitral Valve: Moderate regurgitation. Left Atrium: Left atrium is mildly dilated  Assessment:     Active Problems:    Sepsis ([address] (12/18/2022)      Plan:     NSTEMI: Likely Type 2 in setting of profound anemia,  BP has been stable, ECHO EF 60%, proceed with CHIQUIS NST today, continue to medically treat with ASA/BB/Statin no need for nitrates at this time. Hemoglobin down to 8.7 today, still has hematuria.     Ky Wright DNP,ANP-BC

## 2022-12-22 NOTE — PROGRESS NOTES
0700   End of Shift Note    Bedside shift change report given to BUDDY BARNETT RN (oncoming nurse) by Va Sexton RN (offgoing nurse). Report included the following information SBAR, Kardex, ED Summary, OR Summary, Procedure Summary, Intake/Output, MAR, Accordion, Recent Results, Med Rec Status, and Cardiac Rhythm NSR    Shift worked:  7p to 7a     Shift summary and any significant changes:    NPO since bedtime (10pm). Stress test today. Abraham irrigated a few tiny red blood clots. Hgb =8.7     Concerns for physician to address:       Zone phone for oncoming shift:          Activity:  Activity Level: Up with Assistance  Number times ambulated in hallways past shift: 0  Number of times OOB to chair past shift: 1    Cardiac:   Cardiac Monitoring: Yes      Cardiac Rhythm: Sinus Rhythm    Access:  Current line(s): PIV     Genitourinary:   Urinary status: abraham    Respiratory:   O2 Device: None (Room air)  Chronic home O2 use?: NO  Incentive spirometer at bedside: NO       GI:  Last Bowel Movement Date: 12/18/22  Current diet:  DIET NPO  Passing flatus: YES  Tolerating current diet: YES       Pain Management:   Patient states pain is manageable on current regimen: YES    Skin:  Ottoniel Score: 20  Interventions: float heels, increase time out of bed, limit briefs, internal/external urinary devices, and nutritional support     Patient Safety:  Fall Score:  Total Score: 3  Interventions: bed/chair alarm, assistive device (walker, cane, etc), gripper socks, pt to call before getting OOB, stay with me (per policy), and gait belt  High Fall Risk: Yes    Length of Stay:  Expected LOS: 5d 0h  Actual LOS: [place] [place]

## 2022-12-22 NOTE — PROGRESS NOTES
Hospitalist Progress Note    NAME: Ed Blood   :  1945   MRN:  [id number]            Subjective:     Chief Complaint / Reason for Physician Visit  Patient seen and treated at bedside, overnight events reviewed. Discussed with RN events overnight. Patient notes dark urine. Has no other complaints. Review of Systems:  Full 14 point ROS assessed and negative except as noted above. Objective:     VITALS:   Last 24hrs VS reviewed since prior progress note. Most recent are:  Patient Vitals for the past 24 hrs:   Temp Pulse Resp BP SpO2   22 1437 98.5 °F (36.9 °C) 75 17 (!) 150/66 98 %   22 1435 -- 81 -- (!) 152/68 96 %   22 1357 -- -- -- 126/64 --   22 1030 98.4 °F (36.9 °C) 70 16 126/64 98 %   22 0724 98.4 °F (36.9 °C) 77 15 136/71 97 %   22 0352 98.6 °F (37 °C) 72 13 (!) 122/58 96 %   22 2232 98.2 °F (36.8 °C) 71 16 128/66 97 %         Intake/Output Summary (Last 24 hours) at 2022 1932  Last data filed at 2022 1551  Gross per 24 hour   Intake 300 ml   Output 2300 ml   Net -2000 ml        PHYSICAL EXAM:  General: Patient appears comfortable   EENT:  EOMI. Anicteric sclerae. MMM  Resp:  CTA bilaterally, no wheezing or rales. No accessory muscle use  CV:  Regular  rhythm, s1/s2 no m/r/g  No edema  GI:  Soft, Non distended, Non tender. +Bowel sounds  :  Trevino present, dark urine  Neurologic:  Alert and oriented X 3, normal speech,   Psych:   Good insight. Not anxious nor agitated  Skin:  No rashes. No jaundice    Procedures: see electronic medical records for all procedures/Xrays and details which were not copied into this note but were reviewed prior to creation of Plan. LABS:  I reviewed today's most current labs and imaging studies.   Pertinent labs include:  Recent Labs     22  0421 22  0539 22  0822   WBC 6.9 9.6 8.0   HGB 8.6* 9.1* 10.1*   HCT 25.8* 27.8* 30.6*    191 218       Recent Labs     22  8914 12/20/22  0539 12/19/22  0822 12/19/22  0318 12/19/22  0201 12/18/22  2107 12/18/22  1955    140 143   < > 134* 128* 128*   K 3.8 4.5 4.2   < > 4.8 5.1 5.2*   * 111* 113*   < > 106 98 96*   CO2 24 22 18*   < > 7* 13* 15*   * 342* 229*   < > 392* 606* 627*   BUN 15 26* 27*   < > 20 24* 24*   CREA 0.90 1.19 1.48*   < > 0.79 1.36* 1.37*   CA 8.2* 8.4* 9.0   < > 7.4* 8.7 9.1   MG  --  2.4 3.1*  --  1.2*  --   --    PHOS  --   --   --   --  3.1  --   --    ALB  --   --   --   --   --  3.1* 3.5   TBILI  --   --   --   --   --  0.7 0.8   ALT  --   --   --   --   --  22 26    < > = values in this interval not displayed.        Reviewed most current lab test results and cultures  YES  Reviewed most current radiology test results   YES  Review and summation of old records today    NO  Reviewed patient's current orders and MAR    YES  PMH/ reviewed - no change compared to H&P    Assessment / Plan:  Sepsis secondary to complicated urinary tract infection-  of note patient presented meeting sepsis criteria secondary to complicated urinary tract infection, preliminary urine cultures consistent with Pseudomonas, patient remains hemodynamically stable at this time  Urine sensitivities consistent with pan sensitive pseudomonas and partially resistant Providencia stuartii  Discontinue vancomycin  Continue Zosyn for antibiotic coverage, transition to ciprofloxacin on discharge  Maintenance IV fluids  Continue to monitor patient's clinical status    Acute blood loss anemia/hematuria- improving  patient found to have significant anemia, 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, status posttransfusion of packed RBC with appropriate response, patient has been evaluated by urology, hematuria has improved  Maintain active type and screen  Continue to trend hemoglobin and hematocrit  Urology consult appreciated, continue to follow recommendations  - ok for DAPT per discussion with Urology    Elevated serum troponin/type II non-ST elevation MI-  of note patient found to have significant troponin elevation, thought to be secondary to demand in the setting of acute blood loss anemia as well as sepsis, patient rodo hemodynamically stable  Continuous telemetry monitoring  Continue aspirin once daily  Continue Lipitor once daily  - Cardiology consult appreciated, continue to follow recommendations  - TTE with normal EF  - plan for CHIQUIS NST tomorrow    Diabetic ketoacidosis-currently resolved    Hyperglycemia type 2 diabetes-  currently under much better control  Increase lantus to 28 units daily  - added mealtime 6U lispro  Continue insulin sliding scale    Prophylaxis-SCDs  FEN-cardiac/diabetic diet, maintenance IV fluids, replete potassium and magnesium  Full code, will clarify about surrogate decision-maker  Disposition- no needs per PT/OT. Urology/Cardiology clearance   HANY: 12/23/22    18.5 - 24.9 Normal weight / Body mass index is 22.98 kg/m².       ________________________________________________________________________  Care Plan discussed with:    Comments   Patient x    Family      RN x    Care Manager x    Consultant                       x Multidiciplinary team rounds were held today with , nursing, pharmacist and clinical coordinator. Patient's plan of care was discussed; medications were reviewed and discharge planning was addressed.        ________________________________________________________________________  Evangelina Serrato MD

## 2022-12-22 NOTE — PROGRESS NOTES
Problem: Pressure Injury - Risk of  Goal: *Prevention of pressure injury  Description: Document Ottoniel Scale and appropriate interventions in the flowsheet. Outcome: Progressing Towards Goal  Note: Pressure Injury Interventions:       Moisture Interventions: Absorbent underpads, Apply protective barrier, creams and emollients, Internal/External urinary devices    Activity Interventions: Increase time out of bed, Chair cushion    Mobility Interventions: Float heels, HOB 30 degrees or less, Chair cushion    Nutrition Interventions: Document food/fluid/supplement intake    Friction and Shear Interventions: Apply protective barrier, creams and emollients, Feet elevated on foot rest, HOB 30 degrees or less                Problem: Patient Education: Go to Patient Education Activity  Goal: Patient/Family Education  Outcome: Progressing Towards Goal     Problem: Falls - Risk of  Goal: *Absence of Falls  Description: Document Nereida Fall Risk and appropriate interventions in the flowsheet. Outcome: Progressing Towards Goal  Note: Fall Risk Interventions:  Mobility Interventions: Bed/chair exit alarm, Communicate number of staff needed for ambulation/transfer, Patient to call before getting OOB, Utilize walker, cane, or other assistive device, Utilize gait belt for transfers/ambulation         Medication Interventions: Bed/chair exit alarm, Patient to call before getting OOB    Elimination Interventions: Bed/chair exit alarm, Call light in reach, Patient to call for help with toileting needs              Problem: Patient Education: Go to Patient Education Activity  Goal: Patient/Family Education  Outcome: Progressing Towards Goal     Problem: Diabetes Self-Management  Goal: *Disease process and treatment process  Description: Define diabetes and identify own type of diabetes; list 3 options for treating diabetes.   Outcome: Progressing Towards Goal  Goal: *Incorporating nutritional management into lifestyle  Description: Describe effect of type, amount and timing of food on blood glucose; list 3 methods for planning meals. Outcome: Progressing Towards Goal  Goal: *Incorporating physical activity into lifestyle  Description: State effect of exercise on blood glucose levels. Outcome: Progressing Towards Goal  Goal: *Developing strategies to promote health/change behavior  Description: Define the ABC's of diabetes; identify appropriate screenings, schedule and personal plan for screenings. Outcome: Progressing Towards Goal  Goal: *Using medications safely  Description: State effect of diabetes medications on diabetes; name diabetes medication taking, action and side effects. Outcome: Progressing Towards Goal  Goal: *Monitoring blood glucose, interpreting and using results  Description: Identify recommended blood glucose targets  and personal targets. Outcome: Progressing Towards Goal  Goal: *Prevention, detection, treatment of acute complications  Description: List symptoms of hyper- and hypoglycemia; describe how to treat low blood sugar and actions for lowering  high blood glucose level. Outcome: Progressing Towards Goal  Goal: *Prevention, detection and treatment of chronic complications  Description: Define the natural course of diabetes and describe the relationship of blood glucose levels to long term complications of diabetes.   Outcome: Progressing Towards Goal  Goal: *Developing strategies to address psychosocial issues  Description: Describe feelings about living with diabetes; identify support needed and support network  Outcome: Progressing Towards Goal  Goal: *Insulin pump training  Outcome: Progressing Towards Goal  Goal: *Sick day guidelines  Outcome: Progressing Towards Goal  Goal: *Patient Specific Goal (EDIT GOAL, INSERT TEXT)  Outcome: Progressing Towards Goal     Problem: Patient Education: Go to Patient Education Activity  Goal: Patient/Family Education  Outcome: Progressing Towards Goal     Problem: Patient Education: Go to Patient Education Activity  Goal: Patient/Family Education  Outcome: Progressing Towards Goal     Problem: Sepsis: Day of Diagnosis  Goal: Off Pathway (Use only if patient is Off Pathway)  Outcome: Progressing Towards Goal  Goal: *Fluid resuscitation  Outcome: Progressing Towards Goal  Goal: *Paired blood cultures prior to first dose of antibiotic  Outcome: Progressing Towards Goal  Goal: *First dose of  appropriate antibiotic within 3 hours of arrival to ED, within 1 hour of arrival to ICU  Outcome: Progressing Towards Goal  Goal: *Lactic acid with first set of blood cultures  Outcome: Progressing Towards Goal  Goal: *Pneumococcal immunization (if eligible)  Outcome: Progressing Towards Goal  Goal: *Influenza immunization (if eligible)  Outcome: Progressing Towards Goal  Goal: Activity/Safety  Outcome: Progressing Towards Goal  Goal: Consults, if ordered  Outcome: Progressing Towards Goal  Goal: Diagnostic Test/Procedures  Outcome: Progressing Towards Goal  Goal: Nutrition/Diet  Outcome: Progressing Towards Goal  Goal: Discharge Planning  Outcome: Progressing Towards Goal  Goal: Medications  Outcome: Progressing Towards Goal  Goal: Respiratory  Outcome: Progressing Towards Goal  Goal: Treatments/Interventions/Procedures  Outcome: Progressing Towards Goal  Goal: Psychosocial  Outcome: Progressing Towards Goal     Problem: Sepsis: Day 2  Goal: Off Pathway (Use only if patient is Off Pathway)  Outcome: Progressing Towards Goal  Goal: *Central Venous Pressure maintained at 8-12 mm Hg  Outcome: Progressing Towards Goal  Goal: *Hemodynamically stable  Outcome: Progressing Towards Goal  Goal: *Tolerating diet  Outcome: Progressing Towards Goal  Goal: Activity/Safety  Outcome: Progressing Towards Goal  Goal: Consults, if ordered  Outcome: Progressing Towards Goal  Goal: Diagnostic Test/Procedures  Outcome: Progressing Towards Goal  Goal: Nutrition/Diet  Outcome: Progressing Towards Goal  Goal: Discharge Planning  Outcome: Progressing Towards Goal  Goal: Medications  Outcome: Progressing Towards Goal  Goal: Respiratory  Outcome: Progressing Towards Goal  Goal: Treatments/Interventions/Procedures  Outcome: Progressing Towards Goal  Goal: Psychosocial  Outcome: Progressing Towards Goal     Problem: Sepsis: Day 3  Goal: Off Pathway (Use only if patient is Off Pathway)  Outcome: Progressing Towards Goal  Goal: *Central Venous Pressure maintained at 8-12 mm Hg  Outcome: Progressing Towards Goal  Goal: *Oxygen saturation within defined limits  Outcome: Progressing Towards Goal  Goal: *Vital sign stability  Outcome: Progressing Towards Goal  Goal: *Tolerating diet  Outcome: Progressing Towards Goal  Goal: *Demonstrates progressive activity  Outcome: Progressing Towards Goal  Goal: Activity/Safety  Outcome: Progressing Towards Goal  Goal: Consults, if ordered  Outcome: Progressing Towards Goal  Goal: Diagnostic Test/Procedures  Outcome: Progressing Towards Goal  Goal: Nutrition/Diet  Outcome: Progressing Towards Goal  Goal: Discharge Planning  Outcome: Progressing Towards Goal  Goal: Medications  Outcome: Progressing Towards Goal  Goal: Respiratory  Outcome: Progressing Towards Goal  Goal: Treatments/Interventions/Procedures  Outcome: Progressing Towards Goal  Goal: Psychosocial  Outcome: Progressing Towards Goal     Problem: Sepsis: Day 4  Goal: Off Pathway (Use only if patient is Off Pathway)  Outcome: Progressing Towards Goal  Goal: Activity/Safety  Outcome: Progressing Towards Goal  Goal: Consults, if ordered  Outcome: Progressing Towards Goal  Goal: Diagnostic Test/Procedures  Outcome: Progressing Towards Goal  Goal: Nutrition/Diet  Outcome: Progressing Towards Goal  Goal: Discharge Planning  Outcome: Progressing Towards Goal  Goal: Medications  Outcome: Progressing Towards Goal  Goal: Respiratory  Outcome: Progressing Towards Goal  Goal: Treatments/Interventions/Procedures  Outcome: Progressing Towards Goal  Goal: Psychosocial  Outcome: Progressing Towards Goal  Goal: *Oxygen saturation within defined limits  Outcome: Progressing Towards Goal  Goal: *Hemodynamically stable  Outcome: Progressing Towards Goal  Goal: *Vital signs within defined limits  Outcome: Progressing Towards Goal  Goal: *Tolerating diet  Outcome: Progressing Towards Goal  Goal: *Demonstrates progressive activity  Outcome: Progressing Towards Goal  Goal: *Fluid volume maintenance  Outcome: Progressing Towards Goal

## 2022-12-22 NOTE — PROGRESS NOTES
[facility]  [address] [facility], [address] [facility]  [phone]  [email or web address]. [email or web address]      Stress test negative for ischemia, normal EF on ECHO. No indications for LHC, Type II NSTEMI secondary to profound anemia. No further recommendations from Cardiology, will sign off please have patient follow up 2 weeks of hospital discharge.      Cortney Courser DNP, ANP-BC

## 2022-12-22 NOTE — PROGRESS NOTES
Hospitalist Progress Note    NAME: Tamie Bland   :  1945   MRN:  [id number]            Subjective:     Chief Complaint / Reason for Physician Visit  Patient seen and treated at bedside, overnight events reviewed. Discussed with RN events overnight. Seen after stress test. States he is nervous to go home and have more hematuria. No other complaints. Review of Systems:  Full 14 point ROS assessed and negative except as noted above. Objective:     VITALS:   Last 24hrs VS reviewed since prior progress note. Most recent are:  Patient Vitals for the past 24 hrs:   Temp Pulse Resp BP SpO2   22 1500 98 °F (36.7 °C) 89 -- 132/72 98 %   22 1202 -- 90 -- -- 97 %   22 1200 98.4 °F (36.9 °C) 90 18 (!) 140/88 97 %   22 0745 98.5 °F (36.9 °C) 75 16 128/72 98 %   22 0307 98.1 °F (36.7 °C) 72 16 129/67 98 %   22 2318 98.3 °F (36.8 °C) 72 16 (!) 138/57 97 %   22 1941 98.6 °F (37 °C) 72 16 (!) 123/58 98 %         Intake/Output Summary (Last 24 hours) at 2022 1705  Last data filed at 2022 0949  Gross per 24 hour   Intake 1487.5 ml   Output 4750 ml   Net -3262.5 ml        PHYSICAL EXAM:  General: Patient appears comfortable   EENT:  EOMI. Anicteric sclerae. MMM  Resp:  CTA bilaterally, no wheezing or rales. No accessory muscle use  CV:  Regular  rhythm, s1/s2 no m/r/g  No edema  GI:  Soft, Non distended, Non tender. +Bowel sounds  :  Trevino present, urine lighter today  Neurologic:  Alert and oriented X 3, normal speech,   Psych:   Good insight. Not anxious nor agitated  Skin:  No rashes. No jaundice    Procedures: see electronic medical records for all procedures/Xrays and details which were not copied into this note but were reviewed prior to creation of Plan. LABS:  I reviewed today's most current labs and imaging studies.   Pertinent labs include:  Recent Labs     22  1425 22  0224 22  0421 22  0539   WBC 4.8  --  6.9 9.6   HGB 9.4* 8. 7* 8.6* 9.1*   HCT 28.2* 26.0* 25.8* 27.8*     --  169 191       Recent Labs     12/22/22  0224 12/21/22  0421 12/20/22  0539    141 140   K 4.2 3.8 4.5   * 112* 111*   CO2 28 24 22   GLU 95 152* 342*   BUN 11 15 26*   CREA 0.82 0.90 1.19   CA 8.6 8.2* 8.4*   MG 1.8  --  2.4   PHOS 3.2  --   --        Reviewed most current lab test results and cultures  YES  Reviewed most current radiology test results   YES  Review and summation of old records today    NO  Reviewed patient's current orders and MAR    YES  PMH/SH reviewed - no change compared to H&P    Assessment / Plan:  Sepsis secondary to complicated urinary tract infection-  of note patient presented meeting sepsis criteria secondary to complicated urinary tract infection, preliminary urine cultures consistent with Pseudomonas, patient remains hemodynamically stable at this time  Urine sensitivities consistent with pan sensitive pseudomonas and partially resistant Providencia stuartii  Discontinue vancomycin  Continue Zosyn for antibiotic coverage, transition to ciprofloxacin on discharge  Maintenance IV fluids  Continue to monitor patient's clinical status    Acute blood loss anemia/hematuria- improving  patient found to have significant anemia, 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, status posttransfusion of packed RBC with appropriate response, patient has been evaluated by urology, hematuria has improved  Maintain active type and screen  Continue to trend hemoglobin and hematocrit  Urology consult appreciated, continue to follow recommendations  - ok for DAPT per discussion with Urology, however did not need, only on ASA    Elevated serum troponin/type II non-ST elevation MI-  of note patient found to have significant troponin elevation, thought to be secondary to demand in the setting of acute blood loss anemia as well as sepsis, patient rodo hemodynamically stable  Continuous telemetry monitoring  Continue aspirin once daily  Continue Lipitor once daily  - Cardiology consult appreciated, continue to follow recommendations  - TTE with normal EF  - stress test 12/22/22 neg for ischemia, no cath needed    Diabetic ketoacidosis-currently resolved    Hyperglycemia type 2 diabetes-  currently under much better control  Increase lantus to 28 units daily  - added mealtime 6U lispro  Continue insulin sliding scale    Prophylaxis-SCDs wit hematuria  FEN-cardiac/diabetic diet, maintenance IV fluids, replete potassium and magnesium  Full code, will clarify about surrogate decision-maker  Disposition- no needs per PT/OT. Urology clearance   HANY: 12/23/22    18.5 - 24.9 Normal weight / Body mass index is 22.98 kg/m².       ________________________________________________________________________  Care Plan discussed with:    Comments   Patient x    Family      RN x    Care Manager x    Consultant                       x Multidiciplinary team rounds were held today with , nursing, pharmacist and clinical coordinator. Patient's plan of care was discussed; medications were reviewed and discharge planning was addressed.        ________________________________________________________________________  Evangelina Serrato MD

## 2022-12-22 NOTE — PROGRESS NOTES
0730 Bedside shift change report given to [address] (oncoming nurse) by Jose Cho (offgoing nurse). Report included the following information SBAR and Kardex. End of Shift Note    Bedside shift change report given to [address] [place] [address] [address] (oncoming nurse) by Gemma Macias (offgoing nurse). Report included the following information SBAR, Kardex, ED Summary, MAR, Recent Results, and Cardiac Rhythm NSR     Shift worked:  8536-6539     Shift summary and any significant changes:     ECHO completed today pending results. Patient is to be NPO at midnight for stress test tomorrow. Abraham irrigated twice today. Concerns for physician to address:  None      [facility]   [phone]       Activity:  Activity Level: Up with Assistance  Number times ambulated in hallways past shift: 0  Number of times OOB to chair past shift: 1    Cardiac:   Cardiac Monitoring: Yes      Cardiac Rhythm: Sinus Rhythm    Access:  Current line(s): PIV     Genitourinary:   Urinary status: abraham Suprapubic catheter    Respiratory:   O2 Device: None (Room air)  Chronic home O2 use?: NO  Incentive spirometer at bedside: NO       GI:  Last Bowel Movement Date: 12/18/22  Current diet:  ADULT DIET Easy to Chew; 4 carb choices (60 gm/meal)  DIET NPO  Passing flatus: YES  Tolerating current diet: YES       Pain Management:   Patient states pain is manageable on current regimen: N/A    Skin:  Ottoniel Score: 19  Interventions: float heels, increase time out of bed, PT/OT consult, and internal/external urinary devices    Patient Safety:  Fall Score:  Total Score: 3  Interventions: bed/chair alarm, assistive device (walker, cane, etc), gripper socks, pt to call before getting OOB, and stay with me (per policy)  High Fall Risk: Yes    Length of Stay:  Expected LOS: 5d 0h  Actual LOS: [address]

## 2022-12-23 VITALS
TEMPERATURE: 98 F | WEIGHT: 177.1 LBS | RESPIRATION RATE: 18 BRPM | SYSTOLIC BLOOD PRESSURE: 115 MMHG | OXYGEN SATURATION: 97 % | HEART RATE: 70 BPM | BODY MASS INDEX: 22.73 KG/M2 | HEIGHT: 74 IN | DIASTOLIC BLOOD PRESSURE: 48 MMHG

## 2022-12-23 LAB
ANION GAP SERPL CALC-SCNC: 8 MMOL/L (ref 5–15)
BACTERIA SPEC CULT: NORMAL
BASOPHILS # BLD: 0 K/UL (ref 0–0.1)
BASOPHILS NFR BLD: 0 % (ref 0–1)
BUN SERPL-MCNC: 11 MG/DL (ref 6–20)
BUN/CREAT SERPL: 13 (ref 12–20)
CALCIUM SERPL-MCNC: 8.6 MG/DL (ref 8.5–10.1)
CHLORIDE SERPL-SCNC: 108 MMOL/L (ref 97–108)
CO2 SERPL-SCNC: 22 MMOL/L (ref 21–32)
CREAT SERPL-MCNC: 0.87 MG/DL (ref 0.7–1.3)
DIFFERENTIAL METHOD BLD: ABNORMAL
EOSINOPHIL # BLD: 0.2 K/UL (ref 0–0.4)
EOSINOPHIL NFR BLD: 5 % (ref 0–7)
ERYTHROCYTE [DISTWIDTH] IN BLOOD BY AUTOMATED COUNT: 14.3 % (ref 11.5–14.5)
GLUCOSE BLD STRIP.AUTO-MCNC: 118 MG/DL (ref 65–117)
GLUCOSE BLD STRIP.AUTO-MCNC: 197 MG/DL (ref 65–117)
GLUCOSE BLD STRIP.AUTO-MCNC: 96 MG/DL (ref 65–117)
GLUCOSE SERPL-MCNC: 144 MG/DL (ref 65–100)
HCT VFR BLD AUTO: 29.2 % (ref 36.6–50.3)
HGB BLD-MCNC: 9.6 G/DL (ref 12.1–17)
IMM GRANULOCYTES # BLD AUTO: 0 K/UL (ref 0–0.04)
IMM GRANULOCYTES NFR BLD AUTO: 0 % (ref 0–0.5)
LYMPHOCYTES # BLD: 0.7 K/UL (ref 0.8–3.5)
LYMPHOCYTES NFR BLD: 17 % (ref 12–49)
MAGNESIUM SERPL-MCNC: 1.8 MG/DL (ref 1.6–2.4)
MCH RBC QN AUTO: 27.7 PG (ref 26–34)
MCHC RBC AUTO-ENTMCNC: 32.9 G/DL (ref 30–36.5)
MCV RBC AUTO: 84.1 FL (ref 80–99)
MONOCYTES # BLD: 0.5 K/UL (ref 0–1)
MONOCYTES NFR BLD: 11 % (ref 5–13)
NEUTS SEG # BLD: 2.6 K/UL (ref 1.8–8)
NEUTS SEG NFR BLD: 66 % (ref 32–75)
NRBC # BLD: 0 K/UL (ref 0–0.01)
NRBC BLD-RTO: 0 PER 100 WBC
PHOSPHATE SERPL-MCNC: 3.5 MG/DL (ref 2.6–4.7)
PLATELET # BLD AUTO: 209 K/UL (ref 150–400)
PMV BLD AUTO: 9.8 FL (ref 8.9–12.9)
POTASSIUM SERPL-SCNC: 4.2 MMOL/L (ref 3.5–5.1)
RBC # BLD AUTO: 3.47 M/UL (ref 4.1–5.7)
SERVICE CMNT-IMP: ABNORMAL
SERVICE CMNT-IMP: ABNORMAL
SERVICE CMNT-IMP: NORMAL
SERVICE CMNT-IMP: NORMAL
SODIUM SERPL-SCNC: 138 MMOL/L (ref 136–145)
WBC # BLD AUTO: 4 K/UL (ref 4.1–11.1)

## 2022-12-23 PROCEDURE — 74011636637 HC RX REV CODE- 636/637: Performed by: STUDENT IN AN ORGANIZED HEALTH CARE EDUCATION/TRAINING PROGRAM

## 2022-12-23 PROCEDURE — 82962 GLUCOSE BLOOD TEST: CPT

## 2022-12-23 PROCEDURE — 83735 ASSAY OF MAGNESIUM: CPT

## 2022-12-23 PROCEDURE — 85025 COMPLETE CBC W/AUTO DIFF WBC: CPT

## 2022-12-23 PROCEDURE — C9113 INJ PANTOPRAZOLE SODIUM, VIA: HCPCS | Performed by: NURSE PRACTITIONER

## 2022-12-23 PROCEDURE — 74011000250 HC RX REV CODE- 250: Performed by: STUDENT IN AN ORGANIZED HEALTH CARE EDUCATION/TRAINING PROGRAM

## 2022-12-23 PROCEDURE — 74011250637 HC RX REV CODE- 250/637: Performed by: NURSE PRACTITIONER

## 2022-12-23 PROCEDURE — 74011250636 HC RX REV CODE- 250/636: Performed by: STUDENT IN AN ORGANIZED HEALTH CARE EDUCATION/TRAINING PROGRAM

## 2022-12-23 PROCEDURE — 84100 ASSAY OF PHOSPHORUS: CPT

## 2022-12-23 PROCEDURE — 74011250637 HC RX REV CODE- 250/637: Performed by: STUDENT IN AN ORGANIZED HEALTH CARE EDUCATION/TRAINING PROGRAM

## 2022-12-23 PROCEDURE — 36415 COLL VENOUS BLD VENIPUNCTURE: CPT

## 2022-12-23 PROCEDURE — 74011000258 HC RX REV CODE- 258: Performed by: STUDENT IN AN ORGANIZED HEALTH CARE EDUCATION/TRAINING PROGRAM

## 2022-12-23 PROCEDURE — 74011636637 HC RX REV CODE- 636/637: Performed by: INTERNAL MEDICINE

## 2022-12-23 PROCEDURE — 74011000250 HC RX REV CODE- 250: Performed by: NURSE PRACTITIONER

## 2022-12-23 PROCEDURE — 74011250636 HC RX REV CODE- 250/636: Performed by: INTERNAL MEDICINE

## 2022-12-23 PROCEDURE — 74011250636 HC RX REV CODE- 250/636: Performed by: NURSE PRACTITIONER

## 2022-12-23 PROCEDURE — 80048 BASIC METABOLIC PNL TOTAL CA: CPT

## 2022-12-23 RX ORDER — INSULIN GLARGINE 100 [IU]/ML
INJECTION, SOLUTION SUBCUTANEOUS
Qty: 15 ML | Refills: 3 | Status: SHIPPED
Start: 2022-12-23 | End: 2022-12-25 | Stop reason: SDUPTHER

## 2022-12-23 RX ORDER — INSULIN ASPART 100 [IU]/ML
INJECTION, SOLUTION INTRAVENOUS; SUBCUTANEOUS
Qty: 30 ML | Refills: 3 | Status: SHIPPED
Start: 2022-12-23

## 2022-12-23 RX ORDER — CIPROFLOXACIN 500 MG/1
500 TABLET ORAL EVERY 12 HOURS
Qty: 11 TABLET | Refills: 0 | Status: SHIPPED | OUTPATIENT
Start: 2022-12-23 | End: 2022-12-29

## 2022-12-23 RX ORDER — PANTOPRAZOLE SODIUM 40 MG/1
40 TABLET, DELAYED RELEASE ORAL DAILY
Qty: 30 TABLET | Refills: 0 | Status: SHIPPED | OUTPATIENT
Start: 2022-12-23 | End: 2023-01-22

## 2022-12-23 RX ORDER — METOPROLOL SUCCINATE 25 MG/1
25 TABLET, EXTENDED RELEASE ORAL DAILY
Qty: 30 TABLET | Refills: 0 | Status: SHIPPED | OUTPATIENT
Start: 2022-12-24 | End: 2023-01-23

## 2022-12-23 RX ORDER — CIPROFLOXACIN 500 MG/1
500 TABLET ORAL EVERY 12 HOURS
Status: DISCONTINUED | OUTPATIENT
Start: 2022-12-23 | End: 2022-12-23 | Stop reason: HOSPADM

## 2022-12-23 RX ADMIN — SODIUM CHLORIDE 75 ML/HR: 9 INJECTION, SOLUTION INTRAVENOUS at 09:57

## 2022-12-23 RX ADMIN — Medication 6 UNITS: at 12:43

## 2022-12-23 RX ADMIN — ATORVASTATIN CALCIUM 10 MG: 10 TABLET, FILM COATED ORAL at 10:09

## 2022-12-23 RX ADMIN — ASPIRIN 81 MG: 81 TABLET, CHEWABLE ORAL at 10:09

## 2022-12-23 RX ADMIN — Medication 6 UNITS: at 10:09

## 2022-12-23 RX ADMIN — CIPROFLOXACIN 500 MG: 500 TABLET, FILM COATED ORAL at 11:35

## 2022-12-23 RX ADMIN — PIPERACILLIN AND TAZOBACTAM 3.38 G: 3; .375 INJECTION, POWDER, FOR SOLUTION INTRAVENOUS at 04:43

## 2022-12-23 RX ADMIN — SODIUM CHLORIDE, PRESERVATIVE FREE 10 ML: 5 INJECTION INTRAVENOUS at 05:06

## 2022-12-23 RX ADMIN — Medication 2 UNITS: at 12:43

## 2022-12-23 RX ADMIN — METOPROLOL SUCCINATE 25 MG: 25 TABLET, EXTENDED RELEASE ORAL at 10:09

## 2022-12-23 RX ADMIN — SODIUM CHLORIDE, PRESERVATIVE FREE 40 MG: 5 INJECTION INTRAVENOUS at 10:00

## 2022-12-23 RX ADMIN — INSULIN GLARGINE 28 UNITS: 100 INJECTION, SOLUTION SUBCUTANEOUS at 10:09

## 2022-12-23 NOTE — PROGRESS NOTES
Patient: Ed Blood MRN: [id number]  SSN: [id number]    YOB: 1945  Age: 65 y.o. Sex: male        ADMITTED: 2022 to [facility], Ermelinda Pike, MD by Mahamed Mcknight DO for Sepsis [facility]) [A41.9]  POD# * No surgery found *     Ed Blood is a 65 y.o. male with pmh significant for prostate cancer, radiation cystitis  with gross hematuria. S/P CYSTOSCOPY;PROTOUCH LASER OF BLADDER WALL AND PROSTATE; SUPRA PUBIC CATHETER CHANGE 22. Patient to ED with gross hematuria and elevated troponin level. Urology has been consulted for evaluation of gross hematuria. SPT in place. Drainage bag with clear light pink/britni colored UA. Manually irrigated with 500cc sterile water. No debris or clots noted. RN reported one small clot this AM. Good UOP. NAD noted, abd soft. Nondistended. Denies chest pain or abd pain. On daily ASA. Family bedside. Temp 98.9, VSS  WBC: 4.0  Hgb: 9.6, transfused x2  Cr: 0.87  Troponin: 39,953, cards following  UA: >100 RBCs, 10-20 WBCs, 1+ bacteria  Ucx: pseudomonas, PROVIDENCIA STUARTII   +zosyn    22  CTA ABD/PELV  KIDNEYS: No mass, calculus, or hydronephrosis. URINARY BLADDER: No mass or calculus. Superpubic catheter. Vitals: Temp (24hrs), Av.4 °F (36.9 °C), Min:98 °F (36.7 °C), Max:98.9 °F (37.2 °C)    Blood pressure 135/65, pulse 73, temperature 98.9 °F (37.2 °C), resp. rate 18, height 6' 2\" (1.88 m), weight 80.3 kg (177 lb 1.6 oz), SpO2 98 %. Intake and Output:   1901 -  0700  In: 1937.5 [I.V.:967.5]  Out: 5000 [Urine:5000]  No intake/output data recorded. JOEY Output lats 24 hrs: No data found. JOEY Output last 8 hrs: No data found. BM over last 24 hrs: No data found.     Labs:  CBC:   Lab Results   Component Value Date/Time    WBC 4.0 (L) 2022 01:49 AM    HCT 29.2 (L) 2022 01:49 AM    PLATELET 091  01:49 AM     BMP:   Lab Results   Component Value Date/Time    Glucose 144 (H) 2022 01:49 AM    Sodium 138 12/23/2022 01:49 AM    Potassium 4.2 12/23/2022 01:49 AM    Chloride 108 12/23/2022 01:49 AM    CO2 22 12/23/2022 01:49 AM    BUN 11 12/23/2022 01:49 AM    Creatinine 0.87 12/23/2022 01:49 AM    Calcium 8.6 12/23/2022 01:49 AM       Assessment/Plan:     Gross hematuria  Ucx: pseudomonas, PROVIDENCIA STUARTII   - multifactorial, radiation cystitis, UTI. - no acute urological intervention at this time  - Monitor for worsening hematuria,  nursing staff to manually irrigate q 6 hours x 24 hours and prn for s/s clot retention and decreased UOP. - Monitor H&H, transfuse per protocol  - Culture driven ABX per primary team  - Okay for discharge from urology standpoint. MD spoke with family and may try and get hyperbaric oxygen outpatient. Outpatient follow-up with [facility] scheduled 12/29/22 at 9:20 with Dr. Rashawn Anderson at the [facility] location. Please call PRN.      Case discussed with Dr. Becky Rooney    Signed By: Olive Arce NP - December 23, 2022

## 2022-12-23 NOTE — PROGRESS NOTES
Spoke with wife and patient via phone today about situation  Very frustrating for them and understandable  He has a complex hx of radiation and cryo with recurrent hematuria ? radiation cystitis and or possible avm or cryo related changes  I discussed that there is not an easy solution we discussed that it may be related to pseudomonas bacteria in urine I am hopeful abx will help clear infection decrease inflammation in bladder and bleeding we discussed keeping catheter secured to abdomen to prevent rubbing or pulling  We discussed possibility of hyperbaric oxygen to promote healing and perhaps prevent bleeding episodes  We discussed possibility of formalin or urinary diversion in a worst case scenario    It does seem that he had a brisk bleed prior to presentation to hospital unfortunately no active bleeding was seen on imaging but if encountered could perform embolization but would need directed target    He has had recent cystoscopy last month where there were radiation changes noted in bladder, and friable prostatic tissue   Pro touch laser was used to treat these areas and incise the bladder neck    Bleeding may be related to scabs flaking off from that procedure as well he still has large defect in prostatic urethra no bleeding noted there at time of cysto w/ his urinary retention and large defect in prostatic urethra sp tube is his best management for now.

## 2022-12-23 NOTE — PROGRESS NOTES
Problem: Pressure Injury - Risk of  Goal: *Prevention of pressure injury  Description: Document Ottoniel Scale and appropriate interventions in the flowsheet. Outcome: Progressing Towards Goal  Note: Pressure Injury Interventions:       Moisture Interventions: Absorbent underpads, Check for incontinence Q2 hours and as needed, Internal/External urinary devices, Minimize layers    Activity Interventions: Increase time out of bed, PT/OT evaluation    Mobility Interventions: Float heels, HOB 30 degrees or less, PT/OT evaluation    Nutrition Interventions: Document food/fluid/supplement intake    Friction and Shear Interventions: HOB 30 degrees or less, Lift sheet, Lift team/patient mobility team, Minimize layers                Problem: Falls - Risk of  Goal: *Absence of Falls  Description: Document Nereida Fall Risk and appropriate interventions in the flowsheet.   Outcome: Progressing Towards Goal  Note: Fall Risk Interventions:  Mobility Interventions: Bed/chair exit alarm, Communicate number of staff needed for ambulation/transfer, OT consult for ADLs, Patient to call before getting OOB, PT Consult for mobility concerns, PT Consult for assist device competence, Strengthening exercises (ROM-active/passive), Utilize walker, cane, or other assistive device         Medication Interventions: Bed/chair exit alarm, Patient to call before getting OOB, Evaluate medications/consider consulting pharmacy, Teach patient to arise slowly    Elimination Interventions: Bed/chair exit alarm, Call light in reach, Patient to call for help with toileting needs, Toileting schedule/hourly rounds

## 2022-12-23 NOTE — PROGRESS NOTES
0730 Bedside shift change report given to [address] [place] [address] Kimberly Jignesh (oncoming nurse) by [facility] (offgoing nurse). Report included the following information SBAR and Kardex. End of Shift Note    Bedside shift change report given to [address] [place] [address] (oncoming nurse) by Chula Oconnor (offgoing nurse). Report included the following information SBAR, Kardex, ED Summary, MAR, Recent Results, and Cardiac Rhythm NSR    Shift worked:  [id number]     Shift summary and any significant changes:    Stress test completed. Diet resumed. Suprapubic cath irrigated twice today. CBC, BMP, phosphorous, and magnesium drawn. HGB increased to 9.4     Concerns for physician to address:  None     Zone phone for oncoming shift:   [phone]       Activity:  Activity Level: Up with Assistance  Number times ambulated in hallways past shift: 0  Number of times OOB to chair past shift: 0    Cardiac:   Cardiac Monitoring: Yes      Cardiac Rhythm: Sinus Rhythm    Access:  Current line(s): PIV     Genitourinary:   Urinary status: abraham Suprapubic catheter    Respiratory:   O2 Device: None (Room air)  Chronic home O2 use?: NO  Incentive spirometer at bedside: NO       GI:  Last Bowel Movement Date: 12/21/22  Current diet:  ADULT DIET Regular  Passing flatus: YES  Tolerating current diet: YES       Pain Management:   Patient states pain is manageable on current regimen: N/A    Skin:  Ottoniel Score: 20  Interventions: increase time out of bed, PT/OT consult, and internal/external urinary devices    Patient Safety:  Fall Score:  Total Score: 3  Interventions: bed/chair alarm, assistive device (walker, cane, etc), gripper socks, pt to call before getting OOB, and stay with me (per policy)  High Fall Risk: Yes    Length of Stay:  Expected LOS: 5d 0h  Actual LOS: [address]

## 2022-12-23 NOTE — PROGRESS NOTES
No further CM needs identified. Transition of Care Plan:    RUR: 15% \"moderate risk\"  Disposition: Home with [facility]  If SNF or IPR: Date FOC offered: N/A  Date FOC received: N/A  Date authorization started with reference number: N/A  Date authorization received and expires: N/A  Accepting facility: N/A  Follow up appointments: PCP & Specialists as needed  DME needed: None  Transportation at Discharge: Wife to provide transport  [address] or means to access home:         Medicare Letter: 2nd IM letter given; signed copy on chart  Is patient a  and connected with the [facility]? N/A  Caregiver Contact: Pt's daughter Justine Johnston [phone]  Discharge Caregiver contacted prior to discharge? No  Care Conference needed?: Not at this time    Initial note: Chart reviewed for updates. CM acknowledged d/c. CM met with pt and spouse at bedside, Introduced role and discussed d/c planning. Pt and spouse are agreeable with d/c. Pt requesting for Home health skilled nursing. Referral sent to Home health agencies. [facility] has accepted. Pt and spouse aware. 2nd IM letter given; signed copy on chart. Wife to provide transport. No additional questions/concerns reported by pt and spouse. No further CM needs at this time. CM will continue to remain accessible for for consult incase any CM needs arise prior d/c. Care Management Interventions  PCP Verified by CM: Yes  Palliative Care Criteria Met (RRAT>21 & CHF Dx)?: No  Mode of Transport at Discharge:  Other (see comment) (Wife at bedside to provide transport)  Transition of Care Consult (CM Consult): Discharge Planning  MyChart Signup: No  Discharge Durable Medical Equipment: No  Health Maintenance Reviewed: Yes  Physical Therapy Consult: Yes  Occupational Therapy Consult: Yes  Speech Therapy Consult: Yes  Support Systems: Spouse/Significant Other (Pt's wife is his main support system)  Confirm Follow Up Transport: Family (Wife at bedside will provide transport)  Hayley Provided?: No  Discharge Location  Patient Expects to be Discharged to[de-identified] Home with home health (Pt is d/c home with home health and follow up apts)     HANS Siddiqi    [phone]

## 2022-12-23 NOTE — DISCHARGE SUMMARY
Hospitalist Discharge Summary     Patient ID:  Jerica Espinoza  [id number]  80 y.o.  1945 12/18/2022    PCP on record: Mil Mayfield MD    Admit date: 12/18/2022  Discharge date and time: 12/23/2022    DISCHARGE DIAGNOSIS:    Sepsis secondary to complicated urinary tract infection-  Acute blood loss anemia/hematuria- improving  Elevated serum troponin/type II non-ST elevation MI-  Diabetic ketoacidosis  Hyperglycemia type 2 diabetes    CONSULTATIONS:  IP CONSULT TO CARDIOLOGY  IP CONSULT TO UROLOGY    Excerpted HPI from H&P of Magui Hodge DO:  Jerica Espinoza is a 65 y.o.  male with pertinent past medical history of insulin-dependent diabetes mellitus, prostate cancer status post suprapubic catheter placement who returns to the emergency department today after leaving yesterday while being evaluated for hematuria. He reports since discharge he has had progressive dyspnea, mild chest discomfort, malaise, lightheadedness, and weakness as well as increasing bloody urine output from his suprapubic catheter with overflow incontinence through his penis. Patient denies preceding symptoms and denies fevers chills, cough, URI, sick contacts. ROS otherwise negative. In the ED, patient afebrile, borderline tachycardic 90s-100s, normotensive, saturating upper 90s on room air. ECG demonstrates sinus tachycardia with LVH criteria and known interfascicular block. CTA chest demonstrates no PE or acute process.   Suprapubic catheter draining overtly bloody urine without visible clot and UA concerning for UTI (protein, blood, ketone, nitrites, leukoesterase, 1+ bacteria)-chart review demonstrates history of pseudomonal UTIs, WBC 4.6, hemoglobin 8.3 (9.6 yesterday), platelets 281, lactic acid 4.61 at presentation increased to 5.842 hours later, sodium 128, potassium 5.1, bicarb 13, anion gap 17, glucose 606, BUN 24, creatinine 1.36 (baseline around 1.0), high-sensitivity troponin elevated at 239 increased to 441 on recheck. Patient given empiric vancomycin and Zosyn in the ED.    ______________________________________________________________________  DISCHARGE SUMMARY/HOSPITAL COURSE:  for full details see H&P, daily progress notes, labs, consult notes.      Sepsis secondary to complicated urinary tract infection-  of note patient presented meeting sepsis criteria secondary to complicated urinary tract infection, preliminary urine cultures consistent with Pseudomonas, patient remains hemodynamically stable at this time  Urine sensitivities consistent with pan sensitive pseudomonas and partially resistant Providencia stuartii  Discontinued vancomycin  Continue Zosyn for antibiotic coverage, transitioned to ciprofloxacin on discharge as below  Received Maintenance IV fluids    Acute blood loss anemia/hematuria- improving  patient found to have significant anemia, 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, status posttransfusion of packed RBC with appropriate response, patient has been evaluated by urology, hematuria has improved  Maintain active type and screen  Continue to trend hemoglobin and hematocrit  Urology consult appreciated, continue to follow recommendations  - ok for DAPT per discussion with Urology, however did not need, only on ASA  - cleared for discharge by Urology 12/23/22, has outpatient appt 12/29/22  - has irrigation supplies    Elevated serum troponin/type II non-ST elevation MI-  of note patient found to have significant troponin elevation, thought to be secondary to demand in the setting of acute blood loss anemia as well as sepsis, patient rodo hemodynamically stable  Continuous telemetry monitoring  Continue aspirin once daily  Continue Lipitor once daily  - Cardiology consult appreciated, continue to follow recommendations  - TTE with normal EF  - stress test 12/22/22 neg for ischemia, no cath needed  - started metoprolol as below  - outpatient Cardiology followup    Diabetic ketoacidosis-currently resolved  Hyperglycemia type 2 diabetes-  currently under much better control  Increased lantus to 28 units daily, continue on dc  - added mealtime 6U lispro, continue on dc  Sliding scale while here  _______________________________________________________________________  Patient seen and examined by me on discharge day. Pertinent Findings:  General:          Patient appears comfortable   EENT:              EOMI. Anicteric sclerae. MMM  Resp:               CTA bilaterally, no wheezing or rales. No accessory muscle use  CV:                  Regular  rhythm, s1/s2 no m/r/g  No edema  GI:                   Soft, Non distended, Non tender. +Bowel sounds  :                  Trevino present, urine red tinged  Neurologic:       Alert and oriented X 3, normal speech,   Psych:    Good insight. Not anxious nor agitated  Skin:                No rashes. No jaundice  _______________________________________________________________________  DISCHARGE MEDICATIONS:   Current Discharge Medication List        START taking these medications    Details   ciprofloxacin HCl (CIPRO) 500 mg tablet Take 1 Tablet by mouth every twelve (12) hours for 11 doses. Qty: 11 Tablet, Refills: 0  Start date: 12/23/2022, End date: 12/29/2022      metoprolol succinate (TOPROL-XL) 25 mg XL tablet Take 1 Tablet by mouth daily for 30 days. Qty: 30 Tablet, Refills: 0  Start date: 12/24/2022, End date: 1/23/2023      pantoprazole (PROTONIX) 40 mg tablet Take 1 Tablet by mouth daily for 30 days.   Qty: 30 Tablet, Refills: 0  Start date: 12/23/2022, End date: 1/22/2023           CONTINUE these medications which have CHANGED    Details   insulin aspart U-100 (NovoLOG Flexpen U-100 Insulin) 100 unit/mL (3 mL) inpn Take 6 units before each meal if eating  Qty: 30 mL, Refills: 3  Start date: 12/23/2022      insulin glargine (Lantus Solostar U-100 Insulin) 100 unit/mL (3 mL) inpn INJECT 28 UNITS UNDER THE SKIN AT BEDTIME--Dose change 09/30/22--updated med list--did not send prescription to the pharmacy  Qty: 15 mL, Refills: 3  Start date: 12/23/2022           CONTINUE these medications which have NOT CHANGED    Details   multivitamin (ONE A DAY) tablet Take 1 Tablet by mouth in the morning. ascorbic acid, vitamin C, (VITAMIN C) 500 mg tablet Take 1,000 mg by mouth in the morning. cyanocobalamin (VITAMIN B12) 500 mcg tablet Take 500 mcg by mouth in the morning. megestroL (MEGACE) 20 mg tablet Take 20 mg by mouth three (3) times daily as needed. atorvastatin (Lipitor) 10 mg tablet Take 1 Tablet by mouth daily. Qty: 90 Tablet, Refills: 3      True Metrix Glucose Meter misc TEST UP TO 5 TIMES DAILY (INSULIN FLUCTUATING SUGARS). DX: E10.65  Qty: 1 Each, Refills: 0      TRUEplus Lancets 33 gauge misc TEST UP TO 5 TIMES DAILY (INSULIN FLUCTUATING SUGARS). DX: E10.65  Qty: 500 Lancet, Refills: 3      True Metrix Level 1 soln Use as directed  Qty: 1 Each, Refills: 1      alcohol swabs (BD Single Use Swabs Regular) padm TEST UP TO 5 TIMES DAILY (INSULIN FLUCTUATING SUGARS). DX: E10.65  Qty: 500 Pad, Refills: 3      BD Luer-Rafa Syringe 3 mL 18 x 1 1/2\" syrg USE TO DRAW UP TESTOSTERONE UTD      Disposable Needles 22 gauge x 1 1/2\" ndle USE TO INJECT TESTOSTERONE UTD      aspirin 81 mg chewable tablet Take 81 mg by mouth daily. cholecalciferol (Vitamin D) (2,000 UNITS /50 MCG) cap capsule Take 3 Tablets by mouth daily. Patient Follow Up Instructions:    Activity: Activity as tolerated  Diet: Cardiac Diet and Diabetic Diet  Wound Care: Routine catheter care    Follow-up with Urology, Cardiology, PCP  Follow-up tests/labs none    Follow-up Information       Follow up With Specialties Details Why Contact Info    Bhavik Silveira MD Family Medicine Schedule an appointment as soon as possible for a visit in 2 day(s) To schedule your PCP hospital follow up. [place] [id number]  [phone]      Luke Martin MD Urology Go on 12/29/2022 at 9:20AM for your urological needs. You have an appointment on this day. Call their office to confirm.  [address]  [phone]      Shannon Larson MD Interventional Cardiology Physician, Cardiovascular Disease Physician, Cardio Vascular Surgery Follow up in 2 week(s) for your heart disease [address]  [phone]            ________________________________________________________________    Risk of deterioration: Low    Condition at Discharge:  Stable  __________________________________________________________________    Disposition  Home with family and home health services    ____________________________________________________________________    Code Status: Full Code  ___________________________________________________________________      Total time in minutes spent coordinating this discharge (includes going over instructions, follow-up, prescriptions, and preparing report for sign off to her PCP) :  >30 minutes    Signed:  Ladan Cervantes MD

## 2022-12-23 NOTE — PROGRESS NOTES
Attempted to schedule hospital follow up PCP appointment. Office is closed for the holiday. Pending patient discharge.  Pepe Ramirez, Care Management Assistant

## 2022-12-23 NOTE — DISCHARGE INSTRUCTIONS
You were admitted for bleeding from your bladder. You were found to have a slight injury to your heart, a UTI, and your blood sugar was high. Your insulin was changed, and you were evaluated by the Cardiologist. Ny Judd received antibiotics. Please take the medications as listed in this paperwork and followup with your PCP for refills. Please followup with the Urology office on 12/29/22. Please followup with the Cardiologist as well. Per Urology, please continue the saline flushes as needed at home. Please call their office if any issue arises before your appointment.

## 2022-12-24 NOTE — PROGRESS NOTES
DISCHARGE SUMMARY FROM [facility] NURSE    The patient is stable for discharge. I have reviewed the discharge instructions with the patient. The patient verbalized understanding. All questions were fully answered. The patient verbalized no complaints. Scripts were sent to the patient's pharmacy and medication handouts were given and reviewed with the patient. Appropriate educational materials and medication side effects teaching were also provided. Cardiac monitor and IV line(s) were removed. The following personal items collected during admission were returned to the patient/family  Home medications:   Dental Appliance: Dental Appliances: Uppers  Vision: Visual Aid: Glasses, At bedside  Hearing Aid:    Jewelry: Jewelry: Watch  Clothing: Clothing: Footwear, Pants, Shirt, Jacket/Coat, Socks, Undergarments  Other Valuables: Other Valuables: Wallet  Valuables sent to safe: There were no personal belongings, valuables or home medications left at patient's bedside,  or safe.

## 2022-12-24 NOTE — PROGRESS NOTES
The patient had some concerns about the color of his urine, clotting, and his discharge. I paged Urology, but I was unable to reach the doctor. Dr. Pat Bolivar called the urologist got the clearance to discharge, and he spoke with the patient. The patient was okay with discharging.

## 2022-12-25 RX ORDER — INSULIN GLARGINE 100 [IU]/ML
INJECTION, SOLUTION SUBCUTANEOUS
Qty: 15 ML | Refills: 3 | Status: SHIPPED | OUTPATIENT
Start: 2022-12-25

## 2022-12-26 LAB
BACTERIA SPEC CULT: NORMAL
SERVICE CMNT-IMP: NORMAL

## 2023-01-07 RX ORDER — INSULIN ASPART 100 [IU]/ML
INJECTION, SOLUTION INTRAVENOUS; SUBCUTANEOUS
Qty: 30 ML | Refills: 3 | Status: SHIPPED | OUTPATIENT
Start: 2023-01-07

## 2023-02-27 ENCOUNTER — TELEPHONE (OUTPATIENT)
Dept: ENDOCRINOLOGY | Age: 78
End: 2023-02-27

## 2023-02-27 NOTE — TELEPHONE ENCOUNTER
I tried Officer L one more time tonight and this time reached her and she states they were able to get in touch with another doctor who has treated him and they agreed to sign the death certificate so nothing is needed on my end. I asked her when he passed away and she said she did not have the exact time of death due to the circumstances of how he was found. I will forward a message to my  to diane him as .

## 2023-02-27 NOTE — TELEPHONE ENCOUNTER
I called this number and it went to voicemail but did not have a name on the voicemail so I left a generic message stating that I was  L's phone call and left my cell phone number for a return call.

## 2023-02-27 NOTE — TELEPHONE ENCOUNTER
Officer L. LVM at 10:14 AM stating pt has passed away and she needs to speak with Dr Neri Berrios regarding pt's death certificate before she can contact the  home.

## 2023-03-14 DIAGNOSIS — E78.5 HYPERLIPIDEMIA LDL GOAL <100: ICD-10-CM

## 2023-03-14 DIAGNOSIS — E10.65 UNCONTROLLED TYPE 1 DIABETES MELLITUS WITH HYPERGLYCEMIA (HCC): ICD-10-CM

## 2023-03-14 DIAGNOSIS — R03.0 ELEVATED BLOOD PRESSURE READING WITHOUT DIAGNOSIS OF HYPERTENSION: ICD-10-CM

## 2024-04-17 NOTE — PROGRESS NOTES
How Severe Are Your Spot(S)?: moderate PICC Education: Explained reason and rationale for PICC placement along with providing education in order to make an informed consent including nature, risks, benefits, potential complications, care and maintenance of PICC line. The opportunity for questions or concerns was given. Patient  gave written consent for PICC procedure to be done at the bedside. Patient  verbalizes understanding at this time. Procedure:  Time out completed. Pre procedure assessment done. Maximum sterile barrier precautions observed throughout procedure. NOTE:  Lidocaine was NOT used due to patient allergy to Novocaine. Cannulated Brachial vein using ultrasound guidance and modified Seldinger technique, however was unable to pass the wire despite repositioning and 3 attempts. Note:  Had originally assessed the right arm,however the basilic vein did not close completely after it joined the brachial.  The basilic on the Left side also did not compress. Explained unsuccessful PICC attempt to patient who voiced understanding and tolerated the procedure well.  Also informed NP Gia Constantino and primary RN of unsuccessful PICC attempt.   ]       Bonita Martin, RN, BSN, [facility]-BC  Vascular Access Team What Type Of Note Output Would You Prefer (Optional)?: Bullet Format What Is The Reason For Today's Visit?: Full Body Skin Examination What Is The Reason For Today's Visit? (Being Monitored For X): concerning skin lesions on an annual basis

## 2024-10-14 NOTE — PROGRESS NOTES
Transition of Care Plan:  RUR: 15%  Disposition: goal is to return home   Follow up appointments: PCP, podiatry? DME needed: TBD  Transportation at Discharge: friend Deannie Runner or means to access home: friend to provide       IM Medicare letter: to be provided prior to d/c  Caregiver Contact: gunjan Trent [phone]  Discharge Caregiver contacted prior to discharge? To be contacted prior to d/c             Reason for Admission: wound infection                     RUR Score:  15%                   Plan for utilizing home health:  Per recommendation         PCP: First and Last name:  Shashi Calvo MD   Name of Practice: [facility]    Are you a current patient: Yes/No: yes   Approximate date of last visit: 6 mo ago    Can you participate in a virtual visit with your PCP: yes                    Current Advanced Directive/Advance Care Plan: Jodi Bejarano (ACP) Conversation      Date of Conversation: 7/12/21  Conducted with: Patient with [address] Decision Maker:     Click here to complete [address] including [address] Relationship (ie \"Primary\")  Today we documented Decision Maker(s) consistent with ACP documents on file. Content/Action Overview: Has ACP document(s) on file - reflects the patient's care preferences  Reviewed DNR/DNI and patient elects Full Code (Attempt Resuscitation)    Healthcare Decision Maker:   Click here to complete [address] including selection of the Healthcare Decision Maker Relationship (ie \"Primary\")                         Transition of Care Plan:                      CM made room visit with patient who was alert and oriented. Pt confirmed demographics, insurance, and emergency contact on file. Pt uses [address] on [facility] [address] Pt lives alone in a single level home with 4 corrine. Pt has no DME.  At [No Acute Distress] : no acute distress baseline pt is independent with ADLs and driving. Pt has used HH over 20 years ago and has no hx of SNF or IPR. Pt's goal is to return home upon d/c. Pt reported his friend Nikolai Cowan would be able to help out when needed and also provide transportation at d/c. Care Management Interventions  PCP Verified by CM: Yes (last seen 6 mo ago )  Mode of Transport at Discharge:  Other (see comment) (friend )  Transition of Care Consult (CM Consult): Discharge Planning  Discharge Durable Medical Equipment: No  Physical Therapy Consult: No  Occupational Therapy Consult: No  Speech Therapy Consult: No  Current Support Network: Lives Alone, Family Lives Nearby, Own Home (pt lives alone in a single level home with 3 corrine)  Confirm Follow Up Transport: Family  Discharge Location  [facility]: [facility], [address] [Normal Oropharynx] : normal oropharynx [Normal Appearance] : normal appearance [No Neck Mass] : no neck mass [Normal Rate/Rhythm] : normal rate/rhythm [Normal S1, S2] : normal s1, s2 [No Murmurs] : no murmurs [No Resp Distress] : no resp distress [Clear to Auscultation Bilaterally] : clear to auscultation bilaterally [No Abnormalities] : no abnormalities [Benign] : benign [Normal Gait] : normal gait [No Clubbing] : no clubbing [No Cyanosis] : no cyanosis [No Edema] : no edema [FROM] : FROM [Normal Color/ Pigmentation] : normal color/ pigmentation [No Focal Deficits] : no focal deficits [Oriented x3] : oriented x3 [Normal Affect] : normal affect

## 2025-03-03 NOTE — PERIOP NOTES
EKG REVIEWED BY Eriberto Christianson NP. PT OK FOR SURGERY. - Gabapentin 150 mg q12h (home med)   - Keppra 750 mg BID (home med)   - Lacosamide 100 mg BID (home med)   - Lurasidone 40 mg daily (home med)   - Lamotrigine 100 mg BID (home med)  - Escitalopram 15 mg daily (home med)  - medicaion via OG tube while intubated  - vEEG per ICU team: no apparent seizures.

## (undated) DEVICE — CULTURETTE SGL EVAC TUBE PALL -- 100/CA

## (undated) DEVICE — BANDAGE,GAUZE,BULKEE II,4.5"X4.1YD,STRL: Brand: MEDLINE

## (undated) DEVICE — SOLUTION IRRIG 1000ML 0.9% SOD CHL USP POUR PLAS BTL

## (undated) DEVICE — CYSTO-SMH: Brand: MEDLINE INDUSTRIES, INC.

## (undated) DEVICE — ROCKER SWITCH PENCIL BLADE ELECTRODE, HOLSTER: Brand: EDGE

## (undated) DEVICE — SYR 10ML LUER LOK 1/5ML GRAD --

## (undated) DEVICE — SHEET,DRAPE,UNDERBUTTOCK,GRAD POUCH,PORT: Brand: MEDLINE

## (undated) DEVICE — YANKAUER,BULB TIP,W/O VENT,RIGID,STERILE: Brand: MEDLINE

## (undated) DEVICE — SYRINGE IRRIG 60ML SFT PLIABLE BLB EZ TO GRP 1 HND USE W/

## (undated) DEVICE — BASIC SINGLE BASIN BTC-LF: Brand: MEDLINE INDUSTRIES, INC.

## (undated) DEVICE — SOLUTION IRRIG 1000ML STRL H2O USP PLAS POUR BTL

## (undated) DEVICE — SYR LR LCK 1ML GRAD NSAF 30ML --

## (undated) DEVICE — ZIMMER® STERILE DISPOSABLE TOURNIQUET CUFF WITH PROTECTIVE SLEEVE AND PLC, DUAL PORT, SINGLE BLADDER, 18 IN. (46 CM)

## (undated) DEVICE — TUR SERIES SET

## (undated) DEVICE — STERILE POLYISOPRENE POWDER-FREE SURGICAL GLOVES: Brand: PROTEXIS

## (undated) DEVICE — GARMENT,MEDLINE,DVT,INT,CALF,MED, GEN2: Brand: MEDLINE

## (undated) DEVICE — TUBE SET SONICONE SHARPVAC DISP (MIN ORDER 5)

## (undated) DEVICE — REM POLYHESIVE ADULT PATIENT RETURN ELECTRODE: Brand: VALLEYLAB

## (undated) DEVICE — SUTURE NONABSORBABLE MONOFILAMENT 5-0 PS-2 18 IN BLK ETHILON 1666H

## (undated) DEVICE — PRECISION THIN (9.0 X 0.38 X 31.0MM)

## (undated) DEVICE — CANISTER SUCT C13L L VOL W/OUT TBNG DISP FOR USE W/

## (undated) DEVICE — SWAB CULT LIQ STUART AGR AERB MOD IN BRK SGL RAYON TIP PLAS 220099] BECTON DICKINSON MICRO]

## (undated) DEVICE — SUTURE PERMAHAND SZ 2-0 L18IN NONABSORBABLE BLK L26MM PS 1588H

## (undated) DEVICE — DRAINBAG,ANTI-REFLUX TOWER,L/F,2000ML,LL: Brand: MEDLINE

## (undated) DEVICE — GLOVE ORANGE PI 7 1/2   MSG9075

## (undated) DEVICE — EXTREMITY III-LF: Brand: MEDLINE INDUSTRIES, INC.

## (undated) DEVICE — SOL IRRIGATION INJ NACL 0.9% 500ML BTL

## (undated) DEVICE — TRAY PREP DRY W/ PREM GLV 2 APPL 6 SPNG 2 UNDPD 1 OVERWRAP

## (undated) DEVICE — GOWN,PLEAT,SPECIALTY,XL,STRL: Brand: MEDLINE

## (undated) DEVICE — BANDAGE,GAUZE,CONFORMING,4"X75",STRL,LF: Brand: MEDLINE

## (undated) DEVICE — SUTURE VCRL SZ 4-0 L27IN ABSRB UD L19MM FS-2 3/8 CIR REV J422H

## (undated) DEVICE — DRESSING PETRO W3XL8IN N ADH OIL EMUL GZ CURAD

## (undated) DEVICE — NEEDLE HYPO 25GA L1.5IN BVL ORIENTED ECLIPSE

## (undated) DEVICE — BANDAGE COBAN 4 IN COMPR W4INXL5YD FOAM COHESIVE QUIK STK SELF ADH SFT

## (undated) DEVICE — SOLUTION IRRIG 3000ML 0.9% SOD CHL USP UROMATIC PLAS CONT

## (undated) DEVICE — SPONGE GZ W4XL4IN COT 12 PLY TYP VII WVN C FLD DSGN